# Patient Record
Sex: MALE | Race: WHITE | Employment: OTHER | URBAN - METROPOLITAN AREA
[De-identification: names, ages, dates, MRNs, and addresses within clinical notes are randomized per-mention and may not be internally consistent; named-entity substitution may affect disease eponyms.]

---

## 2017-02-10 PROBLEM — S32.000A CLOSED COMPRESSION FRACTURE OF LUMBAR VERTEBRA (HCC): Status: ACTIVE | Noted: 2017-02-10

## 2017-04-14 PROBLEM — K56.609 SMALL BOWEL OBSTRUCTION (HCC): Status: ACTIVE | Noted: 2017-04-14

## 2017-04-14 PROBLEM — K43.9 VENTRAL HERNIA: Status: ACTIVE | Noted: 2017-04-14

## 2017-04-14 PROBLEM — R10.9 ABDOMINAL PAIN: Status: ACTIVE | Noted: 2017-04-14

## 2017-04-15 PROBLEM — E66.9 OBESITY: Chronic | Status: ACTIVE | Noted: 2017-04-14

## 2017-04-15 PROBLEM — F17.200 NICOTINE DEPENDENCE: Chronic | Status: ACTIVE | Noted: 2017-04-14

## 2017-04-15 PROBLEM — G40.909 SEIZURE DISORDER (HCC): Chronic | Status: ACTIVE | Noted: 2017-04-14

## 2017-04-20 PROBLEM — F05 DELIRIUM DUE TO ANOTHER MEDICAL CONDITION: Status: ACTIVE | Noted: 2017-04-20

## 2017-04-20 PROBLEM — G45.9 TIA (TRANSIENT ISCHEMIC ATTACK): Status: ACTIVE | Noted: 2017-04-20

## 2017-12-11 PROBLEM — R56.9 SEIZURE (HCC): Status: ACTIVE | Noted: 2017-12-11

## 2017-12-12 PROBLEM — R10.9 ABDOMINAL PAIN: Status: RESOLVED | Noted: 2017-04-14 | Resolved: 2017-12-12

## 2017-12-12 PROBLEM — K56.609 SMALL BOWEL OBSTRUCTION (HCC): Status: RESOLVED | Noted: 2017-04-14 | Resolved: 2017-12-12

## 2017-12-12 PROBLEM — Z86.73 HISTORY OF TIA (TRANSIENT ISCHEMIC ATTACK): Status: ACTIVE | Noted: 2017-04-20

## 2018-03-19 LAB
LEFT VENTRICULAR EJECTION FRACTION HIGH VALUE: 71 %
LEFT VENTRICULAR EJECTION FRACTION MODE: NORMAL
LV EF: 61 %

## 2018-09-20 ENCOUNTER — OFFICE VISIT (OUTPATIENT)
Dept: CARDIOLOGY CLINIC | Age: 68
End: 2018-09-20
Payer: MEDICARE

## 2018-09-20 VITALS
DIASTOLIC BLOOD PRESSURE: 66 MMHG | HEIGHT: 70 IN | WEIGHT: 263 LBS | RESPIRATION RATE: 16 BRPM | SYSTOLIC BLOOD PRESSURE: 110 MMHG | BODY MASS INDEX: 37.65 KG/M2 | HEART RATE: 66 BPM

## 2018-09-20 DIAGNOSIS — I10 ESSENTIAL HYPERTENSION: ICD-10-CM

## 2018-09-20 DIAGNOSIS — K70.30 ALCOHOLIC CIRRHOSIS OF LIVER WITHOUT ASCITES (HCC): ICD-10-CM

## 2018-09-20 DIAGNOSIS — Z95.2 S/P TAVR (TRANSCATHETER AORTIC VALVE REPLACEMENT): ICD-10-CM

## 2018-09-20 DIAGNOSIS — I48.19 PERSISTENT ATRIAL FIBRILLATION (HCC): Primary | ICD-10-CM

## 2018-09-20 PROCEDURE — 1036F TOBACCO NON-USER: CPT | Performed by: INTERNAL MEDICINE

## 2018-09-20 PROCEDURE — 99213 OFFICE O/P EST LOW 20 MIN: CPT | Performed by: INTERNAL MEDICINE

## 2018-09-20 PROCEDURE — 4040F PNEUMOC VAC/ADMIN/RCVD: CPT | Performed by: INTERNAL MEDICINE

## 2018-09-20 PROCEDURE — 3017F COLORECTAL CA SCREEN DOC REV: CPT | Performed by: INTERNAL MEDICINE

## 2018-09-20 PROCEDURE — G8417 CALC BMI ABV UP PARAM F/U: HCPCS | Performed by: INTERNAL MEDICINE

## 2018-09-20 PROCEDURE — G8427 DOCREV CUR MEDS BY ELIG CLIN: HCPCS | Performed by: INTERNAL MEDICINE

## 2018-09-20 PROCEDURE — 93000 ELECTROCARDIOGRAM COMPLETE: CPT | Performed by: INTERNAL MEDICINE

## 2018-09-20 PROCEDURE — 1101F PT FALLS ASSESS-DOCD LE1/YR: CPT | Performed by: INTERNAL MEDICINE

## 2018-09-20 PROCEDURE — 1123F ACP DISCUSS/DSCN MKR DOCD: CPT | Performed by: INTERNAL MEDICINE

## 2018-09-20 RX ORDER — CEPHALEXIN 500 MG/1
500 CAPSULE ORAL 2 TIMES DAILY
COMMUNITY
End: 2019-06-17

## 2018-09-20 RX ORDER — LACOSAMIDE 100 MG/1
100 TABLET ORAL 2 TIMES DAILY
COMMUNITY

## 2018-09-20 RX ORDER — PREDNISONE 1 MG/1
1 TABLET ORAL DAILY
COMMUNITY
End: 2019-06-17

## 2018-09-20 RX ORDER — OMEPRAZOLE 40 MG/1
40 CAPSULE, DELAYED RELEASE ORAL DAILY
COMMUNITY

## 2018-09-20 NOTE — PROGRESS NOTES
[facility] Cardiology  Kissee Mills MD Josseline Fiore MD Herbie Gianotti, MD Murel Mesi. Ramiro Hastings MD            Tyrell Alvarenga   1950  Faith Menjivar MD    Mr. Tyrell Alvarenga was in the outpatient office on 2018 for follow up of his valvular heart disease. This 79-year-old gentleman with a history of chronic alcoholic cirrhosis and esophageal varices and underwent TAVR at [facility] in 2017. His last echocardiogram earlier this year at the Valve clinic at [facility] revealed a gradient of 12 and a normal EF. He presented to our office for a 6 month follow up visit. In the interim, he is noted to have right upper lobe nodule that has been growing. There is concern for cancer. He is scheduled to follow up with Pulmonology at [facility] in the next few weeks. He notes no new cardiac complaints. He ambulates with the help of a cane. He notes no shortness of breath, orthopnea or PND with it. No lower extremity edema. Past medical history:   1. Hypertension. 2. Long history of alcohol abuse. 3. Alcoholic cirrhosis with varices and upper GI bleed, 2011.   4. Auto anticoagulation. The patient's INR was 1.9 on 2012 despite the fact that he was not on anticoagulants. It has been documented as being high since at least 2011.   5. Elevated liver function studies due to alcoholic cirrhosis. 6. JAY. The patient had soft palate resection for this and does not use CPAP. 7. Banding of esophageal varices, 2011.   8. Family history negative for early CAD. His father  of \"natural causes\" and his mother  of leukemia. A younger brother who weighs over 450 pounds may have some heart problems but the patient is not sure. 9. Hospitalization, 2012 with newly documented AF. 10. Echo, 2012. Supravalvular aortic stenosis with peak and mean gradients of 33 and 22 mm/Hg respectively, effective orifice area was 1.6 cm ².  He did have normal LV internal dimensions is normal.      EKG, as per my interpretation, reveals atrial fibrillation, controlled ventricular response. No acute ST-T changes. Mr. Karen Hill was in the outpatient office for follow up of his valvular heart disease and AF. Echocardiogram done in [facility] a few months ago at the [facility]ve Clinic revealed an appropriately functioning ES3. He therefore requests no further work up. If he was rate controlled, as mentioned, he was deemed a poor anticoagulation candidate secondary to brain bleed. He has no evidence of volume overload. I therefore made no medication changes today, which include the following:   cephALEXin (KEFLEX) 500 MG capsule Take 500 mg by mouth 2 times daily   lacosamide (VIMPAT) 100 MG TABS tablet Take 100 mg by mouth 2 times daily. Janusz Dominguez omeprazole (PRILOSEC) 20 MG delayed release capsule Take 20 mg by mouth daily   predniSONE (DELTASONE) 1 MG tablet Take 1 mg by mouth daily   Calcium Citrate-Vitamin D (CALCIUM CITRATE + D PO) Take 600 mg by mouth daily   spironolactone (ALDACTONE) 25 MG tablet Take 25 mg by mouth daily   vitamin B-1 (THIAMINE) 100 MG tablet Take 100 mg by mouth daily   acetaminophen (TYLENOL) 325 MG tablet Take 325 mg by mouth 3 times daily    levETIRAcetam (KEPPRA) 500 MG tablet Take 2 tablets by mouth 2 times daily   aspirin 81 MG tablet Take 81 mg by mouth daily   hydroxychloroquine (PLAQUENIL) 200 MG tablet Take 400 mg by mouth nightly    folic acid (FOLVITE) 1 MG tablet Take 1 mg by mouth daily    lactulose 20 GM/30ML SOLN 20 g by Other route daily    Cholecalciferol (VITAMIN D) 2000 UNITS TABS tablet Take 4,000 Units by mouth daily    nadolol (CORGARD) 80 MG tablet Take 80 mg by mouth daily      Should he remain stable from a cardiac perspective, he will be seen back in our office in 6 months. Thank you for allowing us to participate in the care of this patient.      RPV/tlr

## 2018-09-20 NOTE — PATIENT INSTRUCTIONS
Patient Education        A Healthy Heart: Care Instructions  Your Care Instructions    Heart disease occurs when a substance called plaque builds up in the vessels that supply oxygen-rich blood to your heart. This can narrow the blood vessels and reduce blood flow. A heart attack happens when blood flow is completely blocked. A high-fat diet, smoking, and other factors increase the risk of heart disease. Your doctor has found that you have a chance of having heart disease. You can do lots of things to keep your heart healthy. It may not be easy, but you can change your diet, exercise more, and quit smoking. These steps really work to lower your chance of heart disease. Follow-up care is a key part of your treatment and safety. Be sure to make and go to all appointments, and call your doctor if you are having problems. It's also a good idea to know your test results and keep a list of the medicines you take. How can you care for yourself at home? Diet    · Use less salt when you cook and eat. This helps lower your blood pressure. Taste food before salting. Add only a little salt when you think you need it. With time, your taste buds will adjust to less salt.     · Eat fewer snack items, fast foods, canned soups, and other high-salt, high-fat, processed foods.     · Read food labels and try to avoid saturated and trans fats. They increase your risk of heart disease by raising cholesterol levels.     · Limit the amount of solid fat-butter, margarine, and shortening-you eat. Use olive, peanut, or canola oil when you cook. Bake, broil, and steam foods instead of frying them.     · Eating fish can lower your risk for heart disease. Eat at least 2 servings of fish a week. [place], mackerel, herring, sardines, and chunk light tuna are very good choices. These fish contain omega-3 fatty acids.     · Eat a variety of fruit and vegetables every day.  Dark green, deep orange, red, or yellow fruits and vegetables are

## 2018-10-01 ENCOUNTER — TELEPHONE (OUTPATIENT)
Dept: CARDIOLOGY CLINIC | Age: 68
End: 2018-10-01

## 2018-10-01 NOTE — TELEPHONE ENCOUNTER
Dr Marie Santana, [facility]  Requesting cardiac clearance prior to bronchoscopy for lung nodule that lit up on PET scan.    Please advise,

## 2018-10-05 ENCOUNTER — HOSPITAL ENCOUNTER (OUTPATIENT)
Age: 68
Discharge: HOME | End: 2018-10-07

## 2018-10-05 PROCEDURE — 88305 TISSUE EXAM BY PATHOLOGIST: CPT

## 2018-10-05 PROCEDURE — 87081 CULTURE SCREEN ONLY: CPT

## 2018-10-06 LAB — CLOTEST: NORMAL

## 2019-05-09 ENCOUNTER — OFFICE VISIT (OUTPATIENT)
Dept: CARDIOLOGY CLINIC | Age: 69
End: 2019-05-09
Payer: MEDICARE

## 2019-05-09 VITALS
SYSTOLIC BLOOD PRESSURE: 124 MMHG | HEIGHT: 70 IN | RESPIRATION RATE: 20 BRPM | WEIGHT: 280.4 LBS | HEART RATE: 74 BPM | BODY MASS INDEX: 40.14 KG/M2 | DIASTOLIC BLOOD PRESSURE: 72 MMHG

## 2019-05-09 DIAGNOSIS — R06.09 DOE (DYSPNEA ON EXERTION): ICD-10-CM

## 2019-05-09 DIAGNOSIS — I48.0 PAROXYSMAL ATRIAL FIBRILLATION (HCC): Primary | ICD-10-CM

## 2019-05-09 DIAGNOSIS — Z95.2 S/P TAVR (TRANSCATHETER AORTIC VALVE REPLACEMENT): ICD-10-CM

## 2019-05-09 DIAGNOSIS — I10 ESSENTIAL HYPERTENSION: ICD-10-CM

## 2019-05-09 PROCEDURE — 1123F ACP DISCUSS/DSCN MKR DOCD: CPT | Performed by: INTERNAL MEDICINE

## 2019-05-09 PROCEDURE — 3017F COLORECTAL CA SCREEN DOC REV: CPT | Performed by: INTERNAL MEDICINE

## 2019-05-09 PROCEDURE — G8427 DOCREV CUR MEDS BY ELIG CLIN: HCPCS | Performed by: INTERNAL MEDICINE

## 2019-05-09 PROCEDURE — 1036F TOBACCO NON-USER: CPT | Performed by: INTERNAL MEDICINE

## 2019-05-09 PROCEDURE — G8417 CALC BMI ABV UP PARAM F/U: HCPCS | Performed by: INTERNAL MEDICINE

## 2019-05-09 PROCEDURE — 4040F PNEUMOC VAC/ADMIN/RCVD: CPT | Performed by: INTERNAL MEDICINE

## 2019-05-09 PROCEDURE — 93000 ELECTROCARDIOGRAM COMPLETE: CPT | Performed by: INTERNAL MEDICINE

## 2019-05-09 PROCEDURE — 99213 OFFICE O/P EST LOW 20 MIN: CPT | Performed by: INTERNAL MEDICINE

## 2019-05-09 RX ORDER — POTASSIUM CITRATE 10 MEQ/1
TABLET, EXTENDED RELEASE ORAL
COMMUNITY

## 2019-05-09 NOTE — PATIENT INSTRUCTIONS
the  may tell you to chew 1 adult-strength or 2 to 4 low-dose aspirin. Wait for an ambulance. Do not try to drive yourself.   Watch closely for changes in your health, and be sure to contact your doctor if you have any problems. Where can you learn more? Go to [email or web address] and sign in to your [employer] account. Enter X860 in the [facility] box to learn more about \"A Healthy Heart: Care Instructions. \"     If you do not have an account, please click on the \"Sign Up Now\" link. Current as of: July 22, 2018  Content Version: 12.0  © 3013-2910 Healthwise, Incorporated. Care instructions adapted under license by [facility] ([facility]). If you have questions about a medical condition or this instruction, always ask your healthcare professional. [address] any warranty or liability for your use of this information.

## 2019-05-10 NOTE — PROGRESS NOTES
[facility]  Rayann Crick, MD Ian Matar, MD Glennis Percy, MD Reuben Nyhan. Vianey Bartlett MD            Garold Nyhan   1950  Bozena Andrews MD    Mr. Garold Nyhan was in the outpatient office on 2019 for follow up of his valvular heart disease. This 78-year-old obese gentleman has a history of chronic alcoholic cirrhosis, esophageal varices and is S/P TAVR in [facility] in 2017. His last echo in 2018 revealed normal EF with a gradient of 12. During the last office visit, he had noted a right upper pulmonary nodule. Biopsy was consistent with cancer. He therefore underwent 5 cycles of radiation therapy at [facility]. He follows with Pulmonology there for further evaluation. He ambulates with the help of a cane. He notes no chest pain. His baseline dyspnea is unchanged. He denies orthopnea or PND. No lower extremity edema. Past medical history:   1. Hypertension. 2. Long history of alcohol abuse. 3. Alcoholic cirrhosis with varices and upper GI bleed, 2011.   4. Auto anticoagulation. The patient's INR was 1.9 on 2012 despite the fact that he was not on anticoagulants. It has been documented as being high since at least 2011.   5. Elevated liver function studies due to alcoholic cirrhosis. 6. JAY. The patient had soft palate resection for this and does not use CPAP. 7. Banding of esophageal varices, 2011.   8. Family history negative for early CAD. His father  of \"natural causes\" and his mother  of leukemia. A younger brother who weighs over 450 pounds may have some heart problems but the patient is not sure. 9. Hospitalization, 2012 with newly documented AF. 10. Echo, 2012. Supravalvular aortic stenosis with peak and mean gradients of 33 and 22 mm/Hg respectively, effective orifice area was 1.6 cm ².  He did have normal LV internal dimensions with mild concentric LVH, normal LV regional wall motion and    20. Admission, [facility], 12/2017, with seizures.  Prolonged hospital course complicated by fall and right hip fracture.  S/P hip replacement surgery. 21. Echocardiogram, [facility], 3/19/2018, EF 66 +/-5%. Dilated RV with normal function. Severely dilated left atrium, dilated right atrial cavity, S/P TAVR (29 mm S3) with mean gradient of 12 and dimensional index of .52.    22. Atrial fibrillation noted to be persistent. Deemed not an anticoagulation candidate secondary to brain bleed in 2016. Watchman reported attempted at [facility], unsuccessful. 23. Right upper lobe lung cancer. S/P 5 doses of radiation therapy. Follows at [facility]. Last CT scan, 04/2019, fibrosis in the right upper lung with decrease in the size of pulmonary nodules. No new or enlarging lymphadenopathy.      Review of Systems:  HEENT: negative for acute visual and auditory problems  Constitutional: negative for fever and chills  Respiratory: negative for cough and hemoptysis  Cardiovascular: negative for chest pain and dyspnea  Gastrointestinal: negative for abdominal pain, diarrhea, nausea and vomiting  Genitourinary: negative for dysuria and hematuria  Derm: negative for rash and skin lesion(s)  Neurological: negative for seizures and tremors  Endocrine: negative for diabetic symptoms including polydipsia and polyuria  Musculoskeletal: negative for CTD  Psychiatric: negative for anxiety and major depression    On exam, he is a middle-aged, morbidly obese gentleman in no particular distress. BP: 124/72. P: 74.  R: 20. Wt. 218 lbs. BMI: 40. HEENT, conjunctiva pink. Sclerae anicteric. Mucous membranes are moist.  Neck, no JVD could be appreciated. Carotid upstrokes normal.  No bruits. Chest expands normally. No intercostal retractions. Cardiovascular exam reveals normal S1/S2. Regular rate and rhythm. No murmurs, rubs or gallops noted. Lungs have good air entry bilaterally without any creps, rhonchi or wheezes.   Abdomen is soft, nontender with intact bowel sounds. Extremities have no edema. Distal pulses are normal bilaterally. No cyanosis. Skin has no rashes. Neurologically, oriented x 3. Psych, patient is pleasant. Back has no tenderness. Muscle tone is normal.       EKG, as per my interpretation, reveals atrial fibrillation, controlled ventricular response. No acute ST-T changes. Mr. Colton Hill was in the outpatient office for follow up of his valvular heart disease and AF. His valve sounds normal on auscultation. His last echo about a year ago revealed normal gradients. Yearly echo will be repeated after this office visit. He does have long-standing persistent atrial fibrillation. He is rate controlled. He is deemed not a candidate for anticoagulation secondary to his liver disease and esophageal varices. He appears compensated and well perfused today. I therefore made no medication changes today, which include the following:   potassium citrate (UROCIT-K) 10 MEQ (1080 MG) extended release tablet Take by mouth 3 tabs tid   Teriparatide, Recombinant, (FORTEO SC) Inject into the skin   cephALEXin (KEFLEX) 500 MG capsule Take 500 mg by mouth 2 times daily   lacosamide (VIMPAT) 100 MG TABS tablet Take 100 mg by mouth 2 times daily. Suezrichelle Je    omeprazole (PRILOSEC) 20 MG delayed release capsule Take 40 mg by mouth daily    predniSONE (DELTASONE) 1 MG tablet Take 1 mg by mouth daily   Calcium Citrate-Vitamin D (CALCIUM CITRATE + D PO) Take 600 mg by mouth daily   spironolactone (ALDACTONE) 25 MG tablet Take 25 mg by mouth daily   vitamin B-1 (THIAMINE) 100 MG tablet Take 100 mg by mouth daily   acetaminophen (TYLENOL) 325 MG tablet Take 325 mg by mouth 3 times daily    levETIRAcetam (KEPPRA) 500 MG tablet Take 2 tablets by mouth 2 times daily   aspirin 81 MG tablet Take 81 mg by mouth daily   hydroxychloroquine (PLAQUENIL) 200 MG tablet Take 400 mg by mouth nightly    folic acid (FOLVITE) 1 MG tablet Take 1 mg by mouth daily    lactulose 20 GM/30ML SOLN 20 g by Other route daily    Cholecalciferol (VITAMIN D) 2000 UNITS TABS tablet Take 4,000 Units by mouth daily    nadolol (CORGARD) 80 MG tablet Take 80 mg by mouth daily      If he remains stable from a cardiac perspective, he will be seen back in our office in a year. Thank you for allowing us to participate in the care of this patient.       RPV/tlr

## 2019-05-22 ENCOUNTER — TELEPHONE (OUTPATIENT)
Dept: CARDIOLOGY CLINIC | Age: 69
End: 2019-05-22

## 2019-05-29 ENCOUNTER — HOSPITAL ENCOUNTER (OUTPATIENT)
Dept: CARDIOLOGY | Age: 69
Discharge: HOME | End: 2019-05-29
Payer: MEDICARE

## 2019-05-29 DIAGNOSIS — Z95.2 S/P TAVR (TRANSCATHETER AORTIC VALVE REPLACEMENT): ICD-10-CM

## 2019-05-29 DIAGNOSIS — R06.09 DOE (DYSPNEA ON EXERTION): ICD-10-CM

## 2019-05-29 DIAGNOSIS — I10 ESSENTIAL HYPERTENSION: ICD-10-CM

## 2019-05-29 DIAGNOSIS — I48.0 PAROXYSMAL ATRIAL FIBRILLATION (HCC): ICD-10-CM

## 2019-05-29 LAB
LV EF: 65 %
LVEF MODALITY: NORMAL

## 2019-05-29 PROCEDURE — 93306 TTE W/DOPPLER COMPLETE: CPT

## 2019-06-14 ENCOUNTER — HOSPITAL ENCOUNTER (OUTPATIENT)
Age: 69
Discharge: HOME | End: 2019-06-16
Payer: MEDICARE

## 2019-06-14 LAB
ALBUMIN SERPL-MCNC: 3.6 G/DL (ref 3.5–5.2)
ALP BLD-CCNC: 82 U/L (ref 40–129)
ALT SERPL-CCNC: 14 U/L (ref 0–40)
ANION GAP SERPL CALCULATED.3IONS-SCNC: 13 MMOL/L (ref 7–16)
AST SERPL-CCNC: 32 U/L (ref 0–39)
BASOPHILS ABSOLUTE: 0.03 E9/L (ref 0–0.2)
BASOPHILS RELATIVE PERCENT: 0.6 % (ref 0–2)
BILIRUB SERPL-MCNC: 0.8 MG/DL (ref 0–1.2)
BILIRUBIN DIRECT: 0.3 MG/DL (ref 0–0.3)
BILIRUBIN, INDIRECT: 0.5 MG/DL (ref 0–1)
BUN BLDV-MCNC: 21 MG/DL (ref 8–23)
C-REACTIVE PROTEIN: 0.4 MG/DL (ref 0–0.4)
CALCIUM SERPL-MCNC: 9.7 MG/DL (ref 8.6–10.2)
CHLORIDE BLD-SCNC: 103 MMOL/L (ref 98–107)
CO2: 25 MMOL/L (ref 22–29)
CREAT SERPL-MCNC: 1.3 MG/DL (ref 0.7–1.2)
EOSINOPHILS ABSOLUTE: 0.07 E9/L (ref 0.05–0.5)
EOSINOPHILS RELATIVE PERCENT: 1.4 % (ref 0–6)
GFR AFRICAN AMERICAN: >60
GFR NON-AFRICAN AMERICAN: 55 ML/MIN/1.73
GLUCOSE BLD-MCNC: 116 MG/DL (ref 74–99)
HCT VFR BLD CALC: 38.5 % (ref 37–54)
HEMOGLOBIN: 12 G/DL (ref 12.5–16.5)
IMMATURE GRANULOCYTES #: 0.02 E9/L
IMMATURE GRANULOCYTES %: 0.4 % (ref 0–5)
LYMPHOCYTES ABSOLUTE: 0.61 E9/L (ref 1.5–4)
LYMPHOCYTES RELATIVE PERCENT: 12.4 % (ref 20–42)
MCH RBC QN AUTO: 29.6 PG (ref 26–35)
MCHC RBC AUTO-ENTMCNC: 31.2 % (ref 32–34.5)
MCV RBC AUTO: 94.8 FL (ref 80–99.9)
MONOCYTES ABSOLUTE: 0.51 E9/L (ref 0.1–0.95)
MONOCYTES RELATIVE PERCENT: 10.3 % (ref 2–12)
NEUTROPHILS ABSOLUTE: 3.69 E9/L (ref 1.8–7.3)
NEUTROPHILS RELATIVE PERCENT: 74.9 % (ref 43–80)
PDW BLD-RTO: 15.4 FL (ref 11.5–15)
PLATELET # BLD: 66 E9/L (ref 130–450)
PLATELET CONFIRMATION: NORMAL
PMV BLD AUTO: 11.8 FL (ref 7–12)
POTASSIUM SERPL-SCNC: 4.4 MMOL/L (ref 3.5–5)
RBC # BLD: 4.06 E12/L (ref 3.8–5.8)
SEDIMENTATION RATE, ERYTHROCYTE: 3 MM/HR (ref 0–15)
SODIUM BLD-SCNC: 141 MMOL/L (ref 132–146)
TOTAL PROTEIN: 6.9 G/DL (ref 6.4–8.3)
WBC # BLD: 4.9 E9/L (ref 4.5–11.5)

## 2019-06-14 PROCEDURE — 80053 COMPREHEN METABOLIC PANEL: CPT

## 2019-06-14 PROCEDURE — 36415 COLL VENOUS BLD VENIPUNCTURE: CPT

## 2019-06-14 PROCEDURE — 85651 RBC SED RATE NONAUTOMATED: CPT

## 2019-06-14 PROCEDURE — 82248 BILIRUBIN DIRECT: CPT

## 2019-06-14 PROCEDURE — 86140 C-REACTIVE PROTEIN: CPT

## 2019-06-14 PROCEDURE — 85025 COMPLETE CBC W/AUTO DIFF WBC: CPT

## 2019-06-17 ENCOUNTER — OFFICE VISIT (OUTPATIENT)
Dept: RHEUMATOLOGY | Age: 69
End: 2019-06-17
Payer: MEDICARE

## 2019-06-17 VITALS
DIASTOLIC BLOOD PRESSURE: 82 MMHG | HEIGHT: 68 IN | TEMPERATURE: 97.2 F | SYSTOLIC BLOOD PRESSURE: 132 MMHG | HEART RATE: 86 BPM | BODY MASS INDEX: 43.29 KG/M2 | WEIGHT: 285.6 LBS | OXYGEN SATURATION: 98 %

## 2019-06-17 DIAGNOSIS — M81.0 AGE-RELATED OSTEOPOROSIS WITHOUT CURRENT PATHOLOGICAL FRACTURE: ICD-10-CM

## 2019-06-17 DIAGNOSIS — M05.79 RHEUMATOID ARTHRITIS INVOLVING MULTIPLE SITES WITH POSITIVE RHEUMATOID FACTOR (HCC): ICD-10-CM

## 2019-06-17 PROCEDURE — 99214 OFFICE O/P EST MOD 30 MIN: CPT | Performed by: INTERNAL MEDICINE

## 2019-06-17 RX ORDER — PREDNISONE 1 MG/1
TABLET ORAL
Refills: 0 | COMMUNITY
Start: 2019-04-22 | End: 2019-08-19 | Stop reason: SDUPTHER

## 2019-06-17 ASSESSMENT — ENCOUNTER SYMPTOMS
EYE ITCHING: 0
ABDOMINAL PAIN: 0
TROUBLE SWALLOWING: 0
CONSTIPATION: 0
SINUS PAIN: 0
SINUS PRESSURE: 0
DIARRHEA: 0
COUGH: 0
VOMITING: 0
SHORTNESS OF BREATH: 0
SORE THROAT: 0
COLOR CHANGE: 0
WHEEZING: 0
EYE REDNESS: 0
CHEST TIGHTNESS: 0
PHOTOPHOBIA: 0
BLOOD IN STOOL: 0
ABDOMINAL DISTENTION: 0
EYE PAIN: 0
EYE DISCHARGE: 0

## 2019-06-17 ASSESSMENT — ROUTINE ASSESSMENT OF PATIENT INDEX DATA (RAPID3)
ON A SCALE OF ONE TO TEN, CONSIDERING ALL THE WAYS IN WHICH ILLNESS AND HEALTH CONDITIONS MAY AFFECT YOU AT THIS TIME, PLEASE INDICATE BELOW HOW YOU ARE DOING:: 6
ON A SCALE OF ONE TO TEN, HOW MUCH PAIN HAVE YOU HAD BECAUSE OF YOUR CONDITION OVER THE PAST WEEK?: 7
TOTAL RAPID3 SCORE: 13

## 2019-06-17 NOTE — PROGRESS NOTES
adenopathy. Does not bruise/bleed easily. Psychiatric/Behavioral: Negative for confusion. The patient is not nervous/anxious. Current Outpatient Medications:     predniSONE (DELTASONE) 5 MG tablet, take 1 tablet by mouth once daily, Disp: , Rfl: 0    teriparatide, recombinant, (FORTEO) 600 MCG/2.4ML SOLN injection, Inject 0.08 mLs into the skin daily, Disp: 90 pen, Rfl: 5    potassium citrate (UROCIT-K) 10 MEQ (1080 MG) extended release tablet, Take by mouth 3 tabs tid, Disp: , Rfl:     lacosamide (VIMPAT) 100 MG TABS tablet, Take 100 mg by mouth 2 times daily. ., Disp: , Rfl:     omeprazole (PRILOSEC) 20 MG delayed release capsule, Take 40 mg by mouth daily , Disp: , Rfl:     Calcium Citrate-Vitamin D (CALCIUM CITRATE + D PO), Take 600 mg by mouth daily, Disp: , Rfl:     spironolactone (ALDACTONE) 25 MG tablet, Take 25 mg by mouth daily, Disp: , Rfl:     vitamin B-1 (THIAMINE) 100 MG tablet, Take 100 mg by mouth daily, Disp: , Rfl:     acetaminophen (TYLENOL) 325 MG tablet, Take 325 mg by mouth 3 times daily , Disp: , Rfl:     levETIRAcetam (KEPPRA) 500 MG tablet, Take 2 tablets by mouth 2 times daily, Disp: 60 tablet, Rfl: 0    aspirin 81 MG tablet, Take 81 mg by mouth daily, Disp: , Rfl:     hydroxychloroquine (PLAQUENIL) 200 MG tablet, Take 400 mg by mouth nightly , Disp: , Rfl:     folic acid (FOLVITE) 1 MG tablet, Take 1 mg by mouth daily , Disp: , Rfl:     lactulose 20 GM/30ML SOLN, 20 g by Other route daily , Disp: , Rfl:     Cholecalciferol (VITAMIN D) 2000 UNITS TABS tablet, Take 4,000 Units by mouth daily , Disp: , Rfl:     nadolol (CORGARD) 80 MG tablet, Take 80 mg by mouth daily , Disp: , Rfl:   No Known Allergies        Past Medical History:   Diagnosis Date    [facility] [facility]     [facility]     Has been sober / dry since 01/2016.     Arthritis     Chronic atrial fibrillation (HCC)     Esophageal varices (HCC)     UGI bleed ~2012 - has had variceal banding Sexual activity: Not Currently     Partners: Female   Lifestyle    Physical activity:     Days per week: Not on file     Minutes per session: Not on file    Stress: Not on file   Relationships    Social connections:     Talks on phone: Not on file     Gets together: Not on file     Attends Zoroastrianism service: Not on file     Active member of club or organization: Not on file     Attends meetings of clubs or organizations: Not on file     Relationship status: Not on file    Intimate partner violence:     Fear of current or ex partner: Not on file     Emotionally abused: Not on file     Physically abused: Not on file     Forced sexual activity: Not on file   Other Topics Concern    Not on file   Social History Narrative    Drinks occ Cola. Social History     Social History Narrative    Drinks occ Cola. Vitals:    06/17/19 1109   BP: 132/82   Site: Right Upper Arm   Position: Sitting   Pulse: 86   Temp: 97.2 °F (36.2 °C)   TempSrc: Temporal   SpO2: 98%   Weight: 285 lb 9.6 oz (129.5 kg)   Height: 5' 8\" (1.727 m)        Physical Exam   Constitutional: He is oriented to person, place, and time. He appears well-developed and well-nourished. HENT:   Head: Normocephalic. Right Ear: External ear normal.   Left Ear: External ear normal.   Mouth/Throat: Oropharynx is clear and moist.   Eyes: Pupils are equal, round, and reactive to light. Conjunctivae and EOM are normal.   Neck: Normal range of motion. No thyromegaly present. Cardiovascular: Normal rate, regular rhythm and intact distal pulses. Pulmonary/Chest: Effort normal and breath sounds normal. He has no wheezes. He has no rales. He exhibits no tenderness. Abdominal: Soft. Bowel sounds are normal. He exhibits no distension and no mass. There is no tenderness. There is no rebound and no guarding. Musculoskeletal: Normal range of motion. Lymphadenopathy:     He has no cervical adenopathy.    Neurological: He is alert and oriented to person, place, and time. Skin: Skin is warm and dry. Capillary refill takes less than 2 seconds. No rash noted. Psychiatric: He has a normal mood and affect. His behavior is normal.        De Villa was seen today for joint pain and joint swelling. Diagnoses and all orders for this visit:    Rheumatoid arthritis involving multiple sites with positive rheumatoid factor (HCC)    Age-related osteoporosis without current pathological fracture    Other orders  -     teriparatide, recombinant, (FORTEO) 600 MCG/2.4ML SOLN injection; Inject 0.08 mLs into the skin daily     Rheumatoid arthritis was diagnosed by Dr. Jhon Carreon manifested as arthralgia and low titers ofanti-CCP antibody. Not a candidate for DMARD's due to liver cirrhosis. He has been complaining of shoulder pain. X ray  of bilateral shelters were reviewed. hose were consistent with arthritis of AC joints. Glenohumeral joint itself is normal . Continue 5 mg of prednisone. Continue 400 mg, Plaquenil    Osteoporosis :   Continue Forteo. Continue 1200 mg of Jalen and 800 IU of Vit D. Return in about 3 months (around 9/17/2019). Eugenia Angel MD     *This document was created using voice recognition software. Note was reviewed, however may contain grammatical errors.

## 2019-06-19 ENCOUNTER — PROCEDURE VISIT (OUTPATIENT)
Dept: PODIATRY | Age: 69
End: 2019-06-19
Payer: MEDICARE

## 2019-06-19 ENCOUNTER — OFFICE VISIT (OUTPATIENT)
Dept: FAMILY MEDICINE CLINIC | Age: 69
End: 2019-06-19
Payer: MEDICARE

## 2019-06-19 VITALS
SYSTOLIC BLOOD PRESSURE: 122 MMHG | OXYGEN SATURATION: 98 % | TEMPERATURE: 97.9 F | BODY MASS INDEX: 42.74 KG/M2 | HEIGHT: 68 IN | HEART RATE: 66 BPM | DIASTOLIC BLOOD PRESSURE: 78 MMHG | WEIGHT: 282 LBS

## 2019-06-19 VITALS
DIASTOLIC BLOOD PRESSURE: 62 MMHG | WEIGHT: 289 LBS | SYSTOLIC BLOOD PRESSURE: 87 MMHG | TEMPERATURE: 97.8 F | BODY MASS INDEX: 43.94 KG/M2

## 2019-06-19 DIAGNOSIS — R26.2 DIFFICULTY WALKING: ICD-10-CM

## 2019-06-19 DIAGNOSIS — M79.675 PAIN IN LEFT TOE(S): ICD-10-CM

## 2019-06-19 DIAGNOSIS — I48.0 PAROXYSMAL ATRIAL FIBRILLATION (HCC): ICD-10-CM

## 2019-06-19 DIAGNOSIS — I73.9 PERIPHERAL VASCULAR DISEASE, UNSPECIFIED (HCC): ICD-10-CM

## 2019-06-19 DIAGNOSIS — M79.674 PAIN IN TOE OF RIGHT FOOT: ICD-10-CM

## 2019-06-19 DIAGNOSIS — B35.1 TINEA UNGUIUM: Primary | ICD-10-CM

## 2019-06-19 DIAGNOSIS — I10 ESSENTIAL HYPERTENSION: Primary | ICD-10-CM

## 2019-06-19 DIAGNOSIS — E66.01 MORBID OBESITY WITH BMI OF 40.0-44.9, ADULT (HCC): ICD-10-CM

## 2019-06-19 DIAGNOSIS — E66.01 CLASS 3 SEVERE OBESITY DUE TO EXCESS CALORIES WITH SERIOUS COMORBIDITY AND BODY MASS INDEX (BMI) OF 40.0 TO 44.9 IN ADULT (HCC): ICD-10-CM

## 2019-06-19 DIAGNOSIS — K70.30 ALCOHOLIC CIRRHOSIS OF LIVER WITHOUT ASCITES (HCC): ICD-10-CM

## 2019-06-19 PROCEDURE — 11721 DEBRIDE NAIL 6 OR MORE: CPT | Performed by: PODIATRIST

## 2019-06-19 PROCEDURE — 99214 OFFICE O/P EST MOD 30 MIN: CPT | Performed by: INTERNAL MEDICINE

## 2019-06-19 ASSESSMENT — ENCOUNTER SYMPTOMS
SHORTNESS OF BREATH: 0
DIARRHEA: 0
COUGH: 0
BLOOD IN STOOL: 0
RHINORRHEA: 0
WHEEZING: 0
BACK PAIN: 1
VOMITING: 0
SINUS PAIN: 0
ABDOMINAL PAIN: 0
CONSTIPATION: 0
NAUSEA: 0

## 2019-06-19 ASSESSMENT — PATIENT HEALTH QUESTIONNAIRE - PHQ9
2. FEELING DOWN, DEPRESSED OR HOPELESS: 0
SUM OF ALL RESPONSES TO PHQ QUESTIONS 1-9: 0
SUM OF ALL RESPONSES TO PHQ9 QUESTIONS 1 & 2: 0
1. LITTLE INTEREST OR PLEASURE IN DOING THINGS: 0
SUM OF ALL RESPONSES TO PHQ QUESTIONS 1-9: 0

## 2019-06-19 NOTE — PROGRESS NOTES
Logan Castaneda  Return Patient    Chief Complaint   Patient presents with    Toe Pain     nail care saw PCP Dr. Manuel Najera on 3/13/2019       Subjective: This Grant Omar comes to clinic for foot and nail care. Pt currently has complaint of thickened, painful, elongated nails that he/she cannot manage by themselves. Pt. Relates pain to nails with shoe gear. Pt's primary care physician is Ela Sheikh MD.  Past Medical History:   Diagnosis Date    Alcoholic cirrhosis ([address]     [facility]ism ([facility] [facility]     Has been sober / dry since 01/2016.  Arthritis     Chronic atrial fibrillation (HCC)     Esophageal varices (HCC)     UGI bleed ~2012 - has had variceal banding 2x around that time.  History of kidney stones     Hypertension     Lumbar compression fracture ([address] 02/2017    Lung tumor 2017    Osteopenia     S/P TAVR (transcatheter aortic valve replacement) 9/20/2018    Seizure disorder ([facility] [address] 56/4742    Complicated a stroke with occipital ICH.  Seizures ([facility] [address]     Sepsis with MRSE bacteremia 09/2016    resolved    Severe aortic stenosis     Confirmed by echo 9/2016, BROWN 10/2016. Undergoing w/u at [facility] for TAVR, as of 4/2017.  Sleep apnea     Couldn't tolerate CPAP therapy ~2007. Had had UPPP.  Stroke ([facility] [address] 09/2016    With receptive aphasia, poor memory that are improving as of 04/2017. Pt was found to have an occipital ICH at that time, with possible amyloid angiopathy as cause per MRI subsequently. Had seizure and sepsis (d/t MRSE bacteremia) complicating stroke.        No Known Allergies  Current Outpatient Medications on File Prior to Visit   Medication Sig Dispense Refill    predniSONE (DELTASONE) 5 MG tablet take 1 tablet by mouth once daily  0    teriparatide, recombinant, (FORTEO) 600 MCG/2.4ML SOLN injection Inject 0.08 mLs into the skin daily 90 pen 5    potassium citrate (UROCIT-K) 10 MEQ (1080 MG) extended release tablet Take by mouth 3 tabs tid      lacosamide (VIMPAT) [x] [x] [x] [x] [x] [x] [x] [x] [x] [x]  5 4 3  2 1 1 2 3 4 5                         Right                                        Left      Nails that are described above are all elongated thickened pitting mycotic yellowish incurvated causing pain with both shoe gear. Palpation nails greater then 3 mm thick painful       Dermatologic Exam:hair loss noted  lower extremity    Skin lesion/ulceration   Skin   Callus neg  Musculoskeletal:     1st MPJ ROM normal, Bilateral.  Muscle strength 5/5, Bilateral.  Pain present upon palpation of toenails 1-5  Bilateral., Bilateral.  Ankle ROM normal,Bilateral.    Dorsally contracted digits , Bilateral.     Vascular:  Pulses   bilateral DP absnet    PT absient    severe varicose veins noted to lower extremity there is loss of hair cool to touch discoloration to feet nails are mycotic dystrophic         Neurological:  Sensation present to light touch to level of digits, Bilateral.    Foot Exam     Ortho Exam  Q7   []Yes    []No                Q8   [x]Yes    []No                     Q9   []Yes    []No  Assessment:  70 y.o. male with:   1. Tinea unguium    2. Peripheral vascular disease, unspecified ([address]    3. Pain in toe of right foot    4. Pain in left toe(s)    5. Difficulty walking     No orders of the defined types were placed in this encounter. Plan:   Pt was evaluated and examined. Patient was given personalized discharge instructions. Nails 1-10 were debrided in length and thickness sharply with a nail nipper and  without incident. Pt will follow up in 9 weeks or sooner if any problems arise. Diagnosis was discussed with the pt and all of their questions were answered in detail. Proper foot hygiene and care was discussed with the pt. Patient to check feet daily and contact the office with any questions/problems/concerns. Other comorbidity noted and will be managed by PCP. Pain waiver discussed with patient and confirmed.    6/19/2019      Electronically signed by Jada Whatley DPM on 6/19/2019 at 10:26 AM  6/19/2019

## 2019-06-20 NOTE — PROGRESS NOTES
[id number] [facility]     19  Kia Davis : 1950 Sex: male  Age: 70 y.o. Chief Complaint   Patient presents with    Hypertension     3m   Multiple medical problem follow-up. HPI patient presents today for follow-up visit on his multiple medical problems. He is accompanied by his wife today. Doing reasonably well. States his blood pressure has been good. He has had no seizure activity. Since I have seen him last he has seen urology pulmonary GI and rheumatology. I reviewed all of their notes. In addition to the above he also sees podiatry, neurology for seizure and neurology for stroke, cardiology radiation oncology. He denies any falls    Review of Systems   Constitutional: Positive for fatigue. Negative for activity change, appetite change, chills, diaphoresis, fever and unexpected weight change. HENT: Positive for postnasal drip. Negative for congestion, rhinorrhea and sinus pain. Respiratory: Negative for cough, shortness of breath and wheezing. Cardiovascular: Negative for chest pain, palpitations and leg swelling. Chronic atrial fibrillation not on anticoagulation secondary to falls and bleeding   Gastrointestinal: Negative for abdominal pain, blood in stool, constipation, diarrhea, nausea and vomiting. Colic cirrhosis. Some loose stool lactulose that he is on. Endocrine: Negative. Genitourinary: Negative for difficulty urinating, dysuria, frequency, hematuria and urgency. Decreased urinary stream--saw urology  for hematuria. Musculoskeletal: Positive for arthralgias and back pain. Negative for gait problem and myalgias. Ports arthritis and lower back pain/low back pain severe at times has been taking Tylenol. Some improvement. Rheumatoid arthritis--neurosurgery did not recommend surgery at this time for his back-recent right hip fracture and repair-also complaining of worsening rheumatoid arthritic symptoms.    Skin: Negative. Allergic/Immunologic: Negative for environmental allergies and immunocompromised state. Neurological: Negative for dizziness, weakness, light-headedness, numbness and headaches. Relapses, numbness, seizures (although not recent) TIA-watchman procedure failed and we are unable to anticoagulate this gentleman   Hematological: Negative. Psychiatric/Behavioral: Negative for behavioral problems, confusion and sleep disturbance. The patient is not nervous/anxious. REST OF PERTINENT ROS GONE OVER AND WAS NEGATIVE. Current Outpatient Medications:     predniSONE (DELTASONE) 5 MG tablet, take 1 tablet by mouth once daily, Disp: , Rfl: 0    teriparatide, recombinant, (FORTEO) 600 MCG/2.4ML SOLN injection, Inject 0.08 mLs into the skin daily, Disp: 90 pen, Rfl: 5    potassium citrate (UROCIT-K) 10 MEQ (1080 MG) extended release tablet, Take by mouth 3 tabs tid, Disp: , Rfl:     lacosamide (VIMPAT) 100 MG TABS tablet, Take 100 mg by mouth 2 times daily. ., Disp: , Rfl:     omeprazole (PRILOSEC) 20 MG delayed release capsule, Take 40 mg by mouth daily , Disp: , Rfl:     Calcium Citrate-Vitamin D (CALCIUM CITRATE + D PO), Take 600 mg by mouth daily, Disp: , Rfl:     spironolactone (ALDACTONE) 25 MG tablet, Take 25 mg by mouth daily, Disp: , Rfl:     vitamin B-1 (THIAMINE) 100 MG tablet, Take 100 mg by mouth daily, Disp: , Rfl:     acetaminophen (TYLENOL) 325 MG tablet, Take 325 mg by mouth 3 times daily , Disp: , Rfl:     levETIRAcetam (KEPPRA) 500 MG tablet, Take 2 tablets by mouth 2 times daily (Patient taking differently: Take 2,000 mg by mouth daily ), Disp: 60 tablet, Rfl: 0    aspirin 81 MG tablet, Take 81 mg by mouth daily, Disp: , Rfl:     hydroxychloroquine (PLAQUENIL) 200 MG tablet, Take 400 mg by mouth daily , Disp: , Rfl:     folic acid (FOLVITE) 1 MG tablet, Take 1 mg by mouth daily , Disp: , Rfl:     lactulose 20 GM/30ML SOLN, 20 g by Other route daily , Disp: , Rfl:   Cholecalciferol (VITAMIN D) 2000 UNITS TABS tablet, Take 4,000 Units by mouth daily , Disp: , Rfl:     nadolol (CORGARD) 80 MG tablet, Take 80 mg by mouth daily , Disp: , Rfl:   No Known Allergies    Past Medical History:   Diagnosis Date    [facility] cirrhosis [facility]     Alcoholism ([facility]     Has been sober / dry since 01/2016.  Arthritis     Chronic atrial fibrillation (HCC)     Esophageal varices (HCC)     UGI bleed ~2012 - has had variceal banding 2x around that time.  History of kidney stones     Hypertension     Lumbar compression fracture ([facility] 02/2017    Lung tumor 2017    Osteopenia     S/P TAVR (transcatheter aortic valve replacement) 9/20/2018    Seizure disorder ([facility] 18/1697    Complicated a stroke with occipital ICH.  Seizures ([facility]     Sepsis with MRSE bacteremia 09/2016    resolved    Severe aortic stenosis     Confirmed by echo 9/2016, BROWN 10/2016. Undergoing w/u at [facility] for TAVR, as of 4/2017.  Sleep apnea     Couldn't tolerate CPAP therapy ~2007. Had had UPPP.  Stroke ([facility] 09/2016    With receptive aphasia, poor memory that are improving as of 04/2017. Pt was found to have an occipital ICH at that time, with possible amyloid angiopathy as cause per MRI subsequently. Had seizure and sepsis (d/t MRSE bacteremia) complicating stroke.      Past Surgical History:   Procedure Laterality Date    ECHO COMPL W DOP COLOR FLOW  2/4/2012         ECHOCARDIOGRAM COMPLETE 2D W DOPPLER W COLOR  8/27/2013         ESOPHAGEAL VARICE LIGATION      HIP SURGERY      PALATE SURGERY      UMBILICAL HERNIA REPAIR  04/14/2017    UPPER GASTROINTESTINAL ENDOSCOPY      UPPER GASTROINTESTINAL ENDOSCOPY  05/08/2017     Family History   Problem Relation Age of Onset    Cancer Mother         leukemia     Social History     Socioeconomic History    Marital status:      Spouse name: Not on file    Number of children: Not on file    Years of education: Not on file    Highest education level: Not on file   Occupational History    Not on file   Social Needs    Financial resource strain: Not on file    Food insecurity:     Worry: Not on file     Inability: Not on file    Transportation needs:     Medical: Not on file     Non-medical: Not on file   Tobacco Use    Smoking status: Former Smoker    Smokeless tobacco: Never Used   Substance and Sexual Activity    Alcohol use: No     Comment: no alcohol since 2011    Drug use: Never    Sexual activity: Not Currently     Partners: Female   Lifestyle    Physical activity:     Days per week: Not on file     Minutes per session: Not on file    Stress: Not on file   Relationships    Social connections:     Talks on phone: Not on file     Gets together: Not on file     Attends Gnosticist service: Not on file     Active member of club or organization: Not on file     Attends meetings of clubs or organizations: Not on file     Relationship status: Not on file    Intimate partner violence:     Fear of current or ex partner: Not on file     Emotionally abused: Not on file     Physically abused: Not on file     Forced sexual activity: Not on file   Other Topics Concern    Not on file   Social History Narrative    Drinks occ Cola. Vitals:    06/19/19 1036   BP: 122/78   Pulse: 66   Temp: 97.9 °F (36.6 °C)   TempSrc: Temporal   SpO2: 98%   Weight: 282 lb (127.9 kg)   Height: 5' 8\" (1.727 m)       Physical Exam   Constitutional: He is oriented to person, place, and time. He appears well-developed and well-nourished. No distress. Neck: Normal range of motion. Neck supple. No JVD present. No thyromegaly present. No thyroid nodules   Cardiovascular: Normal rate and intact distal pulses. Exam reveals no gallop and no friction rub. Murmur heard. Irregular. Have controlled ventricular response. Systolic murmur did improve postoperatively. Pulmonary/Chest: Effort normal and breath sounds normal. No respiratory distress. He has no wheezes.  He has no rales. Abdominal: Soft. Bowel sounds are normal. He exhibits no distension and no mass. There is no tenderness. Spleen tip and liver were palpable with deep inspiration   Musculoskeletal: Normal range of motion. He exhibits edema. Walks with a slow gait. Lymphadenopathy:     He has no cervical adenopathy. He has no axillary adenopathy. Right: No inguinal adenopathy present. Left: No inguinal adenopathy present. Neurological: He is alert and oriented to person, place, and time. He displays normal reflexes. A sensory deficit is present. He exhibits normal muscle tone. Coordination normal.   Diminished sensation light touch in the ankle area bilaterally. Skin: Skin is warm and dry. No rash noted. No erythema. Psychiatric: He has a normal mood and affect. His behavior is normal. Judgment and thought content normal.   Nursing note and vitals reviewed. Assessment and Plan:  Richard Payne was seen today for hypertension. Diagnoses and all orders for this visit:    Essential hypertension    Morbid obesity with BMI of 40.0-44.9, adult ([facility]    Class 3 severe obesity due to excess calories with serious comorbidity and body mass index (BMI) of 40.0 to 44.9 in adult [facility])    Alcoholic cirrhosis of liver without ascites (HCC)    Paroxysmal atrial fibrillation ([facility] [address] [facility]    Plan: I will see him back in 3 months and as needed. Reviewed recent blood work with them. Will not check anything today but do so again in about 3 months when I see him. Follow with above consultants. Continue to monitor blood pressure closely. Continue weight loss attempts. Fall precautions. Notify us with problems in the interim. Return in about 3 months (around 9/19/2019). Seen By:  Izabela Camp MD      *Document was created using voice recognition software. Note was reviewed however may contain grammatical errors.

## 2019-06-24 ENCOUNTER — TELEPHONE (OUTPATIENT)
Dept: RHEUMATOLOGY | Age: 69
End: 2019-06-24

## 2019-06-24 NOTE — TELEPHONE ENCOUNTER
----- Message from Jenny Lawrence MD sent at 6/20/2019 11:14 PM EDT -----  Markers of inflammation are normal.   Platelets level are low

## 2019-07-01 ENCOUNTER — HOSPITAL ENCOUNTER (OUTPATIENT)
Age: 69
Discharge: HOME | End: 2019-07-03
Payer: MEDICARE

## 2019-07-01 ENCOUNTER — OFFICE VISIT (OUTPATIENT)
Dept: FAMILY MEDICINE CLINIC | Age: 69
End: 2019-07-01
Payer: MEDICARE

## 2019-07-01 ENCOUNTER — TELEPHONE (OUTPATIENT)
Dept: FAMILY MEDICINE CLINIC | Age: 69
End: 2019-07-01

## 2019-07-01 VITALS
HEART RATE: 80 BPM | DIASTOLIC BLOOD PRESSURE: 86 MMHG | TEMPERATURE: 98.4 F | OXYGEN SATURATION: 98 % | SYSTOLIC BLOOD PRESSURE: 124 MMHG

## 2019-07-01 DIAGNOSIS — R53.1 WEAKNESS: Primary | ICD-10-CM

## 2019-07-01 DIAGNOSIS — L03.116 CELLULITIS OF LEFT FOOT: ICD-10-CM

## 2019-07-01 DIAGNOSIS — M79.672 LEFT FOOT PAIN: Primary | ICD-10-CM

## 2019-07-01 DIAGNOSIS — R53.1 WEAKNESS: ICD-10-CM

## 2019-07-01 LAB
ALBUMIN SERPL-MCNC: 3.9 G/DL (ref 3.5–5.2)
ALP BLD-CCNC: 64 U/L (ref 40–129)
ALT SERPL-CCNC: 17 U/L (ref 0–40)
ANION GAP SERPL CALCULATED.3IONS-SCNC: 14 MMOL/L (ref 7–16)
ANISOCYTOSIS: ABNORMAL
AST SERPL-CCNC: 45 U/L (ref 0–39)
BACTERIA: ABNORMAL /HPF
BASOPHILS ABSOLUTE: 0 E9/L (ref 0–0.2)
BASOPHILS RELATIVE PERCENT: 0.4 % (ref 0–2)
BILIRUB SERPL-MCNC: 1.3 MG/DL (ref 0–1.2)
BILIRUBIN URINE: ABNORMAL
BLOOD, URINE: ABNORMAL
BUN BLDV-MCNC: 23 MG/DL (ref 8–23)
BURR CELLS: ABNORMAL
CALCIUM SERPL-MCNC: 9.7 MG/DL (ref 8.6–10.2)
CHLORIDE BLD-SCNC: 102 MMOL/L (ref 98–107)
CLARITY: CLEAR
CO2: 24 MMOL/L (ref 22–29)
COLOR: ABNORMAL
CREAT SERPL-MCNC: 1.2 MG/DL (ref 0.7–1.2)
EOSINOPHILS ABSOLUTE: 0.27 E9/L (ref 0.05–0.5)
EOSINOPHILS RELATIVE PERCENT: 3.5 % (ref 0–6)
GFR AFRICAN AMERICAN: >60
GFR NON-AFRICAN AMERICAN: 60 ML/MIN/1.73
GLUCOSE BLD-MCNC: 103 MG/DL (ref 74–99)
GLUCOSE URINE: NEGATIVE MG/DL
HCT VFR BLD CALC: 37.1 % (ref 37–54)
HEMOGLOBIN: 11.7 G/DL (ref 12.5–16.5)
KETONES, URINE: NEGATIVE MG/DL
LEUKOCYTE ESTERASE, URINE: NEGATIVE
LYMPHOCYTES ABSOLUTE: 0.39 E9/L (ref 1.5–4)
LYMPHOCYTES RELATIVE PERCENT: 5.2 % (ref 20–42)
MCH RBC QN AUTO: 30.2 PG (ref 26–35)
MCHC RBC AUTO-ENTMCNC: 31.5 % (ref 32–34.5)
MCV RBC AUTO: 95.6 FL (ref 80–99.9)
MONOCYTES ABSOLUTE: 0.62 E9/L (ref 0.1–0.95)
MONOCYTES RELATIVE PERCENT: 7.8 % (ref 2–12)
NEUTROPHILS ABSOLUTE: 6.47 E9/L (ref 1.8–7.3)
NEUTROPHILS RELATIVE PERCENT: 83.5 % (ref 43–80)
NITRITE, URINE: NEGATIVE
PDW BLD-RTO: 15.4 FL (ref 11.5–15)
PH UA: 5.5 (ref 5–9)
PHOSPHORUS: 2.5 MG/DL (ref 2.5–4.5)
PLATELET # BLD: 75 E9/L (ref 130–450)
PLATELET CONFIRMATION: NORMAL
PMV BLD AUTO: 11.7 FL (ref 7–12)
POIKILOCYTES: ABNORMAL
POTASSIUM SERPL-SCNC: 5.5 MMOL/L (ref 3.5–5)
PROTEIN UA: ABNORMAL MG/DL
RBC # BLD: 3.88 E12/L (ref 3.8–5.8)
RBC UA: ABNORMAL /HPF (ref 0–2)
SODIUM BLD-SCNC: 140 MMOL/L (ref 132–146)
SPECIFIC GRAVITY UA: >=1.03 (ref 1–1.03)
TOTAL PROTEIN: 7.4 G/DL (ref 6.4–8.3)
UROBILINOGEN, URINE: 1 E.U./DL
WBC # BLD: 7.7 E9/L (ref 4.5–11.5)
WBC UA: ABNORMAL /HPF (ref 0–5)

## 2019-07-01 PROCEDURE — 36415 COLL VENOUS BLD VENIPUNCTURE: CPT

## 2019-07-01 PROCEDURE — 99213 OFFICE O/P EST LOW 20 MIN: CPT | Performed by: FAMILY MEDICINE

## 2019-07-01 PROCEDURE — 80053 COMPREHEN METABOLIC PANEL: CPT

## 2019-07-01 PROCEDURE — 85025 COMPLETE CBC W/AUTO DIFF WBC: CPT

## 2019-07-01 PROCEDURE — 81001 URINALYSIS AUTO W/SCOPE: CPT

## 2019-07-01 PROCEDURE — 84100 ASSAY OF PHOSPHORUS: CPT

## 2019-07-01 RX ORDER — CEPHALEXIN 500 MG/1
500 CAPSULE ORAL 3 TIMES DAILY
Qty: 30 CAPSULE | Refills: 0 | Status: SHIPPED | OUTPATIENT
Start: 2019-07-01 | End: 2019-07-10

## 2019-07-10 ENCOUNTER — OFFICE VISIT (OUTPATIENT)
Dept: FAMILY MEDICINE CLINIC | Age: 69
End: 2019-07-10
Payer: MEDICARE

## 2019-07-10 VITALS
HEIGHT: 69 IN | TEMPERATURE: 97.9 F | HEART RATE: 70 BPM | OXYGEN SATURATION: 97 % | SYSTOLIC BLOOD PRESSURE: 126 MMHG | WEIGHT: 285 LBS | BODY MASS INDEX: 42.21 KG/M2 | DIASTOLIC BLOOD PRESSURE: 70 MMHG

## 2019-07-10 DIAGNOSIS — R53.1 WEAKNESS: Primary | ICD-10-CM

## 2019-07-10 DIAGNOSIS — E66.01 MORBID OBESITY WITH BMI OF 40.0-44.9, ADULT (HCC): ICD-10-CM

## 2019-07-10 DIAGNOSIS — E87.5 HYPERKALEMIA: ICD-10-CM

## 2019-07-10 DIAGNOSIS — K70.30 ALCOHOLIC CIRRHOSIS OF LIVER WITHOUT ASCITES (HCC): ICD-10-CM

## 2019-07-10 DIAGNOSIS — I10 ESSENTIAL HYPERTENSION: ICD-10-CM

## 2019-07-10 DIAGNOSIS — S90.32XD CONTUSION OF LEFT FOOT, SUBSEQUENT ENCOUNTER: ICD-10-CM

## 2019-07-10 DIAGNOSIS — S80.01XD CONTUSION OF RIGHT KNEE, SUBSEQUENT ENCOUNTER: ICD-10-CM

## 2019-07-10 PROCEDURE — 99214 OFFICE O/P EST MOD 30 MIN: CPT | Performed by: INTERNAL MEDICINE

## 2019-07-10 NOTE — PROGRESS NOTES
MG tablet, Take 25 mg by mouth daily, Disp: , Rfl:     vitamin B-1 (THIAMINE) 100 MG tablet, Take 100 mg by mouth daily, Disp: , Rfl:     acetaminophen (TYLENOL) 325 MG tablet, Take 325 mg by mouth 3 times daily , Disp: , Rfl:     levETIRAcetam (KEPPRA) 500 MG tablet, Take 2 tablets by mouth 2 times daily (Patient taking differently: Take 2,000 mg by mouth daily ), Disp: 60 tablet, Rfl: 0    aspirin 81 MG tablet, Take 81 mg by mouth daily, Disp: , Rfl:     hydroxychloroquine (PLAQUENIL) 200 MG tablet, Take 400 mg by mouth daily , Disp: , Rfl:     folic acid (FOLVITE) 1 MG tablet, Take 1 mg by mouth daily , Disp: , Rfl:     lactulose (CHRONULAC) 10 GM/15ML solution, 10 g by Other route daily , Disp: , Rfl:     Cholecalciferol (VITAMIN D) 2000 UNITS TABS tablet, Take 4,000 Units by mouth daily , Disp: , Rfl:     nadolol (CORGARD) 80 MG tablet, Take 80 mg by mouth daily , Disp: , Rfl:   No Known Allergies    Past Medical History:   Diagnosis Date    Alcoholic cirrhosis ([address]     Alcoholism ([address]     Has been sober / dry since 01/2016.  Arthritis     Chronic atrial fibrillation (HCC)     Esophageal varices (HCC)     UGI bleed ~2012 - has had variceal banding 2x around that time.  History of kidney stones     Hypertension     Lumbar compression fracture ([address] 02/2017    Lung tumor 2017    Osteopenia     S/P TAVR (transcatheter aortic valve replacement) 9/20/2018    Seizure disorder ([address] 46/1529    Complicated a stroke with occipital ICH.  Seizures ([address]     Sepsis with MRSE bacteremia 09/2016    resolved    Severe aortic stenosis     Confirmed by echo 9/2016, BROWN 10/2016. Undergoing w/u at [facility] for TAVR, as of 4/2017.  Sleep apnea     Couldn't tolerate CPAP therapy ~2007. Had had UPPP.  Stroke ([facility] 09/2016    With receptive aphasia, poor memory that are improving as of 04/2017.  Pt was found to have an occipital ICH at that time, with possible amyloid angiopathy as cause per MRI

## 2019-07-15 ENCOUNTER — TELEPHONE (OUTPATIENT)
Dept: CARDIOLOGY CLINIC | Age: 69
End: 2019-07-15

## 2019-07-15 NOTE — TELEPHONE ENCOUNTER
Patient's wife calling the office.     He is scheduled July 24th, at [facility] for laser surgery of a kidney stone in his urethra at [facility]     The surgeon is asking if he can stop his ASA for 3 to 7 days and are asking for how long     He is s/p TAVR 6/2017     The Dr Leslie Marcum [phone]

## 2019-07-22 LAB — POTASSIUM (K+): 4.1

## 2019-08-06 ENCOUNTER — TELEPHONE (OUTPATIENT)
Dept: RHEUMATOLOGY | Age: 69
End: 2019-08-06

## 2019-08-13 ENCOUNTER — TELEPHONE (OUTPATIENT)
Dept: FAMILY MEDICINE CLINIC | Age: 69
End: 2019-08-13

## 2019-08-13 DIAGNOSIS — M05.9 RHEUMATOID ARTHRITIS WITH POSITIVE RHEUMATOID FACTOR, INVOLVING UNSPECIFIED SITE (HCC): Primary | ICD-10-CM

## 2019-08-19 RX ORDER — NADOLOL 40 MG/1
40 TABLET ORAL 2 TIMES DAILY
Qty: 180 TABLET | Refills: 1 | Status: SHIPPED | OUTPATIENT
Start: 2019-08-19 | End: 2020-01-08 | Stop reason: SDUPTHER

## 2019-08-19 RX ORDER — PREDNISONE 1 MG/1
5 TABLET ORAL DAILY
Qty: 90 TABLET | Refills: 1 | Status: SHIPPED | OUTPATIENT
Start: 2019-08-19

## 2019-08-21 NOTE — TELEPHONE ENCOUNTER
I left message to see if he has been contacted to set up for referral. If not or if he has questions to please call our receptionists for assistance.  Ok to close

## 2019-09-20 ENCOUNTER — HOSPITAL ENCOUNTER (OUTPATIENT)
Age: 69
Discharge: HOME | End: 2019-09-22
Payer: MEDICARE

## 2019-09-20 LAB
ALBUMIN SERPL-MCNC: 3.9 G/DL (ref 3.5–5.2)
ALP BLD-CCNC: 88 U/L (ref 40–129)
ALT SERPL-CCNC: 16 U/L (ref 0–40)
ANION GAP SERPL CALCULATED.3IONS-SCNC: 14 MMOL/L (ref 7–16)
AST SERPL-CCNC: 33 U/L (ref 0–39)
BASOPHILS ABSOLUTE: 0 E9/L (ref 0–0.2)
BASOPHILS RELATIVE PERCENT: 0.3 % (ref 0–2)
BILIRUB SERPL-MCNC: 1 MG/DL (ref 0–1.2)
BUN BLDV-MCNC: 21 MG/DL (ref 8–23)
CALCIUM SERPL-MCNC: 9.8 MG/DL (ref 8.6–10.2)
CHLORIDE BLD-SCNC: 104 MMOL/L (ref 98–107)
CO2: 24 MMOL/L (ref 22–29)
CREAT SERPL-MCNC: 1.4 MG/DL (ref 0.7–1.2)
EOSINOPHILS ABSOLUTE: 0.06 E9/L (ref 0.05–0.5)
EOSINOPHILS RELATIVE PERCENT: 0.9 % (ref 0–6)
GFR AFRICAN AMERICAN: >60
GFR NON-AFRICAN AMERICAN: 50 ML/MIN/1.73
GLUCOSE BLD-MCNC: 132 MG/DL (ref 74–99)
HCT VFR BLD CALC: 41.1 % (ref 37–54)
HEMOGLOBIN: 12.3 G/DL (ref 12.5–16.5)
INR BLD: 1.3
LYMPHOCYTES ABSOLUTE: 0.51 E9/L (ref 1.5–4)
LYMPHOCYTES RELATIVE PERCENT: 8 % (ref 20–42)
MCH RBC QN AUTO: 28.1 PG (ref 26–35)
MCHC RBC AUTO-ENTMCNC: 29.9 % (ref 32–34.5)
MCV RBC AUTO: 94.1 FL (ref 80–99.9)
MONOCYTES ABSOLUTE: 0.26 E9/L (ref 0.1–0.95)
MONOCYTES RELATIVE PERCENT: 3.5 % (ref 2–12)
NEUTROPHILS ABSOLUTE: 5.63 E9/L (ref 1.8–7.3)
NEUTROPHILS RELATIVE PERCENT: 87.6 % (ref 43–80)
OVALOCYTES: ABNORMAL
PDW BLD-RTO: 14.7 FL (ref 11.5–15)
PLATELET # BLD: 97 E9/L (ref 130–450)
PLATELET CONFIRMATION: NORMAL
PMV BLD AUTO: 11.5 FL (ref 7–12)
POIKILOCYTES: ABNORMAL
POTASSIUM SERPL-SCNC: 5 MMOL/L (ref 3.5–5)
PROTHROMBIN TIME: 14.7 SEC (ref 9.3–12.4)
RBC # BLD: 4.37 E12/L (ref 3.8–5.8)
SODIUM BLD-SCNC: 142 MMOL/L (ref 132–146)
TOTAL PROTEIN: 7.4 G/DL (ref 6.4–8.3)
WBC # BLD: 6.4 E9/L (ref 4.5–11.5)

## 2019-09-20 PROCEDURE — 80053 COMPREHEN METABOLIC PANEL: CPT

## 2019-09-20 PROCEDURE — 36415 COLL VENOUS BLD VENIPUNCTURE: CPT

## 2019-09-20 PROCEDURE — 85610 PROTHROMBIN TIME: CPT

## 2019-09-20 PROCEDURE — 85025 COMPLETE CBC W/AUTO DIFF WBC: CPT

## 2019-09-23 RX ORDER — GUARN/MA-HUANG/P.GIN/S.GINSENG
TABLET ORAL DAILY
COMMUNITY
End: 2019-09-25 | Stop reason: SDUPTHER

## 2019-09-25 ENCOUNTER — PROCEDURE VISIT (OUTPATIENT)
Dept: PODIATRY | Age: 69
End: 2019-09-25
Payer: MEDICARE

## 2019-09-25 ENCOUNTER — OFFICE VISIT (OUTPATIENT)
Dept: FAMILY MEDICINE CLINIC | Age: 69
End: 2019-09-25
Payer: MEDICARE

## 2019-09-25 VITALS
SYSTOLIC BLOOD PRESSURE: 113 MMHG | BODY MASS INDEX: 42.83 KG/M2 | WEIGHT: 290 LBS | TEMPERATURE: 97.2 F | DIASTOLIC BLOOD PRESSURE: 66 MMHG

## 2019-09-25 VITALS
HEART RATE: 77 BPM | WEIGHT: 290 LBS | BODY MASS INDEX: 42.83 KG/M2 | DIASTOLIC BLOOD PRESSURE: 78 MMHG | TEMPERATURE: 97.7 F | SYSTOLIC BLOOD PRESSURE: 128 MMHG

## 2019-09-25 DIAGNOSIS — I73.9 PERIPHERAL VASCULAR DISEASE, UNSPECIFIED (HCC): ICD-10-CM

## 2019-09-25 DIAGNOSIS — M05.9 RHEUMATOID ARTHRITIS WITH POSITIVE RHEUMATOID FACTOR, INVOLVING UNSPECIFIED SITE (HCC): Primary | ICD-10-CM

## 2019-09-25 DIAGNOSIS — D69.6 THROMBOCYTOPENIA (HCC): ICD-10-CM

## 2019-09-25 DIAGNOSIS — R26.2 DIFFICULTY WALKING: ICD-10-CM

## 2019-09-25 DIAGNOSIS — K70.30 ALCOHOLIC CIRRHOSIS OF LIVER WITHOUT ASCITES (HCC): ICD-10-CM

## 2019-09-25 DIAGNOSIS — B35.1 TINEA UNGUIUM: Primary | ICD-10-CM

## 2019-09-25 DIAGNOSIS — E66.01 MORBID OBESITY WITH BMI OF 40.0-44.9, ADULT (HCC): ICD-10-CM

## 2019-09-25 DIAGNOSIS — I10 ESSENTIAL HYPERTENSION: ICD-10-CM

## 2019-09-25 DIAGNOSIS — M79.675 PAIN IN LEFT TOE(S): ICD-10-CM

## 2019-09-25 DIAGNOSIS — M79.674 PAIN IN TOE OF RIGHT FOOT: ICD-10-CM

## 2019-09-25 DIAGNOSIS — G40.909 SEIZURE DISORDER (HCC): ICD-10-CM

## 2019-09-25 PROCEDURE — 99214 OFFICE O/P EST MOD 30 MIN: CPT | Performed by: INTERNAL MEDICINE

## 2019-09-25 PROCEDURE — 4040F PNEUMOC VAC/ADMIN/RCVD: CPT | Performed by: INTERNAL MEDICINE

## 2019-09-25 PROCEDURE — G8427 DOCREV CUR MEDS BY ELIG CLIN: HCPCS | Performed by: INTERNAL MEDICINE

## 2019-09-25 PROCEDURE — 90653 IIV ADJUVANT VACCINE IM: CPT | Performed by: INTERNAL MEDICINE

## 2019-09-25 PROCEDURE — G8417 CALC BMI ABV UP PARAM F/U: HCPCS | Performed by: INTERNAL MEDICINE

## 2019-09-25 PROCEDURE — G0008 ADMIN INFLUENZA VIRUS VAC: HCPCS | Performed by: INTERNAL MEDICINE

## 2019-09-25 PROCEDURE — 1036F TOBACCO NON-USER: CPT | Performed by: INTERNAL MEDICINE

## 2019-09-25 PROCEDURE — 11721 DEBRIDE NAIL 6 OR MORE: CPT | Performed by: PODIATRIST

## 2019-09-25 PROCEDURE — 1123F ACP DISCUSS/DSCN MKR DOCD: CPT | Performed by: INTERNAL MEDICINE

## 2019-09-25 PROCEDURE — 3017F COLORECTAL CA SCREEN DOC REV: CPT | Performed by: INTERNAL MEDICINE

## 2019-09-25 ASSESSMENT — ENCOUNTER SYMPTOMS
RESPIRATORY NEGATIVE: 1
EYES NEGATIVE: 1
GASTROINTESTINAL NEGATIVE: 1

## 2019-09-25 NOTE — PROGRESS NOTES
Diomedes Mario  Return Patient    Chief Complaint   Patient presents with    Toe Pain     nail care saw PCP Dr. Corinne Genet on 7/10/2019       Subjective: This Joo Espinal comes to clinic for foot and nail care. Pt currently has complaint of thickened, painful, elongated nails that he/she cannot manage by themselves. Pt. Relates pain to nails with shoe gear. Pt's primary care physician is Dione Rosario MD.  Past Medical History:   Diagnosis Date    Alcoholic cirrhosis ([address]     Alcoholism ([address]     Has been sober / dry since 01/2016.  Anxiety     Arthritis     Chronic atrial fibrillation (HCC)     Esophageal varices (HCC)     UGI bleed ~2012 - has had variceal banding 2x around that time.  History of kidney stones     Hypertension     Lumbar compression fracture ([address] 02/2017    Lung tumor 2017    Osteopenia     Osteopenia     S/P TAVR (transcatheter aortic valve replacement) 9/20/2018    Seizure disorder ([address] 06/4443    Complicated a stroke with occipital ICH.  Seizures ([address]     Sepsis with MRSE bacteremia 09/2016    resolved    Severe aortic stenosis     Confirmed by echo 9/2016, BROWN 10/2016. Undergoing w/u at [facility] for TAVR, as of 4/2017.  Sleep apnea     Couldn't tolerate CPAP therapy ~2007. Had had UPPP.  Stroke ([facility] 09/2016    With receptive aphasia, poor memory that are improving as of 04/2017. Pt was found to have an occipital ICH at that time, with possible amyloid angiopathy as cause per MRI subsequently. Had seizure and sepsis (d/t MRSE bacteremia) complicating stroke.        No Known Allergies  Current Outpatient Medications on File Prior to Visit   Medication Sig Dispense Refill    Calcium 600-200 MG-UNIT TABS Take by mouth daily      nadolol (CORGARD) 40 MG tablet Take 1 tablet by mouth 2 times daily 180 tablet 1    predniSONE (DELTASONE) 5 MG tablet Take 1 tablet by mouth daily 90 tablet 1    teriparatide, recombinant, (FORTEO) 600 MCG/2.4ML SOLN injection [x] [x] [x] [x] [x] [x] [x] [x] [x] [x]  5 4 3 2 1 1 2 3 4 5                         Right                                        Left    Color  [x] [x] [x] [x] [x] [x] [x] [x] [x] [x]  5 4 3 2 1 1 2 3 4 5                          Right                                        Left    Incurvation/Ingrowin   [x] [x] [x] [x] [x] [x] [x] [x] [x] [x]  5 4 3 2 1 1 2 3 4 5                         Right                                        Left    Inflammation/Pain   [x] [x] [x] [x] [x] [x] [x] [x] [x] [x]  5 4 3  2 1 1 2 3 4 5                         Right                                        Left      Nails that are described above are all elongated thickened pitting mycotic yellowish incurvated causing pain with both shoe gear. Palpation nails greater then 3 mm thick painful       Dermatologic Exam:hair loss noted  lower extremity    Skin lesion/ulceration   Skin   Callus neg  Musculoskeletal:     1st MPJ ROM normal, Bilateral.  Muscle strength 5/5, Bilateral.  Pain present upon palpation of toenails 1-5  Bilateral., Bilateral.  Ankle ROM normal,Bilateral.    Dorsally contracted digits , Bilateral.     Vascular:  Pulses   bilateral DP absnet    PT absient    severe varicose veins noted to lower extremity there is loss of hair cool to touch discoloration to feet nails are mycotic dystrophic         Neurological:  Sensation present to light touch to level of digits, Bilateral.    Foot Exam    General  General Appearance: appears stated age and healthy   Orientation: alert and oriented to person, place, and time       Right Foot/Ankle     Inspection and Palpation  Skin Exam: skin changes and abnormal color; Neurovascular  Dorsalis pedis: 1+  Posterior tibial: absent      Left Foot/Ankle      Neurovascular  Dorsalis pedis: absent  Posterior tibial: absent             Ortho Exam  Q7   []Yes    []No                Q8   [x]Yes    []No                     Q9   []Yes    []No  Assessment:  78 y.o. male with:   1.  Tinea

## 2019-09-25 NOTE — PROGRESS NOTES
[id number] [facility]     19  Eveline Hughes : 1950 Sex: male  Age: 78 y.o. Chief Complaint   Patient presents with    Hypertension       HPI  Patient presents today for 3-month follow-up visit accompanied by his wife. Looked over last couple notes. This gentleman with alcoholic cirrhosis. He has been reasonably stable. He follows with numerous consultants including urology, pulmonary, GI, rheumatology, podiatry, neurosurgery and neurology for seizure/stroke, cardiology, radiation oncology. He has seen GI recently looked over recent labs that included a creatinine that was up to 1.4 which is new. Nothing else is changed. His fluid intake is adequate. No new medications. Nothing new over-the-counter. He has had kidney stones and lithotripsy in the recent past.      Review of Systems   Constitutional: Positive for fatigue. Negative for activity change, appetite change, chills, diaphoresis, fever and unexpected weight change. HENT: Positive for postnasal drip. Negative for congestion, rhinorrhea and sinus pain.    Respiratory: Negative for cough, shortness of breath and wheezing.    Cardiovascular: Negative for chest pain, palpitations and leg swelling.        Chronic atrial fibrillation not on anticoagulation secondary to falls and bleeding   Gastrointestinal: Negative for abdominal pain, blood in stool, constipation, diarrhea, nausea and vomiting.        Colic cirrhosis.  Some loose stool lactulose that he is on.   Endocrine: Negative.    Genitourinary: Negative for difficulty urinating, dysuria, frequency, hematuria and urgency.         Decreased urinary stream--saw urology  for hematuria.   Musculoskeletal: Positive for arthralgias and back pain.  Negative for gait problem and myalgias.   re Ports arthritis and lower back pain/low back pain severe at times has been taking Tylenol.  Some improvement.  Rheumatoid arthritis--neurosurgery did not recommend surgery at this time for hernia with SBO)  right hip fx and repair, Failed watchman procedure  Reviewed, no changes. SH: Patient is . Personal Habits: The patient is a former pipesmoker for years. He still does vaping. Does not  currently consume alcohol, has consumed alcohol in the pastheavily Worked at [employer] now retired  Reviewed, no changes. Current Outpatient Medications:     nadolol (CORGARD) 40 MG tablet, Take 1 tablet by mouth 2 times daily, Disp: 180 tablet, Rfl: 1    predniSONE (DELTASONE) 5 MG tablet, Take 1 tablet by mouth daily, Disp: 90 tablet, Rfl: 1    teriparatide, recombinant, (FORTEO) 600 MCG/2.4ML SOLN injection, Inject 0.08 mLs into the skin daily, Disp: 90 pen, Rfl: 5    potassium citrate (UROCIT-K) 10 MEQ (1080 MG) extended release tablet, Take by mouth 3 tabs tid, Disp: , Rfl:     lacosamide (VIMPAT) 100 MG TABS tablet, Take 100 mg by mouth 2 times daily. ., Disp: , Rfl:     omeprazole (PRILOSEC) 40 MG delayed release capsule, Take 40 mg by mouth daily , Disp: , Rfl:     Calcium Citrate-Vitamin D (CALCIUM CITRATE + D PO), Take 600 mg by mouth daily, Disp: , Rfl:     spironolactone (ALDACTONE) 25 MG tablet, Take 25 mg by mouth daily, Disp: , Rfl:     vitamin B-1 (THIAMINE) 100 MG tablet, Take 100 mg by mouth daily, Disp: , Rfl:     acetaminophen (TYLENOL) 325 MG tablet, Take 325 mg by mouth 3 times daily , Disp: , Rfl:     levETIRAcetam (KEPPRA) 500 MG tablet, Take 2 tablets by mouth 2 times daily (Patient taking differently: Take 2,000 mg by mouth daily ), Disp: 60 tablet, Rfl: 0    aspirin 81 MG tablet, Take 81 mg by mouth daily, Disp: , Rfl:     hydroxychloroquine (PLAQUENIL) 200 MG tablet, Take 400 mg by mouth daily , Disp: , Rfl:     folic acid (FOLVITE) 1 MG tablet, Take 1 mg by mouth daily , Disp: , Rfl:     lactulose (CHRONULAC) 10 GM/15ML solution, 10 g by Other route daily , Disp: , Rfl:     Cholecalciferol (VITAMIN D) 2000 UNITS TABS tablet, Take 4,000 Units by mouth daily , Disp: , strain: Not on file    Food insecurity:     Worry: Not on file     Inability: Not on file    Transportation needs:     Medical: Not on file     Non-medical: Not on file   Tobacco Use    Smoking status: Former Smoker     Packs/day: 1.00     Years: 55.00     Pack years: 55.00     Types: Cigarettes, Pipe     Start date: 7/10/1966     Last attempt to quit: 7/10/2015     Years since quittin.2    Smokeless tobacco: Never Used   Substance and Sexual Activity    Alcohol use: No     Comment: no alcohol since     Drug use: Never    Sexual activity: Not Currently     Partners: Female   Lifestyle    Physical activity:     Days per week: Not on file     Minutes per session: Not on file    Stress: Not on file   Relationships    Social connections:     Talks on phone: Not on file     Gets together: Not on file     Attends Buddhism service: Not on file     Active member of club or organization: Not on file     Attends meetings of clubs or organizations: Not on file     Relationship status: Not on file    Intimate partner violence:     Fear of current or ex partner: Not on file     Emotionally abused: Not on file     Physically abused: Not on file     Forced sexual activity: Not on file   Other Topics Concern    Not on file   Social History Narrative    Drinks occ Cola. Vitals:    19 1214   BP: 128/78   Site: Left Upper Arm   Position: Sitting   Pulse: 77   Temp: 97.7 °F (36.5 °C)   TempSrc: Temporal   Weight: 290 lb (131.5 kg)       Physical Exam      Constitutional: He is oriented to person, place, and time. He appears well-developed and well-nourished. No distress. Neck: Normal range of motion. Neck supple. No JVD present. No thyromegaly present. No thyroid nodules   Cardiovascular: Normal rate and intact distal pulses. Exam reveals no gallop and no friction rub. Murmur heard. Heart: Irregularly irregular Have controlled ventricular response. Systolic murmur did improve postoperatively. 12/25/2019). Seen By:  Georgette Guardado MD      *Document was created using voice recognition software. Note was reviewed however may contain grammatical errors.

## 2019-10-07 RX ORDER — SPIRONOLACTONE 25 MG/1
25 TABLET ORAL DAILY
Qty: 30 TABLET | Refills: 3 | Status: SHIPPED | OUTPATIENT
Start: 2019-10-07 | End: 2020-01-08 | Stop reason: SDUPTHER

## 2019-10-16 ENCOUNTER — TELEPHONE (OUTPATIENT)
Dept: FAMILY MEDICINE CLINIC | Age: 69
End: 2019-10-16

## 2019-11-08 DIAGNOSIS — E87.5 HYPERKALEMIA: ICD-10-CM

## 2019-11-21 ENCOUNTER — OFFICE VISIT (OUTPATIENT)
Dept: CARDIOLOGY CLINIC | Age: 69
End: 2019-11-21
Payer: MEDICARE

## 2019-11-21 VITALS
HEART RATE: 68 BPM | BODY MASS INDEX: 43.84 KG/M2 | RESPIRATION RATE: 16 BRPM | SYSTOLIC BLOOD PRESSURE: 118 MMHG | HEIGHT: 69 IN | WEIGHT: 296 LBS | DIASTOLIC BLOOD PRESSURE: 68 MMHG

## 2019-11-21 DIAGNOSIS — I48.0 PAROXYSMAL ATRIAL FIBRILLATION (HCC): Primary | ICD-10-CM

## 2019-11-21 PROCEDURE — 93000 ELECTROCARDIOGRAM COMPLETE: CPT | Performed by: INTERNAL MEDICINE

## 2019-11-21 PROCEDURE — G8417 CALC BMI ABV UP PARAM F/U: HCPCS | Performed by: INTERNAL MEDICINE

## 2019-11-21 PROCEDURE — 99213 OFFICE O/P EST LOW 20 MIN: CPT | Performed by: INTERNAL MEDICINE

## 2019-11-21 PROCEDURE — 1123F ACP DISCUSS/DSCN MKR DOCD: CPT | Performed by: INTERNAL MEDICINE

## 2019-11-21 PROCEDURE — G8482 FLU IMMUNIZE ORDER/ADMIN: HCPCS | Performed by: INTERNAL MEDICINE

## 2019-11-21 PROCEDURE — 4040F PNEUMOC VAC/ADMIN/RCVD: CPT | Performed by: INTERNAL MEDICINE

## 2019-11-21 PROCEDURE — G8427 DOCREV CUR MEDS BY ELIG CLIN: HCPCS | Performed by: INTERNAL MEDICINE

## 2019-11-21 PROCEDURE — 3017F COLORECTAL CA SCREEN DOC REV: CPT | Performed by: INTERNAL MEDICINE

## 2019-11-21 PROCEDURE — 1036F TOBACCO NON-USER: CPT | Performed by: INTERNAL MEDICINE

## 2020-01-08 ENCOUNTER — OFFICE VISIT (OUTPATIENT)
Dept: FAMILY MEDICINE CLINIC | Age: 70
End: 2020-01-08
Payer: MEDICARE

## 2020-01-08 ENCOUNTER — PROCEDURE VISIT (OUTPATIENT)
Dept: PODIATRY | Age: 70
End: 2020-01-08
Payer: MEDICARE

## 2020-01-08 VITALS
HEART RATE: 100 BPM | WEIGHT: 297 LBS | OXYGEN SATURATION: 97 % | TEMPERATURE: 97.8 F | BODY MASS INDEX: 45.01 KG/M2 | DIASTOLIC BLOOD PRESSURE: 71 MMHG | HEIGHT: 68 IN | RESPIRATION RATE: 18 BRPM | SYSTOLIC BLOOD PRESSURE: 103 MMHG

## 2020-01-08 VITALS
SYSTOLIC BLOOD PRESSURE: 103 MMHG | WEIGHT: 295 LBS | BODY MASS INDEX: 44.71 KG/M2 | DIASTOLIC BLOOD PRESSURE: 71 MMHG | TEMPERATURE: 97.2 F | HEIGHT: 68 IN

## 2020-01-08 PROBLEM — M79.675 PAIN IN LEFT TOE(S): Status: RESOLVED | Noted: 2019-06-19 | Resolved: 2020-01-08

## 2020-01-08 PROBLEM — R26.2 DIFFICULTY WALKING: Status: RESOLVED | Noted: 2019-06-19 | Resolved: 2020-01-08

## 2020-01-08 PROBLEM — I73.9 PERIPHERAL VASCULAR DISEASE, UNSPECIFIED (HCC): Status: RESOLVED | Noted: 2019-06-19 | Resolved: 2020-01-08

## 2020-01-08 PROBLEM — M79.674 PAIN IN TOE OF RIGHT FOOT: Status: RESOLVED | Noted: 2019-06-19 | Resolved: 2020-01-08

## 2020-01-08 PROBLEM — B35.1 TINEA UNGUIUM: Status: RESOLVED | Noted: 2019-06-19 | Resolved: 2020-01-08

## 2020-01-08 PROCEDURE — 99214 OFFICE O/P EST MOD 30 MIN: CPT | Performed by: INTERNAL MEDICINE

## 2020-01-08 PROCEDURE — 3017F COLORECTAL CA SCREEN DOC REV: CPT | Performed by: INTERNAL MEDICINE

## 2020-01-08 PROCEDURE — 4040F PNEUMOC VAC/ADMIN/RCVD: CPT | Performed by: INTERNAL MEDICINE

## 2020-01-08 PROCEDURE — 11721 DEBRIDE NAIL 6 OR MORE: CPT | Performed by: PODIATRIST

## 2020-01-08 PROCEDURE — G8482 FLU IMMUNIZE ORDER/ADMIN: HCPCS | Performed by: INTERNAL MEDICINE

## 2020-01-08 PROCEDURE — G8427 DOCREV CUR MEDS BY ELIG CLIN: HCPCS | Performed by: INTERNAL MEDICINE

## 2020-01-08 PROCEDURE — 1036F TOBACCO NON-USER: CPT | Performed by: INTERNAL MEDICINE

## 2020-01-08 PROCEDURE — G8417 CALC BMI ABV UP PARAM F/U: HCPCS | Performed by: INTERNAL MEDICINE

## 2020-01-08 PROCEDURE — 1123F ACP DISCUSS/DSCN MKR DOCD: CPT | Performed by: INTERNAL MEDICINE

## 2020-01-08 RX ORDER — NADOLOL 40 MG/1
40 TABLET ORAL 2 TIMES DAILY
Qty: 180 TABLET | Refills: 1 | Status: SHIPPED
Start: 2020-01-08 | End: 2020-07-02 | Stop reason: SDUPTHER

## 2020-01-08 RX ORDER — SPIRONOLACTONE 25 MG/1
25 TABLET ORAL DAILY
Qty: 90 TABLET | Refills: 1 | Status: SHIPPED
Start: 2020-01-08 | End: 2020-07-02 | Stop reason: SDUPTHER

## 2020-01-08 ASSESSMENT — PATIENT HEALTH QUESTIONNAIRE - PHQ9
SUM OF ALL RESPONSES TO PHQ QUESTIONS 1-9: 0
SUM OF ALL RESPONSES TO PHQ QUESTIONS 1-9: 0
SUM OF ALL RESPONSES TO PHQ9 QUESTIONS 1 & 2: 0
1. LITTLE INTEREST OR PLEASURE IN DOING THINGS: 0
2. FEELING DOWN, DEPRESSED OR HOPELESS: 0

## 2020-01-08 ASSESSMENT — ENCOUNTER SYMPTOMS
EYES NEGATIVE: 1
RESPIRATORY NEGATIVE: 1
GASTROINTESTINAL NEGATIVE: 1

## 2020-01-08 NOTE — PROGRESS NOTES
[x] [x] [x] [x] [x] [x] [x] [x] [x] [x]  5 4 3 2 1 1 2 3 4 5                         Right                                        Left    Color  [x] [x] [x] [x] [x] [x] [x] [x] [x] [x]  5 4 3 2 1 1 2 3 4 5                          Right                                        Left    Incurvation/Ingrowin   [x] [x] [x] [x] [x] [x] [x] [x] [x] [x]  5 4 3 2 1 1 2 3 4 5                         Right                                        Left    Inflammation/Pain   [x] [x] [x] [x] [x] [x] [x] [x] [x] [x]  5 4 3  2 1 1 2 3 4 5                         Right                                        Left      Nails that are described above are all elongated thickened pitting mycotic yellowish incurvated causing pain with both shoe gear. Palpation nails greater then 3 mm thick painful       Dermatologic Exam:hair loss noted  lower extremity    Skin lesion/ulceration   Skin   Callus neg  Musculoskeletal:     1st MPJ ROM normal, Bilateral.  Muscle strength 5/5, Bilateral.  Pain present upon palpation of toenails 1-5  Bilateral., Bilateral.  Ankle ROM normal,Bilateral.    Dorsally contracted digits , Bilateral.     Vascular:  Pulses   bilateral DP absnet    PT absient    severe varicose veins noted to lower extremity there is loss of hair cool to touch discoloration to feet nails are mycotic dystrophic         Neurological:  Sensation present to light touch to level of digits, Bilateral.    Foot Exam    General  General Appearance: appears stated age and healthy   Orientation: alert and oriented to person, place, and time       Right Foot/Ankle     Inspection and Palpation  Skin Exam: skin changes and abnormal color; Neurovascular  Dorsalis pedis: 1+  Posterior tibial: absent      Left Foot/Ankle      Neurovascular  Dorsalis pedis: absent  Posterior tibial: absent             Ortho Exam  Q7   []Yes    []No                Q8   [x]Yes    []No                     Q9   []Yes    []No  Assessment:  78 y.o. male with:   1.  Tinea

## 2020-01-09 NOTE — PROGRESS NOTES
with vancomycin  Osteoporosis - Follows with rheumatology  Liver biopsy - ×3  Evaluation for liver transplant, [facility] - 5/13  Lung mass - Following with pulmonary  Follows with - Pulmonary, rheumatology, GI, cardiology, urology, podiatry, neurology-no longer seeing  hematology/oncology  transient ischemic attack - 4/17  Tavr - [facility]-6/17--needs Atb prophylaxis prior to dental  squamous cell CA lung - radiation  Lung Cancer - Squamous cell. Radiation treatment completed  Surgical Hx:  Umbilical hernia repair - 4/17 (due to incarcerated ventral hernia with SBO)  right hip fx and repair, Failed watchman procedure  Reviewed, no changes. SH: Patient is . Personal Habits: The patient is a former pipesmoker for years. He still does vaping. Does not  currently consume alcohol, has consumed alcohol in the pastheavily Worked at [employer] now retired  Reviewed, no changes. Current Outpatient Medications:     spironolactone (ALDACTONE) 25 MG tablet, Take 1 tablet by mouth daily, Disp: 90 tablet, Rfl: 1    nadolol (CORGARD) 40 MG tablet, Take 1 tablet by mouth 2 times daily, Disp: 180 tablet, Rfl: 1    predniSONE (DELTASONE) 5 MG tablet, Take 1 tablet by mouth daily, Disp: 90 tablet, Rfl: 1    teriparatide, recombinant, (FORTEO) 600 MCG/2.4ML SOLN injection, Inject 0.08 mLs into the skin daily, Disp: 90 pen, Rfl: 5    potassium citrate (UROCIT-K) 10 MEQ (1080 MG) extended release tablet, Take by mouth 3 tabs tid, Disp: , Rfl:     lacosamide (VIMPAT) 100 MG TABS tablet, Take 100 mg by mouth 2 times daily. ., Disp: , Rfl:     omeprazole (PRILOSEC) 40 MG delayed release capsule, Take 40 mg by mouth daily , Disp: , Rfl:     Calcium Citrate-Vitamin D (CALCIUM CITRATE + D PO), Take 600 mg by mouth daily, Disp: , Rfl:     vitamin B-1 (THIAMINE) 100 MG tablet, Take 100 mg by mouth daily, Disp: , Rfl:     acetaminophen (TYLENOL) 325 MG tablet, Take 325 mg by mouth 3 times daily , Disp: , Rfl:     levETIRAcetam (KEPPRA) 500 HERNIA REPAIR  2017    UPPER GASTROINTESTINAL ENDOSCOPY      UPPER GASTROINTESTINAL ENDOSCOPY  2017    UPPER GASTROINTESTINAL ENDOSCOPY  2017     Family History   Problem Relation Age of Onset    Cancer Mother         leukemia     Social History     Socioeconomic History    Marital status:      Spouse name: Not on file    Number of children: Not on file    Years of education: Not on file    Highest education level: Not on file   Occupational History    Not on file   Social Needs    Financial resource strain: Not on file    Food insecurity:     Worry: Not on file     Inability: Not on file    Transportation needs:     Medical: Not on file     Non-medical: Not on file   Tobacco Use    Smoking status: Former Smoker     Packs/day: 1.00     Years: 55.00     Pack years: 55.00     Types: Cigarettes, Pipe     Start date: 7/10/1966     Last attempt to quit: 7/10/2015     Years since quittin.5    Smokeless tobacco: Never Used   Substance and Sexual Activity    Alcohol use: No     Comment: no alcohol since     Drug use: Never    Sexual activity: Not Currently     Partners: Female   Lifestyle    Physical activity:     Days per week: Not on file     Minutes per session: Not on file    Stress: Not on file   Relationships    Social connections:     Talks on phone: Not on file     Gets together: Not on file     Attends Sabianist service: Not on file     Active member of club or organization: Not on file     Attends meetings of clubs or organizations: Not on file     Relationship status: Not on file    Intimate partner violence:     Fear of current or ex partner: Not on file     Emotionally abused: Not on file     Physically abused: Not on file     Forced sexual activity: Not on file   Other Topics Concern    Not on file   Social History Narrative    Drinks occ Cola.        Vitals:    20 1103   BP: 103/71   Pulse: 100   Resp: 18   Temp: 97.8 °F (36.6 °C)   TempSrc: Temporal

## 2020-01-31 ENCOUNTER — TELEPHONE (OUTPATIENT)
Dept: FAMILY MEDICINE CLINIC | Age: 70
End: 2020-01-31

## 2020-04-06 ENCOUNTER — OFFICE VISIT (OUTPATIENT)
Dept: PRIMARY CARE CLINIC | Age: 70
End: 2020-04-06
Payer: MEDICARE

## 2020-04-06 VITALS
TEMPERATURE: 97.6 F | SYSTOLIC BLOOD PRESSURE: 102 MMHG | DIASTOLIC BLOOD PRESSURE: 66 MMHG | HEART RATE: 76 BPM | OXYGEN SATURATION: 98 %

## 2020-04-06 PROCEDURE — G8926 SPIRO NO PERF OR DOC: HCPCS | Performed by: INTERNAL MEDICINE

## 2020-04-06 PROCEDURE — 1036F TOBACCO NON-USER: CPT | Performed by: INTERNAL MEDICINE

## 2020-04-06 PROCEDURE — 3017F COLORECTAL CA SCREEN DOC REV: CPT | Performed by: INTERNAL MEDICINE

## 2020-04-06 PROCEDURE — G8417 CALC BMI ABV UP PARAM F/U: HCPCS | Performed by: INTERNAL MEDICINE

## 2020-04-06 PROCEDURE — 1123F ACP DISCUSS/DSCN MKR DOCD: CPT | Performed by: INTERNAL MEDICINE

## 2020-04-06 PROCEDURE — G8427 DOCREV CUR MEDS BY ELIG CLIN: HCPCS | Performed by: INTERNAL MEDICINE

## 2020-04-06 PROCEDURE — 99214 OFFICE O/P EST MOD 30 MIN: CPT | Performed by: INTERNAL MEDICINE

## 2020-04-06 PROCEDURE — 4040F PNEUMOC VAC/ADMIN/RCVD: CPT | Performed by: INTERNAL MEDICINE

## 2020-04-06 PROCEDURE — 3023F SPIROM DOC REV: CPT | Performed by: INTERNAL MEDICINE

## 2020-04-06 NOTE — PROGRESS NOTES
13-NDC#5414-0785-43 12/01/15  90662-Influenza Vaccine High Dose 65+ Preservative Free Im Use 18  90658-Influenza Vaccine Fluvirin Iiv3 (ages 3 and older) 11/17/14 09/26/12 09/26/12 10/26/11 10/20/10  90632-Hepatitis A Vaccine - Adult 2 Dose age 23 & over 17. Chronic Diagnosis: Atrial fibrillation, Aortic valve disorder, Other seizures, Thrombocytopenic disorder,  Portal hypertension, Atherosclerotic heart disease of native coronary artery with, Alcoholic cirrhosis,  Taking medication, Rheumatoid arthritis, Essential hypertension, Bleeding esophageal varices, Tobacco  user, Sleep apnea, Benign essential hypertension.   Health Maintenance:  Influenza Vaccination - (2018)  Colonoscopy - (2010)  Couseled on Home Safety - (2017)  Mini Mental Status - (3/27/2017)  Bone Density Test Screening - (2008)  Tetanus Immunization - (2017)  Psa Test - ,3/09,, -dr jean baptiste  Prostate Exam - 10/07,, dr Jan Arechiga  Rectal Exam - 10/07,,dr jean baptiste  Bone Density Test -   Neg AAA, Mammogram  Hemmocult Cards - 7/10  EGD - 10/11,,,,,10/13,4/15,12/15  2D ECHO - 6/15  Carotid Artery Study - ,   EKG -   Thyroid ultrasound - ,   Heart catheter[facility] -   Medical Problems:  Hypertension  Valvular Heart Disease - SEVERE AS-TAVR  Thyroid Irradiation as A Child, Chemical Exposure At Work  Sleep Apnea - status post uvuloplasty  Kidney Stones - uric acid--followed by dr rivera  Colon Polyps  Thyroid Cyst--Asp & US - MULTINODULAR GOITER  Esophageal Varices, GI bleed  thyroid nodule - adenomatous  liver BX - steatosis and fibrosis  Emani Sit  1950 Page #3  renal cysts, adrenal adenoma, cholelithiasis, Benign Prostatic Hypertrophy  microhematuria eval - neg  hematology eval - elevated MCV/MCH/thrombocytopenia  Ugi Bleed - x's 2 with esophageal varicies/Banding  blood transfusions, Rheumatoid Arthritis  Atrial Fibrillation - paroxysmal-unable to anticoagulate  Rheumatoid Arthritis  hepatitis studies - jesica. 7/13-neg  Cirrhosis - alcoholic  Cerebral amyloid angiopathy - Intracerebral bleed  Pancreatitis, Pneumonia, Seizure Disorder  Methicillin-resistant staph epidermidis bacteremia - Presumed infective endocarditis and treated for 6  weeks with vancomycin  Osteoporosis - Follows with rheumatology  Liver biopsy - ×3  Evaluation for liver transplant, [facility] - 5/13  Lung mass - Following with pulmonary  Follows with - Pulmonary, rheumatology, GI, cardiology, urology, podiatry, neurology-no longer seeing  hematology/oncology  transient ischemic attack - 4/17  Tavr - [facility]-6/17--needs Atb prophylaxis prior to dental  squamous cell CA lung - radiation  Lung Cancer - Squamous cell. Radiation treatment completed  Surgical Hx:  Umbilical hernia repair - 4/17 (due to incarcerated ventral hernia with SBO)  right hip fx and repair, Failed watchman procedure  Reviewed, no changes. SH: Patient is . Personal Habits: The patient is a former pipesmoker for years. He still does vaping. Does not  currently consume alcohol, has consumed alcohol in the pastheavily Worked at [employer] now retired  Reviewed, no changes. Current Outpatient Medications:     spironolactone (ALDACTONE) 25 MG tablet, Take 1 tablet by mouth daily, Disp: 90 tablet, Rfl: 1    nadolol (CORGARD) 40 MG tablet, Take 1 tablet by mouth 2 times daily, Disp: 180 tablet, Rfl: 1    predniSONE (DELTASONE) 5 MG tablet, Take 1 tablet by mouth daily, Disp: 90 tablet, Rfl: 1    teriparatide, recombinant, (FORTEO) 600 MCG/2.4ML SOLN injection, Inject 0.08 mLs into the skin daily, Disp: 90 pen, Rfl: 5    potassium citrate (UROCIT-K) 10 MEQ (1080 MG) extended release tablet, Take by mouth 3 tabs tid, Disp: , Rfl:     lacosamide (VIMPAT) 100 MG TABS tablet, Take 100 mg by mouth 2 times daily. ., Disp: , Rfl:     omeprazole (PRILOSEC) 40 MG delayed release capsule, Take 40 mg by mouth daily , Disp: , Rfl:     Calcium Citrate-Vitamin D (CALCIUM CITRATE + D PO), Take 600 mg by mouth daily, Disp: , Rfl:     vitamin B-1 (THIAMINE) 100 MG tablet, Take 100 mg by mouth daily, Disp: , Rfl:     acetaminophen (TYLENOL) 325 MG tablet, Take 325 mg by mouth 3 times daily , Disp: , Rfl:     levETIRAcetam (KEPPRA) 500 MG tablet, Take 2 tablets by mouth 2 times daily (Patient taking differently: Take 2,000 mg by mouth daily ), Disp: 60 tablet, Rfl: 0    aspirin 81 MG tablet, Take 81 mg by mouth daily, Disp: , Rfl:     hydroxychloroquine (PLAQUENIL) 200 MG tablet, Take 400 mg by mouth daily Indications: taking 400 mg , Disp: , Rfl:     folic acid (FOLVITE) 1 MG tablet, Take 1 mg by mouth daily , Disp: , Rfl:     lactulose (CHRONULAC) 10 GM/15ML solution, 10 g by Other route daily , Disp: , Rfl:     VITAMIN D PO, Take 4,000 Units by mouth daily , Disp: , Rfl:   No Known Allergies    Past Medical History:   Diagnosis Date    Alcoholic cirrhosis ([address]     Alcoholism ([address]     Has been sober / dry since 01/2016.  Anxiety     Arthritis     Chronic atrial fibrillation     Esophageal varices (HCC)     UGI bleed ~2012 - has had variceal banding 2x around that time.  History of kidney stones     Hypertension     Lumbar compression fracture ([address] 02/2017    Lung tumor 2017    Osteopenia     Osteopenia     S/P TAVR (transcatheter aortic valve replacement) 9/20/2018    Seizure disorder ([address] 25/7114    Complicated a stroke with occipital ICH.  Seizures ([address]     Sepsis with MRSE bacteremia 09/2016    resolved    Severe aortic stenosis     Confirmed by echo 9/2016, BROWN 10/2016. Undergoing w/u at [facility] for TAVR, as of 4/2017.  Sleep apnea     Couldn't tolerate CPAP therapy ~2007. Had had UPPP.  Stroke [address] 09/2016    With receptive aphasia, poor memory that are improving as of 04/2017.  Pt was found to have an occipital ICH at that time, with possible amyloid angiopathy as cause per MRI subsequently. Had seizure and sepsis (d/t MRSE bacteremia) complicating stroke.      Past Surgical History:   Procedure Laterality Date    ECHO COMPL W DOP COLOR FLOW  2012         ECHOCARDIOGRAM COMPLETE 2D W DOPPLER W COLOR  2013         ESOPHAGEAL VARICE LIGATION      HIP SURGERY      PALATE SURGERY      UMBILICAL HERNIA REPAIR  2017    UPPER GASTROINTESTINAL ENDOSCOPY      UPPER GASTROINTESTINAL ENDOSCOPY  2017    UPPER GASTROINTESTINAL ENDOSCOPY  2017     Family History   Problem Relation Age of Onset    Cancer Mother         leukemia     Social History     Socioeconomic History    Marital status:      Spouse name: Not on file    Number of children: Not on file    Years of education: Not on file    Highest education level: Not on file   Occupational History    Not on file   Social Needs    Financial resource strain: Not on file    Food insecurity     Worry: Not on file     Inability: Not on file    Transportation needs     Medical: Not on file     Non-medical: Not on file   Tobacco Use    Smoking status: Former Smoker     Packs/day: 1.00     Years: 55.00     Pack years: 55.00     Types: Cigarettes, Pipe     Start date: 7/10/1966     Last attempt to quit: 7/10/2015     Years since quittin.7    Smokeless tobacco: Never Used   Substance and Sexual Activity    Alcohol use: No     Comment: no alcohol since     Drug use: Never    Sexual activity: Not Currently     Partners: Female   Lifestyle    Physical activity     Days per week: Not on file     Minutes per session: Not on file    Stress: Not on file   Relationships    Social connections     Talks on phone: Not on file     Gets together: Not on file     Attends Yazidi service: Not on file     Active member of club or organization: Not on file     Attends meetings of clubs or organizations: Not on file     Relationship status: Not on file    Intimate

## 2020-05-29 ENCOUNTER — HOSPITAL ENCOUNTER (OUTPATIENT)
Age: 70
Discharge: HOME | End: 2020-05-31

## 2020-05-29 PROCEDURE — 87081 CULTURE SCREEN ONLY: CPT

## 2020-05-29 PROCEDURE — 88305 TISSUE EXAM BY PATHOLOGIST: CPT

## 2020-05-30 LAB — CLOTEST: NORMAL

## 2020-07-02 ENCOUNTER — OFFICE VISIT (OUTPATIENT)
Dept: FAMILY MEDICINE CLINIC | Age: 70
End: 2020-07-02
Payer: MEDICARE

## 2020-07-02 VITALS
HEIGHT: 70 IN | DIASTOLIC BLOOD PRESSURE: 68 MMHG | TEMPERATURE: 97.8 F | HEART RATE: 82 BPM | SYSTOLIC BLOOD PRESSURE: 110 MMHG | BODY MASS INDEX: 43.15 KG/M2 | OXYGEN SATURATION: 97 % | WEIGHT: 301.4 LBS

## 2020-07-02 PROCEDURE — 1036F TOBACCO NON-USER: CPT | Performed by: INTERNAL MEDICINE

## 2020-07-02 PROCEDURE — 3017F COLORECTAL CA SCREEN DOC REV: CPT | Performed by: INTERNAL MEDICINE

## 2020-07-02 PROCEDURE — G8417 CALC BMI ABV UP PARAM F/U: HCPCS | Performed by: INTERNAL MEDICINE

## 2020-07-02 PROCEDURE — 4040F PNEUMOC VAC/ADMIN/RCVD: CPT | Performed by: INTERNAL MEDICINE

## 2020-07-02 PROCEDURE — G8427 DOCREV CUR MEDS BY ELIG CLIN: HCPCS | Performed by: INTERNAL MEDICINE

## 2020-07-02 PROCEDURE — 1123F ACP DISCUSS/DSCN MKR DOCD: CPT | Performed by: INTERNAL MEDICINE

## 2020-07-02 PROCEDURE — 99214 OFFICE O/P EST MOD 30 MIN: CPT | Performed by: INTERNAL MEDICINE

## 2020-07-02 RX ORDER — NADOLOL 40 MG/1
40 TABLET ORAL 2 TIMES DAILY
Qty: 180 TABLET | Refills: 1 | Status: SHIPPED
Start: 2020-07-02 | End: 2020-10-12 | Stop reason: SDUPTHER

## 2020-07-02 RX ORDER — LACTOBACIL 2/BIFIDO 11/S.THERM 112.5B
CAPSULE ORAL
COMMUNITY
End: 2021-10-18 | Stop reason: ALTCHOICE

## 2020-07-02 RX ORDER — SPIRONOLACTONE 25 MG/1
25 TABLET ORAL DAILY
Qty: 90 TABLET | Refills: 1 | Status: SHIPPED
Start: 2020-07-02 | End: 2020-10-12 | Stop reason: SDUPTHER

## 2020-07-02 RX ORDER — LEVETIRACETAM 1000 MG/1
1000 TABLET ORAL 2 TIMES DAILY
COMMUNITY

## 2020-07-02 ASSESSMENT — PATIENT HEALTH QUESTIONNAIRE - PHQ9
1. LITTLE INTEREST OR PLEASURE IN DOING THINGS: 0
SUM OF ALL RESPONSES TO PHQ QUESTIONS 1-9: 0
SUM OF ALL RESPONSES TO PHQ9 QUESTIONS 1 & 2: 0
SUM OF ALL RESPONSES TO PHQ QUESTIONS 1-9: 0
2. FEELING DOWN, DEPRESSED OR HOPELESS: 0

## 2020-07-05 NOTE — PROGRESS NOTES
for postnasal drip. Negative for congestion, rhinorrhea and sinus pain.    Respiratory: Negative for cough, shortness of breath and wheezing.-History of lung cancer with radiation  Cardiovascular: Negative for chest pain, palpitations and leg swelling.        Chronic atrial fibrillation not on anticoagulation secondary to falls and bleeding   Gastrointestinal: Negative for abdominal pain, blood in stool, constipation, diarrhea, nausea and vomiting.        Alcoholic cirrhosis.  Some loose stool lactulose that he is on.   Endocrine: Negative.    Genitourinary: Negative for difficulty urinating, dysuria, frequency, hematuria and urgency.         Decreased urinary stream--saw urology 1/17 for hematuria.   Musculoskeletal: Positive for arthralgias and back pain. Negative for gait problem and myalgias.   rePorts arthritis and lower back pain/low back pain severe at times has been taking Tylenol.  Some improvement.  Rheumatoid arthritis--neurosurgery did not recommend surgery at this time for his back-recent right hip fracture and repair  Allergic/Immunologic: Negative for environmental allergies and immunocompromised state. Neurological: Negative for dizziness, weakness, light-headedness, numbness and headaches.        Reports, numbness, seizures (although not recent) TIA-watchman procedure failed and we are unable to anticoagulate this gentleman   Hematological: Negative. Amita Coyne have chronic thrombocytopenia associated with his cirrhosis. Psychiatric/Behavioral: Negative for behavioral problems, confusion and sleep disturbance. The patient is not nervous/anxious.         REST OF PERTINENT ROS GONE OVER AND WAS NEGATIVE.      PMH:  Shot Record:  -Fluzone Influenza Virus- Medicare 00/00/00 09/07/17 11/05/15  90750-Shingrix (Shinglesvaccine (HZV), Recombinant, Subunit, Adjuvanted 03/13/19  Toradol-Ketorolac 30MG Injection 03/06/06  90746-Hepatitis B Vaccine Adult 12/18/17 11/17/14 06/24/14 05/22/14 14  90736-Zoster Vaccine 01/12/10  90732-Pneumococcal Vaccine (Pneumovax) 17  90715-TdaP For Individuals greater than 11 17  13595-Zewfraz 13-NDC#4167-2264-56 12/01/15  90662-Influenza Vaccine High Dose 65+ Preservative Free Im Use 18  90658-Influenza Vaccine Fluvirin Iiv3 (ages 3 and older) 11/17/14 09/26/12 09/26/12 10/26/11 10/20/10  90632-Hepatitis A Vaccine - Adult 2 Dose age 23 & over 17. Chronic Diagnosis: Atrial fibrillation, Aortic valve disorder, Other seizures, Thrombocytopenic disorder,  Portal hypertension, Atherosclerotic heart disease of native coronary artery with, Alcoholic cirrhosis,  Taking medication, Rheumatoid arthritis, Essential hypertension, Bleeding esophageal varices, Tobacco  user, Sleep apnea, Benign essential hypertension.   Health Maintenance:  Influenza Vaccination - (2018)  Colonoscopy - (2010)  Couseled on Home Safety - (2017)  Mini Mental Status - (3/27/2017)  Bone Density Test Screening - (2008)  Tetanus Immunization - (2017)  Psa Test - ,3/09,, -dr jean baptiste  Prostate Exam - 10/07,, dr Shreya Crouch  Rectal Exam - 10/07,,dr jean baptiste  Bone Density Test -   Neg AAA, Mammogram  Hemmocult Cards - 7/10  EGD - 10/11,,,,,10/13,4/15,12/15,   2D ECHO - 6/15  Carotid Artery Study - ,   EKG -   Thyroid ultrasound - ,   Heart catheterization [facility] -   Medical Problems:  Hypertension  Valvular Heart Disease - SEVERE AS-TAVR  Thyroid Irradiation as A Child, Chemical Exposure At Work  Sleep Apnea - status post uvuloplasty  Kidney Stones - uric acid--followed by dr rivera  Colon Polyps  Thyroid Cyst--Asp & US - MULTINODULAR GOITER  Esophageal Varices, GI bleed  thyroid nodule - adenomatous  liver BX - steatosis and fibrosis  Lazarus Valdes  1950 Page #3  renal cysts, adrenal adenoma, cholelithiasis, Benign Prostatic Hypertrophy  microhematuria eval - neg  hematology eval - elevated MCV/MCH/thrombocytopenia  Ugi Bleed - x's 2 with esophageal varicies/Banding  blood transfusions, Rheumatoid Arthritis  Atrial Fibrillation - paroxysmal-unable to anticoagulate  Rheumatoid Arthritis  hepatitis studies - jesica. 7/13-neg  Cirrhosis - alcoholic  Cerebral amyloid angiopathy - Intracerebral bleed  Pancreatitis, Pneumonia, Seizure Disorder  Methicillin-resistant staph epidermidis bacteremia - Presumed infective endocarditis and treated for 6  weeks with vancomycin  Osteoporosis - Follows with rheumatology  Liver biopsy - ×3  Evaluation for liver transplant, [facility] - 5/13  Lung mass - Following with pulmonary  Follows with - Pulmonary, rheumatology, GI, cardiology, urology, podiatry, neurology-no longer seeing  hematology/oncology  transient ischemic attack - 4/17  Tavr - [facility]-6/17--needs Atb prophylaxis prior to dental  squamous cell CA lung - radiation  Lung Cancer - Squamous cell. Radiation treatment completed  Surgical Hx:  Umbilical hernia repair - 4/17 (due to incarcerated ventral hernia with SBO)  right hip fx and repair, Failed watchman procedure  Reviewed, no changes. SH: Patient is . Personal Habits: The patient is a former pipesmoker for years. He still does vaping.  Does not  currently consume alcohol, has consumed alcohol in the pastheavily Worked at [employer] now retired  Reviewed, no changes.                Current Outpatient Medications:     levETIRAcetam (KEPPRA) 1000 MG tablet, Take 1,000 mg by mouth 2 times daily, Disp: , Rfl:     Probiotic Product (HIGH POTENCY PROBIOTIC) CAPS, Take by mouth 10 billion CFU: 2 tablets once daily, Disp: , Rfl:     nadolol (CORGARD) 40 MG tablet, Take 1 tablet by mouth 2 times daily, Disp: 180 tablet, Rfl: 1    spironolactone (ALDACTONE) 25 MG tablet, Take 1 tablet by mouth daily, Disp: 90 tablet, Rfl: 1    predniSONE (DELTASONE) 5 MG tablet, Take 1 tablet by mouth daily, Disp: 90 tablet, Rfl: 1    teriparatide, recombinant, (FORTEO) 600 MCG/2.4ML SOLN injection, Inject 0.08 mLs into the skin daily, Disp: 90 pen, Rfl: 5    potassium citrate (UROCIT-K) 10 MEQ (1080 MG) extended release tablet, Take by mouth 3 tabs tid, Disp: , Rfl:     lacosamide (VIMPAT) 100 MG TABS tablet, Take 100 mg by mouth 2 times daily. ., Disp: , Rfl:     omeprazole (PRILOSEC) 40 MG delayed release capsule, Take 40 mg by mouth daily , Disp: , Rfl:     Calcium Citrate-Vitamin D (CALCIUM CITRATE + D PO), Take 600 mg by mouth daily, Disp: , Rfl:     vitamin B-1 (THIAMINE) 100 MG tablet, Take 100 mg by mouth daily, Disp: , Rfl:     acetaminophen (TYLENOL) 500 MG tablet, Take 500 mg by mouth every 6 hours , Disp: , Rfl:     aspirin 81 MG tablet, Take 81 mg by mouth daily, Disp: , Rfl:     hydroxychloroquine (PLAQUENIL) 200 MG tablet, Take 400 mg by mouth daily Indications: taking 400 mg , Disp: , Rfl:     folic acid (FOLVITE) 1 MG tablet, Take 1 mg by mouth daily , Disp: , Rfl:     lactulose (CHRONULAC) 10 GM/15ML solution, 10 g by Other route daily , Disp: , Rfl:     VITAMIN D PO, Take 4,000 Units by mouth daily , Disp: , Rfl:   No Known Allergies    Past Medical History:   Diagnosis Date    [facility]     Alcoholism ([facility]     Has been sober / dry since 01/2016.  Anxiety     Arthritis     Chronic atrial fibrillation     Esophageal varices (HCC)     UGI bleed ~2012 - has had variceal banding 2x around that time.  History of kidney stones     Hypertension     Lumbar compression fracture ([facility] 02/2017    Lung tumor 2017    Osteopenia     Osteopenia     S/P TAVR (transcatheter aortic valve replacement) 9/20/2018    Seizure disorder ([facility] 34/2246    Complicated a stroke with occipital ICH.  Seizures ([address]     Sepsis with MRSE bacteremia 09/2016    resolved    Severe aortic stenosis     Confirmed by echo 9/2016, BROWN 10/2016. Undergoing w/u at [facility] for TAVR, as of 4/2017. together: Not on file     Attends Amish service: Not on file     Active member of club or organization: Not on file     Attends meetings of clubs or organizations: Not on file     Relationship status: Not on file    Intimate partner violence     Fear of current or ex partner: Not on file     Emotionally abused: Not on file     Physically abused: Not on file     Forced sexual activity: Not on file   Other Topics Concern    Not on file   Social History Narrative    Drinks occ Cola. Vitals:    07/02/20 1005   BP: 110/68   Pulse: 82   Temp: 97.8 °F (36.6 °C)   TempSrc: Temporal   SpO2: 97%   Weight: (!) 301 lb 6.4 oz (136.7 kg)   Height: 5' 10\" (1.778 m)       Physical Exam    Constitutional: He is oriented to person, place, and time. He appears well-developed and well-nourished. No distress. Neck: Normal range of motion. Neck supple. No JVD present. No thyromegaly present. No thyroid nodules   Cardiovascular: Normal rate and intact distal pulses. Exam reveals no gallop and no friction rub.   Murmur heard.    Heart: Irregularly irregular with controlled ventricular response.  Systolic murmur did improve postoperatively.   Pulmonary/Chest: Effort normal and breath sounds normal. No respiratory distress. He has no wheezes. He has no rales. Abdominal: Soft. Bowel sounds are normal. He exhibits no distension and no mass. There is no tenderness.   Spleen tip and liver were palpable with deep inspiration   Musculoskeletal: Normal range of motion. He exhibits edema.   Walks with a slow gait.   Lymph nodes     He has no cervical adenopathy.     He has no axillary adenopathy.        Right: No inguinal adenopathy present.        Left: No inguinal adenopathy present. Neurological: He is alert and oriented to person, place, and time. He displays normal reflexes. A sensory deficit is present.  He exhibits normal muscle tone. Coordination normal.   Diminished sensation light touch in the ankle area

## 2020-10-12 ENCOUNTER — HOSPITAL ENCOUNTER (OUTPATIENT)
Age: 70
Discharge: HOME | End: 2020-10-14
Payer: MEDICARE

## 2020-10-12 ENCOUNTER — OFFICE VISIT (OUTPATIENT)
Dept: FAMILY MEDICINE CLINIC | Age: 70
End: 2020-10-12
Payer: MEDICARE

## 2020-10-12 VITALS
OXYGEN SATURATION: 98 % | WEIGHT: 303.6 LBS | SYSTOLIC BLOOD PRESSURE: 98 MMHG | HEART RATE: 69 BPM | BODY MASS INDEX: 43.46 KG/M2 | TEMPERATURE: 97.2 F | DIASTOLIC BLOOD PRESSURE: 60 MMHG | HEIGHT: 70 IN

## 2020-10-12 LAB
ALBUMIN SERPL-MCNC: 3.7 G/DL (ref 3.5–5.2)
ALP BLD-CCNC: 75 U/L (ref 40–129)
ALT SERPL-CCNC: 19 U/L (ref 0–40)
AMMONIA: 48 UMOL/L (ref 16–60)
ANION GAP SERPL CALCULATED.3IONS-SCNC: 13 MMOL/L (ref 7–16)
AST SERPL-CCNC: 45 U/L (ref 0–39)
BASOPHILS ABSOLUTE: 0.04 E9/L (ref 0–0.2)
BASOPHILS RELATIVE PERCENT: 0.4 % (ref 0–2)
BILIRUB SERPL-MCNC: 1.4 MG/DL (ref 0–1.2)
BUN BLDV-MCNC: 20 MG/DL (ref 8–23)
CALCIUM SERPL-MCNC: 9.9 MG/DL (ref 8.6–10.2)
CHLORIDE BLD-SCNC: 99 MMOL/L (ref 98–107)
CHOLESTEROL, TOTAL: 119 MG/DL (ref 0–199)
CO2: 26 MMOL/L (ref 22–29)
CREAT SERPL-MCNC: 1.3 MG/DL (ref 0.7–1.2)
EOSINOPHILS ABSOLUTE: 0.22 E9/L (ref 0.05–0.5)
EOSINOPHILS RELATIVE PERCENT: 2.4 % (ref 0–6)
GFR AFRICAN AMERICAN: >60
GFR NON-AFRICAN AMERICAN: 54 ML/MIN/1.73
GLUCOSE BLD-MCNC: 88 MG/DL (ref 74–99)
HCT VFR BLD CALC: 41.6 % (ref 37–54)
HDLC SERPL-MCNC: 63 MG/DL
HEMOGLOBIN: 13.6 G/DL (ref 12.5–16.5)
IMMATURE GRANULOCYTES #: 0.03 E9/L
IMMATURE GRANULOCYTES %: 0.3 % (ref 0–5)
LDL CHOLESTEROL CALCULATED: 40 MG/DL (ref 0–99)
LYMPHOCYTES ABSOLUTE: 0.83 E9/L (ref 1.5–4)
LYMPHOCYTES RELATIVE PERCENT: 9.1 % (ref 20–42)
MAGNESIUM: 1.6 MG/DL (ref 1.6–2.6)
MCH RBC QN AUTO: 32.5 PG (ref 26–35)
MCHC RBC AUTO-ENTMCNC: 32.7 % (ref 32–34.5)
MCV RBC AUTO: 99.3 FL (ref 80–99.9)
MONOCYTES ABSOLUTE: 1.1 E9/L (ref 0.1–0.95)
MONOCYTES RELATIVE PERCENT: 12.1 % (ref 2–12)
NEUTROPHILS ABSOLUTE: 6.89 E9/L (ref 1.8–7.3)
NEUTROPHILS RELATIVE PERCENT: 75.7 % (ref 43–80)
PDW BLD-RTO: 14.6 FL (ref 11.5–15)
PLATELET # BLD: 81 E9/L (ref 130–450)
PLATELET CONFIRMATION: NORMAL
PMV BLD AUTO: 10.7 FL (ref 7–12)
POTASSIUM SERPL-SCNC: 4.6 MMOL/L (ref 3.5–5)
RBC # BLD: 4.19 E12/L (ref 3.8–5.8)
SODIUM BLD-SCNC: 138 MMOL/L (ref 132–146)
TOTAL PROTEIN: 6.9 G/DL (ref 6.4–8.3)
TRIGL SERPL-MCNC: 80 MG/DL (ref 0–149)
VITAMIN D 25-HYDROXY: 51 NG/ML (ref 30–100)
VLDLC SERPL CALC-MCNC: 16 MG/DL
WBC # BLD: 9.1 E9/L (ref 4.5–11.5)

## 2020-10-12 PROCEDURE — 90694 VACC AIIV4 NO PRSRV 0.5ML IM: CPT | Performed by: INTERNAL MEDICINE

## 2020-10-12 PROCEDURE — 85025 COMPLETE CBC W/AUTO DIFF WBC: CPT

## 2020-10-12 PROCEDURE — 82306 VITAMIN D 25 HYDROXY: CPT

## 2020-10-12 PROCEDURE — 36415 COLL VENOUS BLD VENIPUNCTURE: CPT

## 2020-10-12 PROCEDURE — 99214 OFFICE O/P EST MOD 30 MIN: CPT | Performed by: INTERNAL MEDICINE

## 2020-10-12 PROCEDURE — 4040F PNEUMOC VAC/ADMIN/RCVD: CPT | Performed by: INTERNAL MEDICINE

## 2020-10-12 PROCEDURE — 1036F TOBACCO NON-USER: CPT | Performed by: INTERNAL MEDICINE

## 2020-10-12 PROCEDURE — 3017F COLORECTAL CA SCREEN DOC REV: CPT | Performed by: INTERNAL MEDICINE

## 2020-10-12 PROCEDURE — G8427 DOCREV CUR MEDS BY ELIG CLIN: HCPCS | Performed by: INTERNAL MEDICINE

## 2020-10-12 PROCEDURE — 82140 ASSAY OF AMMONIA: CPT

## 2020-10-12 PROCEDURE — 83735 ASSAY OF MAGNESIUM: CPT

## 2020-10-12 PROCEDURE — 80061 LIPID PANEL: CPT

## 2020-10-12 PROCEDURE — 1123F ACP DISCUSS/DSCN MKR DOCD: CPT | Performed by: INTERNAL MEDICINE

## 2020-10-12 PROCEDURE — 80053 COMPREHEN METABOLIC PANEL: CPT

## 2020-10-12 PROCEDURE — G8484 FLU IMMUNIZE NO ADMIN: HCPCS | Performed by: INTERNAL MEDICINE

## 2020-10-12 PROCEDURE — G8417 CALC BMI ABV UP PARAM F/U: HCPCS | Performed by: INTERNAL MEDICINE

## 2020-10-12 PROCEDURE — 80177 DRUG SCRN QUAN LEVETIRACETAM: CPT

## 2020-10-12 PROCEDURE — G0008 ADMIN INFLUENZA VIRUS VAC: HCPCS | Performed by: INTERNAL MEDICINE

## 2020-10-12 RX ORDER — SPIRONOLACTONE 25 MG/1
25 TABLET ORAL DAILY
Qty: 90 TABLET | Refills: 1 | Status: SHIPPED
Start: 2020-10-12 | End: 2021-04-12 | Stop reason: SDUPTHER

## 2020-10-12 RX ORDER — NADOLOL 40 MG/1
40 TABLET ORAL 2 TIMES DAILY
Qty: 180 TABLET | Refills: 1 | Status: SHIPPED
Start: 2020-10-12 | End: 2021-04-12 | Stop reason: SDUPTHER

## 2020-10-13 ENCOUNTER — TELEPHONE (OUTPATIENT)
Dept: FAMILY MEDICINE CLINIC | Age: 70
End: 2020-10-13

## 2020-10-13 NOTE — PROGRESS NOTES
hypertension, Atherosclerotic heart disease of native coronary artery with, Alcoholic cirrhosis,  Taking medication, Rheumatoid arthritis, Essential hypertension, Bleeding esophageal varices, Tobacco  user, Sleep apnea, Benign essential hypertension. Health Maintenance:  Influenza Vaccination - (2018)  Colonoscopy - (2010)  Couseled on Home Safety - (2017)  Mini Mental Status - (3/27/2017)  Bone Density Test Screening - (2008)  Tetanus Immunization - (2017)  Psa Test - ,3/09,, -dr jean baptiste  Prostate Exam - 10/07,,, dr César Crum  Rectal Exam - 10/07,,dr jean baptiste  Bone Density Test -   Neg AAA, Mammogram  Hemmocult Cards - 7/10  EGD - 10/11,,,,,10/13,4/15,12/15,   2D ECHO - 6/15  Carotid Artery Study - ,   EKG -   Thyroid ultrasound - ,   Heart catheterization [facility] -   Medical Problems:  Hypertension  Valvular Heart Disease - SEVERE AS-TAVR  Thyroid Irradiation as A Child, Chemical Exposure At Work  Sleep Apnea - status post uvuloplasty  Kidney Stones - uric acid--followed by dr rivera  Colon Polyps  Thyroid Cyst--Asp & US - MULTINODULAR GOITER  Esophageal Varices, GI bleed  thyroid nodule - adenomatous  liver BX - steatosis and fibrosis  Vilma Luu  1950 Page #3  renal cysts, adrenal adenoma, cholelithiasis, Benign Prostatic Hypertrophy  microhematuria eval - neg  hematology eval - elevated MCV/MCH/thrombocytopenia  Ugi Bleed - x's 2 with esophageal varicies/Banding  blood transfusions, Rheumatoid Arthritis  Atrial Fibrillation - paroxysmal-unable to anticoagulate  Rheumatoid Arthritis  hepatitis studies - jesica.  -neg  Cirrhosis - alcoholic  Cerebral amyloid angiopathy - Intracerebral bleed  Pancreatitis, Pneumonia, Seizure Disorder  Methicillin-resistant staph epidermidis bacteremia - Presumed infective endocarditis and treated for 6  weeks with vancomycin  Osteoporosis - Follows with MG tablet, Take 100 mg by mouth daily, Disp: , Rfl:     acetaminophen (TYLENOL) 500 MG tablet, Take 500 mg by mouth every 6 hours , Disp: , Rfl:     aspirin 81 MG tablet, Take 81 mg by mouth daily, Disp: , Rfl:     hydroxychloroquine (PLAQUENIL) 200 MG tablet, Take 400 mg by mouth daily Indications: taking 400 mg , Disp: , Rfl:     folic acid (FOLVITE) 1 MG tablet, Take 1 mg by mouth daily , Disp: , Rfl:     lactulose (CHRONULAC) 10 GM/15ML solution, 10 g by Other route daily , Disp: , Rfl:     VITAMIN D PO, Take 4,000 Units by mouth daily , Disp: , Rfl:   No Known Allergies    Past Medical History:   Diagnosis Date    [facility]     Alcoholism ([facility]     Has been sober / dry since 01/2016.  Anxiety     Arthritis     Chronic atrial fibrillation (HCC)     Esophageal varices (HCC)     UGI bleed ~2012 - has had variceal banding 2x around that time.  History of kidney stones     Hypertension     Lumbar compression fracture ([facility] 02/2017    Lung tumor 2017    Osteopenia     Osteopenia     S/P TAVR (transcatheter aortic valve replacement) 9/20/2018    Seizure disorder ([address] 26/3507    Complicated a stroke with occipital ICH.  Seizures ([address]     Sepsis with MRSE bacteremia 09/2016    resolved    Severe aortic stenosis     Confirmed by echo 9/2016, BROWN 10/2016. Undergoing w/u at [facility] for TAVR, as of 4/2017.  Sleep apnea     Couldn't tolerate CPAP therapy ~2007. Had had UPPP.  Stroke ([address] 09/2016    With receptive aphasia, poor memory that are improving as of 04/2017. Pt was found to have an occipital ICH at that time, with possible amyloid angiopathy as cause per MRI subsequently. Had seizure and sepsis (d/t MRSE bacteremia) complicating stroke.      Past Surgical History:   Procedure Laterality Date    ECHO COMPL W DOP COLOR FLOW  2/4/2012         ECHOCARDIOGRAM COMPLETE 2D W DOPPLER W COLOR  8/27/2013         ESOPHAGEAL VARICE LIGATION      HIP SURGERY      PALATE SURGERY      UMBILICAL HERNIA REPAIR  2017    UPPER GASTROINTESTINAL ENDOSCOPY      UPPER GASTROINTESTINAL ENDOSCOPY  2017    UPPER GASTROINTESTINAL ENDOSCOPY  2017     Family History   Problem Relation Age of Onset    Cancer Mother         leukemia     Social History     Socioeconomic History    Marital status:      Spouse name: Not on file    Number of children: Not on file    Years of education: Not on file    Highest education level: Not on file   Occupational History    Not on file   Social Needs    Financial resource strain: Not on file    Food insecurity     Worry: Not on file     Inability: Not on file    Transportation needs     Medical: Not on file     Non-medical: Not on file   Tobacco Use    Smoking status: Former Smoker     Packs/day: 1.00     Years: 55.00     Pack years: 55.00     Types: Cigarettes, Pipe     Start date: 7/10/1966     Last attempt to quit: 7/10/2015     Years since quittin.2    Smokeless tobacco: Never Used   Substance and Sexual Activity    Alcohol use: No     Comment: no alcohol since     Drug use: Never    Sexual activity: Not Currently     Partners: Female   Lifestyle    Physical activity     Days per week: Not on file     Minutes per session: Not on file    Stress: Not on file   Relationships    Social connections     Talks on phone: Not on file     Gets together: Not on file     Attends Spiritism service: Not on file     Active member of club or organization: Not on file     Attends meetings of clubs or organizations: Not on file     Relationship status: Not on file    Intimate partner violence     Fear of current or ex partner: Not on file     Emotionally abused: Not on file     Physically abused: Not on file     Forced sexual activity: Not on file   Other Topics Concern    Not on file   Social History Narrative    Drinks occ Cola.        Vitals:    10/12/20 1244   BP: 98/60   Pulse: 69   Temp: 97.2 °F (36.2 °C)   TempSrc: Temporal   SpO2: 98%   Weight: (!) 303 lb 9.6 oz (137.7 kg)   Height: 5' 10\" (1.778 m)       Physical Exam    Constitutional: He is oriented to person, place, and time. He appears well-developed and well-nourished. No distress. Neck: Normal range of motion. Neck supple. No JVD present. No thyromegaly present. No thyroid nodules   Cardiovascular: Normal rate and intact distal pulses. Exam reveals no gallop and no friction rub.   Murmur heard.    Heart: Irregularly irregular with controlled ventricular response.  Systolic murmur did improve postoperatively.   Pulmonary/Chest: Effort normal and breath sounds normal. No respiratory distress. He has no wheezes. He has no rales. Abdominal: Soft. Bowel sounds are normal. He exhibits no distension and no mass. There is no tenderness.   Spleen tip and liver were palpable with deep inspiration   Musculoskeletal: Normal range of motion. He exhibits edema.   Walks with a slow gait.   Lymph nodes     He has no cervical adenopathy.     He has no axillary adenopathy.        Right: No inguinal adenopathy present.        Left: No inguinal adenopathy present. Neurological: He is alert and oriented to person, place, and time. He displays normal reflexes. A sensory deficit is present. He exhibits normal muscle tone. Coordination normal.   Diminished sensation light touch in the ankle area bilaterally.   Skin: Skin is warm and dry. No rash noted. No erythema.  Few bruises noted  Psychiatric: He has a normal mood and affect. His behavior is normal. Judgment and thought content normal.            Assessment and Plan:  Alisa Seip was seen today for hypertension. Diagnoses and all orders for this visit:    Alcoholic cirrhosis of liver without ascites (HCC)  -     spironolactone (ALDACTONE) 25 MG tablet; Take 1 tablet by mouth daily  -     nadolol (CORGARD) 40 MG tablet;  Take 1 tablet by mouth 2 times daily  -     AMMONIA; Future  [facility]    Seizure disorder ([facility]  -     LEVETIRACETAM LEVEL; Future  Stable and quiescent    Neuropathy, alcoholic (HCC)  Stable    Paroxysmal atrial fibrillation (HCC)  Stable and unable to anticoagulate    Morbid obesity with BMI of 40.0-44.9, adult (HCC)    Rheumatoid arthritis with positive rheumatoid factor, involving unspecified site ([facility]  Stable and following with rheumatology    Age-related osteoporosis without current pathological fracture    S/P TAVR (transcatheter aortic valve replacement)    History of regular medication use  -     CBC Auto Differential; Future  -     Comprehensive Metabolic Panel; Future  -     Lipid Panel; Future  -     MAGNESIUM; Future    Vitamin D deficiency  -     Vitamin D 25 Hydroxy; Future    Anticoagulated    Other orders  -     INFLUENZA, QUADV, ADJUVANTED, 65 YRS =, IM, PF, PREFILL SYR, 0.5ML (FLUAD)    Plan: I will see him back in 3 months and as needed. Will offer him colonoscopy at that time if interested although given his multitude of significant medical problems not sure that he is going to want to do this normally sure how much she is going to benefit. Monitor blood pressure. Weight loss attempts. Blood work to monitor disease progression and medication use. Send copy of this to Dr. Johnson Rank his rheumatologist.  Prescription management performed. Notify me with problems in the interim. Fall precautions. Return in about 3 months (around 1/12/2021). Seen By:  Ana Maria Recinos MD      *Document was created using voice recognition software. Note was reviewed however may contain grammatical errors.

## 2020-10-13 NOTE — TELEPHONE ENCOUNTER
Labs stable including ammonia level. Keppra level is still pending. Please send copy of blood work to his rheumatologist Dr. Bethel Lozano.

## 2020-10-14 LAB — KEPPRA: 90 UG/ML (ref 12–46)

## 2020-10-15 ENCOUNTER — VIRTUAL VISIT (OUTPATIENT)
Dept: FAMILY MEDICINE CLINIC | Age: 70
End: 2020-10-15
Payer: MEDICARE

## 2020-10-15 ENCOUNTER — TELEPHONE (OUTPATIENT)
Dept: FAMILY MEDICINE CLINIC | Age: 70
End: 2020-10-15

## 2020-10-15 PROCEDURE — 3017F COLORECTAL CA SCREEN DOC REV: CPT | Performed by: PHYSICIAN ASSISTANT

## 2020-10-15 PROCEDURE — G0438 PPPS, INITIAL VISIT: HCPCS | Performed by: PHYSICIAN ASSISTANT

## 2020-10-15 PROCEDURE — 1123F ACP DISCUSS/DSCN MKR DOCD: CPT | Performed by: PHYSICIAN ASSISTANT

## 2020-10-15 PROCEDURE — G8484 FLU IMMUNIZE NO ADMIN: HCPCS | Performed by: PHYSICIAN ASSISTANT

## 2020-10-15 PROCEDURE — 4040F PNEUMOC VAC/ADMIN/RCVD: CPT | Performed by: PHYSICIAN ASSISTANT

## 2020-10-15 ASSESSMENT — PATIENT HEALTH QUESTIONNAIRE - PHQ9
SUM OF ALL RESPONSES TO PHQ9 QUESTIONS 1 & 2: 0
SUM OF ALL RESPONSES TO PHQ QUESTIONS 1-9: 0
SUM OF ALL RESPONSES TO PHQ QUESTIONS 1-9: 0
2. FEELING DOWN, DEPRESSED OR HOPELESS: 0
SUM OF ALL RESPONSES TO PHQ QUESTIONS 1-9: 0
1. LITTLE INTEREST OR PLEASURE IN DOING THINGS: 0

## 2020-10-15 ASSESSMENT — LIFESTYLE VARIABLES
HOW OFTEN DO YOU HAVE A DRINK CONTAINING ALCOHOL: NEVER
AUDIT-C TOTAL SCORE: INCOMPLETE
AUDIT TOTAL SCORE: INCOMPLETE
HOW OFTEN DO YOU HAVE A DRINK CONTAINING ALCOHOL: 0

## 2020-10-15 NOTE — PROGRESS NOTES
Medicare Annual Wellness Visit  Are Name: Shai Ortega Date: 10/15/2020   MRN: [id number] Sex: Male   Age: 69 y.o. Ethnicity: Non-/Non    : 1950 Race: Cosimo Buerger is here for Medicare AWV    Screenings for behavioral, psychosocial and functional/safety risks, and cognitive dysfunction are all negative except as indicated below. These results, as well as other patient data from the [address] [facility] [address] form, are documented in Flowsheets linked to this Encounter. Patient does not want colonoscopy performed at this time. No Known Allergies      Prior to Visit Medications    Medication Sig Taking? Authorizing Provider   spironolactone (ALDACTONE) 25 MG tablet Take 1 tablet by mouth daily Yes Denise Reynaga MD   nadolol (CORGARD) 40 MG tablet Take 1 tablet by mouth 2 times daily Yes Denise Reynaga MD   levETIRAcetam (KEPPRA) 1000 MG tablet Take 1,000 mg by mouth 2 times daily Yes Historical Provider, MD   Probiotic Product (HIGH POTENCY PROBIOTIC) CAPS Take by mouth 10 billion CFU: 2 tablets once daily Yes Historical Provider, MD   predniSONE (DELTASONE) 5 MG tablet Take 1 tablet by mouth daily Yes Segundo Lafleur MD   teriparatide, recombinant, (FORTEO) 600 MCG/2.4ML SOLN injection Inject 0.08 mLs into the skin daily Yes Segundo Lafleur MD   potassium citrate (UROCIT-K) 10 MEQ (1080 MG) extended release tablet Take by mouth 3 tabs bid Yes Historical Provider, MD   lacosamide (VIMPAT) 100 MG TABS tablet Take 100 mg by mouth 2 times daily. . Yes Historical Provider, MD   omeprazole (PRILOSEC) 40 MG delayed release capsule Take 40 mg by mouth daily  Yes Historical Provider, MD   Calcium Citrate-Vitamin D (CALCIUM CITRATE + D PO) Take 600 mg by mouth daily Yes Historical Provider, MD   vitamin B-1 (THIAMINE) 100 MG tablet Take 100 mg by mouth daily Yes Historical Provider, MD   acetaminophen (TYLENOL) 500 MG tablet Take 500 mg by mouth every 6 hours  Yes Historical Provider, MD   aspirin 81 MG tablet Take 81 mg by mouth daily Yes Historical Provider, MD   hydroxychloroquine (PLAQUENIL) 200 MG tablet Take 400 mg by mouth daily Indications: taking 400 mg  Yes Historical Provider, MD   folic acid (FOLVITE) 1 MG tablet Take 1 mg by mouth daily  Yes Historical Provider, MD   lactulose (CHRONULAC) 10 GM/15ML solution 10 g by Other route daily  Yes Historical Provider, MD   VITAMIN D PO Take 4,000 Units by mouth daily  Yes Historical Provider, MD         Past Medical History:   Diagnosis Date    Al[facility]     Alcoholism ([facility]     Has been sober / dry since 01/2016.  Anxiety     Arthritis     Chronic atrial fibrillation (HCC)     Esophageal varices (HCC)     UGI bleed ~2012 - has had variceal banding 2x around that time.  History of kidney stones     Hypertension     Lumbar compression fracture ([facility] 02/2017    Lung tumor 2017    Osteopenia     Osteopenia     S/P TAVR (transcatheter aortic valve replacement) 9/20/2018    Seizure disorder ([facility] 41/5928    Complicated a stroke with occipital ICH.  Seizures ([facility]     Sepsis with MRSE bacteremia 09/2016    resolved    Severe aortic stenosis     Confirmed by echo 9/2016, BROWN 10/2016. Undergoing w/u at [facility] for TAVR, as of 4/2017.  Sleep apnea     Couldn't tolerate CPAP therapy ~2007. Had had UPPP.  Stroke ([facility] 09/2016    With receptive aphasia, poor memory that are improving as of 04/2017. Pt was found to have an occipital ICH at that time, with possible amyloid angiopathy as cause per MRI subsequently. Had seizure and sepsis (d/t MRSE bacteremia) complicating stroke.        Past Surgical History:   Procedure Laterality Date    ECHO COMPL W DOP COLOR FLOW  2/4/2012         ECHOCARDIOGRAM COMPLETE 2D W DOPPLER W COLOR  8/27/2013         ESOPHAGEAL VARICE LIGATION      HIP SURGERY      PALATE SURGERY      UMBILICAL HERNIA REPAIR  04/14/2017    UPPER GASTROINTESTINAL ENDOSCOPY      UPPER GASTROINTESTINAL ENDOSCOPY  05/08/2017    UPPER GASTROINTESTINAL ENDOSCOPY  04/2017         Family History   Problem Relation Age of Onset    Cancer Mother         leukemia       CareTeam (Including outside providers/suppliers regularly involved in providing care):   Patient Care Team:  Rafaela Barrera MD as PCP - [facility], MD as PCP - [facility] Empaneled Provider  Jorge Wright MD as Consulting Physician (Hematology and Oncology)  Annelise Reis MD as Surgeon (Gastroenterology)  Cami George MD as Consulting Physician (Cardiology)    Wt Readings from Last 3 Encounters:   10/12/20 (!) 303 lb 9.6 oz (137.7 kg)   07/02/20 (!) 301 lb 6.4 oz (136.7 kg)   01/08/20 297 lb (134.7 kg)      Patient-Reported Vitals 10/15/2020   Patient-Reported Weight 300lb   Patient-Reported Height 5 10   Patient-Reported Temperature 98.4      There is no height or weight on file to calculate BMI. Based upon direct observation of the patient, evaluation of cognition reveals recent and remote memory intact. Patient's complete Health Risk Assessment and screening values have been reviewed and are found in Flowsheets. The following problems were reviewed today and where indicated follow up appointments were made and/or referrals ordered.     Positive Risk Factor Screenings with Interventions:     Fall Risk:  2 or more falls in past year?: no  Fall with injury in past year?: (!) yes  Fall Risk Interventions:    · Home safety tips provided    Health Habits/Nutrition:  Health Habits/Nutrition  Do you exercise for at least 20 minutes 2-3 times per week?: (!) No  Have you lost any weight without trying in the past 3 months?: No  Do you eat fewer than 2 meals per day?: No  Have you seen a dentist within the past year?: Yes     Health Habits/Nutrition Interventions:  · Inadequate physical activity:  patient is not ready to increase his/her physical activity level at this time    Personalized Preventive Plan   Current Health Maintenance Status  Immunization History   Administered Date(s) Administered    Hepatitis A Adult (Havrix, Vaqta) 05/22/2014, 06/24/2014, 11/17/2014, 12/18/2017    Hepatitis A/Hepatitis B (Twinrix) 12/18/2017    Hepatitis B Adult (Recombivax HB) 05/22/2014, 06/24/2014, 11/16/2014, 12/18/2017    Influenza Vaccine, unspecified formulation 12/30/2013    Influenza Virus Vaccine 10/20/2010, 10/26/2011, 09/26/2012, 11/16/2014, 11/04/2015, 10/15/2016, 09/07/2017    Influenza, High Dose (Fluzone 65 yrs and older) 12/26/2017, 09/17/2018    Influenza, Quadv, adjuvanted, 65 yrs +, IM, PF (Fluad) 10/12/2020    Influenza, Triv, inactivated, subunit, adjuvanted, IM (Fluad 65 yrs and older) 09/25/2019    Pneumococcal Conjugate 13-valent ([id number]) 12/01/2015    Pneumococcal Polysaccharide ([id number]) 12/17/2009, 11/13/2013, 12/26/2017    Tdap (Boostrix, Adacel) 09/22/2017    Zoster Live (Zostavax) 01/12/2010    Zoster Recombinant (Shingrix) 03/13/2019, 07/08/2019        Health Maintenance   Topic Date Due    Colon Cancer Screen FIT/FOBT  04/27/2013    Annual Wellness Visit (AWV)  06/19/2019    Low dose CT lung screening  12/11/2020    Potassium monitoring  10/12/2021    Creatinine monitoring  10/12/2021    Lipid screen  10/12/2025    DTaP/Tdap/Td vaccine (2 - Td) 09/22/2027    Hepatitis A vaccine  Completed    Hepatitis B vaccine  Completed    Flu vaccine  Completed    Shingles Vaccine  Completed    Pneumococcal 65+ years Vaccine  Completed    AAA screen  Completed    Hepatitis C screen  Completed    Hib vaccine  Aged Out    Meningococcal (ACWY) vaccine  Aged Out     Recommendations for [employer] Due: see orders and patient instructions/AVS.  . Recommended screening schedule for the next 5-10 years is provided to the patient in written form: see Patient Jaky English was seen today for medicare awv.     Diagnoses and all orders for this visit:    Routine general medical examination at a health care facility              Evie Myles is a 69 y.o. male being evaluated by a Virtual Visit (phone) encounter to address concerns as mentioned above. A caregiver was present when appropriate. Due to this being a TeleHealth encounter (During USBBK-50 public health emergency), evaluation of the following organ systems was limited: Vitals/Constitutional/EENT/Resp/CV/GI//MS/Neuro/Skin/Heme-Lymph-Imm. Pursuant to the emergency declaration under the [facility], [address] [place] [address] and the [employer] Act, this Virtual Visit was conducted with patient's (and/or legal guardian's) consent, to reduce the patient's risk of exposure to COVID-19 and provide necessary medical care. The patient (and/or legal guardian) has also been advised to contact this office for worsening conditions or problems, and seek emergency medical treatment and/or call 911 if deemed necessary. Patient identification was verified at the start of the visit: Yes    Services were provided through phone to substitute for in-person clinic visit. Patient and provider were located at their individual homes. --OLIVIA Chen III on 10/15/2020 at 2:05 PM    An electronic signature was used to authenticate this note. Advance Care Planning   Advanced Care Planning: Discussed the patients choices for care and treatment in case of a health event that adversely affects decision-making abilities. Also discussed the patients long-term treatment options. Reviewed with the patient the [address] [place] [address] Declaration forms  Reviewed the process of designating a competent adult as an Agent (or -in-fact) that could take make health care decisions for the patient if incompetent.  Patient was asked to complete the declaration forms, either acknowledge the forms by a public notary or an eligible witness and provide a signed copy to the practice office. Time spent (minutes): Less than 5 minutes     Obesity Counseling: Assessed behavioral health risks and factors affecting choice of behavior. Suggested weight control approaches, including dietary changes behavioral modification and follow up plan. Provided educational and support documentation. Time spent (minutes): Less than 5 minutes    Cardiovascular Disease Risk Counseling: Assessed the patient's risk to develop cardiovascular disease and reviewed main risk factors. Reviewed steps to reduce disease risk including:   · Quitting tobacco use, reducing amount smoked, or not starting the habit  · Making healthy food choices  · Being physically active and gradualy increasing activity levels   · Reduce weight and determine a healthy BMI goal  · Monitor blood pressure and treat if higher than 140/90 mmHg  · Maintain blood total cholesterol levels under 5 mmol/l or 190 mg/dl  · Maintain LDL cholesterol levels under 3.0 mmol/l or 115 mg/dl   · Control blood glucose levels  · Consider taking aspirin (75 mg daily), once blood pressure is controlled   Provided a follow up plan.   Time spent (minutes): Less than 5 minutes

## 2020-10-15 NOTE — TELEPHONE ENCOUNTER
Patient's Keppra level was too high. Decrease dose to 500 mg a.m. 1000 mg p.m. and repeat level in about a month.

## 2020-10-19 ENCOUNTER — OFFICE VISIT (OUTPATIENT)
Dept: PODIATRY | Age: 70
End: 2020-10-19
Payer: MEDICARE

## 2020-10-19 VITALS
DIASTOLIC BLOOD PRESSURE: 62 MMHG | TEMPERATURE: 96.8 F | WEIGHT: 300 LBS | HEIGHT: 70 IN | BODY MASS INDEX: 42.95 KG/M2 | SYSTOLIC BLOOD PRESSURE: 109 MMHG

## 2020-10-19 PROCEDURE — 11721 DEBRIDE NAIL 6 OR MORE: CPT | Performed by: PODIATRIST

## 2020-10-19 ASSESSMENT — ENCOUNTER SYMPTOMS
RESPIRATORY NEGATIVE: 1
EYES NEGATIVE: 1
GASTROINTESTINAL NEGATIVE: 1

## 2020-10-19 NOTE — PROGRESS NOTES
Pinky Fair  Return Patient    Chief Complaint   Patient presents with    Toe Pain     saw pcp Dr. Charisse Hunter on 10/12/2020       Subjective: This Kathy Morales comes to clinic for foot and nail care. Pt currently has complaint of thickened, painful, elongated nails that he/she cannot manage by themselves. Pt. Relates pain to nails with shoe gear. Pt's primary care physician is Geraldine Worley MD.1-8-20  Past Medical History:   Diagnosis Date    Alcoholic cirrhosis ([address]     Alcoholism ([address]     Has been sober / dry since 01/2016.  Anxiety     Arthritis     Chronic atrial fibrillation (HCC)     Esophageal varices (HCC)     UGI bleed ~2012 - has had variceal banding 2x around that time.  History of kidney stones     Hypertension     Lumbar compression fracture ([address] 02/2017    Lung tumor 2017    Osteopenia     Osteopenia     S/P TAVR (transcatheter aortic valve replacement) 9/20/2018    Seizure disorder ([address] 76/4059    Complicated a stroke with occipital ICH.  Seizures ([address]     Sepsis with MRSE bacteremia 09/2016    resolved    Severe aortic stenosis     Confirmed by echo 9/2016, BROWN 10/2016. Undergoing w/u at [facility] for TAVR, as of 4/2017.  Sleep apnea     Couldn't tolerate CPAP therapy ~2007. Had had UPPP.  Stroke ([facility] 09/2016    With receptive aphasia, poor memory that are improving as of 04/2017. Pt was found to have an occipital ICH at that time, with possible amyloid angiopathy as cause per MRI subsequently. Had seizure and sepsis (d/t MRSE bacteremia) complicating stroke.        No Known Allergies  Current Outpatient Medications on File Prior to Visit   Medication Sig Dispense Refill    spironolactone (ALDACTONE) 25 MG tablet Take 1 tablet by mouth daily 90 tablet 1    nadolol (CORGARD) 40 MG tablet Take 1 tablet by mouth 2 times daily 180 tablet 1    levETIRAcetam (KEPPRA) 1000 MG tablet Take 1,000 mg by mouth 2 times daily      Probiotic Product (HIGH POTENCY PROBIOTIC) CAPS Take by mouth 10 billion CFU: 2 tablets once daily      predniSONE (DELTASONE) 5 MG tablet Take 1 tablet by mouth daily 90 tablet 1    teriparatide, recombinant, (FORTEO) 600 MCG/2.4ML SOLN injection Inject 0.08 mLs into the skin daily 90 pen 5    potassium citrate (UROCIT-K) 10 MEQ (1080 MG) extended release tablet Take by mouth 3 tabs bid      lacosamide (VIMPAT) 100 MG TABS tablet Take 100 mg by mouth 2 times daily. Mannie Ambrosia omeprazole (PRILOSEC) 40 MG delayed release capsule Take 40 mg by mouth daily       Calcium Citrate-Vitamin D (CALCIUM CITRATE + D PO) Take 600 mg by mouth daily      vitamin B-1 (THIAMINE) 100 MG tablet Take 100 mg by mouth daily      acetaminophen (TYLENOL) 500 MG tablet Take 500 mg by mouth every 6 hours       aspirin 81 MG tablet Take 81 mg by mouth daily      hydroxychloroquine (PLAQUENIL) 200 MG tablet Take 400 mg by mouth daily Indications: taking 001 mg       folic acid (FOLVITE) 1 MG tablet Take 1 mg by mouth daily       lactulose (CHRONULAC) 10 GM/15ML solution 10 g by Other route daily       VITAMIN D PO Take 4,000 Units by mouth daily        No current facility-administered medications on file prior to visit. Review of Systems   Constitutional: Negative. HENT: Negative. Eyes: Negative. Respiratory: Negative. Cardiovascular: Negative. Gastrointestinal: Negative. Endocrine: Negative. Genitourinary: Negative. Musculoskeletal: Negative. Objective:  General: AAO x 3 in NAD.     Derm  Toenail Description  Sites of Onychomycosis Involvement (Check affected area)  [x] [x] [x] [x] [x] [x] [x] [x] [x] [x]  5 4 3 2 1 1 2 3 4 5                          Right                                        Left    Thickness  [x] [x] [x] [x] [x] [x] [x] [x] [x] [x]  5 4 3 2 1 1 2 3 4 5                         Right                                        Left    Dystrophic Changes   [x] [x] [x] [x] [x] [x] [x] [x] [x] [x]  5 4 3 2 1 1 2 3 4 5 Right                                        Left    Color  [x] [x] [x] [x] [x] [x] [x] [x] [x] [x]  5 4 3 2 1 1 2 3 4 5                          Right                                        Left    Incurvation/Ingrowin   [x] [x] [x] [x] [x] [x] [x] [x] [x] [x]  5 4 3 2 1 1 2 3 4 5                         Right                                        Left    Inflammation/Pain   [x] [x] [x] [x] [x] [x] [x] [x] [x] [x]  5 4 3  2 1 1 2 3 4 5                         Right                                        Left      Nails that are described above are all elongated thickened pitting mycotic yellowish incurvated causing pain with both shoe gear. Palpation nails greater then 3 mm thick painful       Dermatologic Exam:hair loss noted  lower extremity    Skin lesion/ulceration   Skin   Callus neg  Musculoskeletal:     1st MPJ ROM normal, Bilateral.  Muscle strength 5/5, Bilateral.  Pain present upon palpation of toenails 1-5  Bilateral., Bilateral.  Ankle ROM normal,Bilateral.    Dorsally contracted digits , Bilateral.     Vascular:  Pulses   bilateral DP absnet    PT absient    severe varicose veins noted to lower extremity there is loss of hair cool to touch discoloration to feet nails are mycotic dystrophic         Neurological:  Sensation present to light touch to level of digits, Bilateral.    Foot Exam    General  General Appearance: appears stated age and healthy   Orientation: alert and oriented to person, place, and time       Right Foot/Ankle     Inspection and Palpation  Skin Exam: skin changes and abnormal color; Neurovascular  Dorsalis pedis: 1+  Posterior tibial: absent      Left Foot/Ankle      Neurovascular  Dorsalis pedis: absent  Posterior tibial: absent             Ortho Exam  Q7   []Yes    []No                Q8   [x]Yes    []No                     Q9   []Yes    []No  Assessment:  69 y.o. male with:   1. Tinea unguium    2. Peripheral vascular disease, unspecified ([facility]    3. Pain in toe of right foot    4. Pain in left toe(s)    5. Difficulty walking     No orders of the defined types were placed in this encounter. Plan:   Pt was evaluated and examined. Patient was given personalized discharge instructions. Nails 1-10 were debrided in length and thickness sharply with a nail nipper and  without incident. Pt will follow up in 9 weeks or sooner if any problems arise. Diagnosis was discussed with the pt and all of their questions were answered in detail. Proper foot hygiene and care was discussed with the pt. Patient to check feet daily and contact the office with any questions/problems/concerns. Other comorbidity noted and will be managed by PCP. Pain waiver discussed with patient and confirmed.    10/19/2020      Electronically signed by Delio Flanagan DPM on 10/19/2020 at 11:04 AM  10/19/2020

## 2020-11-02 DIAGNOSIS — G40.909 SEIZURE DISORDER (HCC): ICD-10-CM

## 2020-11-05 ENCOUNTER — TELEPHONE (OUTPATIENT)
Dept: FAMILY MEDICINE CLINIC | Age: 70
End: 2020-11-05

## 2020-11-05 LAB — KEPPRA: 44 UG/ML (ref 12–46)

## 2021-01-13 ENCOUNTER — OFFICE VISIT (OUTPATIENT)
Dept: FAMILY MEDICINE CLINIC | Age: 71
End: 2021-01-13
Payer: MEDICARE

## 2021-01-13 ENCOUNTER — PROCEDURE VISIT (OUTPATIENT)
Dept: PODIATRY | Age: 71
End: 2021-01-13
Payer: MEDICARE

## 2021-01-13 VITALS — SYSTOLIC BLOOD PRESSURE: 112 MMHG | TEMPERATURE: 98.2 F | DIASTOLIC BLOOD PRESSURE: 64 MMHG

## 2021-01-13 VITALS
OXYGEN SATURATION: 97 % | BODY MASS INDEX: 43.18 KG/M2 | WEIGHT: 301.6 LBS | SYSTOLIC BLOOD PRESSURE: 108 MMHG | HEART RATE: 77 BPM | DIASTOLIC BLOOD PRESSURE: 64 MMHG | TEMPERATURE: 97.1 F | HEIGHT: 70 IN

## 2021-01-13 DIAGNOSIS — I48.0 PAROXYSMAL ATRIAL FIBRILLATION (HCC): Primary | ICD-10-CM

## 2021-01-13 DIAGNOSIS — G40.909 SEIZURE DISORDER (HCC): ICD-10-CM

## 2021-01-13 DIAGNOSIS — Z92.29 HISTORY OF REGULAR MEDICATION USE: ICD-10-CM

## 2021-01-13 DIAGNOSIS — B35.1 TINEA UNGUIUM: Primary | ICD-10-CM

## 2021-01-13 DIAGNOSIS — M79.674 PAIN IN TOE OF RIGHT FOOT: ICD-10-CM

## 2021-01-13 DIAGNOSIS — E66.01 CLASS 3 SEVERE OBESITY DUE TO EXCESS CALORIES WITH SERIOUS COMORBIDITY AND BODY MASS INDEX (BMI) OF 40.0 TO 44.9 IN ADULT (HCC): ICD-10-CM

## 2021-01-13 DIAGNOSIS — I10 ESSENTIAL HYPERTENSION: ICD-10-CM

## 2021-01-13 DIAGNOSIS — K70.30 ALCOHOLIC CIRRHOSIS OF LIVER WITHOUT ASCITES (HCC): ICD-10-CM

## 2021-01-13 DIAGNOSIS — I73.9 PERIPHERAL VASCULAR DISEASE, UNSPECIFIED (HCC): ICD-10-CM

## 2021-01-13 DIAGNOSIS — D69.6 THROMBOCYTOPENIA (HCC): ICD-10-CM

## 2021-01-13 DIAGNOSIS — M79.675 PAIN IN LEFT TOE(S): ICD-10-CM

## 2021-01-13 DIAGNOSIS — R26.2 DIFFICULTY WALKING: ICD-10-CM

## 2021-01-13 DIAGNOSIS — M05.9 RHEUMATOID ARTHRITIS WITH POSITIVE RHEUMATOID FACTOR, INVOLVING UNSPECIFIED SITE (HCC): ICD-10-CM

## 2021-01-13 PROCEDURE — 1123F ACP DISCUSS/DSCN MKR DOCD: CPT | Performed by: INTERNAL MEDICINE

## 2021-01-13 PROCEDURE — 11721 DEBRIDE NAIL 6 OR MORE: CPT | Performed by: PODIATRIST

## 2021-01-13 PROCEDURE — G8417 CALC BMI ABV UP PARAM F/U: HCPCS | Performed by: INTERNAL MEDICINE

## 2021-01-13 PROCEDURE — 3017F COLORECTAL CA SCREEN DOC REV: CPT | Performed by: INTERNAL MEDICINE

## 2021-01-13 PROCEDURE — G8427 DOCREV CUR MEDS BY ELIG CLIN: HCPCS | Performed by: INTERNAL MEDICINE

## 2021-01-13 PROCEDURE — 4040F PNEUMOC VAC/ADMIN/RCVD: CPT | Performed by: INTERNAL MEDICINE

## 2021-01-13 PROCEDURE — 99214 OFFICE O/P EST MOD 30 MIN: CPT | Performed by: INTERNAL MEDICINE

## 2021-01-13 PROCEDURE — G8484 FLU IMMUNIZE NO ADMIN: HCPCS | Performed by: INTERNAL MEDICINE

## 2021-01-13 PROCEDURE — 1036F TOBACCO NON-USER: CPT | Performed by: INTERNAL MEDICINE

## 2021-01-13 ASSESSMENT — ENCOUNTER SYMPTOMS
EYES NEGATIVE: 1
RESPIRATORY NEGATIVE: 1
GASTROINTESTINAL NEGATIVE: 1

## 2021-01-13 ASSESSMENT — PATIENT HEALTH QUESTIONNAIRE - PHQ9
SUM OF ALL RESPONSES TO PHQ QUESTIONS 1-9: 0
2. FEELING DOWN, DEPRESSED OR HOPELESS: 0
SUM OF ALL RESPONSES TO PHQ QUESTIONS 1-9: 0
1. LITTLE INTEREST OR PLEASURE IN DOING THINGS: 0

## 2021-01-13 NOTE — PROGRESS NOTES
[id number]     21  Juliet Odom : 1950 Sex: male  Age: 69 y.o. Chief Complaint   Patient presents with    Hypertension       HPI    Patient presents today for 3-month follow-up visit accompanied by his wife. This is follow-up on his medical problems. Patient blood pressure has been stable although with the lower end. He is asymptomatic with this. Weight is at 301 and has been sitting there for the last 6 months. No change. No further seizure activity on his current antiseizure medications. He continues in atrial fibrillation with controlled ventricular response on no anticoagulation secondary to his bleeding including intracerebral history and esophageal varices with cirrhosis. There is auto anticoagulation secondary to his cirrhosis. He follows with numerous consultants including urology, pulmonary, GI, rheumatology, podiatry, neurosurgery and neurology for seizure/stroke, cardiology  No longer sees radiation oncology    Review of Systems     Constitutional:  Negative for activity change, fatigue, appetite change, chills, diaphoresis, fever and unexpected weight change. HENT: Positive for postnasal drip. Negative for congestion, rhinorrhea and sinus pain.    Respiratory: Negative for cough, shortness of breath and wheezing.-History of lung cancer with radiation  Cardiovascular: Negative for chest pain, palpitations and leg swelling.        Chronic atrial fibrillation not on anticoagulation secondary to falls and bleeding-he is currently on no anticoagulated with cirrhosis. Gastrointestinal: Negative for abdominal pain, blood in stool, constipation, diarrhea, nausea and vomiting.        Alcoholic cirrhosis.  No longer taking lactulose. Last lactulose level was 71.  On probiotic that is supposed to help with his ammonia level endocrine: Negative.    Genitourinary: Negative for difficulty urinating, dysuria, frequency, hematuria and urgency.       Decreased urinary stream--saw urology 1/17 for hematuria.   Musculoskeletal: Positive for arthralgias and back pain. Negative for gait problem and myalgias.   rePorts arthritis and lower back pain/low back pain severe at times has been taking Tylenol.  Some improvement.  Rheumatoid arthritis--neurosurgery did not recommend surgery at this time for his back  Allergic/Immunologic: Negative for environmental allergies and immunocompromised state. Neurological: Negative for dizziness, weakness, light-headedness, numbness and headaches.        Reports, numbness, seizures (although not recent) TIA-watchman procedure failed and we are unable to anticoagulate this gentleman   Hematological: Negative. Vlad Levyw have chronic thrombocytopenia associated with his cirrhosis. Psychiatric/Behavioral: Negative for behavioral problems, confusion and sleep disturbance. The patient is not nervous/anxious.           REST OF PERTINENT ROS GONE OVER AND WAS NEGATIVE. PMH:  Shot Record:  -Fluzone Influenza Virus- Medicare 00/00/00 09/07/17 11/05/15  90750-Shingrix (Shinglesvaccine (HZV), Recombinant, Subunit, Adjuvanted 03/13/19  Toradol-Ketorolac 30MG Injection 03/06/06  90746-Hepatitis B Vaccine Adult 12/18/17 11/17/14 06/24/14 05/22/14 05/22/14  90736-Zoster Vaccine 01/12/10  90732-Pneumococcal Vaccine (Pneumovax) 12/26/17 11/13/13 12/17/09  90715-TdaP For Individuals greater than 11 09/22/17  25985-Bnbebmd 13-NDC#[id number] 12/01/15  90662-Influenza Vaccine High Dose 65+ Preservative Free Im Use 09/17/18  90658-Influenza Vaccine Fluvirin Iiv3 (ages 3 and older) 11/17/14 09/26/12 09/26/12 10/26/11 10/20/10  90632-Hepatitis A Vaccine - Adult 2 Dose age 23 & over 12/18/17 11/17/14 06/24/14 05/22/14 05/22/14.   Chronic Diagnosis: Atrial fibrillation, Aortic valve disorder, Other seizures, Thrombocytopenic disorder,  Portal hypertension, Atherosclerotic heart disease of native coronary artery with, Alcoholic cirrhosis, Taking medication, Rheumatoid arthritis, Essential hypertension, Bleeding esophageal varices, Tobacco  user, Sleep apnea, Benign essential hypertension. Health Maintenance:  Influenza Vaccination - (2018)  Colonoscopy - (2010)  Couseled on Home Safety - (2017)  Mini Mental Status - (3/27/2017)  Bone Density Test Screening - (2008)  Tetanus Immunization - (2017)  Psa Test - ,3/09,, -dr jean baptiste  Prostate Exam - 10/07,,, dr Aidan Yi  Rectal Exam - 10/07,,dr jean baptiste  Bone Density Test -   Neg AAA, Mammogram  Hemmocult Cards - 7/10  EGD - 10/11,,,,,10/13,4/15,12/15,   2D ECHO - 6/15  Carotid Artery Study - ,   EKG -   Thyroid ultrasound - ,   Heart catheterization [facility] -   Medical Problems:  Hypertension  Valvular Heart Disease - SEVERE AS-TAVR  Thyroid Irradiation as A Child, Chemical Exposure At Work  Sleep Apnea - status post uvuloplasty  Kidney Stones - uric acid--followed by dr rivera  Colon Polyps  Thyroid Cyst--Asp & US - MULTINODULAR GOITER  Esophageal Varices, GI bleed  thyroid nodule - adenomatous  liver BX - steatosis and fibrosis  Vikram Bernal  1950 Page #3  renal cysts, adrenal adenoma, cholelithiasis, Benign Prostatic Hypertrophy  microhematuria eval - neg  hematology eval - elevated MCV/MCH/thrombocytopenia  Ugi Bleed - x's 2 with esophageal varicies/Banding  blood transfusions, Rheumatoid Arthritis  Atrial Fibrillation - paroxysmal-unable to anticoagulate  Rheumatoid Arthritis  hepatitis studies - jesica.  -neg  Cirrhosis - alcoholic  Cerebral amyloid angiopathy - Intracerebral bleed  Pancreatitis, Pneumonia, Seizure Disorder  Methicillin-resistant staph epidermidis bacteremia - Presumed infective endocarditis and treated for 6  weeks with vancomycin  Osteoporosis - Follows with rheumatology  Liver biopsy - ×3  Evaluation for liver transplant, [facility] -  Lung mass - Following with pulmonary  Follows with - Pulmonary, rheumatology, GI, cardiology, urology, podiatry, neurology-no longer seeing  hematology/oncology  transient ischemic attack - 4/17  Tavr - [facility]-6/17--needs Atb prophylaxis prior to dental  squamous cell CA lung - radiation  Lung Cancer - Squamous cell. Radiation treatment completed  Surgical Hx:  Umbilical hernia repair - 4/17 (due to incarcerated ventral hernia with SBO)  right hip fx and repair, Failed watchman procedure  Reviewed, no changes. SH: Patient is . Personal Habits: The patient is a former pipesmoker for years. He still does vaping. Does not  currently consume alcohol, has consumed alcohol in the pastheavily Worked at [employer] now retired  Reviewed, no changes.                Current Outpatient Medications:     spironolactone (ALDACTONE) 25 MG tablet, Take 1 tablet by mouth daily, Disp: 90 tablet, Rfl: 1    nadolol (CORGARD) 40 MG tablet, Take 1 tablet by mouth 2 times daily, Disp: 180 tablet, Rfl: 1    levETIRAcetam (KEPPRA) 1000 MG tablet, Take 1,000 mg by mouth 2 times daily, Disp: , Rfl:     Probiotic Product (HIGH POTENCY PROBIOTIC) CAPS, Take by mouth 10 billion CFU: 2 tablets once daily, Disp: , Rfl:     predniSONE (DELTASONE) 5 MG tablet, Take 1 tablet by mouth daily, Disp: 90 tablet, Rfl: 1    teriparatide, recombinant, (FORTEO) 600 MCG/2.4ML SOLN injection, Inject 0.08 mLs into the skin daily, Disp: 90 pen, Rfl: 5    potassium citrate (UROCIT-K) 10 MEQ (1080 MG) extended release tablet, Take by mouth 3 tabs bid, Disp: , Rfl:     lacosamide (VIMPAT) 100 MG TABS tablet, Take 100 mg by mouth 2 times daily. ., Disp: , Rfl:     omeprazole (PRILOSEC) 40 MG delayed release capsule, Take 40 mg by mouth daily , Disp: , Rfl:     Calcium Citrate-Vitamin D (CALCIUM CITRATE + D PO), Take 600 mg by mouth daily, Disp: , Rfl:     vitamin B-1 (THIAMINE) 100 MG tablet, Take 100 mg by mouth daily, Disp: , Rfl:   acetaminophen (TYLENOL) 500 MG tablet, Take 500 mg by mouth every 6 hours , Disp: , Rfl:     aspirin 81 MG tablet, Take 81 mg by mouth daily, Disp: , Rfl:     hydroxychloroquine (PLAQUENIL) 200 MG tablet, Take 400 mg by mouth daily Indications: taking 400 mg , Disp: , Rfl:     folic acid (FOLVITE) 1 MG tablet, Take 1 mg by mouth daily , Disp: , Rfl:     lactulose (CHRONULAC) 10 GM/15ML solution, 10 g by Other route daily , Disp: , Rfl:     VITAMIN D PO, Take 4,000 Units by mouth daily , Disp: , Rfl:   No Known Allergies    Past Medical History:   Diagnosis Date    Alcoholic cirrhosis ([facility]     Alcoholism ([facility]     Has been sober / dry since 01/2016.  Anxiety     Arthritis     Chronic atrial fibrillation (HCC)     Esophageal varices (HCC)     UGI bleed ~2012 - has had variceal banding 2x around that time.  History of kidney stones     Hypertension     Lumbar compression fracture ([facility] 02/2017    Lung tumor 2017    Osteopenia     Osteopenia     S/P TAVR (transcatheter aortic valve replacement) 9/20/2018    Seizure disorder ([address] 82/2940    Complicated a stroke with occipital ICH.  Seizures ([address]     Sepsis with MRSE bacteremia 09/2016    resolved    Severe aortic stenosis     Confirmed by echo 9/2016, BROWN 10/2016. Undergoing w/u at [facility] for TAVR, as of 4/2017.  Sleep apnea     Couldn't tolerate CPAP therapy ~2007. Had had UPPP.  Stroke ([facility] 09/2016    With receptive aphasia, poor memory that are improving as of 04/2017. Pt was found to have an occipital ICH at that time, with possible amyloid angiopathy as cause per MRI subsequently. Had seizure and sepsis (d/t MRSE bacteremia) complicating stroke.      Past Surgical History:   Procedure Laterality Date    ECHO COMPL W DOP COLOR FLOW  2/4/2012         ECHOCARDIOGRAM COMPLETE 2D W DOPPLER W COLOR  8/27/2013         ESOPHAGEAL VARICE LIGATION      HIP SURGERY      PALATE SURGERY      UMBILICAL HERNIA REPAIR  04/14/2017  UPPER GASTROINTESTINAL ENDOSCOPY      UPPER GASTROINTESTINAL ENDOSCOPY  2017    UPPER GASTROINTESTINAL ENDOSCOPY  2017     Family History   Problem Relation Age of Onset    Cancer Mother         leukemia     Social History     Socioeconomic History    Marital status:      Spouse name: Not on file    Number of children: Not on file    Years of education: Not on file    Highest education level: Not on file   Occupational History    Not on file   Social Needs    Financial resource strain: Not on file    Food insecurity     Worry: Not on file     Inability: Not on file    Transportation needs     Medical: Not on file     Non-medical: Not on file   Tobacco Use    Smoking status: Former Smoker     Packs/day: 1.00     Years: 55.00     Pack years: 55.00     Types: Cigarettes, Pipe     Start date: 7/10/1966     Quit date: 7/10/2015     Years since quittin.5    Smokeless tobacco: Never Used   Substance and Sexual Activity    Alcohol use: No     Comment: no alcohol since     Drug use: Never    Sexual activity: Not Currently     Partners: Female   Lifestyle    Physical activity     Days per week: Not on file     Minutes per session: Not on file    Stress: Not on file   Relationships    Social connections     Talks on phone: Not on file     Gets together: Not on file     Attends [employer] service: Not on file     Active member of club or organization: Not on file     Attends meetings of clubs or organizations: Not on file     Relationship status: Not on file    Intimate partner violence     Fear of current or ex partner: Not on file     Emotionally abused: Not on file     Physically abused: Not on file     Forced sexual activity: Not on file   Other Topics Concern    Not on file   Social History Narrative    Drinks occ Cola.        Vitals:    21 1115   BP: 108/64   Pulse: 77   Temp: 97.1 °F (36.2 °C)   TempSrc: Temporal   SpO2: 97%   Weight: (!) 301 lb 9.6 oz (136.8 kg) Height: 5' 10\" (1.778 m)       Physical Exam    Constitutional: He is oriented to person, place, and time. He appears well-developed and well-nourished. No distress. Neck: Normal range of motion. Neck supple. No JVD present. No thyromegaly present. No thyroid nodules   Cardiovascular: Normal rate and intact distal pulses. Exam reveals no gallop and no friction rub.   Murmur heard.    Heart: Irregularly irregular with controlled ventricular response.  Systolic murmur did improve postoperatively.   Pulmonary/Chest: Effort normal and breath sounds normal. No respiratory distress. He has no wheezes. He has no rales. Psychiatric: He has a normal mood and affect. His behavior is normal. Judgment and thought content normal.             Assessment and Plan:  Dinorah Albarran was seen today for hypertension. Diagnoses and all orders for this visit:    Paroxysmal atrial fibrillation ([address]  Stable-rate control and auto anticoagulated. Seizure disorder ([address]  Stable without seizure activity. Alcoholic cirrhosis of liver without ascites (HCC)  Stable and following with GI[facility]    Rheumatoid arthritis with positive rheumatoid factor, involving unspecified site ([address]  Stable on medication and following with rheumatology    Essential hypertension  Stable    History of regular medication use    Class 3 severe obesity due to excess calories with serious comorbidity and body mass index (BMI) of 40.0 to 44.9 in adult (HCC)    Thrombocytopenia (HCC)  Stable. Plan: I will see patient back in 3 months and as needed. He is to continue follow-up with the above consultants. Apparently did have CT of chest with pulmonary recently not demonstrating any further activity of his lung cancer. No blood work today. Monitor blood pressure. Look over 10/13/2020 note when I see patient next. time spent with encounter pre,post and during visit reviewing records and answering questions. Time spent 35 minutes. Return in about 3 months (around 4/13/2021). Seen By:  Anne Velazco MD      *Document was created using voice recognition software. Note was reviewed however may contain grammatical errors.

## 2021-01-13 NOTE — PROGRESS NOTES
Edith Marshall  Return Patient    Chief Complaint   Patient presents with    Toe Pain     saw pcp Dr. Rony Mello on 1/13/21       Subjective: This Oris Small comes to clinic for foot and nail care. Pt currently has complaint of thickened, painful, elongated nails that he/she cannot manage by themselves. Pt. Relates pain to nails with shoe gear. Pt's primary care physician is Zayra Anderson MD.  Past Medical History:   Diagnosis Date    Al[facility]olic cirrhosis [facility] [address]     Alcoholism ([facility] [address]     Has been sober / dry since 01/2016.  Anxiety     Arthritis     Chronic atrial fibrillation (HCC)     Esophageal varices (HCC)     UGI bleed ~2012 - has had variceal banding 2x around that time.  History of kidney stones     Hypertension     Lumbar compression fracture ([address] 02/2017    Lung tumor 2017    Osteopenia     Osteopenia     S/P TAVR (transcatheter aortic valve replacement) 9/20/2018    Seizure disorder ([facility] [address] 82/2356    Complicated a stroke with occipital ICH.  Seizures ([facility] [address]     Sepsis with MRSE bacteremia 09/2016    resolved    Severe aortic stenosis     Confirmed by echo 9/2016, BROWN 10/2016. Undergoing w/u at [facility] for TAVR, as of 4/2017.  Sleep apnea     Couldn't tolerate CPAP therapy ~2007. Had had UPPP.  Stroke ([facility] [address] 09/2016    With receptive aphasia, poor memory that are improving as of 04/2017. Pt was found to have an occipital ICH at that time, with possible amyloid angiopathy as cause per MRI subsequently. Had seizure and sepsis (d/t MRSE bacteremia) complicating stroke.        No Known Allergies  Current Outpatient Medications on File Prior to Visit   Medication Sig Dispense Refill    nadolol (CORGARD) 40 MG tablet Take 1 tablet by mouth 2 times daily 180 tablet 1    levETIRAcetam (KEPPRA) 1000 MG tablet Take 1,000 mg by mouth 2 times daily      Probiotic Product (HIGH POTENCY PROBIOTIC) CAPS Take by mouth 10 billion CFU: 2 tablets once daily      predniSONE (DELTASONE) 5 MG tablet Take 1 tablet by mouth daily 90 tablet 1    teriparatide, recombinant, (FORTEO) 600 MCG/2.4ML SOLN injection Inject 0.08 mLs into the skin daily 90 pen 5    potassium citrate (UROCIT-K) 10 MEQ (1080 MG) extended release tablet Take by mouth 3 tabs bid      lacosamide (VIMPAT) 100 MG TABS tablet Take 100 mg by mouth 2 times daily. Naina Blocker omeprazole (PRILOSEC) 40 MG delayed release capsule Take 40 mg by mouth daily       Calcium Citrate-Vitamin D (CALCIUM CITRATE + D PO) Take 600 mg by mouth daily      vitamin B-1 (THIAMINE) 100 MG tablet Take 100 mg by mouth daily      acetaminophen (TYLENOL) 500 MG tablet Take 500 mg by mouth every 6 hours       aspirin 81 MG tablet Take 81 mg by mouth daily      hydroxychloroquine (PLAQUENIL) 200 MG tablet Take 400 mg by mouth daily Indications: taking 780 mg       folic acid (FOLVITE) 1 MG tablet Take 1 mg by mouth daily       lactulose (CHRONULAC) 10 GM/15ML solution 10 g by Other route daily       VITAMIN D PO Take 4,000 Units by mouth daily       spironolactone (ALDACTONE) 25 MG tablet Take 1 tablet by mouth daily 90 tablet 1     No current facility-administered medications on file prior to visit. Review of Systems   Constitutional: Negative. HENT: Negative. Eyes: Negative. Respiratory: Negative. Cardiovascular: Negative. Gastrointestinal: Negative. Endocrine: Negative. Genitourinary: Negative. Musculoskeletal: Negative. Objective:  General: AAO x 3 in NAD.     Derm  Toenail Description  Sites of Onychomycosis Involvement (Check affected area)  [x] [x] [x] [x] [x] [x] [x] [x] [x] [x]  5 4 3 2 1 1 2 3 4 5                          Right                                        Left    Thickness  [x] [x] [x] [x] [x] [x] [x] [x] [x] [x]  5 4 3 2 1 1 2 3 4 5                         Right                                        Left    Dystrophic Changes   [x] [x] [x] [x] [x] [x] [x] [x] [x] [x]  5 4 3 2 1 1 2 3 4 5 Right                                        Left    Color  [x] [x] [x] [x] [x] [x] [x] [x] [x] [x]  5 4 3 2 1 1 2 3 4 5                          Right                                        Left    Incurvation/Ingrowin   [x] [x] [x] [x] [x] [x] [x] [x] [x] [x]  5 4 3 2 1 1 2 3 4 5                         Right                                        Left    Inflammation/Pain   [x] [x] [x] [x] [x] [x] [x] [x] [x] [x]  5 4 3  2 1 1 2 3 4 5                         Right                                        Left      Nails that are described above are all elongated thickened pitting mycotic yellowish incurvated causing pain with both shoe gear. Palpation nails greater then 3 mm thick painful       Dermatologic Exam:hair loss noted  lower extremity    Skin lesion/ulceration   Skin   Callus neg  Musculoskeletal:     1st MPJ ROM normal, Bilateral.  Muscle strength 5/5, Bilateral.  Pain present upon palpation of toenails 1-5  Bilateral., Bilateral.  Ankle ROM normal,Bilateral.    Dorsally contracted digits , Bilateral.     Vascular:  Pulses   bilateral DP absnet    PT absient    severe varicose veins noted to lower extremity there is loss of hair cool to touch discoloration to feet nails are mycotic dystrophic         Neurological:  Sensation present to light touch to level of digits, Bilateral.    Foot Exam    General  General Appearance: appears stated age and healthy   Orientation: alert and oriented to person, place, and time       Right Foot/Ankle     Inspection and Palpation  Skin Exam: skin changes and abnormal color; Neurovascular  Dorsalis pedis: 1+  Posterior tibial: absent      Left Foot/Ankle      Neurovascular  Dorsalis pedis: absent  Posterior tibial: absent             Ortho Exam  Q7   []Yes    []No                Q8   [x]Yes    []No                     Q9   []Yes    []No  Assessment:  69 y.o. male with:   1. Tinea unguium    2.  Peripheral vascular disease, unspecified (HCC)    3. Pain in toe of right foot    4. Pain in left toe(s)    5. Difficulty walking     No orders of the defined types were placed in this encounter. Plan:   Pt was evaluated and examined. Patient was given personalized discharge instructions. Nails 1-10 were debrided in length and thickness sharply with a nail nipper and  without incident. Pt will follow up in 9 weeks or sooner if any problems arise. Diagnosis was discussed with the pt and all of their questions were answered in detail. Proper foot hygiene and care was discussed with the pt. Patient to check feet daily and contact the office with any questions/problems/concerns. Other comorbidity noted and will be managed by PCP. Pain waiver discussed with patient and confirmed.    1/13/2021      Electronically signed by Marty Valencia DPM on 1/13/2021 at 11:03 AM  1/13/2021

## 2021-04-12 ENCOUNTER — PROCEDURE VISIT (OUTPATIENT)
Dept: PODIATRY | Age: 71
End: 2021-04-12
Payer: MEDICARE

## 2021-04-12 ENCOUNTER — OFFICE VISIT (OUTPATIENT)
Dept: FAMILY MEDICINE CLINIC | Age: 71
End: 2021-04-12
Payer: MEDICARE

## 2021-04-12 VITALS
OXYGEN SATURATION: 97 % | SYSTOLIC BLOOD PRESSURE: 110 MMHG | BODY MASS INDEX: 43.44 KG/M2 | HEART RATE: 89 BPM | TEMPERATURE: 97.4 F | HEIGHT: 70 IN | WEIGHT: 303.4 LBS | DIASTOLIC BLOOD PRESSURE: 66 MMHG

## 2021-04-12 VITALS
SYSTOLIC BLOOD PRESSURE: 110 MMHG | HEIGHT: 70 IN | BODY MASS INDEX: 43.38 KG/M2 | WEIGHT: 303 LBS | DIASTOLIC BLOOD PRESSURE: 66 MMHG | TEMPERATURE: 97.4 F

## 2021-04-12 DIAGNOSIS — J44.9 CHRONIC OBSTRUCTIVE PULMONARY DISEASE, UNSPECIFIED COPD TYPE (HCC): ICD-10-CM

## 2021-04-12 DIAGNOSIS — I48.11 LONGSTANDING PERSISTENT ATRIAL FIBRILLATION (HCC): ICD-10-CM

## 2021-04-12 DIAGNOSIS — R26.2 DIFFICULTY WALKING: ICD-10-CM

## 2021-04-12 DIAGNOSIS — D69.6 THROMBOCYTOPENIA (HCC): ICD-10-CM

## 2021-04-12 DIAGNOSIS — E66.01 CLASS 3 SEVERE OBESITY DUE TO EXCESS CALORIES WITH SERIOUS COMORBIDITY AND BODY MASS INDEX (BMI) OF 40.0 TO 44.9 IN ADULT (HCC): ICD-10-CM

## 2021-04-12 DIAGNOSIS — I10 ESSENTIAL HYPERTENSION: ICD-10-CM

## 2021-04-12 DIAGNOSIS — K70.30 ALCOHOLIC CIRRHOSIS OF LIVER WITHOUT ASCITES (HCC): ICD-10-CM

## 2021-04-12 DIAGNOSIS — I73.9 PERIPHERAL VASCULAR DISEASE, UNSPECIFIED (HCC): ICD-10-CM

## 2021-04-12 DIAGNOSIS — G40.909 SEIZURE DISORDER (HCC): ICD-10-CM

## 2021-04-12 DIAGNOSIS — Z92.29 HISTORY OF REGULAR MEDICATION USE: ICD-10-CM

## 2021-04-12 DIAGNOSIS — M79.674 PAIN IN TOE OF RIGHT FOOT: ICD-10-CM

## 2021-04-12 DIAGNOSIS — M05.9 RHEUMATOID ARTHRITIS WITH POSITIVE RHEUMATOID FACTOR, INVOLVING UNSPECIFIED SITE (HCC): ICD-10-CM

## 2021-04-12 DIAGNOSIS — B35.1 TINEA UNGUIUM: Primary | ICD-10-CM

## 2021-04-12 DIAGNOSIS — M79.675 PAIN IN LEFT TOE(S): ICD-10-CM

## 2021-04-12 DIAGNOSIS — G62.1 NEUROPATHY, ALCOHOLIC (HCC): ICD-10-CM

## 2021-04-12 LAB
ALBUMIN SERPL-MCNC: 3.8 G/DL (ref 3.5–5.2)
ALP BLD-CCNC: 68 U/L (ref 40–129)
ALT SERPL-CCNC: 18 U/L (ref 0–40)
ANION GAP SERPL CALCULATED.3IONS-SCNC: 11 MMOL/L (ref 7–16)
AST SERPL-CCNC: 44 U/L (ref 0–39)
BASOPHILS ABSOLUTE: 0.05 E9/L (ref 0–0.2)
BASOPHILS RELATIVE PERCENT: 0.7 % (ref 0–2)
BILIRUB SERPL-MCNC: 1.3 MG/DL (ref 0–1.2)
BUN BLDV-MCNC: 20 MG/DL (ref 8–23)
CALCIUM SERPL-MCNC: 10 MG/DL (ref 8.6–10.2)
CHLORIDE BLD-SCNC: 104 MMOL/L (ref 98–107)
CO2: 27 MMOL/L (ref 22–29)
CREAT SERPL-MCNC: 1.3 MG/DL (ref 0.7–1.2)
EOSINOPHILS ABSOLUTE: 0.01 E9/L (ref 0.05–0.5)
EOSINOPHILS RELATIVE PERCENT: 0.1 % (ref 0–6)
GFR AFRICAN AMERICAN: >60
GFR NON-AFRICAN AMERICAN: 54 ML/MIN/1.73
GLUCOSE BLD-MCNC: 90 MG/DL (ref 74–99)
HCT VFR BLD CALC: 41.8 % (ref 37–54)
HEMOGLOBIN: 13 G/DL (ref 12.5–16.5)
IMMATURE GRANULOCYTES #: 0.02 E9/L
IMMATURE GRANULOCYTES %: 0.3 % (ref 0–5)
LYMPHOCYTES ABSOLUTE: 0.7 E9/L (ref 1.5–4)
LYMPHOCYTES RELATIVE PERCENT: 9.4 % (ref 20–42)
MCH RBC QN AUTO: 31.3 PG (ref 26–35)
MCHC RBC AUTO-ENTMCNC: 31.1 % (ref 32–34.5)
MCV RBC AUTO: 100.7 FL (ref 80–99.9)
MONOCYTES ABSOLUTE: 0.73 E9/L (ref 0.1–0.95)
MONOCYTES RELATIVE PERCENT: 9.8 % (ref 2–12)
NEUTROPHILS ABSOLUTE: 5.95 E9/L (ref 1.8–7.3)
NEUTROPHILS RELATIVE PERCENT: 79.7 % (ref 43–80)
PDW BLD-RTO: 14.1 FL (ref 11.5–15)
PLATELET # BLD: 76 E9/L (ref 130–450)
PLATELET CONFIRMATION: NORMAL
PMV BLD AUTO: 10.9 FL (ref 7–12)
POTASSIUM SERPL-SCNC: 4.7 MMOL/L (ref 3.5–5)
RBC # BLD: 4.15 E12/L (ref 3.8–5.8)
SODIUM BLD-SCNC: 142 MMOL/L (ref 132–146)
TOTAL PROTEIN: 6.9 G/DL (ref 6.4–8.3)
WBC # BLD: 7.5 E9/L (ref 4.5–11.5)

## 2021-04-12 PROCEDURE — 1123F ACP DISCUSS/DSCN MKR DOCD: CPT | Performed by: INTERNAL MEDICINE

## 2021-04-12 PROCEDURE — 4040F PNEUMOC VAC/ADMIN/RCVD: CPT | Performed by: INTERNAL MEDICINE

## 2021-04-12 PROCEDURE — 11721 DEBRIDE NAIL 6 OR MORE: CPT | Performed by: PODIATRIST

## 2021-04-12 PROCEDURE — 1036F TOBACCO NON-USER: CPT | Performed by: INTERNAL MEDICINE

## 2021-04-12 PROCEDURE — 3023F SPIROM DOC REV: CPT | Performed by: INTERNAL MEDICINE

## 2021-04-12 PROCEDURE — 99214 OFFICE O/P EST MOD 30 MIN: CPT | Performed by: INTERNAL MEDICINE

## 2021-04-12 PROCEDURE — G8417 CALC BMI ABV UP PARAM F/U: HCPCS | Performed by: INTERNAL MEDICINE

## 2021-04-12 PROCEDURE — G8427 DOCREV CUR MEDS BY ELIG CLIN: HCPCS | Performed by: INTERNAL MEDICINE

## 2021-04-12 PROCEDURE — 3017F COLORECTAL CA SCREEN DOC REV: CPT | Performed by: INTERNAL MEDICINE

## 2021-04-12 PROCEDURE — G8926 SPIRO NO PERF OR DOC: HCPCS | Performed by: INTERNAL MEDICINE

## 2021-04-12 RX ORDER — SPIRONOLACTONE 25 MG/1
25 TABLET ORAL DAILY
Qty: 90 TABLET | Refills: 1 | Status: SHIPPED
Start: 2021-04-12 | End: 2021-07-12 | Stop reason: SDUPTHER

## 2021-04-12 RX ORDER — NADOLOL 40 MG/1
40 TABLET ORAL 2 TIMES DAILY
Qty: 180 TABLET | Refills: 1 | Status: SHIPPED
Start: 2021-04-12 | End: 2021-07-12 | Stop reason: SDUPTHER

## 2021-04-12 ASSESSMENT — ENCOUNTER SYMPTOMS
RESPIRATORY NEGATIVE: 1
GASTROINTESTINAL NEGATIVE: 1
EYES NEGATIVE: 1

## 2021-04-12 NOTE — PROGRESS NOTES
Pat Dailey  Return Patient    Chief Complaint   Patient presents with    Toe Pain     saw pcp Dr. Tammy Venegas 4/13/2021       Subjective: This Padimni Sung comes to clinic for foot and nail care. Pt currently has complaint of thickened, painful, elongated nails that he/she cannot manage by themselves. Pt. Relates pain to nails with shoe gear. Pt's primary care physician is Kayleigh Og MD.  Past Medical History:   Diagnosis Date    Alcoholic cirrhosis ([facility] [address]     Alcoholism ([address]     Has been sober / dry since 01/2016.  Anxiety     Arthritis     Chronic atrial fibrillation (HCC)     Esophageal varices (HCC)     UGI bleed ~2012 - has had variceal banding 2x around that time.  History of kidney stones     Hypertension     Lumbar compression fracture ([address] 02/2017    Lung tumor 2017    Osteopenia     Osteopenia     S/P TAVR (transcatheter aortic valve replacement) 9/20/2018    Seizure disorder ([address] 45/4071    Complicated a stroke with occipital ICH.  Seizures ([address]     Sepsis with MRSE bacteremia 09/2016    resolved    Severe aortic stenosis     Confirmed by echo 9/2016, BROWN 10/2016. Undergoing w/u at [facility] for TAVR, as of 4/2017.  Sleep apnea     Couldn't tolerate CPAP therapy ~2007. Had had UPPP.  Stroke ([facility] [address] 09/2016    With receptive aphasia, poor memory that are improving as of 04/2017. Pt was found to have an occipital ICH at that time, with possible amyloid angiopathy as cause per MRI subsequently. Had seizure and sepsis (d/t MRSE bacteremia) complicating stroke.        No Known Allergies  Current Outpatient Medications on File Prior to Visit   Medication Sig Dispense Refill    levETIRAcetam (KEPPRA) 1000 MG tablet Take 1,000 mg by mouth 2 times daily      Probiotic Product (HIGH POTENCY PROBIOTIC) CAPS Take by mouth 10 billion CFU: 2 tablets once daily      predniSONE (DELTASONE) 5 MG tablet Take 1 tablet by mouth daily 90 tablet 1    teriparatide, recombinant, (FORTEO) 600 MCG/2.4ML SOLN injection Inject 0.08 mLs into the skin daily 90 pen 5    potassium citrate (UROCIT-K) 10 MEQ (1080 MG) extended release tablet Take by mouth 3 tabs bid      lacosamide (VIMPAT) 100 MG TABS tablet Take 100 mg by mouth 2 times daily. Lakeland Dunn omeprazole (PRILOSEC) 40 MG delayed release capsule Take 40 mg by mouth daily       Calcium Citrate-Vitamin D (CALCIUM CITRATE + D PO) Take 600 mg by mouth daily      vitamin B-1 (THIAMINE) 100 MG tablet Take 100 mg by mouth daily      acetaminophen (TYLENOL) 500 MG tablet Take 500 mg by mouth every 6 hours       aspirin 81 MG tablet Take 81 mg by mouth daily      hydroxychloroquine (PLAQUENIL) 200 MG tablet Take 400 mg by mouth daily Indications: taking 171 mg       folic acid (FOLVITE) 1 MG tablet Take 1 mg by mouth daily       lactulose (CHRONULAC) 10 GM/15ML solution 10 g by Other route daily       VITAMIN D PO Take 4,000 Units by mouth daily        No current facility-administered medications on file prior to visit. Review of Systems   Constitutional: Negative. HENT: Negative. Eyes: Negative. Respiratory: Negative. Cardiovascular: Negative. Gastrointestinal: Negative. Endocrine: Negative. Genitourinary: Negative. Musculoskeletal: Negative. Objective:  General: AAO x 3 in NAD.     Derm  Toenail Description  Sites of Onychomycosis Involvement (Check affected area)  [x] [x] [x] [x] [x] [x] [x] [x] [x] [x]  5 4 3 2 1 1 2 3 4 5                          Right                                        Left    Thickness  [x] [x] [x] [x] [x] [x] [x] [x] [x] [x]  5 4 3 2 1 1 2 3 4 5                         Right                                        Left    Dystrophic Changes   [x] [x] [x] [x] [x] [x] [x] [x] [x] [x]  5 4 3 2 1 1 2 3 4 5                         Right                                        Left    Color  [x] [x] [x] [x] [x] [x] [x] [x] [x] [x]  5 4 3 2 1 1 2 3 4 5                          Right Left    Incurvation/Ingrowin   [x] [x] [x] [x] [x] [x] [x] [x] [x] [x]  5 4 3 2 1 1 2 3 4 5                         Right                                        Left    Inflammation/Pain   [x] [x] [x] [x] [x] [x] [x] [x] [x] [x]  5 4 3  2 1 1 2 3 4 5                         Right                                        Left      Nails that are described above are all elongated thickened pitting mycotic yellowish incurvated causing pain with both shoe gear. Palpation nails greater then 3 mm thick painful       Dermatologic Exam:hair loss noted  lower extremity    Skin lesion/ulceration   Skin   Callus neg  Musculoskeletal:     1st MPJ ROM normal, Bilateral.  Muscle strength 5/5, Bilateral.  Pain present upon palpation of toenails 1-5  Bilateral., Bilateral.  Ankle ROM normal,Bilateral.    Dorsally contracted digits , Bilateral.     Vascular:  Pulses   bilateral DP absnet    PT absient    severe varicose veins noted to lower extremity there is loss of hair cool to touch discoloration to feet nails are mycotic dystrophic         Neurological:  Sensation present to light touch to level of digits, Bilateral.    Foot Exam    General  General Appearance: appears stated age and healthy   Orientation: alert and oriented to person, place, and time       Right Foot/Ankle     Inspection and Palpation  Skin Exam: skin changes and abnormal color; Neurovascular  Dorsalis pedis: 1+  Posterior tibial: absent      Left Foot/Ankle      Neurovascular  Dorsalis pedis: absent  Posterior tibial: absent             Ortho Exam  Q7   []Yes    []No                Q8   [x]Yes    []No                     Q9   []Yes    []No  Assessment:  69 y.o. male with:   1. Tinea unguium    2. Peripheral vascular disease, unspecified ([facility] [address]    3. Pain in toe of right foot    4. Pain in left toe(s)    5. Difficulty walking     No orders of the defined types were placed in this encounter.         Plan:   Pt was evaluated and examined. Patient was given personalized discharge instructions. Nails 1-10 were debrided in length and thickness sharply with a nail nipper and  without incident. Pt will follow up in 9 weeks or sooner if any problems arise. Diagnosis was discussed with the pt and all of their questions were answered in detail. Proper foot hygiene and care was discussed with the pt. Patient to check feet daily and contact the office with any questions/problems/concerns. Other comorbidity noted and will be managed by PCP. Pain waiver discussed with patient and confirmed.    4/12/2021      Electronically signed by Cristóbal Gaming DPM on 4/12/2021 at 11:39 AM  4/12/2021

## 2021-04-13 LAB — AMMONIA: 60 UMOL/L (ref 16–60)

## 2021-04-13 NOTE — PROGRESS NOTES
[id number] [employer]     21  Desean Rosado : 1950 Sex: male  Age: 69 y.o. Chief Complaint   Patient presents with    Hypertension       HPI    Patient presents today accompanied by his wife for 3-month follow-up visit on his multiple medical problems patient's blood pressure stable. Wife states it does run at the lower end many times but patient is asymptomatic with this. Weight is stable at 303 and has been sitting there for some time. He is unable to lose weight. No further seizure activity on his current antiseizure medication. He continues in his chronic atrial fibrillation with controlled ventricular response on no anticoagulation secondary to his bleeding including intracerebral bleed thought to be caused by cerebral amyloid angiopathy and esophageal varices with cirrhosis. Since I have seen him he is seen GI for cirrhosis esophageal varices. Is going to have colonoscopy in July along with a EGD upcoming next month. He saw urology related to his stones. He did have post residual check of his urine which was negligible. He does tell me today that he voids very small amounts and does not go very often. He insists he is drinking enough fluids. I did asked him to keep some ice the nose over several days and call in some numbers. I would likewise check some labs today to include renal function. Is also seeing rheumatology recently for his rheumatoid arthritis. He also has osteoporosis and is currently on Forteo which will be finishing up in July timeframe. All of the above notes were reviewed. Wife manages all his appointments, states is up-to-date with everybody. He follows with numerous consultants including urology, pulmonary, GI, rheumatology, podiatry, and neurology for seizure/stroke, cardiology.   He no longer sees radiation oncology for his lung cancer nor neurosurgery    Review of Systems     Constitutional:  Negative for activity change, fatigue, appetite change, chills, diaphoresis, fever and unexpected weight change. HENT: Positive for postnasal drip. Negative for congestion, rhinorrhea and sinus pain.    Respiratory: Negative for cough, shortness of breath and wheezing.-History of lung cancer with radiation  Cardiovascular: Negative for chest pain, palpitations and leg swelling.        Chronic atrial fibrillation not on anticoagulation secondary to falls and bleeding-he is currently on no anticoagulated with cirrhosis. Gastrointestinal: Negative for abdominal pain, blood in stool, constipation, diarrhea, nausea and vomiting.        Alcoholic cirrhosis.  No longer taking lactulose. Last lactulose level was 71. On probiotic that is supposed to help with his ammonia level endocrine: Negative.    Genitourinary: Negative for difficulty urinating, dysuria, frequency, hematuria and urgency.         Decreased urinary stream--saw urology 1/17 for hematuria. --Now complaining of decreasing urine output-see above  Musculoskeletal: Positive for arthralgias and back pain. Negative for gait problem and myalgias.   rePorts arthritis and lower back pain/low back pain severe at times has been taking Tylenol.  Some improvement.  Rheumatoid arthritis--neurosurgery did not recommend surgery at this time for his back  Allergic/Immunologic: Negative for environmental allergies and immunocompromised state. Neurological: Negative for dizziness, weakness, light-headedness, numbness and headaches.        Reports, numbness, seizures (although not recent) TIA-watchman procedure failed and we are unable to anticoagulate this gentleman   Hematological: Negative. Shameka Whitney have chronic thrombocytopenia associated with his cirrhosis. Psychiatric/Behavioral: Negative for behavioral problems, confusion and sleep disturbance. The patient is not nervous/anxious.             REST OF PERTINENT ROS GONE OVER AND WAS NEGATIVE.      PMH:  Shot Record:  -Fluzone Influenza Virus- Medicare 00/00/00 09/07/17 11/05/15  90750-Shingrix (Shinglesvaccine (HZV), Recombinant, Subunit, Adjuvanted 03/13/19  Toradol-Ketorolac 30MG Injection 03/06/06  90746-Hepatitis B Vaccine Adult 12/18/17 11/17/14 06/24/14 05/22/14 05/22/14  90736-Zoster Vaccine 01/12/10  90732-Pneumococcal Vaccine (Pneumovax) 12/26/17 11/13/13 12/17/09  90715-TdaP For Individuals greater than 11 09/22/17  64684-Wllhumn 13-NDC#9947-6729-42 12/01/15  90662-Influenza Vaccine High Dose 65+ Preservative Free Im Use 09/17/18  90658-Influenza Vaccine Fluvirin Iiv3 (ages 3 and older) 11/17/14 09/26/12 09/26/12 10/26/11 10/20/10  90632-Hepatitis A Vaccine - Adult 2 Dose age 23 & over 12/18/17 11/17/14 06/24/14 05/22/14 05/22/14. Chronic Diagnosis: Atrial fibrillation, Aortic valve disorder, Other seizures, Thrombocytopenic disorder,  Portal hypertension, Atherosclerotic heart disease of native coronary artery with, Alcoholic cirrhosis,  Taking medication, Rheumatoid arthritis, Essential hypertension, Bleeding esophageal varices, Tobacco  user, Sleep apnea, Benign essential hypertension.   Health Maintenance:  Influenza Vaccination - (9/17/2018)  Colonoscopy - (1/6/2010)  Couseled on Home Safety - (7/19/2017)  Mini Mental Status - (3/27/2017)  Bone Density Test Screening - (1/4/2008)  Tetanus Immunization - (9/7/2017)  Psa Test - 1/08,3/09,12/09, 7/17-dr jean baptiste  Prostate Exam - 10/07,11/08,2015, dr Paris Acosta  Rectal Exam - 10/07,11/08,dr jean baptiste  Bone Density Test - 1/08  Neg AAA, Mammogram  Hemmocult Cards - 7/10  EGD - 10/11,11/11,6/12,8/12,1/13,10/13,4/15,12/15, 5/20  2D ECHO - 6/15  Carotid Artery Study - 2/12, 2/14  EKG - 2/12  Thyroid ultrasound - 7/12, 2/16  Heart catheterization [facility] - 4/17  Medical Problems:  Hypertension  Valvular Heart Disease - SEVERE AS-TAVR  Thyroid Irradiation as A Child, Chemical Exposure At Work  Sleep Apnea - status post uvuloplasty  Kidney Stones - uric acid--followed by dr Paris Acosta  Colon Polyps  Thyroid Cyst--Asp & US - MULTINODULAR GOITER  Esophageal Varices, GI bleed  thyroid nodule - adenomatous  liver BX - steatosis and fibrosis  Thelma Reese  1950 Page #3  renal cysts, adrenal adenoma, cholelithiasis, Benign Prostatic Hypertrophy  microhematuria eval - neg  hematology eval - elevated MCV/MCH/thrombocytopenia  Ugi Bleed - x's 2 with esophageal varicies/Banding  blood transfusions, Rheumatoid Arthritis  Atrial Fibrillation - paroxysmal-unable to anticoagulate  Rheumatoid Arthritis  hepatitis studies - jesica. 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 Radiation treatment completed  Surgical Hx:  Umbilical hernia repair -  (due to incarcerated ventral hernia with SBO)  right hip fx and repair, Failed watchman procedure  Reviewed, no changes. SH: Patient is . Personal Habits: The patient is a former pipesmoker for years. He still does vaping.  Does not  currently consume alcohol, has consumed alcohol in the pastheavily Worked at [employer] now retired  Reviewed, no changes.                  Current Outpatient Medications:     nadolol (CORGARD) 40 MG tablet, Take 1 tablet by mouth 2 times daily, Disp: 180 tablet, Rfl: 1    spironolactone (ALDACTONE) 25 MG tablet, Take 1 tablet by mouth daily, Disp: 90 tablet, Rfl: 1    levETIRAcetam (KEPPRA) 1000 MG tablet, Take 1,000 mg by mouth 2 times daily, Disp: , Rfl:     Probiotic Product (HIGH POTENCY PROBIOTIC) CAPS, Take by mouth 10 billion CFU: 2 tablets once daily, Disp: , Rfl:     predniSONE (DELTASONE) 5 MG tablet, Take 1 tablet by mouth daily, Disp: 90 tablet, Rfl: 1    teriparatide, recombinant, (FORTEO) 600 MCG/2.4ML SOLN injection, Inject 0.08 mLs into the skin daily, Disp: 90 pen, Rfl: 5    potassium citrate (UROCIT-K) 10 MEQ (1080 MG) extended release tablet, Take by mouth 3 tabs bid, Disp: , Rfl:     lacosamide (VIMPAT) 100 MG TABS tablet, Take 100 mg by mouth 2 times daily. ., Disp: , Rfl:     omeprazole (PRILOSEC) 40 MG delayed release capsule, Take 40 mg by mouth daily , Disp: , Rfl:     Calcium Citrate-Vitamin D (CALCIUM CITRATE + D PO), Take 600 mg by mouth daily, Disp: , Rfl:     vitamin B-1 (THIAMINE) 100 MG tablet, Take 100 mg by mouth daily, Disp: , Rfl:     acetaminophen (TYLENOL) 500 MG tablet, Take 500 mg by mouth every 6 hours , Disp: , Rfl:     aspirin 81 MG tablet, Take 81 mg by mouth daily, Disp: , Rfl:     hydroxychloroquine (PLAQUENIL) 200 MG tablet, Take 400 mg by mouth daily Indications: taking 400 mg , Disp: , Rfl:     folic acid (FOLVITE) 1 MG tablet, Take 1 mg by mouth daily , Disp: , Rfl:     lactulose (CHRONULAC) 10 GM/15ML solution, 10 g by Other route daily , Disp: , Rfl:     VITAMIN D PO, Take 4,000 Units by mouth daily , Disp: , Rfl:   No Known Allergies    Past Medical History:   Diagnosis Date    Alcoholic cirrhosis ([facility]     Alcoholism ([facility]     Has been sober / dry since 01/2016.  Anxiety     Arthritis     Chronic atrial fibrillation (HCC)     Esophageal varices (HCC)     UGI bleed ~2012 - has had variceal banding 2x around that time.     History of kidney stones     Hypertension     Lumbar compression fracture ([facility] 02/2017    Lung tumor 2017    Osteopenia     Osteopenia     S/P TAVR (transcatheter aortic valve replacement) 9/20/2018    Seizure disorder ([facility] 09/2016 Complicated a stroke with occipital ICH.  Seizures ([address]     Sepsis with MRSE bacteremia 2016    resolved    Severe aortic stenosis     Confirmed by echo 2016, BROWN 10/2016. Undergoing w/u at [facility] for TAVR, as of 2017.  Sleep apnea     Couldn't tolerate CPAP therapy ~. Had had UPPP.  Stroke ([address] 2016    With receptive aphasia, poor memory that are improving as of 2017. Pt was found to have an occipital ICH at that time, with possible amyloid angiopathy as cause per MRI subsequently. Had seizure and sepsis (d/t MRSE bacteremia) complicating stroke.      Past Surgical History:   Procedure Laterality Date    ECHO COMPL W DOP COLOR FLOW  2012         ECHOCARDIOGRAM COMPLETE 2D W DOPPLER W COLOR  2013         ESOPHAGEAL VARICE LIGATION      HIP SURGERY      PALATE SURGERY      UMBILICAL HERNIA REPAIR  2017    UPPER GASTROINTESTINAL ENDOSCOPY      UPPER GASTROINTESTINAL ENDOSCOPY  2017    UPPER GASTROINTESTINAL ENDOSCOPY  2017     Family History   Problem Relation Age of Onset    Cancer Mother         leukemia     Social History     Socioeconomic History    Marital status:      Spouse name: Not on file    Number of children: Not on file    Years of education: Not on file    Highest education level: Not on file   Occupational History    Not on file   Social Needs    Financial resource strain: Not on file    Food insecurity     Worry: Not on file     Inability: Not on file    Transportation needs     Medical: Not on file     Non-medical: Not on file   Tobacco Use    Smoking status: Former Smoker     Packs/day: 1.00     Years: 55.00     Pack years: 55.00     Types: Cigarettes, Pipe     Start date: 7/10/1966     Quit date: 7/10/2015     Years since quittin.7    Smokeless tobacco: Never Used   Substance and Sexual Activity    Alcohol use: No     Comment: no alcohol since     Drug use: Never    Sexual activity: Not Currently     Partners: Female   Lifestyle    Physical activity     Days per week: Not on file     Minutes per session: Not on file    Stress: Not on file   Relationships    Social connections     Talks on phone: Not on file     Gets together: Not on file     Attends Cheondoism service: Not on file     Active member of club or organization: Not on file     Attends meetings of clubs or organizations: Not on file     Relationship status: Not on file    Intimate partner violence     Fear of current or ex partner: Not on file     Emotionally abused: Not on file     Physically abused: Not on file     Forced sexual activity: Not on file   Other Topics Concern    Not on file   Social History Narrative    Drinks occ Cola. Vitals:    04/12/21 1034   BP: 110/66   Pulse: 89   Temp: 97.4 °F (36.3 °C)   TempSrc: Temporal   SpO2: 97%   Weight: (!) 303 lb 6.4 oz (137.6 kg)   Height: 5' 10\" (1.778 m)       Physical Exam    Constitutional: He is oriented to person, place, and time. He appears well-developed and well-nourished. No distress. Obese  Neck: Normal range of motion. Neck supple. No JVD present. No thyromegaly present. No thyroid nodules   Cardiovascular: Normal rate and intact distal pulses. Exam reveals no gallop and no friction rub.   .    Heart: Irregularly irregular with controlled ventricular response.  Systolic murmur did improve postoperatively.   Pulmonary/Chest: Effort normal and breath sounds normal. No respiratory distress. He has no wheezes. He has no rales.      Abdominal: Soft. Bowel sounds are normal. He exhibits no distension and no mass. There is no tenderness.   Spleen tip and liver were palpable with deep inspiration   Musculoskeletal: Normal range of motion. He exhibits edema.   Walks with a slow gait.   Lymph nodes     He has no cervical adenopathy.     He has no axillary adenopathy.        Right: No inguinal adenopathy present.        Left: No inguinal adenopathy present.    Neurological: He is alert and oriented to person, place, and time. He displays normal reflexes. A sensory deficit is present. He exhibits normal muscle tone. Coordination normal.   Diminished sensation light touch in the ankle area bilaterally.   Skin: Skin is warm and dry. No rash noted. No erythema. Psychiatric: He has a normal mood and affect. His behavior is normal. Judgment and thought content normal.         Assessment and Plan:  Lennox Baldy was seen today for hypertension. Diagnoses and all orders for this visit:    Alcoholic cirrhosis of liver without ascites (HCC)  -     nadolol (CORGARD) 40 MG tablet; Take 1 tablet by mouth 2 times daily  -     spironolactone (ALDACTONE) 25 MG tablet; Take 1 tablet by mouth daily  -     AMMONIA; Future    Neuropathy, alcoholic (HCC)    Longstanding persistent atrial fibrillation (HCC)    Chronic obstructive pulmonary disease, unspecified COPD type ([facility]    Seizure disorder (HCC)  -     LEVETIRACETAM LEVEL; Future    Rheumatoid arthritis with positive rheumatoid factor, involving unspecified site [facility])    Essential hypertension  -     Comprehensive Metabolic Panel; Future  -     CBC Auto Differential; Future    Thrombocytopenia (HCC)    Class 3 severe obesity due to excess calories with serious comorbidity and body mass index (BMI) of 40.0 to 44.9 in adult [facility])    History of regular medication use  -     Comprehensive Metabolic Panel; Future  -     CBC Auto Differential; Future    Plan: Blood work today to monitor disease progression and medication use. Prescription management performed. Continue to follow with above consultants. Monitor blood pressure. I did ask him to check STU's over several days and call in some numbers. Fall precautions. Notify us of problems in the interim. Return in about 3 months (around 7/12/2021). Seen By:  Gibran Esquivel MD      *Document was created using voice recognition software. Note was reviewed however may contain grammatical errors.

## 2021-04-15 ENCOUNTER — TELEPHONE (OUTPATIENT)
Dept: FAMILY MEDICINE CLINIC | Age: 71
End: 2021-04-15

## 2021-04-15 LAB — KEPPRA: 44 UG/ML (ref 12–46)

## 2021-04-15 NOTE — LETTER
[address] [place] [address]  Phone: [phone]  Fax: [phone]    Luis Felipe Elkins MD        April 15, 2021     [place] [address]      Dear Augusta Cervantes:    Below are the results from your recent visit:    Resulted Orders   LEVETIRACETAM LEVEL   Result Value Ref Range    Levetiracetam Lvl 44 12 - 46 ug/mL      Comment:      INTERPRETIVE INFORMATION: Keppra (Levetiracetam)  Therapeutic Range:  12-46 ug/mL              Toxic:  Not well Established  Pharmacokinetics of levetiracetam are affected by renal function. Adverse  effects may include somnolence, weakness, headache and vomiting. This levetiracetam (Keppra) immunoassay uses the [employer]  reagents,  which has known cross-reactivity with the drug brivaracetam (Briviact)  and  may report inaccurate results. Patients transitioning from  levetiracetam to  brivaracetam or those who are using both medications should not monitor  drug  concentrations with the [employer] assay. These patients should be  monitored using a validated chromatographic methodology that  distinguishes  between drugs to determine drug concentrations. Performed By: Beny [address]  [place], [address] [place] [address]  : Jose Antonio De La Fuente.  Barney Saint, MD     AMMONIA   Result Value Ref Range    Ammonia 60.0 16.0 - 60.0 umol/L   CBC Auto Differential   Result Value Ref Range    WBC 7.5 4.5 - 11.5 E9/L    RBC 4.15 3.80 - 5.80 E12/L    Hemoglobin 13.0 12.5 - 16.5 g/dL    Hematocrit 41.8 37.0 - 54.0 %    .7 (H) 80.0 - 99.9 fL    MCH 31.3 26.0 - 35.0 pg    MCHC 31.1 (L) 32.0 - 34.5 %    RDW 14.1 11.5 - 15.0 fL    Platelets 76 (L) 854 - 450 E9/L    MPV 10.9 7.0 - 12.0 fL    Neutrophils % 79.7 43.0 - 80.0 %    Immature Granulocytes % 0.3 0.0 - 5.0 %    Lymphocytes % 9.4 (L) 20.0 - 42.0 %    Monocytes % 9.8 2.0 - 12.0 %    Eosinophils % 0.1 0.0 - 6.0 %    Basophils % 0.7 0.0 - 2.0 %    Neutrophils Absolute 5.95 1.80 - 7.30 E9/L    Immature Granulocytes # 0.02 E9/L    Lymphocytes Absolute 0.70 (L) 1.50 - 4.00 E9/L    Monocytes Absolute 0.73 0.10 - 0.95 E9/L    Eosinophils Absolute 0.01 (L) 0.05 - 0.50 E9/L    Basophils Absolute 0.05 0.00 - 0.20 E9/L   Comprehensive Metabolic Panel   Result Value Ref Range    Sodium 142 132 - 146 mmol/L    Potassium 4.7 3.5 - 5.0 mmol/L    Chloride 104 98 - 107 mmol/L    CO2 27 22 - 29 mmol/L    Anion Gap 11 7 - 16 mmol/L    Glucose 90 74 - 99 mg/dL    BUN 20 8 - 23 mg/dL    CREATININE 1.3 (H) 0.7 - 1.2 mg/dL    GFR Non-African American 54 >=60 mL/min/1.73      Comment:      Chronic Kidney Disease: less than 60 ml/min/1.73 sq.m. Kidney Failure: less than 15 ml/min/1.73 sq.m. Results valid for patients 18 years and older. GFR  >60     Calcium 10.0 8.6 - 10.2 mg/dL    Total Protein 6.9 6.4 - 8.3 g/dL    Albumin 3.8 3.5 - 5.2 g/dL    Total Bilirubin 1.3 (H) 0.0 - 1.2 mg/dL    Alkaline Phosphatase 68 40 - 129 U/L    ALT 18 0 - 40 U/L    AST 44 (H) 0 - 39 U/L   Platelet Confirmation   Result Value Ref Range    Platelet Confirmation CONFIRMED      Labs stable    If you have any questions or concerns, please don't hesitate to call.     Sincerely,        Lalita Singh MD

## 2021-05-17 ENCOUNTER — HOSPITAL ENCOUNTER (OUTPATIENT)
Age: 71
Discharge: HOME | End: 2021-05-19

## 2021-05-17 PROCEDURE — 87081 CULTURE SCREEN ONLY: CPT

## 2021-05-18 LAB — CLOTEST: NORMAL

## 2021-06-02 ENCOUNTER — OFFICE VISIT (OUTPATIENT)
Dept: CARDIOLOGY CLINIC | Age: 71
End: 2021-06-02
Payer: MEDICARE

## 2021-06-02 VITALS
WEIGHT: 300.2 LBS | HEART RATE: 77 BPM | BODY MASS INDEX: 42.98 KG/M2 | DIASTOLIC BLOOD PRESSURE: 70 MMHG | SYSTOLIC BLOOD PRESSURE: 132 MMHG | HEIGHT: 70 IN | RESPIRATION RATE: 16 BRPM

## 2021-06-02 DIAGNOSIS — I10 ESSENTIAL HYPERTENSION: ICD-10-CM

## 2021-06-02 DIAGNOSIS — Z95.2 S/P TAVR (TRANSCATHETER AORTIC VALVE REPLACEMENT): ICD-10-CM

## 2021-06-02 DIAGNOSIS — I48.0 PAROXYSMAL ATRIAL FIBRILLATION (HCC): Primary | ICD-10-CM

## 2021-06-02 PROCEDURE — G8417 CALC BMI ABV UP PARAM F/U: HCPCS | Performed by: INTERNAL MEDICINE

## 2021-06-02 PROCEDURE — G8427 DOCREV CUR MEDS BY ELIG CLIN: HCPCS | Performed by: INTERNAL MEDICINE

## 2021-06-02 PROCEDURE — 99212 OFFICE O/P EST SF 10 MIN: CPT | Performed by: INTERNAL MEDICINE

## 2021-06-02 PROCEDURE — 4040F PNEUMOC VAC/ADMIN/RCVD: CPT | Performed by: INTERNAL MEDICINE

## 2021-06-02 PROCEDURE — 3017F COLORECTAL CA SCREEN DOC REV: CPT | Performed by: INTERNAL MEDICINE

## 2021-06-02 PROCEDURE — 1123F ACP DISCUSS/DSCN MKR DOCD: CPT | Performed by: INTERNAL MEDICINE

## 2021-06-02 PROCEDURE — 93000 ELECTROCARDIOGRAM COMPLETE: CPT | Performed by: INTERNAL MEDICINE

## 2021-06-02 PROCEDURE — 1036F TOBACCO NON-USER: CPT | Performed by: INTERNAL MEDICINE

## 2021-06-02 NOTE — PROGRESS NOTES
[facility]  Julee Marlow. TERI Bustamante M.D. [place]. [address] [facility], Alfred Rhodes M.D. Sanya Hammonds M.D. MD Desean Figueroa   1950  Shon Butler MD    This 78-year-old man is seen for outpatient cardiac follow-up. He remains sedentary. He becomes dyspneic on walking from the parking lot today. This is chronic. He has been unable to lose weight. He had recent EGD and reports that his esophageal varices were stable. He does not take anticoagulants. He has a complex history including obesity, hypertension, JAY, atrial fibrillation and aortic valve stenosis. He had normal coronary arteries at catheterization in 2017. Successful TAVR was performed at the [facility] in 2017. He was hospitalized with seizures in 2017. A watchman implant was attempted unsuccessfully in . It is felt anticoagulation is contraindicated due to a history of intracranial bleeding. He is recently completed 5 doses of radiation therapy for lung cancer. He uses a cane and is limited functionally because of weakness and dyspnea. He denies chest pain palpitations or unprovoked sweats. Medical History:  1. Hypertension. 2. Long history of alcohol abuse. 3. Alcoholic cirrhosis with varices and upper GI bleed, 2011.   4. Auto anticoagulation. The patient's INR was 1.9 on 2012 despite the fact that he was not on anticoagulants. It has been documented as being high since at least 2011.   5. Elevated liver function studies due to alcoholic cirrhosis. 6. JAY. The patient had soft palate resection for this and does not use CPAP. 7. Obesity. BMI  - 2019.  8. Banding of esophageal varices, 2011.   9. Family history negative for early CAD. His father  of \"natural causes\" and his mother  of leukemia.  A younger brother who weighs over 450 pounds may have some heart problems but the patient is not sure. 10. Hospitalization, 02/03/2012 with newly documented AF. 11. Echo, 02/04/2012. Supravalvular aortic stenosis with peak and mean gradients of 33 and 22 mm/Hg respectively, effective orifice area was 1.6 cm ². He did have normal LV internal dimensions with mild concentric LVH, normal LV regional wall motion and systolic function with normal diastolic function for age. Moderate LAE with mild MR, AV is sclerotic, but the leaflets appear to open well. He has mild TR with RVSP between 35 and 40 mmHg. 12. Rheumatoid arthritis on prednisone therapy. 15. [facility] admission, 08/27/2013 with AF/RVR. Triggered by a flu-like illness and beta blocker withdrawal. Patient unable to take his medications for two to three days prior to admission because of nausea and vomiting. Patient converted to sinus rhythm in the hospital. Further GI work-up pending OP follow-up with his gastroenterologist, Dr. Yessenia Stephens. 14. Echo, 08/27/2013. Normal LV size, mild concentric LVH. Mild global hypokinesis, EF 45%. Stage II diastolic dysfunction. Severe LAE. AV difficult to visualize, but appeared to be sclerotic with mildly decreased leaflet excursion. Echo bright structure just above sinuses of Valsalva consistent with supravalvular aortic stenosis. Peak/mean gradients 46/24. Estimated effective orifice area 1.1 cm². Mild TR, normal RVSP.  15. Echocardiogram, 06/25/2015, EF >55%.  E/E' 16.  Severe left atrial enlargement.  Moderately sclerotic aortic valve with moderate AS (mean gradient 33 mmHg, valve area 1.4 cm², V-max 3.7 m/s).  RVSP 39 mmHg.    16. Echocardiogram, 3/21/2017, [facility]. EF is normal.  Normal RV size and function. Severe left atrial enlargement.  Moderate mitral annular calcification with mild MR, severe aortic stenosis (valve area . 67 cm²).  VTI ratio . 21.  Mean gradient 42 mmHg. 17. Left heart catheterization, 4/10/2017, [facility].  Normal epicardial coronaries.    18. TAVR, 6/12/2017, [facility] with a 29 mm Romero Marija S3.    19. Echocardiogram, 6/13/2017, [facility], EF normal.  Severely dilated left atrium. S/P TAVR with mean gradient of 13 mmHg.     20. Echocardiogram, 7/2017, [facility], EF 54 +/-5%, severely dilated left atrium.  S/P TAVR with a mean gradient of 12 mmHg.    21. Admission, F, 12/2017, with seizures.  Prolonged hospital course complicated by fall and right hip fracture.  S/P hip replacement surgery. 22. Echocardiogram, [facility], 3/19/2018, EF 66 +/-5%. Dilated RV with normal function. Severely dilated left atrium, dilated right atrial cavity, S/P TAVR (29 mm S3) with mean gradient of 12 and dimensional index of .52.    23. Atrial fibrillation noted to be persistent. Deemed not an anticoagulation candidate secondary to brain bleed in 2016. Watchman reported attempted at [facility], unsuccessful. 24. Right upper lobe lung cancer. S/P 5 doses of radiation therapy. Follows at [facility]. Last CT scan, 04/2019, fibrosis in the right upper lung with decrease in the size of pulmonary nodules. No new or enlarging lymphadenopathy. Review of Systems:  Constitutional: negative for fever and chills  Respiratory: negative for cough and hemoptysis  Cardiovascular:   Gastrointestinal: negative for abdominal pain, diarrhea, nausea and vomiting  Genitourinary:negative for dysuria and hematuria  Derm: negative for rash and skin lesion(s)  Neurological: negative for seizures and tremors  Endocrine: negative for diabetic symptoms including polydipsia and polyuria  Musculoskeletal: negative for CTD  Psychiatric: negative for psychosis and major depression    On examination he is an alert pleasant elderly man in no distress he is severely obese. Skin is warm and dry. Respirations are unlabored. /70   Pulse 77   Resp 16   Ht 5' 10\" (1.778 m)   Wt (!) 300 lb 3.2 oz (136.2 kg)   BMI 43.07 kg/m² .     HEENT negative for scleral icterus. Extraocular muscles intact. No facial asymmetry or central cyanosis. Neck without masses or goiter. No bruit or JVD. Cardiac apex not displaced. Rhythm regular. Abdomen normal.  Extremities without edema. Lung fields are clear    EKG today per Dr. Ethel Davis review: Atrial fibrillation. Ventricular response 77/min. Left axis. Intraventricular conduction delay. The patient is stable from a cardiovascular standpoint. Medications are reviewed today and no changes made. He will have cardiac follow-up in 1 year. At completion of today's visit, medications include the following:    Current Outpatient Medications:     nadolol (CORGARD) 40 MG tablet, Take 1 tablet by mouth 2 times daily, Disp: 180 tablet, Rfl: 1    spironolactone (ALDACTONE) 25 MG tablet, Take 1 tablet by mouth daily, Disp: 90 tablet, Rfl: 1    levETIRAcetam (KEPPRA) 1000 MG tablet, Take 1,000 mg by mouth 2 times daily, Disp: , Rfl:     Probiotic Product (HIGH POTENCY PROBIOTIC) CAPS, Take by mouth 10 billion CFU: 2 tablets once daily, Disp: , Rfl:     predniSONE (DELTASONE) 5 MG tablet, Take 1 tablet by mouth daily, Disp: 90 tablet, Rfl: 1    teriparatide, recombinant, (FORTEO) 600 MCG/2.4ML SOLN injection, Inject 0.08 mLs into the skin daily, Disp: 90 pen, Rfl: 5    potassium citrate (UROCIT-K) 10 MEQ (1080 MG) extended release tablet, Take by mouth 3 tabs bid, Disp: , Rfl:     lacosamide (VIMPAT) 100 MG TABS tablet, Take 100 mg by mouth 2 times daily. ., Disp: , Rfl:     omeprazole (PRILOSEC) 40 MG delayed release capsule, Take 40 mg by mouth daily , Disp: , Rfl:     Calcium Citrate-Vitamin D (CALCIUM CITRATE + D PO), Take 600 mg by mouth daily, Disp: , Rfl:     vitamin B-1 (THIAMINE) 100 MG tablet, Take 100 mg by mouth daily, Disp: , Rfl:     acetaminophen (TYLENOL) 500 MG tablet, Take 500 mg by mouth every 6 hours , Disp: , Rfl:     aspirin 81 MG tablet, Take 81 mg by mouth daily, Disp: , Rfl:    hydroxychloroquine (PLAQUENIL) 200 MG tablet, Take 400 mg by mouth daily Indications: taking 400 mg , Disp: , Rfl:     folic acid (FOLVITE) 1 MG tablet, Take 1 mg by mouth daily , Disp: , Rfl:     lactulose (CHRONULAC) 10 GM/15ML solution, 10 g by Other route daily , Disp: , Rfl:     VITAMIN D PO, Take 4,000 Units by mouth daily , Disp: , Rfl:       Note: This report was completed utilizing computer voice recognition software. Every effort has been made to ensure accuracy, however; inadvertent computerized transcription errors may be present. Rosario Brown.  Yamilex Sutherland MD

## 2021-07-12 ENCOUNTER — OFFICE VISIT (OUTPATIENT)
Dept: FAMILY MEDICINE CLINIC | Age: 71
End: 2021-07-12
Payer: MEDICARE

## 2021-07-12 ENCOUNTER — PROCEDURE VISIT (OUTPATIENT)
Dept: PODIATRY | Age: 71
End: 2021-07-12
Payer: MEDICARE

## 2021-07-12 VITALS
OXYGEN SATURATION: 99 % | SYSTOLIC BLOOD PRESSURE: 96 MMHG | HEIGHT: 70 IN | WEIGHT: 297.6 LBS | HEART RATE: 64 BPM | TEMPERATURE: 96.9 F | DIASTOLIC BLOOD PRESSURE: 64 MMHG | BODY MASS INDEX: 42.61 KG/M2

## 2021-07-12 VITALS
BODY MASS INDEX: 43.05 KG/M2 | WEIGHT: 300 LBS | TEMPERATURE: 97.4 F | DIASTOLIC BLOOD PRESSURE: 72 MMHG | SYSTOLIC BLOOD PRESSURE: 112 MMHG

## 2021-07-12 DIAGNOSIS — M79.674 PAIN IN TOE OF RIGHT FOOT: ICD-10-CM

## 2021-07-12 DIAGNOSIS — M81.0 AGE-RELATED OSTEOPOROSIS WITHOUT CURRENT PATHOLOGICAL FRACTURE: ICD-10-CM

## 2021-07-12 DIAGNOSIS — B35.1 TINEA UNGUIUM: Primary | ICD-10-CM

## 2021-07-12 DIAGNOSIS — E55.9 VITAMIN D DEFICIENCY: ICD-10-CM

## 2021-07-12 DIAGNOSIS — D69.6 THROMBOCYTOPENIA (HCC): ICD-10-CM

## 2021-07-12 DIAGNOSIS — E66.01 MORBID OBESITY WITH BMI OF 40.0-44.9, ADULT (HCC): ICD-10-CM

## 2021-07-12 DIAGNOSIS — K70.30 ALCOHOLIC CIRRHOSIS OF LIVER WITHOUT ASCITES (HCC): ICD-10-CM

## 2021-07-12 DIAGNOSIS — M79.675 PAIN IN LEFT TOE(S): ICD-10-CM

## 2021-07-12 DIAGNOSIS — M05.9 RHEUMATOID ARTHRITIS WITH POSITIVE RHEUMATOID FACTOR, INVOLVING UNSPECIFIED SITE (HCC): ICD-10-CM

## 2021-07-12 DIAGNOSIS — I73.9 PERIPHERAL VASCULAR DISEASE, UNSPECIFIED (HCC): ICD-10-CM

## 2021-07-12 DIAGNOSIS — Z95.2 S/P TAVR (TRANSCATHETER AORTIC VALVE REPLACEMENT): ICD-10-CM

## 2021-07-12 DIAGNOSIS — R26.2 DIFFICULTY WALKING: ICD-10-CM

## 2021-07-12 DIAGNOSIS — G40.909 SEIZURE DISORDER (HCC): ICD-10-CM

## 2021-07-12 DIAGNOSIS — Z92.29 HISTORY OF REGULAR MEDICATION USE: ICD-10-CM

## 2021-07-12 DIAGNOSIS — I48.11 LONGSTANDING PERSISTENT ATRIAL FIBRILLATION (HCC): ICD-10-CM

## 2021-07-12 LAB
ALBUMIN SERPL-MCNC: 4 G/DL (ref 3.5–5.2)
ALP BLD-CCNC: 62 U/L (ref 40–129)
ALT SERPL-CCNC: 20 U/L (ref 0–40)
AMMONIA: 48 UMOL/L (ref 16–60)
ANION GAP SERPL CALCULATED.3IONS-SCNC: 16 MMOL/L (ref 7–16)
AST SERPL-CCNC: 52 U/L (ref 0–39)
BILIRUB SERPL-MCNC: 1.6 MG/DL (ref 0–1.2)
BUN BLDV-MCNC: 19 MG/DL (ref 6–23)
CALCIUM SERPL-MCNC: 10.4 MG/DL (ref 8.6–10.2)
CHLORIDE BLD-SCNC: 100 MMOL/L (ref 98–107)
CO2: 25 MMOL/L (ref 22–29)
CREAT SERPL-MCNC: 1.3 MG/DL (ref 0.7–1.2)
GFR AFRICAN AMERICAN: >60
GFR NON-AFRICAN AMERICAN: 54 ML/MIN/1.73
GLUCOSE BLD-MCNC: 86 MG/DL (ref 74–99)
HCT VFR BLD CALC: 43.2 % (ref 37–54)
HEMOGLOBIN: 14.1 G/DL (ref 12.5–16.5)
MCH RBC QN AUTO: 31.7 PG (ref 26–35)
MCHC RBC AUTO-ENTMCNC: 32.6 % (ref 32–34.5)
MCV RBC AUTO: 97.1 FL (ref 80–99.9)
PDW BLD-RTO: 14.6 FL (ref 11.5–15)
PLATELET # BLD: 85 E9/L (ref 130–450)
PLATELET CONFIRMATION: NORMAL
PMV BLD AUTO: 10.9 FL (ref 7–12)
POTASSIUM SERPL-SCNC: 4.6 MMOL/L (ref 3.5–5)
RBC # BLD: 4.45 E12/L (ref 3.8–5.8)
SODIUM BLD-SCNC: 141 MMOL/L (ref 132–146)
TOTAL PROTEIN: 7.6 G/DL (ref 6.4–8.3)
WBC # BLD: 7.8 E9/L (ref 4.5–11.5)

## 2021-07-12 PROCEDURE — 4040F PNEUMOC VAC/ADMIN/RCVD: CPT | Performed by: INTERNAL MEDICINE

## 2021-07-12 PROCEDURE — 3017F COLORECTAL CA SCREEN DOC REV: CPT | Performed by: INTERNAL MEDICINE

## 2021-07-12 PROCEDURE — 1123F ACP DISCUSS/DSCN MKR DOCD: CPT | Performed by: INTERNAL MEDICINE

## 2021-07-12 PROCEDURE — G8417 CALC BMI ABV UP PARAM F/U: HCPCS | Performed by: INTERNAL MEDICINE

## 2021-07-12 PROCEDURE — 99214 OFFICE O/P EST MOD 30 MIN: CPT | Performed by: INTERNAL MEDICINE

## 2021-07-12 PROCEDURE — 1036F TOBACCO NON-USER: CPT | Performed by: INTERNAL MEDICINE

## 2021-07-12 PROCEDURE — G8427 DOCREV CUR MEDS BY ELIG CLIN: HCPCS | Performed by: INTERNAL MEDICINE

## 2021-07-12 PROCEDURE — 11721 DEBRIDE NAIL 6 OR MORE: CPT | Performed by: PODIATRIST

## 2021-07-12 RX ORDER — NADOLOL 40 MG/1
40 TABLET ORAL 2 TIMES DAILY
Qty: 180 TABLET | Refills: 1 | Status: SHIPPED
Start: 2021-07-12 | End: 2022-02-21 | Stop reason: SDUPTHER

## 2021-07-12 RX ORDER — SPIRONOLACTONE 25 MG/1
25 TABLET ORAL DAILY
Qty: 90 TABLET | Refills: 1 | Status: SHIPPED
Start: 2021-07-12 | End: 2022-02-21 | Stop reason: SDUPTHER

## 2021-07-12 SDOH — ECONOMIC STABILITY: FOOD INSECURITY: WITHIN THE PAST 12 MONTHS, THE FOOD YOU BOUGHT JUST DIDN'T LAST AND YOU DIDN'T HAVE MONEY TO GET MORE.: NEVER TRUE

## 2021-07-12 SDOH — ECONOMIC STABILITY: FOOD INSECURITY: WITHIN THE PAST 12 MONTHS, YOU WORRIED THAT YOUR FOOD WOULD RUN OUT BEFORE YOU GOT MONEY TO BUY MORE.: NEVER TRUE

## 2021-07-12 ASSESSMENT — ENCOUNTER SYMPTOMS
RESPIRATORY NEGATIVE: 1
EYES NEGATIVE: 1
GASTROINTESTINAL NEGATIVE: 1

## 2021-07-12 ASSESSMENT — SOCIAL DETERMINANTS OF HEALTH (SDOH): HOW HARD IS IT FOR YOU TO PAY FOR THE VERY BASICS LIKE FOOD, HOUSING, MEDICAL CARE, AND HEATING?: NOT HARD AT ALL

## 2021-07-12 NOTE — PROGRESS NOTES
[address] [facility] [employer]     21  Eva Luna : 1950 Sex: male  Age: 69 y.o. Chief Complaint   Patient presents with    Hypertension     3 month follow-up       HPI  Patient presents today for 3-month follow-up visit on his multiple medical problems. He is accompanied by his wife. Since I have seen him he has seen Dr. Jules Foster of cardiology and I reviewed his note. He is also seen Dr. Veena Mccoy who is done EGD and I reviewed his note as well. EGD showing severe gastritis. He does have colonoscopy upcoming with him this month. He will be seeing pulmonary upcoming but will first get CAT scan of the chest. This is in follow-up for his lung cancer history. He is finishing up his Forteo from rheumatology this month also will follow up with him shortly as well. Patient's weight is down 6 pounds intentionally. Wife shows me an order for blood work ordered by GI. I reviewed this and added a couple things today to this as well. This will include vitamin D and vitamin B1 levels. He has had no further seizures on his antiseizure medication. We did check level last time it was okay. He continues in his chronic atrial fibrillation with controlled ventricular response on no anticoagulation secondary to his bleeding including intracerebral bleed thought to be caused by cerebral amyloid angiopathy and esophageal varices with cirrhosis. Patient has had no falls. Wife manages all his appointments, states is up-to-date with everybody. He follows with numerous consultants including urology, pulmonary, GI, rheumatology, podiatry, and neurology for seizure/stroke, cardiology. He no longer sees radiation oncology for his lung cancer nor neurosurgery    Review of Systems     Constitutional:  Negative for activity change, fatigue, appetite change, chills, diaphoresis, fever and unexpected weight change.    HENT:  Negative for congestion, rhinorrhea and sinus pain.    Respiratory: Negative for cough, shortness of breath and wheezing.-History of lung cancer with radiation  Cardiovascular: Negative for chest pain, palpitations and leg swelling.        Chronic atrial fibrillation not on anticoagulation secondary to falls and bleeding-he is currently on no anticoagulated with cirrhosis. Gastrointestinal: Negative for abdominal pain, blood in stool, constipation, diarrhea, nausea and vomiting.        Alcoholic cirrhosis. States taking lactulose about twice a week. Last ammonia level was 60. On probiotic that is supposed to help with his ammonia level endocrine: Negative.    Genitourinary: Negative for difficulty urinating, dysuria, frequency, hematuria and urgency.         Decreased urinary stream--saw urology 1/17 for hematuria. -  Musculoskeletal: Positive for arthralgias and back pain. Negative for gait problem and myalgias.   rePorts arthritis and lower back pain/low back pain severe at times has been taking Tylenol.  Some improvement.  Rheumatoid arthritis--neurosurgery did not recommend surgery at this time for his back  Allergic/Immunologic: Negative for environmental allergies and immunocompromised state. Neurological: Negative for dizziness, weakness, light-headedness, numbness and headaches.        Reports, numbness, seizures (although not recent) TIA-watchman procedure failed and we are unable to anticoagulate this gentleman   Hematological: Negative. Blanca Rodas have chronic thrombocytopenia associated with his cirrhosis. Psychiatric/Behavioral: Negative for behavioral problems, confusion and sleep disturbance. The patient is not nervous/anxious.             REST OF PERTINENT ROS GONE OVER AND WAS NEGATIVE.        PMH:  Shot Record:  -Fluzone Influenza Virus- Medicare 00/00/00 09/07/17 11/05/15  90750-Shingrix (Shinglesvaccine (HZV), Recombinant, Subunit, Adjuvanted 03/13/19  Toradol-Ketorolac 30MG Injection 03/06/06  90746-Hepatitis B Vaccine Adult 12/18/17 11/17/14 06/24/14 05/22/14 05/22/14  90736-Zoster Vaccine 01/12/10  90732-Pneumococcal Vaccine (Pneumovax) 12/26/17 11/13/13 12/17/09  90715-TdaP For Individuals greater than 11 09/22/17  62839-Myvdwrh 13-NDC#7165-1022-82 12/01/15  90662-Influenza Vaccine High Dose 65+ Preservative Free Im Use 09/17/18  90658-Influenza Vaccine Fluvirin Iiv3 (ages 3 and older) 11/17/14 09/26/12 09/26/12 10/26/11 10/20/10  90632-Hepatitis A Vaccine - Adult 2 Dose age 23 & over 12/18/17 11/17/14 06/24/14 05/22/14 05/22/14. Chronic Diagnosis: Atrial fibrillation, Aortic valve disorder, Other seizures, Thrombocytopenic disorder,  Portal hypertension, Atherosclerotic heart disease of native coronary artery with, Alcoholic cirrhosis,  Taking medication, Rheumatoid arthritis, Essential hypertension, Bleeding esophageal varices, Tobacco  user, Sleep apnea, Benign essential hypertension.   Health Maintenance:  Influenza Vaccination - (9/17/2018)  Colonoscopy - (1/6/2010), 7/21  Couseled on Home Safety - (7/19/2017)  Mini Mental Status - (3/27/2017)  Bone Density Test Screening - (1/4/2008)  Tetanus Immunization - (9/7/2017)  Psa Test - 1/08,3/09,12/09, 7/17-dr jean baptiste  Prostate Exam - 10/07,11/08,2015, dr Ana Becerra  Rectal Exam - 10/07,11/08,dr jean baptiste  Bone Density Test - 1/08  Neg AAA, Mammogram  Hemmocult Cards - 7/10  EGD - 10/11,11/11,6/12,8/12,1/13,10/13,4/15,12/15, 5/20, 5/21  2D ECHO - 6/15  Carotid Artery Study - 2/12, 2/14  EKG - 2/12  Thyroid ultrasound - 7/12, 2/16  Heart catheterization [facility] clinic - 4/17  Medical Problems:  Hypertension  Valvular Heart Disease - SEVERE AS-TAVR  Thyroid Irradiation as A Child, Chemical Exposure At Work  Sleep Apnea - status post uvuloplasty  Kidney Stones - uric acid--followed by dr Ana Becerra  Colon Polyps  Thyroid Cyst--Asp & US - MULTINODULAR GOITER  Esophageal Varices, GI bleed  thyroid nodule - adenomatous  liver BX - steatosis and fibrosis    renal cysts, adrenal adenoma, cholelithiasis, Benign Prostatic Hypertrophy  microhematuria eval - neg  hematology eval - elevated MCV/MCH/thrombocytopenia  Ugi Bleed - x's 2 with esophageal varicies/Banding  blood transfusions, Rheumatoid Arthritis  Atrial Fibrillation - paroxysmal-unable to anticoagulate  Rheumatoid Arthritis  hepatitis studies - jesica. 7/13-neg  Cirrhosis - alcoholic  Cerebral amyloid angiopathy - Intracerebral bleed  Pancreatitis, Pneumonia, Seizure Disorder  Methicillin-resistant staph epidermidis bacteremia - Presumed infective endocarditis and treated for 6  weeks with vancomycin  Osteoporosis - Follows with rheumatology  Liver biopsy - ×3  Evaluation for liver transplant, [facility] - 5/13  Lung mass - Following with pulmonary  Follows with - Pulmonary, rheumatology, GI, cardiology, urology, podiatry, neurology-no longer seeing  hematology/oncology  transient ischemic attack - 4/17  Tavr - [facility]-6/17--needs Atb prophylaxis prior to dental  squamous cell CA lung - radiation  Lung Cancer - Squamous cell. Radiation treatment completed  Surgical Hx:  Umbilical hernia repair - 4/17 (due to incarcerated ventral hernia with SBO)  right hip fx and repair, Failed watchman procedure  Reviewed, no changes. SH: Patient is . Personal Habits: The patient is a former pipesmoker for years. He still does vaping.  Does not  currently consume alcohol, has consumed alcohol in the pastheavily Worked at [employer] now retired  Reviewed, no changes.                 Current Outpatient Medications:     spironolactone (ALDACTONE) 25 MG tablet, Take 1 tablet by mouth daily, Disp: 90 tablet, Rfl: 1    nadolol (CORGARD) 40 MG tablet, Take 1 tablet by mouth 2 times daily, Disp: 180 tablet, Rfl: 1    levETIRAcetam (KEPPRA) 1000 MG tablet, Take 1,000 mg by mouth 2 times daily, Disp: , Rfl:     Probiotic Product (HIGH POTENCY PROBIOTIC) CAPS, Take by mouth 10 billion CFU: 2 tablets once daily, Disp: , Rfl:     predniSONE (DELTASONE) 5 MG tablet, Take 1 tablet by mouth daily, Disp: 90 tablet, Rfl: 1    teriparatide, recombinant, (FORTEO) 600 MCG/2.4ML SOLN injection, Inject 0.08 mLs into the skin daily, Disp: 90 pen, Rfl: 5    potassium citrate (UROCIT-K) 10 MEQ (1080 MG) extended release tablet, Take by mouth 3 tabs bid, Disp: , Rfl:     lacosamide (VIMPAT) 100 MG TABS tablet, Take 100 mg by mouth 2 times daily. ., Disp: , Rfl:     omeprazole (PRILOSEC) 40 MG delayed release capsule, Take 40 mg by mouth daily , Disp: , Rfl:     Calcium Citrate-Vitamin D (CALCIUM CITRATE + D PO), Take 600 mg by mouth daily, Disp: , Rfl:     vitamin B-1 (THIAMINE) 100 MG tablet, Take 100 mg by mouth daily, Disp: , Rfl:     acetaminophen (TYLENOL) 500 MG tablet, Take 500 mg by mouth every 6 hours , Disp: , Rfl:     aspirin 81 MG tablet, Take 81 mg by mouth daily, Disp: , Rfl:     hydroxychloroquine (PLAQUENIL) 200 MG tablet, Take 400 mg by mouth daily Indications: taking 400 mg , Disp: , Rfl:     folic acid (FOLVITE) 1 MG tablet, Take 1 mg by mouth daily , Disp: , Rfl:     lactulose (CHRONULAC) 10 GM/15ML solution, 10 g by Other route daily , Disp: , Rfl:     VITAMIN D PO, Take 4,000 Units by mouth daily , Disp: , Rfl:   No Known Allergies    Past Medical History:   Diagnosis Date    Alcoholic cirrhosis ([facility] [address]     Alcoholism ([facility]     Has been sober / dry since 01/2016.  Anxiety     Arthritis     Chronic atrial fibrillation (HCC)     Esophageal varices (HCC)     UGI bleed ~2012 - has had variceal banding 2x around that time.  History of kidney stones     Hypertension     Lumbar compression fracture ([address] 02/2017    Lung tumor 2017    Osteopenia     Osteopenia     S/P TAVR (transcatheter aortic valve replacement) 9/20/2018    Seizure disorder ([address] 04/9179    Complicated a stroke with occipital ICH.  Seizures ([address]     Sepsis with MRSE bacteremia 09/2016    resolved    Severe aortic stenosis     Confirmed by echo 9/2016, BROWN 10/2016. Undergoing w/u at [facility] for TAVR, as of 4/2017.     Sleep apnea     Couldn't tolerate CPAP therapy ~. Had had UPPP.  Stroke ([facility] [address] 2016    With receptive aphasia, poor memory that are improving as of 2017. Pt was found to have an occipital ICH at that time, with possible amyloid angiopathy as cause per MRI subsequently. Had seizure and sepsis (d/t MRSE bacteremia) complicating stroke. Past Surgical History:   Procedure Laterality Date    ECHO COMPL W DOP COLOR FLOW  2012         ECHOCARDIOGRAM COMPLETE 2D W DOPPLER W COLOR  2013         ESOPHAGEAL VARICE LIGATION      HIP SURGERY      PALATE SURGERY      UMBILICAL HERNIA REPAIR  2017    UPPER GASTROINTESTINAL ENDOSCOPY      UPPER GASTROINTESTINAL ENDOSCOPY  2017    UPPER GASTROINTESTINAL ENDOSCOPY  2017     Family History   Problem Relation Age of Onset    Cancer Mother         leukemia     Social History     Socioeconomic History    Marital status:      Spouse name: Not on file    Number of children: Not on file    Years of education: Not on file    Highest education level: Not on file   Occupational History    Not on file   Tobacco Use    Smoking status: Former Smoker     Packs/day: 1.00     Years: 55.00     Pack years: 55.00     Types: Cigarettes, Pipe     Start date: 7/10/1966     Quit date: 7/10/2015     Years since quittin.0    Smokeless tobacco: Never Used   Vaping Use    Vaping Use: Never used   Substance and Sexual Activity    Alcohol use: No     Comment: no alcohol since     Drug use: Never    Sexual activity: Not Currently     Partners: Female   Other Topics Concern    Not on file   Social History Narrative    Drinks occ Cola.      Social Determinants of Health     Financial Resource Strain: Low Risk     Difficulty of Paying Living Expenses: Not hard at all   Food Insecurity: No Food Insecurity    Worried About Running Out of Food in the Last Year: Never true    Avis of Food in the Last Year: Never true   Transportation Needs:     Lack of Transportation (Medical):  Lack of Transportation (Non-Medical):    Physical Activity:     Days of Exercise per Week:     Minutes of Exercise per Session:    Stress:     Feeling of Stress :    Social Connections:     Frequency of Communication with Friends and Family:     Frequency of Social Gatherings with Friends and Family:     Attends Alevism Services:     Active Member of Clubs or Organizations:     Attends Club or Organization Meetings:     Marital Status:    Intimate Partner Violence:     Fear of Current or Ex-Partner:     Emotionally Abused:     Physically Abused:     Sexually Abused:        Vitals:    07/12/21 1349   BP: 96/64   Pulse: 64   Temp: 96.9 °F (36.1 °C)   TempSrc: Temporal   SpO2: 99%   Weight: 297 lb 9.6 oz (135 kg)   Height: 5' 10\" (1.778 m)       Physical Exam    Constitutional: He is oriented to person, place, and time. He appears well-developed and well-nourished. No distress. Obese  Neck: Normal range of motion. Neck supple. No JVD present. No thyromegaly present. No thyroid nodules   Cardiovascular: Normal rate and intact distal pulses. Exam reveals no gallop and no friction rub.   .    Heart: Irregularly irregular with controlled ventricular response.  Systolic murmur did improve postoperatively.   Pulmonary/Chest: Effort normal and breath sounds normal. No respiratory distress. He has no wheezes. He has no rales.       Abdominal: Soft. Bowel sounds are normal. He exhibits no distension and no mass. There is no tenderness.   Spleen tip and liver were palpable with deep inspiration   Musculoskeletal: Normal range of motion. He exhibits edema.   Walks with a slow gait.   Lymph nodes     He has no cervical adenopathy.     He has no axillary adenopathy.        Right: No inguinal adenopathy present.        Left: No inguinal adenopathy present. Neurological: He is alert and oriented to person, place, and time. He displays normal reflexes. A sensory deficit is present.  He exhibits normal muscle tone. Coordination normal.   Diminished sensation light touch in the ankle area bilaterally.   Skin: Skin is warm and dry. No rash noted. No erythema. Psychiatric: He has a normal mood and affect. His behavior is normal. Judgment and thought content normal.       Assessment and Plan:  Jie Alves was seen today for hypertension. Diagnoses and all orders for this visit:    Alcoholic cirrhosis of liver without ascites (HCC)  -     spironolactone (ALDACTONE) 25 MG tablet; Take 1 tablet by mouth daily  -     nadolol (CORGARD) 40 MG tablet; Take 1 tablet by mouth 2 times daily  -     VITAMIN B1; Future    Longstanding persistent atrial fibrillation (HCC)    Seizure disorder (HCC)    Rheumatoid arthritis with positive rheumatoid factor, involving unspecified site ([facility]    Thrombocytopenia ([facility] [address]    History of regular medication use    Morbid obesity with BMI of 40.0-44.9, adult (HCC)    Age-related osteoporosis without current pathological fracture    Vitamin D deficiency  -     Vitamin D 25 Hydroxy; Future    S/P TAVR (transcatheter aortic valve replacement)    Plan: Continue to follow with all above consultants. Blood work today to monitor disease progression and medication use. Prescription management performed. Continue weight loss attempts. Upcoming colonoscopy. I will see back in 3 months and as needed. Notify us of problems in the interim. Encounter including face-to-face time and post visit chart review and E HR documentation was 35 minutes  Return in about 3 months (around 10/12/2021). Seen By:  Shannan Zhong MD      *Document was created using voice recognition software. Note was reviewed however may contain grammatical errors.

## 2021-07-12 NOTE — PROGRESS NOTES
Abhishek Moncada  Return Patient    Chief Complaint   Patient presents with    Toe Pain     saw pcp Dr. Diana Guajardo 7/12/2021       Subjective: This Rajendra Balderrama comes to clinic for foot and nail care. Pt currently has complaint of thickened, painful, elongated nails that he/she cannot manage by themselves. Pt. Relates pain to nails with shoe gear. Pt's primary care physician is Mare Dorantes MD.  Past Medical History:   Diagnosis Date    Alcoholic cirrhosis ([address]     Alcoholism ([address]     Has been sober / dry since 01/2016.  Anxiety     Arthritis     Chronic atrial fibrillation (HCC)     Esophageal varices (HCC)     UGI bleed ~2012 - has had variceal banding 2x around that time.  History of kidney stones     Hypertension     Lumbar compression fracture ([address] 02/2017    Lung tumor 2017    Osteopenia     Osteopenia     S/P TAVR (transcatheter aortic valve replacement) 9/20/2018    Seizure disorder ([address] 81/9207    Complicated a stroke with occipital ICH.  Seizures ([address]     Sepsis with MRSE bacteremia 09/2016    resolved    Severe aortic stenosis     Confirmed by echo 9/2016, BROWN 10/2016. Undergoing w/u at [facility] for [facility]AVR, as of 4/2017.  Sleep apnea     Couldn't tolerate CPAP therapy ~2007. Had had UPPP.  Stroke ([facility] 09/2016    With receptive aphasia, poor memory that are improving as of 04/2017. Pt was found to have an occipital ICH at that time, with possible amyloid angiopathy as cause per MRI subsequently. Had seizure and sepsis (d/t MRSE bacteremia) complicating stroke.        No Known Allergies  Current Outpatient Medications on File Prior to Visit   Medication Sig Dispense Refill    nadolol (CORGARD) 40 MG tablet Take 1 tablet by mouth 2 times daily 180 tablet 1    levETIRAcetam (KEPPRA) 1000 MG tablet Take 1,000 mg by mouth 2 times daily      Probiotic Product (HIGH POTENCY PROBIOTIC) CAPS Take by mouth 10 billion CFU: 2 tablets once daily      predniSONE (DELTASONE) 5 MG tablet Take 1 tablet by mouth daily 90 tablet 1    teriparatide, recombinant, (FORTEO) 600 MCG/2.4ML SOLN injection Inject 0.08 mLs into the skin daily 90 pen 5    potassium citrate (UROCIT-K) 10 MEQ (1080 MG) extended release tablet Take by mouth 3 tabs bid      lacosamide (VIMPAT) 100 MG TABS tablet Take 100 mg by mouth 2 times daily. Yoselynjosh Lopez omeprazole (PRILOSEC) 40 MG delayed release capsule Take 40 mg by mouth daily       Calcium Citrate-Vitamin D (CALCIUM CITRATE + D PO) Take 600 mg by mouth daily      vitamin B-1 (THIAMINE) 100 MG tablet Take 100 mg by mouth daily      acetaminophen (TYLENOL) 500 MG tablet Take 500 mg by mouth every 6 hours       aspirin 81 MG tablet Take 81 mg by mouth daily      hydroxychloroquine (PLAQUENIL) 200 MG tablet Take 400 mg by mouth daily Indications: taking 566 mg       folic acid (FOLVITE) 1 MG tablet Take 1 mg by mouth daily       lactulose (CHRONULAC) 10 GM/15ML solution 10 g by Other route daily       VITAMIN D PO Take 4,000 Units by mouth daily       spironolactone (ALDACTONE) 25 MG tablet Take 1 tablet by mouth daily 90 tablet 1     No current facility-administered medications on file prior to visit. Review of Systems   Constitutional: Negative. HENT: Negative. Eyes: Negative. Respiratory: Negative. Cardiovascular: Negative. Gastrointestinal: Negative. Endocrine: Negative. Genitourinary: Negative. Musculoskeletal: Negative. Objective:  General: AAO x 3 in NAD.     Derm  Toenail Description  Sites of Onychomycosis Involvement (Check affected area)  [x] [x] [x] [x] [x] [x] [x] [x] [x] [x]  5 4 3 2 1 1 2 3 4 5                          Right                                        Left    Thickness  [x] [x] [x] [x] [x] [x] [x] [x] [x] [x]  5 4 3 2 1 1 2 3 4 5                         Right                                        Left    Dystrophic Changes   [x] [x] [x] [x] [x] [x] [x] [x] [x] [x]  5 4 3 2 1 1 2 3 4 5 right foot    4. Pain in left toe(s)    5. Difficulty walking     No orders of the defined types were placed in this encounter. Plan:   Pt was evaluated and examined. Patient was given personalized discharge instructions. Nails 1-10 were debrided in length and thickness sharply with a nail nipper and  without incident. Pt will follow up in 9 weeks or sooner if any problems arise. Diagnosis was discussed with the pt and all of their questions were answered in detail. Proper foot hygiene and care was discussed with the pt. Patient to check feet daily and contact the office with any questions/problems/concerns. Other comorbidity noted and will be managed by PCP. Pain waiver discussed with patient and confirmed.    7/12/2021      Electronically signed by Marquis Elio DPM on 7/12/2021 at 2:31 PM  7/12/2021

## 2021-07-13 LAB
ANTI-NUCLEAR ANTIBODY (ANA): NEGATIVE
VITAMIN D 25-HYDROXY: 60 NG/ML (ref 30–100)

## 2021-07-14 ENCOUNTER — TELEPHONE (OUTPATIENT)
Dept: FAMILY MEDICINE CLINIC | Age: 71
End: 2021-07-14

## 2021-07-14 LAB — AFP-TUMOR MARKER: 3 NG/ML (ref 0–9)

## 2021-07-14 NOTE — TELEPHONE ENCOUNTER
Labs stable.   Send copies to his gastroenterologist Dr. Bella Antonio and his rheumatologist Dr. Mehnaz Flores

## 2021-07-15 NOTE — TELEPHONE ENCOUNTER
Letter sent to patient letting him know that his lab results are stable. Attached a copy of the results for his records. Results also sent via fax to Dr Ellen Max and Dr Farideh Valdes.

## 2021-07-18 LAB — VITAMIN B1 WHOLE BLOOD: 172 NMOL/L (ref 70–180)

## 2021-07-27 ENCOUNTER — HOSPITAL ENCOUNTER (OUTPATIENT)
Age: 71
Discharge: HOME | End: 2021-07-29

## 2021-07-27 PROCEDURE — 88305 TISSUE EXAM BY PATHOLOGIST: CPT

## 2021-10-18 ENCOUNTER — OFFICE VISIT (OUTPATIENT)
Dept: FAMILY MEDICINE CLINIC | Age: 71
End: 2021-10-18
Payer: MEDICARE

## 2021-10-18 ENCOUNTER — PROCEDURE VISIT (OUTPATIENT)
Dept: PODIATRY | Age: 71
End: 2021-10-18
Payer: MEDICARE

## 2021-10-18 VITALS — HEIGHT: 70 IN | WEIGHT: 297 LBS | BODY MASS INDEX: 42.52 KG/M2

## 2021-10-18 VITALS
BODY MASS INDEX: 41.66 KG/M2 | HEART RATE: 67 BPM | WEIGHT: 291 LBS | HEIGHT: 70 IN | OXYGEN SATURATION: 99 % | SYSTOLIC BLOOD PRESSURE: 106 MMHG | TEMPERATURE: 97 F | DIASTOLIC BLOOD PRESSURE: 68 MMHG

## 2021-10-18 DIAGNOSIS — Z92.29 HISTORY OF REGULAR MEDICATION USE: ICD-10-CM

## 2021-10-18 DIAGNOSIS — M79.675 PAIN IN LEFT TOE(S): ICD-10-CM

## 2021-10-18 DIAGNOSIS — M79.674 PAIN IN TOE OF RIGHT FOOT: ICD-10-CM

## 2021-10-18 DIAGNOSIS — R26.2 DIFFICULTY WALKING: ICD-10-CM

## 2021-10-18 DIAGNOSIS — D69.6 THROMBOCYTOPENIA (HCC): ICD-10-CM

## 2021-10-18 DIAGNOSIS — K70.30 ALCOHOLIC CIRRHOSIS OF LIVER WITHOUT ASCITES (HCC): ICD-10-CM

## 2021-10-18 DIAGNOSIS — I48.11 LONGSTANDING PERSISTENT ATRIAL FIBRILLATION (HCC): ICD-10-CM

## 2021-10-18 DIAGNOSIS — G40.909 SEIZURE DISORDER (HCC): ICD-10-CM

## 2021-10-18 DIAGNOSIS — I73.9 PERIPHERAL VASCULAR DISEASE, UNSPECIFIED (HCC): ICD-10-CM

## 2021-10-18 DIAGNOSIS — E66.01 MORBID OBESITY WITH BMI OF 40.0-44.9, ADULT (HCC): ICD-10-CM

## 2021-10-18 DIAGNOSIS — M05.9 RHEUMATOID ARTHRITIS WITH POSITIVE RHEUMATOID FACTOR, INVOLVING UNSPECIFIED SITE (HCC): ICD-10-CM

## 2021-10-18 DIAGNOSIS — B35.1 TINEA UNGUIUM: Primary | ICD-10-CM

## 2021-10-18 LAB
ALBUMIN SERPL-MCNC: 4.1 G/DL (ref 3.5–5.2)
ALP BLD-CCNC: 65 U/L (ref 40–129)
ALT SERPL-CCNC: 18 U/L (ref 0–40)
ANION GAP SERPL CALCULATED.3IONS-SCNC: 15 MMOL/L (ref 7–16)
AST SERPL-CCNC: 46 U/L (ref 0–39)
BASOPHILS ABSOLUTE: 0.02 E9/L (ref 0–0.2)
BASOPHILS RELATIVE PERCENT: 0.3 % (ref 0–2)
BILIRUB SERPL-MCNC: 1.8 MG/DL (ref 0–1.2)
BUN BLDV-MCNC: 17 MG/DL (ref 6–23)
CALCIUM SERPL-MCNC: 10.2 MG/DL (ref 8.6–10.2)
CHLORIDE BLD-SCNC: 101 MMOL/L (ref 98–107)
CO2: 23 MMOL/L (ref 22–29)
CREAT SERPL-MCNC: 1.3 MG/DL (ref 0.7–1.2)
EOSINOPHILS ABSOLUTE: 0.1 E9/L (ref 0.05–0.5)
EOSINOPHILS RELATIVE PERCENT: 1.3 % (ref 0–6)
GFR AFRICAN AMERICAN: >60
GFR NON-AFRICAN AMERICAN: 54 ML/MIN/1.73
GLUCOSE BLD-MCNC: 89 MG/DL (ref 74–99)
HCT VFR BLD CALC: 44.3 % (ref 37–54)
HEMOGLOBIN: 14.5 G/DL (ref 12.5–16.5)
IMMATURE GRANULOCYTES #: 0.02 E9/L
IMMATURE GRANULOCYTES %: 0.3 % (ref 0–5)
LYMPHOCYTES ABSOLUTE: 0.76 E9/L (ref 1.5–4)
LYMPHOCYTES RELATIVE PERCENT: 9.6 % (ref 20–42)
MCH RBC QN AUTO: 32.3 PG (ref 26–35)
MCHC RBC AUTO-ENTMCNC: 32.7 % (ref 32–34.5)
MCV RBC AUTO: 98.7 FL (ref 80–99.9)
MONOCYTES ABSOLUTE: 0.63 E9/L (ref 0.1–0.95)
MONOCYTES RELATIVE PERCENT: 7.9 % (ref 2–12)
NEUTROPHILS ABSOLUTE: 6.4 E9/L (ref 1.8–7.3)
NEUTROPHILS RELATIVE PERCENT: 80.6 % (ref 43–80)
PDW BLD-RTO: 14.6 FL (ref 11.5–15)
PLATELET # BLD: 77 E9/L (ref 130–450)
PLATELET CONFIRMATION: NORMAL
PMV BLD AUTO: 11.2 FL (ref 7–12)
POTASSIUM SERPL-SCNC: 4.5 MMOL/L (ref 3.5–5)
RBC # BLD: 4.49 E12/L (ref 3.8–5.8)
SODIUM BLD-SCNC: 139 MMOL/L (ref 132–146)
TOTAL PROTEIN: 7.4 G/DL (ref 6.4–8.3)
WBC # BLD: 7.9 E9/L (ref 4.5–11.5)

## 2021-10-18 PROCEDURE — G8427 DOCREV CUR MEDS BY ELIG CLIN: HCPCS | Performed by: INTERNAL MEDICINE

## 2021-10-18 PROCEDURE — G8417 CALC BMI ABV UP PARAM F/U: HCPCS | Performed by: INTERNAL MEDICINE

## 2021-10-18 PROCEDURE — G8484 FLU IMMUNIZE NO ADMIN: HCPCS | Performed by: INTERNAL MEDICINE

## 2021-10-18 PROCEDURE — G0008 ADMIN INFLUENZA VIRUS VAC: HCPCS | Performed by: INTERNAL MEDICINE

## 2021-10-18 PROCEDURE — 11721 DEBRIDE NAIL 6 OR MORE: CPT | Performed by: PODIATRIST

## 2021-10-18 PROCEDURE — 1036F TOBACCO NON-USER: CPT | Performed by: INTERNAL MEDICINE

## 2021-10-18 PROCEDURE — 4040F PNEUMOC VAC/ADMIN/RCVD: CPT | Performed by: INTERNAL MEDICINE

## 2021-10-18 PROCEDURE — 90694 VACC AIIV4 NO PRSRV 0.5ML IM: CPT | Performed by: INTERNAL MEDICINE

## 2021-10-18 PROCEDURE — 99213 OFFICE O/P EST LOW 20 MIN: CPT | Performed by: INTERNAL MEDICINE

## 2021-10-18 PROCEDURE — 1123F ACP DISCUSS/DSCN MKR DOCD: CPT | Performed by: INTERNAL MEDICINE

## 2021-10-18 PROCEDURE — 3017F COLORECTAL CA SCREEN DOC REV: CPT | Performed by: INTERNAL MEDICINE

## 2021-10-18 RX ORDER — NADOLOL 40 MG/1
40 TABLET ORAL 2 TIMES DAILY
Qty: 180 TABLET | Refills: 1 | Status: CANCELLED | OUTPATIENT
Start: 2021-10-18

## 2021-10-18 RX ORDER — SPIRONOLACTONE 25 MG/1
25 TABLET ORAL DAILY
Qty: 90 TABLET | Refills: 1 | Status: CANCELLED | OUTPATIENT
Start: 2021-10-18 | End: 2021-11-17

## 2021-10-18 ASSESSMENT — ENCOUNTER SYMPTOMS
GASTROINTESTINAL NEGATIVE: 1
RESPIRATORY NEGATIVE: 1
EYES NEGATIVE: 1

## 2021-10-18 NOTE — PROGRESS NOTES
Lilly Fuelling  Return Patient    Chief Complaint   Patient presents with    Nail Problem       Subjective: This Karlan Gess comes to clinic for foot and nail care. Pt currently has complaint of thickened, painful, elongated nails that he/she cannot manage by themselves. Pt. Relates pain to nails with shoe gear. Pt's primary care physician is Iris Olivares MD.  Past Medical History:   Diagnosis Date    Alcoholic cirrhosis ([address]     Alcoholism ([address]     Has been sober / dry since 01/2016.  Anxiety     Arthritis     Chronic atrial fibrillation (HCC)     Esophageal varices (HCC)     UGI bleed ~2012 - has had variceal banding 2x around that time.  History of kidney stones     Hypertension     Lumbar compression fracture ([address] 02/2017    Lung tumor 2017    Osteopenia     Osteopenia     S/P TAVR (transcatheter aortic valve replacement) 9/20/2018    Seizure disorder ([address] 89/9767    Complicated a stroke with occipital ICH.  Seizures ([address]     Sepsis with MRSE bacteremia 09/2016    resolved    Severe aortic stenosis     Confirmed by echo 9/2016, BROWN 10/2016. Undergoing w/u at [facility] for [facility]AVR, as of 4/2017.  Sleep apnea     Couldn't tolerate CPAP therapy ~2007. Had had UPPP.  Stroke [facility] 09/2016    With receptive aphasia, poor memory that are improving as of 04/2017. Pt was found to have an occipital ICH at that time, with possible amyloid angiopathy as cause per MRI subsequently. Had seizure and sepsis (d/t MRSE bacteremia) complicating stroke.        No Known Allergies  Current Outpatient Medications on File Prior to Visit   Medication Sig Dispense Refill    nadolol (CORGARD) 40 MG tablet Take 1 tablet by mouth 2 times daily 180 tablet 1    levETIRAcetam (KEPPRA) 1000 MG tablet Take 1,000 mg by mouth 2 times daily      Probiotic Product (HIGH POTENCY PROBIOTIC) CAPS Take by mouth 10 billion CFU: 2 tablets once daily      predniSONE (DELTASONE) 5 MG tablet Take 1 tablet by mouth daily 90 tablet 1    teriparatide, recombinant, (FORTEO) 600 MCG/2.4ML SOLN injection Inject 0.08 mLs into the skin daily 90 pen 5    potassium citrate (UROCIT-K) 10 MEQ (1080 MG) extended release tablet Take by mouth 3 tabs bid      lacosamide (VIMPAT) 100 MG TABS tablet Take 100 mg by mouth 2 times daily. Ashia Roseronel omeprazole (PRILOSEC) 40 MG delayed release capsule Take 40 mg by mouth daily       Calcium Citrate-Vitamin D (CALCIUM CITRATE + D PO) Take 600 mg by mouth daily      vitamin B-1 (THIAMINE) 100 MG tablet Take 100 mg by mouth daily      acetaminophen (TYLENOL) 500 MG tablet Take 500 mg by mouth every 6 hours       aspirin 81 MG tablet Take 81 mg by mouth daily      hydroxychloroquine (PLAQUENIL) 200 MG tablet Take 400 mg by mouth daily Indications: taking 058 mg       folic acid (FOLVITE) 1 MG tablet Take 1 mg by mouth daily       lactulose (CHRONULAC) 10 GM/15ML solution 10 g by Other route daily       VITAMIN D PO Take 4,000 Units by mouth daily       spironolactone (ALDACTONE) 25 MG tablet Take 1 tablet by mouth daily 90 tablet 1     No current facility-administered medications on file prior to visit. Review of Systems   Constitutional: Negative. HENT: Negative. Eyes: Negative. Respiratory: Negative. Cardiovascular: Negative. Gastrointestinal: Negative. Endocrine: Negative. Genitourinary: Negative. Musculoskeletal: Negative. Objective:  General: AAO x 3 in NAD.     Derm  Toenail Description  Sites of Onychomycosis Involvement (Check affected area)  [x] [x] [x] [x] [x] [x] [x] [x] [x] [x]  5 4 3 2 1 1 2 3 4 5                          Right                                        Left    Thickness  [x] [x] [x] [x] [x] [x] [x] [x] [x] [x]  5 4 3 2 1 1 2 3 4 5                         Right                                        Left    Dystrophic Changes   [x] [x] [x] [x] [x] [x] [x] [x] [x] [x]  5 4 3 2 1 1 2 3 4 5                         Right Left    Color  [x] [x] [x] [x] [x] [x] [x] [x] [x] [x]  5 4 3 2 1 1 2 3 4 5                          Right                                        Left    Incurvation/Ingrowin   [x] [x] [x] [x] [x] [x] [x] [x] [x] [x]  5 4 3 2 1 1 2 3 4 5                         Right                                        Left    Inflammation/Pain   [x] [x] [x] [x] [x] [x] [x] [x] [x] [x]  5 4 3  2 1 1 2 3 4 5                         Right                                        Left      Nails that are described above are all elongated thickened pitting mycotic yellowish incurvated causing pain with both shoe gear. Palpation nails greater then 3 mm thick painful       Dermatologic Exam:hair loss noted  lower extremity    Skin lesion/ulceration   Skin   Callus neg  Musculoskeletal:     1st MPJ ROM normal, Bilateral.  Muscle strength 5/5, Bilateral.  Pain present upon palpation of toenails 1-5  Bilateral., Bilateral.  Ankle ROM normal,Bilateral.    Dorsally contracted digits , Bilateral.     Vascular:  Pulses   bilateral DP absnet    PT absient    severe varicose veins noted to lower extremity there is loss of hair cool to touch discoloration to feet nails are mycotic dystrophic         Neurological:  Sensation present to light touch to level of digits, Bilateral.    Foot Exam    General  General Appearance: appears stated age and healthy   Orientation: alert and oriented to person, place, and time       Right Foot/Ankle     Inspection and Palpation  Skin Exam: skin changes and abnormal color; Neurovascular  Dorsalis pedis: 1+  Posterior tibial: absent      Left Foot/Ankle      Neurovascular  Dorsalis pedis: absent  Posterior tibial: absent             Ortho Exam  Q7   []Yes    []No                Q8   [x]Yes    []No                     Q9   []Yes    []No  Assessment:  66 y.o. male with:   1. Tinea unguium    2. Peripheral vascular disease, unspecified ([address]    3. Pain in toe of right foot    4.  Pain in left toe(s)    5. Difficulty walking     No orders of the defined types were placed in this encounter. Plan:   Pt was evaluated and examined. Patient was given personalized discharge instructions. Nails 1-10 were debrided in length and thickness sharply with a nail nipper and  without incident. Pt will follow up in 9 weeks or sooner if any problems arise. Diagnosis was discussed with the pt and all of their questions were answered in detail. Proper foot hygiene and care was discussed with the pt. Patient to check feet daily and contact the office with any questions/problems/concerns. Other comorbidity noted and will be managed by PCP. Pain waiver discussed with patient and confirmed.    10/18/2021      Electronically signed by Mendel Calix, DPM on 10/18/2021 at 1:19 PM  10/18/2021

## 2021-10-19 ENCOUNTER — TELEPHONE (OUTPATIENT)
Dept: FAMILY MEDICINE CLINIC | Age: 71
End: 2021-10-19

## 2021-10-19 NOTE — PROGRESS NOTES
[id number] [employer]     10/19/21  Lynn Yuan : 1950 Sex: male  Age: 66 y.o. Chief Complaint   Patient presents with    Hypertension       HPI    Patient presents today for 3-month follow-up visit on his medical problems. Since I have seen Pasha Arreola 45 he has seen gastroenterology and has had colonoscopy done demonstrating tubulovillous adenoma. Another scope done upcoming February to follow-up on this. He is also seeing rheumatology for his rheumatoid arthritis and I reviewed their note as well. Had virtual visit with [place] radiation oncology. Reviewed their note and CAT scan results which demonstrated no metastatic disease of his lung cancer. Appears to be stable from their standpoint. He has had no further seizures on his antiseizure medication. He continues in his chronic atrial fibrillation with controlled ventricular response on no anticoagulation secondary to his bleeding including intracerebral bleed thought to be caused by cerebral amyloid angiopathy and esophageal varices with cirrhosis. Patient has had no falls.   Wife manages all his appointments, states is up-to-date with everybody. Weight is down 6 pounds. He follows with numerous consultants including urology, pulmonary, GI, rheumatology, podiatry, and neurology for seizure/stroke, cardiology radiation oncology. [facility] no longer sees  neurosurgery     Review of Systems     Constitutional:  Negative for activity change, fatigue, appetite change, chills, diaphoresis, fever and unexpected weight change. HENT:  Negative for congestion, rhinorrhea and sinus pain.    Respiratory: Negative for cough, shortness of breath and wheezing.-History of lung cancer with radiation  Cardiovascular: Negative for chest pain, palpitations and leg swelling.        Chronic atrial fibrillation not on anticoagulation secondary to falls and bleeding-he is currently on no anticoagulated with cirrhosis.   Gastrointestinal: Negative for abdominal pain, blood in stool, constipation, diarrhea, nausea and vomiting.        Alcoholic cirrhosis. States taking lactulose about twice a week. Last ammonia level was 60. On probiotic that is supposed to help with his ammonia level endocrine: Negative.    Genitourinary: Negative for difficulty urinating, dysuria, frequency, hematuria and urgency.         Decreased urinary stream--saw urology 1/17 for hematuria. -  Musculoskeletal: Positive for arthralgias and back pain. Negative for gait problem and myalgias.   rePorts arthritis and lower back pain/low back pain severe at times has been taking Tylenol.  Some improvement.  Rheumatoid arthritis--neurosurgery did not recommend surgery  for his back  Allergic/Immunologic: Negative for environmental allergies and immunocompromised state. Neurological: Negative for dizziness, weakness, light-headedness, numbness and headaches.        Reports, numbness, seizures (although not recent) TIA-watchman procedure failed and we are unable to anticoagulate this gentleman   Hematological: Negative. Winsome Ortega have chronic thrombocytopenia associated with his cirrhosis. Psychiatric/Behavioral: Negative for behavioral problems, confusion and sleep disturbance. The patient is not nervous/anxious.            REST OF PERTINENT ROS GONE OVER AND WAS NEGATIVE. Current Outpatient Medications:     spironolactone (ALDACTONE) 25 MG tablet, Take 1 tablet by mouth daily, Disp: 90 tablet, Rfl: 1    nadolol (CORGARD) 40 MG tablet, Take 1 tablet by mouth 2 times daily, Disp: 180 tablet, Rfl: 1    levETIRAcetam (KEPPRA) 1000 MG tablet, Take 1,000 mg by mouth 2 times daily, Disp: , Rfl:     predniSONE (DELTASONE) 5 MG tablet, Take 1 tablet by mouth daily, Disp: 90 tablet, Rfl: 1    potassium citrate (UROCIT-K) 10 MEQ (1080 MG) extended release tablet, Take by mouth 3 tabs bid, Disp: , Rfl:     lacosamide (VIMPAT) 100 MG TABS tablet, Take 100 mg by mouth 2 times daily. ., Disp: , Rfl:    omeprazole (PRILOSEC) 40 MG delayed release capsule, Take 40 mg by mouth daily , Disp: , Rfl:     Calcium Citrate-Vitamin D (CALCIUM CITRATE + D PO), Take 600 mg by mouth daily, Disp: , Rfl:     vitamin B-1 (THIAMINE) 100 MG tablet, Take 100 mg by mouth daily, Disp: , Rfl:     acetaminophen (TYLENOL) 500 MG tablet, Take 500 mg by mouth every 6 hours , Disp: , Rfl:     aspirin 81 MG tablet, Take 81 mg by mouth daily, Disp: , Rfl:     hydroxychloroquine (PLAQUENIL) 200 MG tablet, Take 400 mg by mouth daily Indications: taking 400 mg , Disp: , Rfl:     folic acid (FOLVITE) 1 MG tablet, Take 1 mg by mouth daily , Disp: , Rfl:     lactulose (CHRONULAC) 10 GM/15ML solution, 10 g by Other route daily , Disp: , Rfl:     VITAMIN D PO, Take 4,000 Units by mouth daily , Disp: , Rfl:   No Known Allergies    Past Medical History:   Diagnosis Date    Al[facility]     Alcoholism ([facility]     Has been sober / dry since 01/2016.  Anxiety     Arthritis     Chronic atrial fibrillation (HCC)     Esophageal varices (HCC)     UGI bleed ~2012 - has had variceal banding 2x around that time.  History of kidney stones     Hypertension     Lumbar compression fracture ([facility] 02/2017    Lung tumor 2017    Osteopenia     Osteopenia     S/P TAVR (transcatheter aortic valve replacement) 9/20/2018    Seizure disorder ([facility] 63/6091    Complicated a stroke with occipital ICH.  Seizures ([facility]     Sepsis with MRSE bacteremia 09/2016    resolved    Severe aortic stenosis     Confirmed by echo 9/2016, BROWN 10/2016. Undergoing [facility] for TAVR, as of 4/2017.  Sleep apnea     Couldn't tolerate CPAP therapy ~2007. Had had UPPP.  Stroke ([facility] 09/2016    With receptive aphasia, poor memory that are improving as of 04/2017. Pt was found to have an occipital ICH at that time, with possible amyloid angiopathy as cause per MRI subsequently. Had seizure and sepsis (d/t MRSE bacteremia) complicating stroke.      Past Attends Holiness Services:     Active Member of Clubs or Organizations:     Attends Club or Organization Meetings:     Marital Status:    Intimate Partner Violence:     Fear of Current or Ex-Partner:     Emotionally Abused:     Physically Abused:     Sexually Abused:        Vitals:    10/18/21 1338   BP: 106/68   Pulse: 67   Temp: 97 °F (36.1 °C)   TempSrc: Temporal   SpO2: 99%   Weight: 291 lb (132 kg)   Height: 5' 10\" (1.778 m)       Physical Exam  Constitutional: He is oriented to person, place, and time. He appears well-developed and well-nourished. No distress.  Obese  Neck: Normal range of motion. Neck supple. No JVD present. No thyromegaly present. No thyroid nodules   Cardiovascular: Normal rate and intact distal pulses. Exam reveals no gallop and no friction rub.   .    Heart: Irregularly irregular with controlled ventricular response.  Systolic murmur did improve postoperatively.   Pulmonary/Chest: Effort normal and breath sounds normal. No respiratory distress. He has no wheezes. He has no rales.       Abdominal: Soft. Bowel sounds are normal. He exhibits no distension and no mass. There is no tenderness.   Spleen tip and liver were palpable with deep inspiration   Musculoskeletal: Normal range of motion. He exhibits edema.   Walks with a slow gait.   Lymph nodes     He has no cervical adenopathy.     He has no axillary adenopathy.        Right: No inguinal adenopathy present.        Left: No inguinal adenopathy present. Neurological: He is alert and oriented to person, place, and time. He displays normal reflexes. A sensory deficit is present. He exhibits normal muscle tone. Coordination normal.   Diminished sensation light touch in the ankle area bilaterally.   Skin: Skin is warm and dry. No rash noted. No erythema. Psychiatric: He has a normal mood and affect.  His behavior is normal. Judgment and thought content normal.          Assessment and Plan:  Santino Meyer was seen today for hypertension. Diagnoses and all orders for this visit:    Alcoholic cirrhosis of liver without ascites ([address]    Longstanding persistent atrial fibrillation (HCC)  -     CBC Auto Differential; Future  -     Comprehensive Metabolic Panel; Future  -     LEVETIRACETAM LEVEL; Future    Seizure disorder (HCC)  -     CBC Auto Differential; Future  -     Comprehensive Metabolic Panel; Future  -     LEVETIRACETAM LEVEL; Future    Rheumatoid arthritis with positive rheumatoid factor, involving unspecified site (HCC)    Thrombocytopenia ([address]    History of regular medication use  -     CBC Auto Differential; Future  -     Comprehensive Metabolic Panel; Future    Morbid obesity with BMI of 40.0-44.9, adult ([address]    Other orders  -     INFLUENZA, QUADV, ADJUVANTED, 65 YRS =, IM, PF, PREFILL SYR, 0.5ML ([facility]UAD)    Plan: See back in 3 months and as needed. Blood work today to monitor disease progression and medication use. This will include a Keppra level. Upcoming repeat colonoscopy in February. No prescriptions today. Fall precautions. Follow-up with above consultants. Look over 7/12/2021 note when I see patient next. Return in about 4 months (around 2/18/2022). Seen By:  Filomena Melo MD      *Document was created using voice recognition software. Note was reviewed however may contain grammatical errors.

## 2021-10-20 NOTE — TELEPHONE ENCOUNTER
Letter sent to patient letting him know that his labs came back and appear to be stable. I have attached a copy of the results for his records.

## 2021-10-21 ENCOUNTER — TELEPHONE (OUTPATIENT)
Dept: FAMILY MEDICINE CLINIC | Age: 71
End: 2021-10-21

## 2021-10-21 LAB — KEPPRA: 39 UG/ML (ref 12–46)

## 2021-10-21 NOTE — TELEPHONE ENCOUNTER
Letter sent to patient letting him know his keppra level was good. Attached a copy of the results for his records.

## 2022-02-09 ENCOUNTER — HOSPITAL ENCOUNTER (OUTPATIENT)
Age: 72
Discharge: HOME | End: 2022-02-11

## 2022-02-09 PROCEDURE — 88305 TISSUE EXAM BY PATHOLOGIST: CPT

## 2022-02-21 ENCOUNTER — OFFICE VISIT (OUTPATIENT)
Dept: FAMILY MEDICINE CLINIC | Age: 72
End: 2022-02-21
Payer: MEDICARE

## 2022-02-21 ENCOUNTER — PROCEDURE VISIT (OUTPATIENT)
Dept: PODIATRY | Age: 72
End: 2022-02-21
Payer: MEDICARE

## 2022-02-21 VITALS
SYSTOLIC BLOOD PRESSURE: 116 MMHG | TEMPERATURE: 97.1 F | BODY MASS INDEX: 40.43 KG/M2 | HEIGHT: 70 IN | HEART RATE: 68 BPM | DIASTOLIC BLOOD PRESSURE: 74 MMHG | OXYGEN SATURATION: 98 % | WEIGHT: 282.4 LBS

## 2022-02-21 VITALS
WEIGHT: 282 LBS | DIASTOLIC BLOOD PRESSURE: 74 MMHG | SYSTOLIC BLOOD PRESSURE: 116 MMHG | TEMPERATURE: 97.4 F | BODY MASS INDEX: 40.46 KG/M2

## 2022-02-21 DIAGNOSIS — R26.2 DIFFICULTY WALKING: ICD-10-CM

## 2022-02-21 DIAGNOSIS — K70.30 ALCOHOLIC CIRRHOSIS OF LIVER WITHOUT ASCITES (HCC): ICD-10-CM

## 2022-02-21 DIAGNOSIS — D69.6 THROMBOCYTOPENIA (HCC): ICD-10-CM

## 2022-02-21 DIAGNOSIS — G40.909 SEIZURE DISORDER (HCC): ICD-10-CM

## 2022-02-21 DIAGNOSIS — Z95.2 S/P TAVR (TRANSCATHETER AORTIC VALVE REPLACEMENT): ICD-10-CM

## 2022-02-21 DIAGNOSIS — B35.1 TINEA UNGUIUM: Primary | ICD-10-CM

## 2022-02-21 DIAGNOSIS — M05.9 RHEUMATOID ARTHRITIS WITH POSITIVE RHEUMATOID FACTOR, INVOLVING UNSPECIFIED SITE (HCC): ICD-10-CM

## 2022-02-21 DIAGNOSIS — M79.674 PAIN IN TOE OF RIGHT FOOT: ICD-10-CM

## 2022-02-21 DIAGNOSIS — I48.11 LONGSTANDING PERSISTENT ATRIAL FIBRILLATION (HCC): Primary | ICD-10-CM

## 2022-02-21 DIAGNOSIS — G62.1 NEUROPATHY, ALCOHOLIC (HCC): ICD-10-CM

## 2022-02-21 DIAGNOSIS — M79.675 PAIN IN LEFT TOE(S): ICD-10-CM

## 2022-02-21 DIAGNOSIS — J44.9 CHRONIC OBSTRUCTIVE PULMONARY DISEASE, UNSPECIFIED COPD TYPE (HCC): ICD-10-CM

## 2022-02-21 DIAGNOSIS — I73.9 PERIPHERAL VASCULAR DISEASE, UNSPECIFIED (HCC): ICD-10-CM

## 2022-02-21 DIAGNOSIS — E66.01 MORBID OBESITY WITH BMI OF 40.0-44.9, ADULT (HCC): ICD-10-CM

## 2022-02-21 PROCEDURE — 3023F SPIROM DOC REV: CPT | Performed by: INTERNAL MEDICINE

## 2022-02-21 PROCEDURE — G8417 CALC BMI ABV UP PARAM F/U: HCPCS | Performed by: INTERNAL MEDICINE

## 2022-02-21 PROCEDURE — 3017F COLORECTAL CA SCREEN DOC REV: CPT | Performed by: INTERNAL MEDICINE

## 2022-02-21 PROCEDURE — 11721 DEBRIDE NAIL 6 OR MORE: CPT | Performed by: PODIATRIST

## 2022-02-21 PROCEDURE — G8427 DOCREV CUR MEDS BY ELIG CLIN: HCPCS | Performed by: INTERNAL MEDICINE

## 2022-02-21 PROCEDURE — 1123F ACP DISCUSS/DSCN MKR DOCD: CPT | Performed by: INTERNAL MEDICINE

## 2022-02-21 PROCEDURE — 1036F TOBACCO NON-USER: CPT | Performed by: INTERNAL MEDICINE

## 2022-02-21 PROCEDURE — 99213 OFFICE O/P EST LOW 20 MIN: CPT | Performed by: INTERNAL MEDICINE

## 2022-02-21 PROCEDURE — G8484 FLU IMMUNIZE NO ADMIN: HCPCS | Performed by: INTERNAL MEDICINE

## 2022-02-21 PROCEDURE — 4040F PNEUMOC VAC/ADMIN/RCVD: CPT | Performed by: INTERNAL MEDICINE

## 2022-02-21 RX ORDER — SPIRONOLACTONE 25 MG/1
25 TABLET ORAL DAILY
Qty: 90 TABLET | Refills: 1 | Status: SHIPPED
Start: 2022-02-21 | End: 2022-06-20 | Stop reason: SDUPTHER

## 2022-02-21 RX ORDER — NADOLOL 40 MG/1
40 TABLET ORAL 2 TIMES DAILY
Qty: 180 TABLET | Refills: 1 | Status: SHIPPED
Start: 2022-02-21 | End: 2022-06-20 | Stop reason: SDUPTHER

## 2022-02-21 RX ORDER — HYDROCORTISONE 25 MG/G
CREAM TOPICAL
Qty: 30 G | Refills: 0 | Status: SHIPPED
Start: 2022-02-21 | End: 2022-06-20

## 2022-02-21 ASSESSMENT — PATIENT HEALTH QUESTIONNAIRE - PHQ9
SUM OF ALL RESPONSES TO PHQ9 QUESTIONS 1 & 2: 0
SUM OF ALL RESPONSES TO PHQ QUESTIONS 1-9: 0
2. FEELING DOWN, DEPRESSED OR HOPELESS: 0
1. LITTLE INTEREST OR PLEASURE IN DOING THINGS: 0
SUM OF ALL RESPONSES TO PHQ QUESTIONS 1-9: 0

## 2022-02-21 ASSESSMENT — ENCOUNTER SYMPTOMS
RESPIRATORY NEGATIVE: 1
EYES NEGATIVE: 1
GASTROINTESTINAL NEGATIVE: 1

## 2022-02-21 NOTE — PROGRESS NOTES
Paresh Inman  Return Patient    Chief Complaint   Patient presents with    Toe Pain     saw pcp dr. Bess Garcia 2/21/2022       Subjective: This Adinreida Nail comes to clinic for foot and nail care. Pt currently has complaint of thickened, painful, elongated nails that he/she cannot manage by themselves. Pt. Relates pain to nails with shoe gear. Pt's primary care physician is Romayne Sale, MD.  Past Medical History:   Diagnosis Date    Alcoholic cirrhosis ([address]     Alcoholism ([address]     Has been sober / dry since 01/2016.  Anxiety     Arthritis     Chronic atrial fibrillation (HCC)     Esophageal varices (HCC)     UGI bleed ~2012 - has had variceal banding 2x around that time.  History of kidney stones     Hypertension     Lumbar compression fracture ([address] 02/2017    Lung tumor 2017    Osteopenia     Osteopenia     S/P TAVR (transcatheter aortic valve replacement) 9/20/2018    Seizure disorder ([address] 15/6963    Complicated a stroke with occipital ICH.  Seizures ([address]     Sepsis with MRSE bacteremia 09/2016    resolved    Severe aortic stenosis     Confirmed by echo 9/2016, BROWN 10/2016. Undergoing w/u at [facility] for [facility]AVR, as of 4/2017.  Sleep apnea     Couldn't tolerate CPAP therapy ~2007. Had had UPPP.  Stroke ([facility] 09/2016    With receptive aphasia, poor memory that are improving as of 04/2017. Pt was found to have an occipital ICH at that time, with possible amyloid angiopathy as cause per MRI subsequently. Had seizure and sepsis (d/t MRSE bacteremia) complicating stroke.        No Known Allergies  Current Outpatient Medications on File Prior to Visit   Medication Sig Dispense Refill    nadolol (CORGARD) 40 MG tablet Take 1 tablet by mouth 2 times daily 180 tablet 1    spironolactone (ALDACTONE) 25 MG tablet Take 1 tablet by mouth daily 90 tablet 1    Handicap Placard MISC by Does not apply route Duration: 5 years 1 each 0    hydrocortisone (ANUSOL-HC) 2.5 % CREA rectal cream Apply topically bid for 10 day 30 g 0    levETIRAcetam (KEPPRA) 1000 MG tablet Take 1,000 mg by mouth 2 times daily      predniSONE (DELTASONE) 5 MG tablet Take 1 tablet by mouth daily 90 tablet 1    potassium citrate (UROCIT-K) 10 MEQ (1080 MG) extended release tablet Take by mouth 3 tabs bid      lacosamide (VIMPAT) 100 MG TABS tablet Take 100 mg by mouth 2 times daily. [place] omeprazole (PRILOSEC) 40 MG delayed release capsule Take 40 mg by mouth daily       Calcium Citrate-Vitamin D (CALCIUM CITRATE + D PO) Take 600 mg by mouth daily      vitamin B-1 (THIAMINE) 100 MG tablet Take 100 mg by mouth daily      acetaminophen (TYLENOL) 500 MG tablet Take 500 mg by mouth every 6 hours       aspirin 81 MG tablet Take 81 mg by mouth daily      hydroxychloroquine (PLAQUENIL) 200 MG tablet Take 400 mg by mouth daily Indications: taking 511 mg       folic acid (FOLVITE) 1 MG tablet Take 1 mg by mouth daily       lactulose (CHRONULAC) 10 GM/15ML solution 10 g by Other route daily       VITAMIN D PO Take 4,000 Units by mouth daily        No current facility-administered medications on file prior to visit. Review of Systems   Constitutional: Negative. HENT: Negative. Eyes: Negative. Respiratory: Negative. Cardiovascular: Negative. Gastrointestinal: Negative. Endocrine: Negative. Genitourinary: Negative. Musculoskeletal: Negative. Objective:  General: AAO x 3 in NAD.     Derm  Toenail Description  Sites of Onychomycosis Involvement (Check affected area)  [x] [x] [x] [x] [x] [x] [x] [x] [x] [x]  5 4 3 2 1 1 2 3 4 5                          Right                                        Left    Thickness  [x] [x] [x] [x] [x] [x] [x] [x] [x] [x]  5 4 3 2 1 1 2 3 4 5                         Right                                        Left    Dystrophic Changes   [x] [x] [x] [x] [x] [x] [x] [x] [x] [x]  5 4 3 2 1 1 2 3 4 5                         Right Left    Color  [x] [x] [x] [x] [x] [x] [x] [x] [x] [x]  5 4 3 2 1 1 2 3 4 5                          Right                                        Left    Incurvation/Ingrowin   [x] [x] [x] [x] [x] [x] [x] [x] [x] [x]  5 4 3 2 1 1 2 3 4 5                         Right                                        Left    Inflammation/Pain   [x] [x] [x] [x] [x] [x] [x] [x] [x] [x]  5 4 3  2 1 1 2 3 4 5                         Right                                        Left      Nails that are described above are all elongated thickened pitting mycotic yellowish incurvated causing pain with both shoe gear. Palpation nails greater then 3 mm thick painful       Dermatologic Exam:hair loss noted  lower extremity    Skin lesion/ulceration   Skin   Callus neg  Musculoskeletal:     1st MPJ ROM normal, Bilateral.  Muscle strength 5/5, Bilateral.  Pain present upon palpation of toenails 1-5  Bilateral., Bilateral.  Ankle ROM normal,Bilateral.    Dorsally contracted digits , Bilateral.     Vascular:  Pulses   bilateral DP absnet    PT absient    severe varicose veins noted to lower extremity there is loss of hair cool to touch discoloration to feet nails are mycotic dystrophic         Neurological:  Sensation present to light touch to level of digits, Bilateral.    Foot Exam    General  General Appearance: appears stated age and healthy   Orientation: alert and oriented to person, place, and time       Right Foot/Ankle     Inspection and Palpation  Skin Exam: skin changes and abnormal color; Neurovascular  Dorsalis pedis: 1+  Posterior tibial: absent      Left Foot/Ankle      Neurovascular  Dorsalis pedis: absent  Posterior tibial: absent             Ortho Exam  Q7   []Yes    []No                Q8   [x]Yes    []No                     Q9   []Yes    []No  Assessment:  66 y.o. male with:   1. Tinea unguium    2. Peripheral vascular disease, unspecified ([address]    3. Pain in toe of right foot    4. Pain in left toe(s)    5. Difficulty walking     No orders of the defined types were placed in this encounter. Plan:   Pt was evaluated and examined. Patient was given personalized discharge instructions. Nails 1-10 were debrided in length and thickness sharply with a nail nipper and  without incident. Pt will follow up in 9 weeks or sooner if any problems arise. Diagnosis was discussed with the pt and all of their questions were answered in detail. Proper foot hygiene and care was discussed with the pt. Patient to check feet daily and contact the office with any questions/problems/concerns. Other comorbidity noted and will be managed by PCP. Pain waiver discussed with patient and confirmed.    2/21/2022      Electronically signed by Von Marie DPM on 2/21/2022 at 1:55 PM  2/21/2022

## 2022-02-22 NOTE — PROGRESS NOTES
[id number] [email or web address]     22  Kristofer Ahumada : 1950 Sex: male  Age: 66 y.o. Chief Complaint   Patient presents with    Hypertension     4 months       HPI    Patient presents today for 4-month follow-up visit on his medical problems. He is accompanied by his wife. States he has been doing well since seen last.  He did have colonoscopy by Dr. Jace Calles just recently. I do not have that report but will attempt to get that. He also states he has upcoming blood work to be done by Dr. Noemí Garcia. I looked at the order  and I do not have additional labs to add to this. He continues to follow with rheumatology for his rheumatoid arthritis and is stable. Follows with [facility] radiation oncology for his lung cancer and appears to be stable without metastatic disease. No further seizures on his antiseizure medication. He continues in chronic atrial fibrillation with controlled ventricular response on no anticoagulation secondary to his bleeding including an intracerebral bleed thought to be caused by cerebral amyloid angiopathy and his esophageal varices with cirrhosis. Denies any falls. Wife manages all of his appointments and states to be up-to-date with everybody. Weight is down 9 pounds. He follows with numerous consultants including urology, pulmonary, GI, rheumatology, podiatry, and neurology for seizure/stroke, cardiology radiation oncology. [facility] no longer sees  neurosurgery       Review of Systems     Constitutional:  Negative for activity change, fatigue, appetite change, chills, diaphoresis, fever and unexpected weight change.    HENT:  Negative for congestion, rhinorrhea and sinus pain.    Respiratory: Negative for cough, shortness of breath and wheezing.-History of lung cancer with radiation  Cardiovascular: Negative for chest pain, palpitations and leg swelling.        Chronic atrial fibrillation not on anticoagulation secondary to falls and bleeding-he is currently on no anticoagulated with cirrhosis. Gastrointestinal: Negative for abdominal pain, blood in stool, constipation, diarrhea, nausea and vomiting.        Alcoholic cirrhosis. States taking lactulose about twice a week. Last ammonia level was 60. On probiotic that is supposed to help with his ammonia level endocrine: Negative.    Genitourinary: Negative for difficulty urinating, dysuria, frequency, hematuria and urgency.         Decreased urinary stream--saw urology 1/17 for hematuria. -  Musculoskeletal: Positive for arthralgias and back pain. Negative for gait problem and myalgias.   rePorts arthritis and lower back pain/low back pain severe at times has been taking Tylenol on occasion.  Some improvement.  Rheumatoid arthritis--neurosurgery did not recommend surgery  for his back  Allergic/Immunologic: Negative for environmental allergies and immunocompromised state. Neurological: Negative for dizziness, weakness, light-headedness, numbness and headaches.        Reports, numbness, seizures (although not recent) TIA-watchman procedure failed and we are unable to anticoagulate this gentleman   Hematological: Negative. Ricky Pipes have chronic thrombocytopenia associated with his cirrhosis. Psychiatric/Behavioral: Negative for behavioral problems, confusion and sleep disturbance. The patient is not nervous/anxious.               REST OF PERTINENT ROS GONE OVER AND WAS NEGATIVE.                  Current Outpatient Medications:     nadolol (CORGARD) 40 MG tablet, Take 1 tablet by mouth 2 times daily, Disp: 180 tablet, Rfl: 1    spironolactone (ALDACTONE) 25 MG tablet, Take 1 tablet by mouth daily, Disp: 90 tablet, Rfl: 1    Handicap Placard MISC, by Does not apply route Duration: 5 years, Disp: 1 each, Rfl: 0    hydrocortisone (ANUSOL-HC) 2.5 % CREA rectal cream, Apply topically bid for 10 day, Disp: 30 g, Rfl: 0    levETIRAcetam (KEPPRA) 1000 MG tablet, Take 1,000 mg by mouth 2 times daily, Disp: , Rfl:     predniSONE (DELTASONE) 5 MG tablet, Take 1 tablet by mouth daily, Disp: 90 tablet, Rfl: 1    potassium citrate (UROCIT-K) 10 MEQ (1080 MG) extended release tablet, Take by mouth 3 tabs bid, Disp: , Rfl:     lacosamide (VIMPAT) 100 MG TABS tablet, Take 100 mg by mouth 2 times daily. ., Disp: , Rfl:     omeprazole (PRILOSEC) 40 MG delayed release capsule, Take 40 mg by mouth daily , Disp: , Rfl:     Calcium Citrate-Vitamin D (CALCIUM CITRATE + D PO), Take 600 mg by mouth daily, Disp: , Rfl:     vitamin B-1 (THIAMINE) 100 MG tablet, Take 100 mg by mouth daily, Disp: , Rfl:     acetaminophen (TYLENOL) 500 MG tablet, Take 500 mg by mouth every 6 hours , Disp: , Rfl:     aspirin 81 MG tablet, Take 81 mg by mouth daily, Disp: , Rfl:     hydroxychloroquine (PLAQUENIL) 200 MG tablet, Take 400 mg by mouth daily Indications: taking 400 mg , Disp: , Rfl:     folic acid (FOLVITE) 1 MG tablet, Take 1 mg by mouth daily , Disp: , Rfl:     lactulose (CHRONULAC) 10 GM/15ML solution, 10 g by Other route daily , Disp: , Rfl:     VITAMIN D PO, Take 4,000 Units by mouth daily , Disp: , Rfl:   No Known Allergies    Past Medical History:   Diagnosis Date    [facility]     Alcoholism ([facility]     Has been sober / dry since 01/2016.  Anxiety     Arthritis     Chronic atrial fibrillation (HCC)     Esophageal varices (HCC)     UGI bleed ~2012 - has had variceal banding 2x around that time.  History of kidney stones     Hypertension     Lumbar compression fracture ([address] 02/2017    Lung tumor 2017    Osteopenia     Osteopenia     S/P TAVR (transcatheter aortic valve replacement) 9/20/2018    Seizure disorder ([address] 26/9040    Complicated a stroke with occipital ICH.  Seizures ([address]     Sepsis with MRSE bacteremia 09/2016    resolved    Severe aortic stenosis     Confirmed by echo 9/2016, BROWN 10/2016. Undergoing w/u at [facility] for TAVR, as of 4/2017.  Sleep apnea     Couldn't tolerate CPAP therapy ~2007.  Had had UPPP.    Stroke ([facility] 2016    With receptive aphasia, poor memory that are improving as of 2017. Pt was found to have an occipital ICH at that time, with possible amyloid angiopathy as cause per MRI subsequently. Had seizure and sepsis (d/t MRSE bacteremia) complicating stroke. Past Surgical History:   Procedure Laterality Date    ECHO COMPL W DOP COLOR FLOW  2012         ECHOCARDIOGRAM COMPLETE 2D W DOPPLER W COLOR  2013         ESOPHAGEAL VARICE LIGATION      HIP SURGERY      PALATE SURGERY      UMBILICAL HERNIA REPAIR  2017    UPPER GASTROINTESTINAL ENDOSCOPY      UPPER GASTROINTESTINAL ENDOSCOPY  2017    UPPER GASTROINTESTINAL ENDOSCOPY  2017     Family History   Problem Relation Age of Onset    Cancer Mother         leukemia     Social History     Socioeconomic History    Marital status:      Spouse name: Not on file    Number of children: Not on file    Years of education: Not on file    Highest education level: Not on file   Occupational History    Not on file   Tobacco Use    Smoking status: Former Smoker     Packs/day: 1.00     Years: 55.00     Pack years: 55.00     Types: Cigarettes, Pipe     Start date: 7/10/1966     Quit date: 7/10/2015     Years since quittin.6    Smokeless tobacco: Never Used   Vaping Use    Vaping Use: Never used   Substance and Sexual Activity    Alcohol use: No     Comment: no alcohol since     Drug use: Never    Sexual activity: Not Currently     Partners: Female   Other Topics Concern    Not on file   Social History Narrative    Drinks occ Cola. Social Determinants of Health     Financial Resource Strain: Low Risk     Difficulty of Paying Living Expenses: Not hard at all   Food Insecurity: No Food Insecurity    Worried About Running Out of Food in the Last Year: Never true    Avis of Food in the Last Year: Never true   Transportation Needs:     Lack of Transportation (Medical):  Not on file    Lack gait.   Lymph nodes     He has no cervical adenopathy.     He has no axillary adenopathy.        Right: No inguinal adenopathy present.        Left: No inguinal adenopathy present. Neurological: He is alert and oriented to person, place, and time. He displays normal reflexes. A sensory deficit is present. He exhibits normal muscle tone. Coordination normal.   Diminished sensation light touch in the ankle area bilaterally.   Skin: Skin is warm and dry. No rash noted. No erythema. Psychiatric: He has a normal mood and affect. His behavior is normal. Judgment and thought content normal.            Assessment and Plan:  Anitha Perales was seen today for hypertension. Diagnoses and all orders for this visit:    Longstanding persistent atrial fibrillation (HCC)    Alcoholic cirrhosis of liver without ascites (HCC)  -     nadolol (CORGARD) 40 MG tablet; Take 1 tablet by mouth 2 times daily  -     spironolactone (ALDACTONE) 25 MG tablet; Take 1 tablet by mouth daily    Neuropathy, alcoholic (HCC)    Rheumatoid arthritis with positive rheumatoid factor, involving unspecified site [facility])    Chronic obstructive pulmonary disease, unspecified COPD type ([facility]    Seizure disorder (HCC)    Thrombocytopenia ([facility]    Morbid obesity with BMI of 40.0-44.9, adult ([facility]    S/P TAVR (transcatheter aortic valve replacement)    Other orders  -     Handicap Placard MISC; by Does not apply route Duration: 5 years  -     hydrocortisone (ANUSOL-HC) 2.5 % CREA rectal cream; Apply topically bid for 10 day    : We will see him back in 4 months and as needed. Follow-up with above consultants. We will attempt to get recent colonoscopy report. I did ask them to get a copy of GI blood work when available. I will not check anything today. He is complaining of hemorrhoids which are nonpainful and nonbleeding patient is causing a hygiene issue.   I did prescribe hydrocortisone cream to be applied over the next 10 days to see if this will shrink them up.  If he continues to have issues to let me know I can have surgery look at them. Reminder to look over 7/12/2021 note when I see patient nex fall precautions. Notify us with problems in the interim. T.    Return in about 4 months (around 6/21/2022). Seen By:  Howie Vidal MD      *Document was created using voice recognition software. Note was reviewed however may contain grammatical errors.

## 2022-05-06 ENCOUNTER — HOSPITAL ENCOUNTER (OUTPATIENT)
Age: 72
Discharge: HOME | End: 2022-05-08

## 2022-05-06 PROCEDURE — 88305 TISSUE EXAM BY PATHOLOGIST: CPT

## 2022-05-06 PROCEDURE — 87081 CULTURE SCREEN ONLY: CPT

## 2022-05-07 LAB — CLOTEST: NORMAL

## 2022-06-20 ENCOUNTER — PROCEDURE VISIT (OUTPATIENT)
Dept: PODIATRY | Age: 72
End: 2022-06-20
Payer: MEDICARE

## 2022-06-20 ENCOUNTER — OFFICE VISIT (OUTPATIENT)
Dept: FAMILY MEDICINE CLINIC | Age: 72
End: 2022-06-20
Payer: MEDICARE

## 2022-06-20 VITALS
DIASTOLIC BLOOD PRESSURE: 68 MMHG | TEMPERATURE: 98 F | HEIGHT: 70 IN | HEART RATE: 85 BPM | BODY MASS INDEX: 39.51 KG/M2 | SYSTOLIC BLOOD PRESSURE: 104 MMHG | OXYGEN SATURATION: 96 % | WEIGHT: 276 LBS

## 2022-06-20 VITALS
TEMPERATURE: 98 F | DIASTOLIC BLOOD PRESSURE: 68 MMHG | WEIGHT: 276 LBS | SYSTOLIC BLOOD PRESSURE: 104 MMHG | BODY MASS INDEX: 39.6 KG/M2

## 2022-06-20 DIAGNOSIS — I48.11 LONGSTANDING PERSISTENT ATRIAL FIBRILLATION (HCC): ICD-10-CM

## 2022-06-20 DIAGNOSIS — M79.674 PAIN IN TOE OF RIGHT FOOT: ICD-10-CM

## 2022-06-20 DIAGNOSIS — M81.0 AGE-RELATED OSTEOPOROSIS WITHOUT CURRENT PATHOLOGICAL FRACTURE: ICD-10-CM

## 2022-06-20 DIAGNOSIS — I73.9 PERIPHERAL VASCULAR DISEASE, UNSPECIFIED (HCC): ICD-10-CM

## 2022-06-20 DIAGNOSIS — K64.4 HEMORRHOIDS, EXTERNAL: ICD-10-CM

## 2022-06-20 DIAGNOSIS — R26.2 DIFFICULTY WALKING: ICD-10-CM

## 2022-06-20 DIAGNOSIS — K70.30 ALCOHOLIC CIRRHOSIS OF LIVER WITHOUT ASCITES (HCC): ICD-10-CM

## 2022-06-20 DIAGNOSIS — G40.909 SEIZURE DISORDER (HCC): ICD-10-CM

## 2022-06-20 DIAGNOSIS — Z92.29 HISTORY OF REGULAR MEDICATION USE: ICD-10-CM

## 2022-06-20 DIAGNOSIS — D69.6 THROMBOCYTOPENIA (HCC): ICD-10-CM

## 2022-06-20 DIAGNOSIS — E66.01 MORBID OBESITY WITH BMI OF 40.0-44.9, ADULT (HCC): ICD-10-CM

## 2022-06-20 DIAGNOSIS — M79.675 PAIN IN LEFT TOE(S): ICD-10-CM

## 2022-06-20 DIAGNOSIS — B35.1 TINEA UNGUIUM: Primary | ICD-10-CM

## 2022-06-20 DIAGNOSIS — M05.9 RHEUMATOID ARTHRITIS WITH POSITIVE RHEUMATOID FACTOR, INVOLVING UNSPECIFIED SITE (HCC): ICD-10-CM

## 2022-06-20 PROCEDURE — 3017F COLORECTAL CA SCREEN DOC REV: CPT | Performed by: INTERNAL MEDICINE

## 2022-06-20 PROCEDURE — 1123F ACP DISCUSS/DSCN MKR DOCD: CPT | Performed by: INTERNAL MEDICINE

## 2022-06-20 PROCEDURE — 1036F TOBACCO NON-USER: CPT | Performed by: INTERNAL MEDICINE

## 2022-06-20 PROCEDURE — 99214 OFFICE O/P EST MOD 30 MIN: CPT | Performed by: INTERNAL MEDICINE

## 2022-06-20 PROCEDURE — G8417 CALC BMI ABV UP PARAM F/U: HCPCS | Performed by: INTERNAL MEDICINE

## 2022-06-20 PROCEDURE — G8427 DOCREV CUR MEDS BY ELIG CLIN: HCPCS | Performed by: INTERNAL MEDICINE

## 2022-06-20 PROCEDURE — 11721 DEBRIDE NAIL 6 OR MORE: CPT | Performed by: PODIATRIST

## 2022-06-20 RX ORDER — HYDROCORTISONE 25 MG/G
CREAM TOPICAL
Qty: 30 G | Refills: 0 | Status: SHIPPED
Start: 2022-06-20 | End: 2022-06-20 | Stop reason: CLARIF

## 2022-06-20 RX ORDER — NADOLOL 40 MG/1
40 TABLET ORAL 2 TIMES DAILY
Qty: 180 TABLET | Refills: 1 | Status: SHIPPED | OUTPATIENT
Start: 2022-06-20

## 2022-06-20 RX ORDER — SPIRONOLACTONE 25 MG/1
25 TABLET ORAL DAILY
Qty: 90 TABLET | Refills: 1 | Status: SHIPPED | OUTPATIENT
Start: 2022-06-20 | End: 2023-10-17

## 2022-06-20 ASSESSMENT — ENCOUNTER SYMPTOMS
EYES NEGATIVE: 1
RESPIRATORY NEGATIVE: 1
GASTROINTESTINAL NEGATIVE: 1

## 2022-06-20 NOTE — PROGRESS NOTES
Jenna Patella  Return Patient    Chief Complaint   Patient presents with    Toe Pain     saw pcp Dr. Dominic Villaseñor 6/20/2022       Subjective: This Mapmargoth Moore comes to clinic for foot and nail care. Pt currently has complaint of thickened, painful, elongated nails that he/she cannot manage by themselves. Pt. Relates pain to nails with shoe gear. Pt's primary care physician is Sean Marquez MD.  Past Medical History:   Diagnosis Date    Alcoholic cirrhosis ([address]     Alcoholism ([address]     Has been sober / dry since 01/2016.  Anxiety     Arthritis     Chronic atrial fibrillation (HCC)     Esophageal varices (HCC)     UGI bleed ~2012 - has had variceal banding 2x around that time.  History of kidney stones     Hypertension     Lumbar compression fracture ([address] 02/2017    Lung tumor 2017    Osteopenia     Osteopenia     S/P TAVR (transcatheter aortic valve replacement) 9/20/2018    Seizure disorder ([address] 21/3326    Complicated a stroke with occipital ICH.  Seizures ([address]     Sepsis with MRSE bacteremia 09/2016    resolved    Severe aortic stenosis     Confirmed by echo 9/2016, BROWN 10/2016. Undergoing [facility] for [facility]AVR, as of 4/2017.  Sleep apnea     Couldn't tolerate CPAP therapy ~2007. Had had UPPP.  Stroke ([facility] 09/2016    With receptive aphasia, poor memory that are improving as of 04/2017. Pt was found to have an occipital ICH at that time, with possible amyloid angiopathy as cause per MRI subsequently. Had seizure and sepsis (d/t MRSE bacteremia) complicating stroke.        No Known Allergies  Current Outpatient Medications on File Prior to Visit   Medication Sig Dispense Refill    nadolol (CORGARD) 40 MG tablet Take 1 tablet by mouth 2 times daily 180 tablet 1    spironolactone (ALDACTONE) 25 MG tablet Take 1 tablet by mouth daily 90 tablet 1    hydrocortisone (ANUSOL-HC) 2.5 % CREA rectal cream Apply topically daily x 10-14 days to hemorrhoids 30 g 0    Handicap Placard MISC by Does not apply route Duration: 5 years 1 each 0    levETIRAcetam (KEPPRA) 1000 MG tablet Take 1,000 mg by mouth 2 times daily      predniSONE (DELTASONE) 5 MG tablet Take 1 tablet by mouth daily 90 tablet 1    potassium citrate (UROCIT-K) 10 MEQ (1080 MG) extended release tablet Take by mouth 3 tabs bid      lacosamide (VIMPAT) 100 MG TABS tablet Take 100 mg by mouth 2 times daily. Yoselyn Lopez omeprazole (PRILOSEC) 40 MG delayed release capsule Take 40 mg by mouth daily       Calcium Citrate-Vitamin D (CALCIUM CITRATE + D PO) Take 600 mg by mouth daily      vitamin B-1 (THIAMINE) 100 MG tablet Take 100 mg by mouth daily      acetaminophen (TYLENOL) 500 MG tablet Take 500 mg by mouth every 6 hours       aspirin 81 MG tablet Take 81 mg by mouth daily      hydroxychloroquine (PLAQUENIL) 200 MG tablet Take 400 mg by mouth daily Indications: taking 527 mg       folic acid (FOLVITE) 1 MG tablet Take 1 mg by mouth daily       VITAMIN D PO Take 4,000 Units by mouth daily        No current facility-administered medications on file prior to visit. Review of Systems   Constitutional: Negative. HENT: Negative. Eyes: Negative. Respiratory: Negative. Cardiovascular: Negative. Gastrointestinal: Negative. Endocrine: Negative. Genitourinary: Negative. Musculoskeletal: Negative. Objective:  General: AAO x 3 in NAD.     Derm  Toenail Description  Sites of Onychomycosis Involvement (Check affected area)  [x] [x] [x] [x] [x] [x] [x] [x] [x] [x]  5 4 3 2 1 1 2 3 4 5                          Right                                        Left    Thickness  [x] [x] [x] [x] [x] [x] [x] [x] [x] [x]  5 4 3 2 1 1 2 3 4 5                         Right                                        Left    Dystrophic Changes   [x] [x] [x] [x] [x] [x] [x] [x] [x] [x]  5 4 3 2 1 1 2 3 4 5                         Right Left    Color  [x] [x] [x] [x] [x] [x] [x] [x] [x] [x]  5 4 3 2 1 1 2 3 4 5                          Right                                        Left    Incurvation/Ingrowin   [x] [x] [x] [x] [x] [x] [x] [x] [x] [x]  5 4 3 2 1 1 2 3 4 5                         Right                                        Left    Inflammation/Pain   [x] [x] [x] [x] [x] [x] [x] [x] [x] [x]  5 4 3  2 1 1 2 3 4 5                         Right                                        Left      Nails that are described above are all elongated thickened pitting mycotic yellowish incurvated causing pain with both shoe gear. Palpation nails greater then 3 mm thick painful       Dermatologic Exam:hair loss noted  lower extremity    Skin lesion/ulceration   Skin   Callus neg  Musculoskeletal:     1st MPJ ROM normal, Bilateral.  Muscle strength 5/5, Bilateral.  Pain present upon palpation of toenails 1-5  Bilateral., Bilateral.  Ankle ROM normal,Bilateral.    Dorsally contracted digits , Bilateral.     Vascular:  Pulses   bilateral DP absnet    PT absient    severe varicose veins noted to lower extremity there is loss of hair cool to touch discoloration to feet nails are mycotic dystrophic         Neurological:  Sensation present to light touch to level of digits, Bilateral.    Foot Exam    General  General Appearance: appears stated age and healthy   Orientation: alert and oriented to person, place, and time       Right Foot/Ankle     Inspection and Palpation  Skin Exam: skin changes and abnormal color; Neurovascular  Dorsalis pedis: 1+  Posterior tibial: absent      Left Foot/Ankle      Neurovascular  Dorsalis pedis: absent  Posterior tibial: absent             Ortho Exam  Q7   []Yes    []No                Q8   [x]Yes    []No                     Q9   []Yes    []No  Assessment:  66 y.o. male with:   1. Tinea unguium    2. Peripheral vascular disease, unspecified ([address]    3. Pain in toe of right foot    4. Pain in left toe(s)    5. Difficulty walking     No orders of the defined types were placed in this encounter. Plan:   Pt was evaluated and examined. Patient was given personalized discharge instructions. Nails 1-10 were debrided in length and thickness sharply with a nail nipper and  without incident. Pt will follow up in 9 weeks or sooner if any problems arise. Diagnosis was discussed with the pt and all of their questions were answered in detail. Proper foot hygiene and care was discussed with the pt. Patient to check feet daily and contact the office with any questions/problems/concerns. Other comorbidity noted and will be managed by PCP. Pain waiver discussed with patient and confirmed.    6/20/2022      Electronically signed by Mor Lara DPM on 6/20/2022 at 2:33 PM  6/20/2022

## 2022-06-21 NOTE — PROGRESS NOTES
[address] [place]     22  [facility] : 1950 Sex: male  Age: 66 y.o. Chief Complaint   Patient presents with    Hypertension     4 months    Follow-up       HPI    Patient presents today accompanied by wife for 4-month follow-up visit on his medical problems. Colleen Coronado has been doing okay. Since I have seen him he has seen urology for his stones and PSA and rheumatology related to his rheumatoid arthritis. Also had EGD performed by gastroenterology demonstrating no varices at that time. He did have reflux and gastritis noted surgical pathology unremarkable. He also had liver ultrasound done in April through GI related to his cirrhosis. .  Reviewed gastroenterology's labs done  Few months ago. No medication changes since have seen him last.  He has been able to lose about 21 pounds in the past year intentionally watching his diet. Encouraged him to continue same. Follows with [facility] [place] radiation oncology for his lung cancer and appears to be stable without metastatic disease. No further seizures on his antiseizure medication. He continues in chronic atrial fibrillation with controlled ventricular response on no anticoagulation secondary to his bleeding including an intracerebral bleed thought to be caused by cerebral amyloid angiopathy and his esophageal varices with cirrhosis. Denies any falls. Wife manages all of his appointments and states to be up-to-date with everybody. He follows with numerous consultants including urology, pulmonary, GI, rheumatology, podiatry, and neurology for seizure/stroke, cardiology radiation oncology. [facility] no longer sees  neurosurgery     Review of Systems     Constitutional:  Negative for activity change, fatigue, appetite change, chills, diaphoresis, fever and unexpected weight change.    HENT:  Negative for congestion, rhinorrhea and sinus pain.    Respiratory: Negative for cough, shortness of breath and wheezing.-History of lung cancer with radiation  Cardiovascular: Negative for chest pain, palpitations and leg swelling.        Chronic atrial fibrillation not on anticoagulation secondary to falls and bleeding-he is currently on no anticoagulated with cirrhosis. Gastrointestinal: Negative for abdominal pain, blood in stool, constipation, diarrhea, nausea and vomiting.        Alcoholic cirrhosis. No longer taking lactulose on probiotic that is supposed to help with his ammonia level endocrine: Negative.    Genitourinary: Negative for difficulty urinating, dysuria, frequency, hematuria and urgency.         Decreased urinary stream--saw urology 1/17 for hematuria------ continues to see them  Musculoskeletal: Positive for arthralgias and back pain. Negative for gait problem and myalgias.   rePorts arthritis and lower back pain/low back pain severe at times has been taking Tylenol on occasion.  Some improvement.  Rheumatoid arthritis--neurosurgery did not recommend surgery  for his back  Allergic/Immunologic: Negative for environmental allergies and immunocompromised state. Neurological: Negative for dizziness, weakness, light-headedness, numbness and headaches.        Reports, numbness, seizures (although not recent) TIA-watchman procedure failed and we are unable to anticoagulate this gentleman   Hematological: Negative. Laxmi Clark have chronic thrombocytopenia associated with his cirrhosis. Psychiatric/Behavioral: Negative for behavioral problems, confusion and sleep disturbance. The patient is not nervous/anxious.              REST OF PERTINENT ROS GONE OVER AND WAS NEGATIVE.        PMH:  Shot Record:  -Fluzone Influenza Virus- Medicare 00/00/00 09/07/17 11/05/15  90750-Shingrix (Shinglesvaccine (HZV), Recombinant, Subunit, Adjuvanted 03/13/19  Toradol-Ketorolac 30MG Injection 03/06/06  90746-Hepatitis B Vaccine Adult 12/18/17 11/17/14 06/24/14 05/22/14 05/22/14  90736-Zoster Vaccine 01/12/10  90732-Pneumococcal Vaccine (Pneumovax) 12/26/17 11/13/13 12/17/09  90715-TdaP For Individuals greater than 11 09/22/17  39976-Rwoscsx 13-NDC#[id number] 12/01/15  90662-Influenza Vaccine High Dose 65+ Preservative Free Im Use 09/17/18  90658-Influenza Vaccine Fluvirin Iiv3 (ages 3 and older) 11/17/14 09/26/12 09/26/12 10/26/11 10/20/10  90632-Hepatitis A Vaccine - Adult 2 Dose age 23 & over 12/18/17 11/17/14 06/24/14 05/22/14 05/22/14. Chronic Diagnosis: Atrial fibrillation, Aortic valve disorder, Other seizures, Thrombocytopenic disorder,  Portal hypertension, Atherosclerotic heart disease of native coronary artery with, Alcoholic cirrhosis,  Taking medication, Rheumatoid arthritis, Essential hypertension, Bleeding esophageal varices, Tobacco  user, Sleep apnea, Benign essential hypertension.   Health Maintenance:  Influenza Vaccination - (9/17/2018)  Colonoscopy - (1/6/2010), 7/21  Couseled on Home Safety - (7/19/2017)  Mini Mental Status - (3/27/2017)  Bone Density Test Screening - (1/4/2008)  Tetanus Immunization - (9/7/2017)  Psa Test - 1/08,3/09,12/09, 7/17-dr jean baptiste  Prostate Exam - 10/07,11/08,2015, dr Freddie Forbes  Rectal Exam - 10/07,11/08,dr jean baptiste  Bone Density Test - 1/08  Neg AAA, Mammogram  Hemmocult Cards - 7/10  EGD - 10/11,11/11,6/12,8/12,1/13,10/13,4/15,12/15, 5/20, 5/21  2D ECHO - 6/15  Carotid Artery Study - 2/12, 2/14  EKG - 2/12  Thyroid ultrasound - 7/12, 2/16  Heart catheterization [facility] - 4/17  Medical Problems:  Hypertension  Valvular Heart Disease - SEVERE AS-TAVR  Thyroid Irradiation as A Child, Chemical Exposure At Work  Sleep Apnea - status post uvuloplasty  Kidney Stones - uric acid--followed by dr jean baptiste  Colon Polyps  Thyroid Cyst--Asp & US - MULTINODULAR GOITER  Esophageal Varices, GI bleed  thyroid nodule - adenomatous  liver BX - steatosis and fibrosis     renal cysts, adrenal adenoma, cholelithiasis, Benign Prostatic Hypertrophy  microhematuria eval - neg  hematology eval - elevated MCV/MCH/thrombocytopenia  Ugi Bleed - x's 2 with esophageal varicies/Banding  blood transfusions, Rheumatoid Arthritis  Atrial Fibrillation - paroxysmal-unable to anticoagulate  Rheumatoid Arthritis  hepatitis studies - jesica. 7/13-neg  Cirrhosis - alcoholic  Cerebral amyloid angiopathy - Intracerebral bleed  Pancreatitis, Pneumonia, Seizure Disorder  Methicillin-resistant staph epidermidis bacteremia - Presumed infective endocarditis and treated for 6  weeks with vancomycin  Osteoporosis - Follows with rheumatology  Liver biopsy - ×3  Evaluation for liver transplant, [facility] - 5/13  Lung mass - Following with pulmonary  Follows with - Pulmonary, rheumatology, GI, cardiology, urology, podiatry, neurology-no longer seeing  hematology/oncology  transient ischemic attack - 4/17  Tavr - [facility]-6/17--needs Atb prophylaxis prior to dental  squamous cell CA lung - radiation  Lung Cancer - Squamous cell. Radiation treatment completed  Surgical Hx:  Umbilical hernia repair - 4/17 (due to incarcerated ventral hernia with SBO)  right hip fx and repair, Failed watchman procedure  Reviewed, no changes. SH: Patient is . Personal Habits: The patient is a former pipesmoker for years. He still does vaping.  Does not  currently consume alcohol, has consumed alcohol in the pastheavily Worked at [employer] now retired  Reviewed, no changes.                Current Outpatient Medications:     nadolol (CORGARD) 40 MG tablet, Take 1 tablet by mouth 2 times daily, Disp: 180 tablet, Rfl: 1    spironolactone (ALDACTONE) 25 MG tablet, Take 1 tablet by mouth daily, Disp: 90 tablet, Rfl: 1    Handicap Placard MISC, by Does not apply route Duration: 5 years, Disp: 1 each, Rfl: 0    levETIRAcetam (KEPPRA) 1000 MG tablet, Take 1,000 mg by mouth 2 times daily, Disp: , Rfl:     predniSONE (DELTASONE) 5 MG tablet, Take 1 tablet by mouth daily, Disp: 90 tablet, Rfl: 1    potassium citrate (UROCIT-K) 10 MEQ (1080 MG) extended release tablet, Take by mouth 3 tabs bid, Disp: , Rfl:    lacosamide (VIMPAT) 100 MG TABS tablet, Take 100 mg by mouth 2 times daily. ., Disp: , Rfl:     omeprazole (PRILOSEC) 40 MG delayed release capsule, Take 40 mg by mouth daily , Disp: , Rfl:     Calcium Citrate-Vitamin D (CALCIUM CITRATE + D PO), Take 600 mg by mouth daily, Disp: , Rfl:     vitamin B-1 (THIAMINE) 100 MG tablet, Take 100 mg by mouth daily, Disp: , Rfl:     acetaminophen (TYLENOL) 500 MG tablet, Take 500 mg by mouth every 6 hours , Disp: , Rfl:     aspirin 81 MG tablet, Take 81 mg by mouth daily, Disp: , Rfl:     hydroxychloroquine (PLAQUENIL) 200 MG tablet, Take 400 mg by mouth daily Indications: taking 400 mg , Disp: , Rfl:     folic acid (FOLVITE) 1 MG tablet, Take 1 mg by mouth daily , Disp: , Rfl:     VITAMIN D PO, Take 4,000 Units by mouth daily , Disp: , Rfl:   No Known Allergies    Past Medical History:   Diagnosis Date    [facility] cirrhosis [facility]     Alcoholism ([facility]     Has been sober / dry since 01/2016.  Anxiety     Arthritis     Chronic atrial fibrillation (HCC)     Esophageal varices (HCC)     UGI bleed ~2012 - has had variceal banding 2x around that time.  History of kidney stones     Hypertension     Lumbar compression fracture ([facility] 02/2017    Lung tumor 2017    Osteopenia     Osteopenia     S/P TAVR (transcatheter aortic valve replacement) 9/20/2018    Seizure disorder ([facility] 04/2143    Complicated a stroke with occipital ICH.  Seizures ([facility]     Sepsis with MRSE bacteremia 09/2016    resolved    Severe aortic stenosis     Confirmed by echo 9/2016, BROWN 10/2016. Undergoing w/u at [facility] for TAVR, as of 4/2017.  Sleep apnea     Couldn't tolerate CPAP therapy ~2007. Had had UPPP.  Stroke ([facility] 09/2016    With receptive aphasia, poor memory that are improving as of 04/2017. Pt was found to have an occipital ICH at that time, with possible amyloid angiopathy as cause per MRI subsequently. Had seizure and sepsis (d/t MRSE bacteremia) complicating stroke. Past Surgical History:   Procedure Laterality Date    ECHO COMPL W DOP COLOR FLOW  2012         ECHOCARDIOGRAM COMPLETE 2D W DOPPLER W COLOR  2013         ESOPHAGEAL VARICE LIGATION      HIP SURGERY      PALATE SURGERY      UMBILICAL HERNIA REPAIR  2017    UPPER GASTROINTESTINAL ENDOSCOPY      UPPER GASTROINTESTINAL ENDOSCOPY  2017    UPPER GASTROINTESTINAL ENDOSCOPY  2017     Family History   Problem Relation Age of Onset    Cancer Mother         leukemia     Social History     Socioeconomic History    Marital status:      Spouse name: Not on file    Number of children: Not on file    Years of education: Not on file    Highest education level: Not on file   Occupational History    Not on file   Tobacco Use    Smoking status: Former Smoker     Packs/day: 1.00     Years: 55.00     Pack years: 55.00     Types: Cigarettes, Pipe     Start date: 7/10/1966     Quit date: 7/10/2015     Years since quittin.9    Smokeless tobacco: Never Used   Vaping Use    Vaping Use: Never used   Substance and Sexual Activity    Alcohol use: No     Comment: no alcohol since     Drug use: Never    Sexual activity: Not Currently     Partners: Female   Other Topics Concern    Not on file   Social History Narrative    Drinks occ Cola. Social Determinants of Health     Financial Resource Strain: Low Risk     Difficulty of Paying Living Expenses: Not hard at all   Food Insecurity: No Food Insecurity    Worried About Running Out of Food in the Last Year: Never true    Avis of Food in the Last Year: Never true   Transportation Needs:     Lack of Transportation (Medical): Not on file    Lack of Transportation (Non-Medical):  Not on file   Physical Activity:     Days of Exercise per Week: Not on file    Minutes of Exercise per Session: Not on file   Stress:     Feeling of Stress : Not on file   Social Connections:     Frequency of Communication with Friends and Family: Not on file    Frequency of Social Gatherings with Friends and Family: Not on file    Attends Scientology Services: Not on file    Active Member of Clubs or Organizations: Not on file    Attends Club or Organization Meetings: Not on file    Marital Status: Not on file   Intimate Partner Violence:     Fear of Current or Ex-Partner: Not on file    Emotionally Abused: Not on file    Physically Abused: Not on file    Sexually Abused: Not on file   Housing Stability:     Unable to Pay for Housing in the Last Year: Not on file    Number of [place] in the Last Year: Not on file    Unstable Housing in the Last Year: Not on file       Vitals:    06/20/22 1339   BP: 104/68   Pulse: 85   Temp: 98 °F (36.7 °C)   TempSrc: Temporal   SpO2: 96%   Weight: 276 lb (125.2 kg)   Height: 5' 10\" (1.778 m)       Physical Exam    Constitutional: He is oriented to person, place, and time. He appears well-developed and well-nourished. No distress.  Obese  Neck: Normal range of motion. Neck supple. No JVD present. No thyromegaly present. No thyroid nodules   Cardiovascular: Normal rate and intact distal pulses. Exam reveals no gallop and no friction rub.   .    Heart: Irregularly irregular with controlled ventricular response.  Systolic murmur did improve postoperatively.   Pulmonary/Chest: Effort normal and breath sounds normal. No respiratory distress. He has no wheezes. He has no rales.       Abdominal: Soft. Bowel sounds are normal. He exhibits no distension and no mass. There is no tenderness.   Spleen tip and liver were palpable with deep inspiration   Musculoskeletal: Normal range of motion. He exhibits edema.   Walks with a slow gait.   Lymph nodes     He has no cervical adenopathy.     He has no axillary adenopathy.        Right: No inguinal adenopathy present.        Left: No inguinal adenopathy present. Neurological: He is alert and oriented to person, place, and time. He displays normal reflexes.  A sensory deficit is present. He exhibits normal muscle tone. Coordination normal.   Diminished sensation light touch in the ankle area bilaterally.   Skin: Skin is warm and dry. No rash noted. No erythema. Psychiatric: He has a normal mood and affect. His behavior is normal. Judgment and thought content normal.   Rectal-I did examine the anal area and he does have external hemorrhoids noted. There is no bleeding and nonpainful. Assessment and Plan:  Maritza Lee was seen today for hypertension and follow-up. Diagnoses and all orders for this visit:    Alcoholic cirrhosis of liver without ascites (HCC)  -     nadolol (CORGARD) 40 MG tablet; Take 1 tablet by mouth 2 times daily  -     spironolactone (ALDACTONE) 25 MG tablet; Take 1 tablet by mouth daily  -     CBC with Auto Differential; Future  -     Comprehensive Metabolic Panel; Future  -     Ammonia; Future  -     AFP Tumor Marker; Future    Rheumatoid arthritis with positive rheumatoid factor, involving unspecified site (HCC)  -     Sedimentation Rate; Future    Seizure disorder (HCC)  -     Levetiracetam Level; Future    Thrombocytopenia (HCC)    Morbid obesity with BMI of 40.0-44.9, adult ([facility]    History of regular medication use    Paroxysmal atrial fibrillation (HCC)    Other orders  -     Discontinue: hydrocortisone (ANUSOL-HC) 2.5 % CREA rectal cream; Apply topically daily x 10-14 days to hemorrhoids    Plan: I will see patient back in the next 4 months and as needed. Follow with above consultants. With above consultants. Attempt to get Dr. Marion Poe note to review. Continue weight loss attempts. I will check blood work today to monitor disease progression and medication use. We will send copy to rheumatology and gastroenterology. Prescription management performed. Precautions.   Encounter with face-to-face, reviewing previous notes and consultant notes, test results discussing diagnosis and coordination of care well as documenting in EHR was 35 minutes. Return in about 4 months (around 10/20/2022). Seen By:  Roberta Ponce MD      *Document was created using voice recognition software. Note was reviewed however may contain grammatical errors.

## 2022-07-08 ENCOUNTER — OFFICE VISIT (OUTPATIENT)
Dept: FAMILY MEDICINE CLINIC | Age: 72
End: 2022-07-08
Payer: MEDICARE

## 2022-07-08 VITALS
HEART RATE: 60 BPM | BODY MASS INDEX: 39.94 KG/M2 | HEIGHT: 70 IN | DIASTOLIC BLOOD PRESSURE: 72 MMHG | SYSTOLIC BLOOD PRESSURE: 114 MMHG | WEIGHT: 279 LBS | OXYGEN SATURATION: 98 % | TEMPERATURE: 97.1 F

## 2022-07-08 DIAGNOSIS — L98.9 SKIN LESION: Primary | ICD-10-CM

## 2022-07-08 DIAGNOSIS — L82.1 SEBORRHEIC KERATOSIS: ICD-10-CM

## 2022-07-08 DIAGNOSIS — M05.9 RHEUMATOID ARTHRITIS WITH POSITIVE RHEUMATOID FACTOR, INVOLVING UNSPECIFIED SITE (HCC): ICD-10-CM

## 2022-07-08 DIAGNOSIS — K70.30 ALCOHOLIC CIRRHOSIS OF LIVER WITHOUT ASCITES (HCC): ICD-10-CM

## 2022-07-08 DIAGNOSIS — G40.909 SEIZURE DISORDER (HCC): ICD-10-CM

## 2022-07-08 LAB
AMMONIA: 36 UMOL/L (ref 16–60)
BASOPHILS ABSOLUTE: 0.03 E9/L (ref 0–0.2)
BASOPHILS RELATIVE PERCENT: 0.6 % (ref 0–2)
EOSINOPHILS ABSOLUTE: 0.02 E9/L (ref 0.05–0.5)
EOSINOPHILS RELATIVE PERCENT: 0.4 % (ref 0–6)
HCT VFR BLD CALC: 44 % (ref 37–54)
HEMOGLOBIN: 14.2 G/DL (ref 12.5–16.5)
IMMATURE GRANULOCYTES #: 0.01 E9/L
IMMATURE GRANULOCYTES %: 0.2 % (ref 0–5)
LYMPHOCYTES ABSOLUTE: 0.68 E9/L (ref 1.5–4)
LYMPHOCYTES RELATIVE PERCENT: 14.2 % (ref 20–42)
MCH RBC QN AUTO: 32.2 PG (ref 26–35)
MCHC RBC AUTO-ENTMCNC: 32.3 % (ref 32–34.5)
MCV RBC AUTO: 99.8 FL (ref 80–99.9)
MONOCYTES ABSOLUTE: 0.47 E9/L (ref 0.1–0.95)
MONOCYTES RELATIVE PERCENT: 9.8 % (ref 2–12)
NEUTROPHILS ABSOLUTE: 3.59 E9/L (ref 1.8–7.3)
NEUTROPHILS RELATIVE PERCENT: 74.8 % (ref 43–80)
PDW BLD-RTO: 14.6 FL (ref 11.5–15)
PLATELET # BLD: 74 E9/L (ref 130–450)
PLATELET CONFIRMATION: NORMAL
PMV BLD AUTO: 10.9 FL (ref 7–12)
RBC # BLD: 4.41 E12/L (ref 3.8–5.8)
SEDIMENTATION RATE, ERYTHROCYTE: 2 MM/HR (ref 0–15)
WBC # BLD: 4.8 E9/L (ref 4.5–11.5)

## 2022-07-08 PROCEDURE — G8417 CALC BMI ABV UP PARAM F/U: HCPCS | Performed by: INTERNAL MEDICINE

## 2022-07-08 PROCEDURE — 1123F ACP DISCUSS/DSCN MKR DOCD: CPT | Performed by: INTERNAL MEDICINE

## 2022-07-08 PROCEDURE — 1036F TOBACCO NON-USER: CPT | Performed by: INTERNAL MEDICINE

## 2022-07-08 PROCEDURE — 3017F COLORECTAL CA SCREEN DOC REV: CPT | Performed by: INTERNAL MEDICINE

## 2022-07-08 PROCEDURE — G8427 DOCREV CUR MEDS BY ELIG CLIN: HCPCS | Performed by: INTERNAL MEDICINE

## 2022-07-08 PROCEDURE — 99212 OFFICE O/P EST SF 10 MIN: CPT | Performed by: INTERNAL MEDICINE

## 2022-07-08 ASSESSMENT — ENCOUNTER SYMPTOMS: ROS SKIN COMMENTS: SEE ABOVE

## 2022-07-08 NOTE — PROGRESS NOTES
[address] [place]     22  [facility] [place] : 1950 Sex: male  Age: 66 y.o. Chief Complaint   Patient presents with    Mass     small growth on right arm, by elbow; bruising around it; hard; scaley       HPI  Presents today accompanied by wife to express care to have me look at a lesion to his right forearm that he states he just noticed about a month ago. Wife states that since he brought it to her attention couple weeks ago it has not changed. Is not painful. Does not itch. Is not describing lesions elsewhere. Review of Systems   Skin:        See above                  REST OF PERTINENT ROS GONE OVER AND WAS NEGATIVE. Current Outpatient Medications:     nadolol (CORGARD) 40 MG tablet, Take 1 tablet by mouth 2 times daily, Disp: 180 tablet, Rfl: 1    spironolactone (ALDACTONE) 25 MG tablet, Take 1 tablet by mouth daily, Disp: 90 tablet, Rfl: 1    Handicap Placard MISC, by Does not apply route Duration: 5 years, Disp: 1 each, Rfl: 0    levETIRAcetam (KEPPRA) 1000 MG tablet, Take 1,000 mg by mouth 2 times daily, Disp: , Rfl:     predniSONE (DELTASONE) 5 MG tablet, Take 1 tablet by mouth daily, Disp: 90 tablet, Rfl: 1    potassium citrate (UROCIT-K) 10 MEQ (1080 MG) extended release tablet, Take by mouth 3 tabs bid, Disp: , Rfl:     lacosamide (VIMPAT) 100 MG TABS tablet, Take 100 mg by mouth 2 times daily. ., Disp: , Rfl:     omeprazole (PRILOSEC) 40 MG delayed release capsule, Take 40 mg by mouth daily , Disp: , Rfl:     Calcium Citrate-Vitamin D (CALCIUM CITRATE + D PO), Take 600 mg by mouth daily, Disp: , Rfl:     vitamin B-1 (THIAMINE) 100 MG tablet, Take 100 mg by mouth daily, Disp: , Rfl:     acetaminophen (TYLENOL) 500 MG tablet, Take 500 mg by mouth every 6 hours , Disp: , Rfl:     aspirin 81 MG tablet, Take 81 mg by mouth daily, Disp: , Rfl:     hydroxychloroquine (PLAQUENIL) 200 MG tablet, Take 400 mg by mouth daily Indications: taking 400 mg , Disp: , Rfl:     folic acid (FOLVITE) 1 MG tablet, Take 1 mg by mouth daily , Disp: , Rfl:     VITAMIN D PO, Take 4,000 Units by mouth daily , Disp: , Rfl:   No Known Allergies    Past Medical History:   Diagnosis Date    Alcoholic cirrhosis ([facility]     Alcoholism ([facility]     Has been sober / dry since 01/2016.  Anxiety     Arthritis     Chronic atrial fibrillation (HCC)     Esophageal varices (HCC)     UGI bleed ~2012 - has had variceal banding 2x around that time.  History of kidney stones     Hypertension     Lumbar compression fracture ([address] 02/2017    Lung tumor 2017    Osteopenia     Osteopenia     S/P TAVR (transcatheter aortic valve replacement) 9/20/2018    Seizure disorder ([facility] 13/9324    Complicated a stroke with occipital ICH.  Seizures ([facility] [facility]     Sepsis with MRSE bacteremia 09/2016    resolved    Severe aortic stenosis     Confirmed by echo 9/2016, BROWN 10/2016. Undergoing w/u at [facility] for TAVR, as of 4/2017.  Sleep apnea     Couldn't tolerate CPAP therapy ~2007. Had had UPPP.  Stroke ([facility] [facility] 09/2016    With receptive aphasia, poor memory that are improving as of 04/2017. Pt was found to have an occipital ICH at that time, with possible amyloid angiopathy as cause per MRI subsequently. Had seizure and sepsis (d/t MRSE bacteremia) complicating stroke.      Past Surgical History:   Procedure Laterality Date    ECHO COMPL W DOP COLOR FLOW  2/4/2012         ECHOCARDIOGRAM COMPLETE 2D W DOPPLER W COLOR  8/27/2013         ESOPHAGEAL VARICE LIGATION      HIP SURGERY      PALATE SURGERY      UMBILICAL HERNIA REPAIR  04/14/2017    UPPER GASTROINTESTINAL ENDOSCOPY      UPPER GASTROINTESTINAL ENDOSCOPY  05/08/2017    UPPER GASTROINTESTINAL ENDOSCOPY  04/2017     Family History   Problem Relation Age of Onset    Cancer Mother         leukemia     Social History     Socioeconomic History    Marital status:      Spouse name: Not on file    Number of children: Not on file    Vitals:    07/08/22 1138   BP: 114/72   Pulse: 60   Temp: 97.1 °F (36.2 °C)   TempSrc: Temporal   SpO2: 98%   Weight: 279 lb (126.6 kg)   Height: 5' 10\" (1.778 m)       Physical Exam  Vitals and nursing note reviewed. Constitutional:       General: He is not in acute distress. Skin:     General: Skin is warm and dry. Findings: Lesion present. Comments: Area of concern is slightly raised keratotic appearing lesion suggestive of a seborrheic keratosis. Did have some surrounding bruising   Neurological:      Mental Status: He is alert. Psychiatric:         Mood and Affect: Mood normal.         Behavior: Behavior normal.         Thought Content: Thought content normal.                 Assessment and Plan:  Carla Olmos was seen today for mass. Diagnoses and all orders for this visit:    Skin lesion    Seborrheic keratosis      Plan: I told patient and wife that I was not suspicious of this to be a malignancy at this time. I asked him to watch closely over the next month and call me with an update. If any growth or change we would have to take it off. They were comfortable with this. Keep next appointment. No follow-ups on file. Seen By:  Carlos Moise MD      *Document was created using voice recognition software. Note was reviewed however may contain grammatical errors.

## 2022-07-09 LAB
ALBUMIN SERPL-MCNC: 3.9 G/DL (ref 3.5–5.2)
ALP BLD-CCNC: 61 U/L (ref 40–129)
ALT SERPL-CCNC: 21 U/L (ref 0–40)
ANION GAP SERPL CALCULATED.3IONS-SCNC: 15 MMOL/L (ref 7–16)
AST SERPL-CCNC: 47 U/L (ref 0–39)
BILIRUB SERPL-MCNC: 1.2 MG/DL (ref 0–1.2)
BUN BLDV-MCNC: 19 MG/DL (ref 6–23)
CALCIUM SERPL-MCNC: 9.5 MG/DL (ref 8.6–10.2)
CHLORIDE BLD-SCNC: 101 MMOL/L (ref 98–107)
CO2: 22 MMOL/L (ref 22–29)
CREAT SERPL-MCNC: 1.3 MG/DL (ref 0.7–1.2)
GFR AFRICAN AMERICAN: >60
GFR NON-AFRICAN AMERICAN: 54 ML/MIN/1.73
GLUCOSE BLD-MCNC: 87 MG/DL (ref 74–99)
POTASSIUM SERPL-SCNC: 4.8 MMOL/L (ref 3.5–5)
SODIUM BLD-SCNC: 138 MMOL/L (ref 132–146)
TOTAL PROTEIN: 7.2 G/DL (ref 6.4–8.3)

## 2022-07-12 ENCOUNTER — TELEPHONE (OUTPATIENT)
Dept: FAMILY MEDICINE CLINIC | Age: 72
End: 2022-07-12

## 2022-07-12 LAB
AFP-TUMOR MARKER: 3 NG/ML (ref 0–9)
KEPPRA: 58 UG/ML (ref 10–40)

## 2022-07-12 NOTE — TELEPHONE ENCOUNTER
Left message for his wife Patience Merida to call the office back. Labs have been faxed to his neurologist, Alisha Thomas.

## 2022-10-17 ENCOUNTER — OFFICE VISIT (OUTPATIENT)
Dept: FAMILY MEDICINE CLINIC | Age: 72
End: 2022-10-17
Payer: MEDICARE

## 2022-10-17 ENCOUNTER — PROCEDURE VISIT (OUTPATIENT)
Dept: PODIATRY | Age: 72
End: 2022-10-17
Payer: MEDICARE

## 2022-10-17 ENCOUNTER — TELEPHONE (OUTPATIENT)
Dept: FAMILY MEDICINE CLINIC | Age: 72
End: 2022-10-17

## 2022-10-17 VITALS
SYSTOLIC BLOOD PRESSURE: 110 MMHG | BODY MASS INDEX: 37.88 KG/M2 | TEMPERATURE: 97.5 F | WEIGHT: 264 LBS | DIASTOLIC BLOOD PRESSURE: 72 MMHG

## 2022-10-17 VITALS
SYSTOLIC BLOOD PRESSURE: 110 MMHG | TEMPERATURE: 97.5 F | HEIGHT: 70 IN | WEIGHT: 264.4 LBS | HEART RATE: 76 BPM | OXYGEN SATURATION: 98 % | BODY MASS INDEX: 37.85 KG/M2 | DIASTOLIC BLOOD PRESSURE: 72 MMHG

## 2022-10-17 DIAGNOSIS — B35.1 TINEA UNGUIUM: Primary | ICD-10-CM

## 2022-10-17 DIAGNOSIS — M79.675 PAIN IN LEFT TOE(S): ICD-10-CM

## 2022-10-17 DIAGNOSIS — I73.9 PERIPHERAL VASCULAR DISEASE, UNSPECIFIED (HCC): ICD-10-CM

## 2022-10-17 DIAGNOSIS — D69.6 THROMBOCYTOPENIA (HCC): ICD-10-CM

## 2022-10-17 DIAGNOSIS — L89.309 PRESSURE INJURY OF SKIN OF BUTTOCK, UNSPECIFIED INJURY STAGE, UNSPECIFIED LATERALITY: ICD-10-CM

## 2022-10-17 DIAGNOSIS — M05.9 RHEUMATOID ARTHRITIS WITH POSITIVE RHEUMATOID FACTOR, INVOLVING UNSPECIFIED SITE (HCC): ICD-10-CM

## 2022-10-17 DIAGNOSIS — R19.8 ABNORMAL BOWEL MOVEMENT: ICD-10-CM

## 2022-10-17 DIAGNOSIS — G40.909 SEIZURE DISORDER (HCC): ICD-10-CM

## 2022-10-17 DIAGNOSIS — N18.31 STAGE 3A CHRONIC KIDNEY DISEASE (HCC): ICD-10-CM

## 2022-10-17 DIAGNOSIS — K70.30 ALCOHOLIC CIRRHOSIS OF LIVER WITHOUT ASCITES (HCC): ICD-10-CM

## 2022-10-17 DIAGNOSIS — R26.2 DIFFICULTY WALKING: ICD-10-CM

## 2022-10-17 DIAGNOSIS — M79.674 PAIN IN TOE OF RIGHT FOOT: ICD-10-CM

## 2022-10-17 DIAGNOSIS — K64.9 ACUTE HEMORRHOID: ICD-10-CM

## 2022-10-17 PROBLEM — N18.30 CHRONIC RENAL DISEASE, STAGE III (HCC): Status: ACTIVE | Noted: 2022-10-17

## 2022-10-17 PROCEDURE — 3017F COLORECTAL CA SCREEN DOC REV: CPT | Performed by: INTERNAL MEDICINE

## 2022-10-17 PROCEDURE — 99213 OFFICE O/P EST LOW 20 MIN: CPT | Performed by: INTERNAL MEDICINE

## 2022-10-17 PROCEDURE — 11721 DEBRIDE NAIL 6 OR MORE: CPT | Performed by: PODIATRIST

## 2022-10-17 PROCEDURE — G8484 FLU IMMUNIZE NO ADMIN: HCPCS | Performed by: INTERNAL MEDICINE

## 2022-10-17 PROCEDURE — G8417 CALC BMI ABV UP PARAM F/U: HCPCS | Performed by: INTERNAL MEDICINE

## 2022-10-17 PROCEDURE — 90694 VACC AIIV4 NO PRSRV 0.5ML IM: CPT | Performed by: INTERNAL MEDICINE

## 2022-10-17 PROCEDURE — 1123F ACP DISCUSS/DSCN MKR DOCD: CPT | Performed by: INTERNAL MEDICINE

## 2022-10-17 PROCEDURE — 1036F TOBACCO NON-USER: CPT | Performed by: INTERNAL MEDICINE

## 2022-10-17 PROCEDURE — G0008 ADMIN INFLUENZA VIRUS VAC: HCPCS | Performed by: INTERNAL MEDICINE

## 2022-10-17 PROCEDURE — G8427 DOCREV CUR MEDS BY ELIG CLIN: HCPCS | Performed by: INTERNAL MEDICINE

## 2022-10-17 RX ORDER — NADOLOL 40 MG/1
40 TABLET ORAL 2 TIMES DAILY
Qty: 180 TABLET | Refills: 1 | Status: CANCELLED | OUTPATIENT
Start: 2022-10-17

## 2022-10-17 RX ORDER — SPIRONOLACTONE 25 MG/1
25 TABLET ORAL DAILY
Qty: 90 TABLET | Refills: 1 | Status: CANCELLED | OUTPATIENT
Start: 2022-10-17 | End: 2022-11-16

## 2022-10-17 ASSESSMENT — PATIENT HEALTH QUESTIONNAIRE - PHQ9
SUM OF ALL RESPONSES TO PHQ QUESTIONS 1-9: 0
SUM OF ALL RESPONSES TO PHQ9 QUESTIONS 1 & 2: 0
SUM OF ALL RESPONSES TO PHQ QUESTIONS 1-9: 0
2. FEELING DOWN, DEPRESSED OR HOPELESS: 0
1. LITTLE INTEREST OR PLEASURE IN DOING THINGS: 0
SUM OF ALL RESPONSES TO PHQ QUESTIONS 1-9: 0
SUM OF ALL RESPONSES TO PHQ QUESTIONS 1-9: 0

## 2022-10-17 ASSESSMENT — LIFESTYLE VARIABLES
HOW MANY STANDARD DRINKS CONTAINING ALCOHOL DO YOU HAVE ON A TYPICAL DAY: PATIENT DOES NOT DRINK
HOW OFTEN DO YOU HAVE A DRINK CONTAINING ALCOHOL: NEVER

## 2022-10-17 ASSESSMENT — ENCOUNTER SYMPTOMS
RESPIRATORY NEGATIVE: 1
GASTROINTESTINAL NEGATIVE: 1
EYES NEGATIVE: 1

## 2022-10-17 NOTE — PROGRESS NOTES
Hayden Aguilar  Return Patient    Chief Complaint   Patient presents with    Toe Pain     Saw pcp Dr. Jarod Montaño 10/17/22       Subjective: This Miguel Banegas comes to clinic for foot and nail care. Pt currently has complaint of thickened, painful, elongated nails that he/she cannot manage by themselves. Pt. Relates pain to nails with shoe gear. Pt's primary care physician is Coral Todd MD.  Past Medical History:   Diagnosis Date    Alcoholic cirrhosis ([address]     Alcoholism ([address]     Has been sober / dry since 01/2016. Anxiety     Arthritis     Chronic atrial fibrillation (HCC)     Esophageal varices (HCC)     UGI bleed ~2012 - has had variceal banding 2x around that time. History of kidney stones     Hypertension     Lumbar compression fracture ([address] 02/2017    Lung tumor 2017    Osteopenia     Osteopenia     S/P TAVR (transcatheter aortic valve replacement) 9/20/2018    Seizure disorder ([address] 42/0117    Complicated a stroke with occipital ICH. Seizures ([address]     Sepsis with MRSE bacteremia 09/2016    resolved    Severe aortic stenosis     Confirmed by echo 9/2016, BROWN 10/2016. Undergoing w/u at [facility] for TAVR, as of 4/2017. Sleep apnea     Couldn't tolerate CPAP therapy ~2007. Had had UPPP. Stroke [facility]) 09/2016    With receptive aphasia, poor memory that are improving as of 04/2017. Pt was found to have an occipital ICH at that time, with possible amyloid angiopathy as cause per MRI subsequently. Had seizure and sepsis (d/t MRSE bacteremia) complicating stroke.        No Known Allergies  Current Outpatient Medications on File Prior to Visit   Medication Sig Dispense Refill    nadolol (CORGARD) 40 MG tablet Take 1 tablet by mouth 2 times daily 180 tablet 1    spironolactone (ALDACTONE) 25 MG tablet Take 1 tablet by mouth daily 90 tablet 1    Handicap Placard MISC by Does not apply route Duration: 5 years 1 each 0    levETIRAcetam (KEPPRA) 1000 MG tablet Take 1,000 mg by mouth 2 times daily predniSONE (DELTASONE) 5 MG tablet Take 1 tablet by mouth daily 90 tablet 1    potassium citrate (UROCIT-K) 10 MEQ (1080 MG) extended release tablet Take by mouth 3 tabs bid      lacosamide (VIMPAT) 100 MG TABS tablet Take 100 mg by mouth 2 times daily. Candy Choudhury omeprazole (PRILOSEC) 40 MG delayed release capsule Take 40 mg by mouth daily       Calcium Citrate-Vitamin D (CALCIUM CITRATE + D PO) Take 600 mg by mouth daily      vitamin B-1 (THIAMINE) 100 MG tablet Take 100 mg by mouth daily      acetaminophen (TYLENOL) 500 MG tablet Take 500 mg by mouth in the morning and 500 mg at noon and 500 mg in the evening and 500 mg before bedtime. aspirin 81 MG tablet Take 81 mg by mouth daily      hydroxychloroquine (PLAQUENIL) 200 MG tablet Take 400 mg by mouth daily Indications: taking 239 mg       folic acid (FOLVITE) 1 MG tablet Take 1 mg by mouth daily       VITAMIN D PO Take 4,000 Units by mouth daily       silver sulfADIAZINE (SILVADENE) 1 % cream Apply topically daily. 50 g 0     No current facility-administered medications on file prior to visit. Review of Systems   Constitutional: Negative. HENT: Negative. Eyes: Negative. Respiratory: Negative. Cardiovascular: Negative. Gastrointestinal: Negative. Endocrine: Negative. Genitourinary: Negative. Musculoskeletal: Negative. Objective:  General: AAO x 3 in NAD.     Derm  Toenail Description  Sites of Onychomycosis Involvement (Check affected area)  [x] [x] [x] [x] [x] [x] [x] [x] [x] [x]  5 4 3 2 1 1 2 3 4 5                          Right                                        Left    Thickness  [x] [x] [x] [x] [x] [x] [x] [x] [x] [x]  5 4 3 2 1 1 2 3 4 5                         Right                                        Left    Dystrophic Changes   [x] [x] [x] [x] [x] [x] [x] [x] [x] [x]  5 4 3 2 1 1 2 3 4 5                         Right Left    Color  [x] [x] [x] [x] [x] [x] [x] [x] [x] [x]  5 4 3 2 1 1 2 3 4 5                          Right                                        Left    Incurvation/Ingrowin   [x] [x] [x] [x] [x] [x] [x] [x] [x] [x]  5 4 3 2 1 1 2 3 4 5                         Right                                        Left    Inflammation/Pain   [x] [x] [x] [x] [x] [x] [x] [x] [x] [x]  5 4 3  2 1 1 2 3 4 5                         Right                                        Left      Nails that are described above are all elongated thickened pitting mycotic yellowish incurvated causing pain with both shoe gear. Palpation nails greater then 3 mm thick painful       Dermatologic Exam:hair loss noted  lower extremity    Skin lesion/ulceration   Skin   Callus neg  Musculoskeletal:     1st MPJ ROM normal, Bilateral.  Muscle strength 5/5, Bilateral.  Pain present upon palpation of toenails 1-5  Bilateral., Bilateral.  Ankle ROM normal,Bilateral.    Dorsally contracted digits , Bilateral.     Vascular:  Pulses   bilateral DP absnet    PT absient    severe varicose veins noted to lower extremity there is loss of hair cool to touch discoloration to feet nails are mycotic dystrophic         Neurological:  Sensation present to light touch to level of digits, Bilateral.    Foot Exam    General  General Appearance: appears stated age and healthy   Orientation: alert and oriented to person, place, and time       Right Foot/Ankle     Inspection and Palpation  Skin Exam: skin changes and abnormal color; Neurovascular  Dorsalis pedis: 1+  Posterior tibial: absent      Left Foot/Ankle      Neurovascular  Dorsalis pedis: absent  Posterior tibial: absent         Ortho Exam  Q7   []Yes    []No                Q8   [x]Yes    []No                     Q9   []Yes    []No  Assessment:  67 y.o. male with:   1. Tinea unguium    2. Peripheral vascular disease, unspecified ([address]    3. Pain in toe of right foot    4. Pain in left toe(s)    5.  Difficulty walking     No orders of the defined types were placed in this encounter. Plan:   Pt was evaluated and examined. Patient was given personalized discharge instructions. Nails 1-10 were debrided in length and thickness sharply with a nail nipper and  without incident. Pt will follow up in 9 weeks or sooner if any problems arise. Diagnosis was discussed with the pt and all of their questions were answered in detail. Proper foot hygiene and care was discussed with the pt. Patient to check feet daily and contact the office with any questions/problems/concerns. Other comorbidity noted and will be managed by PCP. Pain waiver discussed with patient and confirmed.    10/17/2022      Electronically signed by Jocelyne Bolton DPM on 10/17/2022 at 1:53 PM  10/17/2022

## 2022-10-18 NOTE — TELEPHONE ENCOUNTER
Patient today with a complaint of hemorrhoids. Trial some 1% hydrocortisone cream to be used daily for about a week as well as picking up a sitz bath for trial couple times a day. Needs to try and stop sitting so much and get up and move a little bit which should help not only with the hemorrhoids but also the small decubitus he complained of today as well. Tell him that if the hemorrhoids are bothersome enough to him over the next several weeks to notify us and I would have one of the surgeons look at them.

## 2022-10-18 NOTE — PROGRESS NOTES
[address] [place]     10/18/22  Davy Mulligan : 1950 Sex: male  Age: 67 y.o. Chief Complaint   Patient presents with    Hypertension     4 months       HPI  Patient presents today accompanied by wife for 4-month follow-up visit on his medical problems. Since I have seen him he has seen gastroenterology for his cirrhosis/portal hypertensive gastropathy/esophageal varices, rheumatology for his rheumatoid arthritis and podiatry. I reviewed all of their notes. In general he tells me has been doing well. States he is lost 15 pounds and initially upon query states has not been trying however upon further questioning he and his wife state that he has only been eating between noon and 6 PM and has changed his dietary pattern. Does like this is probably a contributing factor. Still is not active at all and sits a lot. I did look over his forearm skin lesion again and again does not appear suspicious to me but more like a keratotic lesion. It has not changed in size. I did asked him to continue watching and notify me if changes. Follows with [place] radiation oncology for his lung cancer and appears to be stable without metastatic disease. No further seizures on his antiseizure medication. He continues in chronic atrial fibrillation with controlled ventricular response on no anticoagulation secondary to his bleeding including an intracerebral bleed thought to be caused by cerebral amyloid angiopathy and his esophageal varices with cirrhosis. Denies any falls. Wife manages all of his appointments and states to be up-to-date with everybody. He follows with numerous consultants including urology, pulmonary, GI, rheumatology, podiatry, and neurology for seizure/stroke, cardiology radiation oncology. He no longer sees  neurosurgery        Review of Systems    Constitutional:  Negative for activity change, fatigue, appetite change, chills, diaphoresis, fever and unexpected weight change. HENT:  Negative for congestion, rhinorrhea and sinus pain. Respiratory: Negative for cough, shortness of breath and wheezing.-History of lung cancer with radiation  Cardiovascular: Negative for chest pain, palpitations and leg swelling. Chronic atrial fibrillation not on anticoagulation secondary to falls and bleeding-he is currently on no anticoagulated with cirrhosis. Gastrointestinal: Negative for abdominal pain, blood in stool, constipation, diarrhea, nausea and vomiting. Alcoholic cirrhosis. No longer taking lactulose on probiotic that is supposed to help with his ammonia level endocrine: Negative. Genitourinary: Negative for difficulty urinating, dysuria, frequency, hematuria and urgency. Decreased urinary stream--saw urology 1/17 for hematuria------ continues to see them  Musculoskeletal: Positive for arthralgias and back pain. Negative for gait problem and myalgias. rePorts arthritis and lower back pain/low back pain severe at times has been taking Tylenol on occasion. Some improvement. Rheumatoid arthritis--neurosurgery did not recommend surgery  for his back  Allergic/Immunologic: Negative for environmental allergies and immunocompromised state. Neurological: Negative for dizziness, weakness, light-headedness, numbness and headaches. Reports, numbness, seizures (although not recent) TIA-watchman procedure failed and we are unable to anticoagulate this gentleman   Hematological: Negative. Does have chronic thrombocytopenia associated with his cirrhosis. Psychiatric/Behavioral: Negative for behavioral problems, confusion and sleep disturbance. The patient is not nervous/anxious. REST OF PERTINENT ROS GONE OVER AND WAS NEGATIVE.    PMH:  Shot Record:  -Fluzone Influenza Virus- Medicare 00/00/00 09/07/17 11/05/15  90750-Shingrix (Shinglesvaccine (HZV), Recombinant, Subunit, Adjuvanted 03/13/19  Toradol-Ketorolac 30MG Injection 03/06/06  90746-Hepatitis B Vaccine Adult 12/18/17 11/17/14 06/24/14 05/22/14 05/22/14  90736-Zoster Vaccine 01/12/10  90732-Pneumococcal Vaccine (Pneumovax) 12/26/17 11/13/13 12/17/09  90715-TdaP For Individuals greater than 11 09/22/17  14030-Xsprtnl 13-NDC#6853-5782-34 12/01/15  90662-Influenza Vaccine High Dose 65+ Preservative Free Im Use 09/17/18  90658-Influenza Vaccine Fluvirin Iiv3 (ages 3 and older) 11/17/14 09/26/12 09/26/12 10/26/11 10/20/10  90632-Hepatitis A Vaccine - Adult 2 Dose age 23 & over 12/18/17 11/17/14 06/24/14 05/22/14 05/22/14. Chronic Diagnosis: Atrial fibrillation, Aortic valve disorder, Other seizures, Thrombocytopenic disorder,  Portal hypertension, Atherosclerotic heart disease of native coronary artery with, Alcoholic cirrhosis,  Taking medication, Rheumatoid arthritis, Essential hypertension, Bleeding esophageal varices, Tobacco  user, Sleep apnea, Benign essential hypertension.   Health Maintenance:  Influenza Vaccination - (9/17/2018)  Colonoscopy - (1/6/2010), 7/21  Couseled on Home Safety - (7/19/2017)  Mini Mental Status - (3/27/2017)  Bone Density Test Screening - (1/4/2008)  Tetanus Immunization - (9/7/2017)  Psa Test - 1/08,3/09,12/09, 7/17-dr jean baptiste  Prostate Exam - 10/07,11/08,2015, dr Daniel Branch  Rectal Exam - 10/07,11/08,dr jean baptiste  Bone Density Test - 1/08  Neg AAA, Mammogram  Hemmocult Cards - 7/10  EGD - 10/11,11/11,6/12,8/12,1/13,10/13,4/15,12/15, 5/20, 5/21  2D ECHO - 6/15  Carotid Artery Study - 2/12, 2/14  EKG - 2/12  Thyroid ultrasound - 7/12, 2/16  Heart catheterization [facility] - 4/17  Medical Problems:  Hypertension  Valvular Heart Disease - SEVERE AS-TAVR  Thyroid Irradiation as A Child, Chemical Exposure At Work  Sleep Apnea - status post uvuloplasty  Kidney Stones - uric acid--followed by dr Daniel Branch  Colon Polyps  Thyroid Cyst--Asp & US - MULTINODULAR GOITER  Esophageal Varices, GI bleed  thyroid nodule - adenomatous  liver BX - steatosis and fibrosis     renal cysts, adrenal adenoma, cholelithiasis, Benign Prostatic Hypertrophy  microhematuria eval - neg  hematology eval - elevated MCV/MCH/thrombocytopenia  Ugi Bleed - x's 2 with esophageal varicies/Banding  blood transfusions, Rheumatoid Arthritis  Atrial Fibrillation - paroxysmal-unable to anticoagulate  Rheumatoid Arthritis  hepatitis studies - jesica. 7/13-neg  Cirrhosis - alcoholic  Cerebral amyloid angiopathy - Intracerebral bleed  Pancreatitis, Pneumonia, Seizure Disorder  Methicillin-resistant staph epidermidis bacteremia - Presumed infective endocarditis and treated for 6  weeks with vancomycin  Osteoporosis - Follows with rheumatology  Liver biopsy - ×3  Evaluation for liver transplant, [facility] - 5/13  Lung mass - Following with pulmonary  Follows with - Pulmonary, rheumatology, GI, cardiology, urology, podiatry, neurology-no longer seeing  hematology/oncology  transient ischemic attack - 4/17  Tavr - [facility]-6/17--needs Atb prophylaxis prior to dental  squamous cell CA lung - radiation  Lung Cancer - Squamous cell. Radiation treatment completed  Surgical Hx:  Umbilical hernia repair - 4/17 (due to incarcerated ventral hernia with SBO)  right hip fx and repair, Failed watchman procedure  Reviewed, no changes. SH: Patient is . Personal Habits: The patient is a former pipesmoker for years. He still does vaping. Does not  currently consume alcohol, has consumed alcohol in the pastheavily Worked at [employer] now retired  Reviewed, no changes. Current Outpatient Medications:     silver sulfADIAZINE (SILVADENE) 1 % cream, Apply topically daily. , Disp: 50 g, Rfl: 0    nadolol (CORGARD) 40 MG tablet, Take 1 tablet by mouth 2 times daily, Disp: 180 tablet, Rfl: 1    spironolactone (ALDACTONE) 25 MG tablet, Take 1 tablet by mouth daily, Disp: 90 tablet, Rfl: 1    Handicap Placard MISC, by Does not apply route Duration: 5 years, Disp: 1 each, Rfl: 0    levETIRAcetam (KEPPRA) 1000 MG tablet, Take 1,000 mg by mouth 2 times daily, Disp: , Rfl:     predniSONE (DELTASONE) 5 MG tablet, Take 1 tablet by mouth daily, Disp: 90 tablet, Rfl: 1    potassium citrate (UROCIT-K) 10 MEQ (1080 MG) extended release tablet, Take by mouth 3 tabs bid, Disp: , Rfl:     lacosamide (VIMPAT) 100 MG TABS tablet, Take 100 mg by mouth 2 times daily. ., Disp: , Rfl:     omeprazole (PRILOSEC) 40 MG delayed release capsule, Take 40 mg by mouth daily , Disp: , Rfl:     Calcium Citrate-Vitamin D (CALCIUM CITRATE + D PO), Take 600 mg by mouth daily, Disp: , Rfl:     vitamin B-1 (THIAMINE) 100 MG tablet, Take 100 mg by mouth daily, Disp: , Rfl:     acetaminophen (TYLENOL) 500 MG tablet, Take 500 mg by mouth in the morning and 500 mg at noon and 500 mg in the evening and 500 mg before bedtime. , Disp: , Rfl:     aspirin 81 MG tablet, Take 81 mg by mouth daily, Disp: , Rfl:     hydroxychloroquine (PLAQUENIL) 200 MG tablet, Take 400 mg by mouth daily Indications: taking 400 mg , Disp: , Rfl:     folic acid (FOLVITE) 1 MG tablet, Take 1 mg by mouth daily , Disp: , Rfl:     VITAMIN D PO, Take 4,000 Units by mouth daily , Disp: , Rfl:   No Known Allergies    Past Medical History:   Diagnosis Date    Alcoholic cirrhosis [address]     Alcoholism ([facility]     Has been sober / dry since 01/2016. Anxiety     Arthritis     Chronic atrial fibrillation (HCC)     Esophageal varices (HCC)     UGI bleed ~2012 - has had variceal banding 2x around that time. History of kidney stones     Hypertension     Lumbar compression fracture ([address] 02/2017    Lung tumor 2017    Osteopenia     Osteopenia     S/P TAVR (transcatheter aortic valve replacement) 9/20/2018    Seizure disorder ([address] 62/8110    Complicated a stroke with occipital ICH. Seizures ([address]     Sepsis with MRSE bacteremia 09/2016    resolved    Severe aortic stenosis     Confirmed by echo 9/2016, BROWN 10/2016. Undergoing w/u at [facility] for TAVR, as of 4/2017.     Sleep apnea     Couldn't tolerate CPAP therapy ~. Had had UPPP. Stroke [facility]) 2016    With receptive aphasia, poor memory that are improving as of 2017. Pt was found to have an occipital ICH at that time, with possible amyloid angiopathy as cause per MRI subsequently. Had seizure and sepsis (d/t MRSE bacteremia) complicating stroke. Past Surgical History:   Procedure Laterality Date    ECHO COMPL W DOP COLOR FLOW  2012         ECHOCARDIOGRAM COMPLETE 2D W DOPPLER W COLOR  2013         ESOPHAGEAL VARICE LIGATION      HIP SURGERY      PALATE SURGERY      UMBILICAL HERNIA REPAIR  2017    UPPER GASTROINTESTINAL ENDOSCOPY      UPPER GASTROINTESTINAL ENDOSCOPY  2017    UPPER GASTROINTESTINAL ENDOSCOPY  2017     Family History   Problem Relation Age of Onset    Cancer Mother         leukemia     Social History     Socioeconomic History    Marital status:      Spouse name: Not on file    Number of children: Not on file    Years of education: Not on file    Highest education level: Not on file   Occupational History    Not on file   Tobacco Use    Smoking status: Former     Packs/day: 1.00     Years: 55.00     Pack years: 55.00     Types: Cigarettes, Pipe     Start date: 7/10/1966     Quit date: 7/10/2015     Years since quittin.2    Smokeless tobacco: Never   Vaping Use    Vaping Use: Never used   Substance and Sexual Activity    Alcohol use: No     Comment: no alcohol since     Drug use: Never    Sexual activity: Not Currently     Partners: Female   Other Topics Concern    Not on file   Social History Narrative    Drinks occ Cola.      Social Determinants of Health     Financial Resource Strain: Not on file   Food Insecurity: Not on file   Transportation Needs: Not on file   Physical Activity: Not on file   Stress: Not on file   Social Connections: Not on file   Intimate Partner Violence: Not on file   Housing Stability: Not on file       Vitals:    10/17/22 1245   BP: 110/72   Pulse: 76   Temp: 97.5 °F (36.4 °C)   TempSrc: Temporal   SpO2: 98%   Weight: 264 lb 6.4 oz (119.9 kg)   Height: 5' 10\" (1.778 m)       Physical Exam  Constitutional: He is oriented to person, place, and time. He appears well-developed and well-nourished. No distress. Obese  Neck: Normal range of motion. Neck supple. No JVD present. No thyromegaly present. No thyroid nodules   Cardiovascular: Normal rate and intact distal pulses. Exam reveals no gallop and no friction rub. Lavanda [place] Heart: Irregularly irregular with controlled ventricular response. Systolic murmur did improve postoperatively. Pulmonary/Chest: Effort normal and breath sounds normal. No respiratory distress. He has no wheezes. He has no rales. Abdominal: Soft. Bowel sounds are normal. He exhibits no distension and no mass. There is no tenderness. Spleen tip and liver were palpable with deep inspiration   Musculoskeletal: Normal range of motion. He exhibits edema. Walks with a slow gait. Lymph nodes     He has no cervical adenopathy. He has no axillary adenopathy. Right: No inguinal adenopathy present. Left: No inguinal adenopathy present. Neurological: He is alert and oriented to person, place, and time. He displays normal reflexes. A sensory deficit is present. He exhibits normal muscle tone. Coordination normal.   Diminished sensation light touch in the ankle area bilaterally. Skin: Skin is warm and dry. No rash noted. No erythema. Psychiatric: He has a normal mood and affect. His behavior is normal. Judgment and thought content normal.   Rectal-I did examine the anal area and he does have external hemorrhoids noted. There is no bleeding and nonpainful. Assessment and Plan:  Anibal Fisher was seen today for hypertension.     Diagnoses and all orders for this visit:    Alcoholic cirrhosis of liver without ascites (HCC)    Rheumatoid arthritis with positive rheumatoid factor, involving unspecified site ([facility]    Stage 3a chronic kidney disease (HCC)    Seizure disorder (HCC)    Thrombocytopenia (HCC)    Abnormal bowel movement  -     External Referral To Gastroenterology    Acute hemorrhoid    Pressure injury of skin of buttock, unspecified injury stage, unspecified laterality    Other orders  -     Influenza, FLUAD, (age 72 y+), IM, Preservative Free, 0.5 mL  -     silver sulfADIAZINE (SILVADENE) 1 % cream; Apply topically daily. Plan: Patient was about to walk out of the room he states she had some other issues she wanted to discuss. He states he has had an ulcer to the buttock area for the last several months and this is related to all the sitting he does. States it is painful at times. Denies any drainage from the site. Also states he has hemorrhoids which are not painful or bleeding however caused issues with hygiene and wanted me to look at them. Also states has had change in bowel habits and states stool does not appear to be normal.  Does have a lot of loose stool. States this is all changed in the last few months. He does follow-up with Dr. Carla Encarnacion of gastroenterology and did just have colonoscopy in February of this year. I told him I would like him reassessed again and put a referral and. Can also look at his hemorrhoids at the same time. If he does not wish to address them I can have surgery take a look at him if patient wishes. I asked him to try some 1% hydrocortisone cream to the area and use sitz bath's  Did have rather large external hemorrhoids noted on exam.  Regarding his decubitus very tiny area of superficial right medial buttock area. I gave him Silvadene cream to use daily for the next couple weeks and notify me if not improving. I told him the key was to get up and start moving and while sitting can use a doughnut to take the pressure off the site. I discussed this with the wife as well. Set him up with new physician in the next 4 months follow-up with above consultants.   Notify us if not improving. Return in about 4 months (around 2/17/2023). Seen By:  Jhonny Woods MD      *Document was created using voice recognition software. Note was reviewed however may contain grammatical errors.

## 2022-10-19 NOTE — TELEPHONE ENCOUNTER
[facility] returned our call. I reviewed the recommendations w/ her and Noe. She picked up the medication and will get the sitz bath. She will let us know if he is not improving.

## 2023-02-19 SDOH — HEALTH STABILITY: PHYSICAL HEALTH: ON AVERAGE, HOW MANY DAYS PER WEEK DO YOU ENGAGE IN MODERATE TO STRENUOUS EXERCISE (LIKE A BRISK WALK)?: 0 DAYS

## 2023-02-19 ASSESSMENT — SOCIAL DETERMINANTS OF HEALTH (SDOH)
WITHIN THE LAST YEAR, HAVE YOU BEEN KICKED, HIT, SLAPPED, OR OTHERWISE PHYSICALLY HURT BY YOUR PARTNER OR EX-PARTNER?: NO
WITHIN THE LAST YEAR, HAVE YOU BEEN AFRAID OF YOUR PARTNER OR EX-PARTNER?: NO
WITHIN THE LAST YEAR, HAVE TO BEEN RAPED OR FORCED TO HAVE ANY KIND OF SEXUAL ACTIVITY BY YOUR PARTNER OR EX-PARTNER?: NO
WITHIN THE LAST YEAR, HAVE YOU BEEN HUMILIATED OR EMOTIONALLY ABUSED IN OTHER WAYS BY YOUR PARTNER OR EX-PARTNER?: NO

## 2023-02-20 ENCOUNTER — PROCEDURE VISIT (OUTPATIENT)
Dept: PODIATRY | Age: 73
End: 2023-02-20
Payer: MEDICARE

## 2023-02-20 ENCOUNTER — OFFICE VISIT (OUTPATIENT)
Dept: FAMILY MEDICINE CLINIC | Age: 73
End: 2023-02-20
Payer: MEDICARE

## 2023-02-20 VITALS
SYSTOLIC BLOOD PRESSURE: 118 MMHG | BODY MASS INDEX: 37.85 KG/M2 | HEART RATE: 77 BPM | HEIGHT: 70 IN | DIASTOLIC BLOOD PRESSURE: 72 MMHG | WEIGHT: 264.4 LBS | TEMPERATURE: 97.1 F | OXYGEN SATURATION: 98 %

## 2023-02-20 VITALS
BODY MASS INDEX: 37.88 KG/M2 | TEMPERATURE: 97.1 F | DIASTOLIC BLOOD PRESSURE: 72 MMHG | SYSTOLIC BLOOD PRESSURE: 118 MMHG | WEIGHT: 264 LBS

## 2023-02-20 DIAGNOSIS — K70.30 ALCOHOLIC CIRRHOSIS OF LIVER WITHOUT ASCITES (HCC): ICD-10-CM

## 2023-02-20 DIAGNOSIS — M79.675 PAIN IN LEFT TOE(S): ICD-10-CM

## 2023-02-20 DIAGNOSIS — G40.909 SEIZURE DISORDER (HCC): ICD-10-CM

## 2023-02-20 DIAGNOSIS — E66.01 MORBID OBESITY WITH BMI OF 40.0-44.9, ADULT (HCC): ICD-10-CM

## 2023-02-20 DIAGNOSIS — E66.01 MORBID OBESITY WITH BMI OF 40.0-44.9, ADULT (HCC): Primary | ICD-10-CM

## 2023-02-20 DIAGNOSIS — I48.11 LONGSTANDING PERSISTENT ATRIAL FIBRILLATION (HCC): ICD-10-CM

## 2023-02-20 DIAGNOSIS — R26.2 DIFFICULTY WALKING: ICD-10-CM

## 2023-02-20 DIAGNOSIS — M79.674 PAIN IN TOE OF RIGHT FOOT: ICD-10-CM

## 2023-02-20 DIAGNOSIS — I73.9 PERIPHERAL VASCULAR DISEASE, UNSPECIFIED (HCC): ICD-10-CM

## 2023-02-20 DIAGNOSIS — B35.1 TINEA UNGUIUM: Primary | ICD-10-CM

## 2023-02-20 DIAGNOSIS — M05.9 RHEUMATOID ARTHRITIS WITH POSITIVE RHEUMATOID FACTOR, INVOLVING UNSPECIFIED SITE (HCC): ICD-10-CM

## 2023-02-20 LAB
ALBUMIN SERPL-MCNC: 4 G/DL (ref 3.5–5.2)
ALP BLD-CCNC: 68 U/L (ref 40–129)
ALT SERPL-CCNC: 23 U/L (ref 0–40)
ANION GAP SERPL CALCULATED.3IONS-SCNC: 18 MMOL/L (ref 7–16)
AST SERPL-CCNC: 64 U/L (ref 0–39)
BASOPHILS ABSOLUTE: 0.02 E9/L (ref 0–0.2)
BASOPHILS RELATIVE PERCENT: 0.3 % (ref 0–2)
BILIRUB SERPL-MCNC: 1.4 MG/DL (ref 0–1.2)
BUN BLDV-MCNC: 19 MG/DL (ref 6–23)
CALCIUM SERPL-MCNC: 10 MG/DL (ref 8.6–10.2)
CHLORIDE BLD-SCNC: 103 MMOL/L (ref 98–107)
CHOLESTEROL, TOTAL: 133 MG/DL (ref 0–199)
CO2: 22 MMOL/L (ref 22–29)
CREAT SERPL-MCNC: 1.2 MG/DL (ref 0.7–1.2)
EOSINOPHILS ABSOLUTE: 0.01 E9/L (ref 0.05–0.5)
EOSINOPHILS RELATIVE PERCENT: 0.2 % (ref 0–6)
GFR SERPL CREATININE-BSD FRML MDRD: >60 ML/MIN/1.73
GLUCOSE BLD-MCNC: 95 MG/DL (ref 74–99)
HCT VFR BLD CALC: 42.1 % (ref 37–54)
HDLC SERPL-MCNC: 70 MG/DL
HEMOGLOBIN: 14 G/DL (ref 12.5–16.5)
IMMATURE GRANULOCYTES #: 0.02 E9/L
IMMATURE GRANULOCYTES %: 0.3 % (ref 0–5)
LDL CHOLESTEROL CALCULATED: 46 MG/DL (ref 0–99)
LYMPHOCYTES ABSOLUTE: 0.7 E9/L (ref 1.5–4)
LYMPHOCYTES RELATIVE PERCENT: 10.7 % (ref 20–42)
MCH RBC QN AUTO: 32 PG (ref 26–35)
MCHC RBC AUTO-ENTMCNC: 33.3 % (ref 32–34.5)
MCV RBC AUTO: 96.1 FL (ref 80–99.9)
MONOCYTES ABSOLUTE: 0.46 E9/L (ref 0.1–0.95)
MONOCYTES RELATIVE PERCENT: 7.1 % (ref 2–12)
NEUTROPHILS ABSOLUTE: 5.31 E9/L (ref 1.8–7.3)
NEUTROPHILS RELATIVE PERCENT: 81.4 % (ref 43–80)
PDW BLD-RTO: 13.9 FL (ref 11.5–15)
PLATELET # BLD: 80 E9/L (ref 130–450)
PLATELET CONFIRMATION: NORMAL
PMV BLD AUTO: 10.8 FL (ref 7–12)
POTASSIUM SERPL-SCNC: 5 MMOL/L (ref 3.5–5)
RBC # BLD: 4.38 E12/L (ref 3.8–5.8)
SODIUM BLD-SCNC: 143 MMOL/L (ref 132–146)
TOTAL PROTEIN: 7.3 G/DL (ref 6.4–8.3)
TRIGL SERPL-MCNC: 85 MG/DL (ref 0–149)
VITAMIN D 25-HYDROXY: 55 NG/ML (ref 30–100)
VLDLC SERPL CALC-MCNC: 17 MG/DL
WBC # BLD: 6.5 E9/L (ref 4.5–11.5)

## 2023-02-20 PROCEDURE — 3017F COLORECTAL CA SCREEN DOC REV: CPT | Performed by: FAMILY MEDICINE

## 2023-02-20 PROCEDURE — 11721 DEBRIDE NAIL 6 OR MORE: CPT | Performed by: PODIATRIST

## 2023-02-20 PROCEDURE — 1036F TOBACCO NON-USER: CPT | Performed by: FAMILY MEDICINE

## 2023-02-20 PROCEDURE — G8427 DOCREV CUR MEDS BY ELIG CLIN: HCPCS | Performed by: FAMILY MEDICINE

## 2023-02-20 PROCEDURE — G8484 FLU IMMUNIZE NO ADMIN: HCPCS | Performed by: FAMILY MEDICINE

## 2023-02-20 PROCEDURE — 1123F ACP DISCUSS/DSCN MKR DOCD: CPT | Performed by: FAMILY MEDICINE

## 2023-02-20 PROCEDURE — 99214 OFFICE O/P EST MOD 30 MIN: CPT | Performed by: FAMILY MEDICINE

## 2023-02-20 PROCEDURE — 3074F SYST BP LT 130 MM HG: CPT | Performed by: FAMILY MEDICINE

## 2023-02-20 PROCEDURE — G8417 CALC BMI ABV UP PARAM F/U: HCPCS | Performed by: FAMILY MEDICINE

## 2023-02-20 PROCEDURE — 3078F DIAST BP <80 MM HG: CPT | Performed by: FAMILY MEDICINE

## 2023-02-20 RX ORDER — NADOLOL 40 MG/1
40 TABLET ORAL 2 TIMES DAILY
Qty: 180 TABLET | Refills: 1 | Status: SHIPPED | OUTPATIENT
Start: 2023-02-20

## 2023-02-20 RX ORDER — SPIRONOLACTONE 25 MG/1
25 TABLET ORAL DAILY
Qty: 90 TABLET | Refills: 1 | Status: SHIPPED | OUTPATIENT
Start: 2023-02-20 | End: 2023-03-22

## 2023-02-20 SDOH — ECONOMIC STABILITY: HOUSING INSECURITY
IN THE LAST 12 MONTHS, WAS THERE A TIME WHEN YOU DID NOT HAVE A STEADY PLACE TO SLEEP OR SLEPT IN A SHELTER (INCLUDING NOW)?: NO

## 2023-02-20 SDOH — ECONOMIC STABILITY: FOOD INSECURITY: WITHIN THE PAST 12 MONTHS, THE FOOD YOU BOUGHT JUST DIDN'T LAST AND YOU DIDN'T HAVE MONEY TO GET MORE.: NEVER TRUE

## 2023-02-20 SDOH — ECONOMIC STABILITY: FOOD INSECURITY: WITHIN THE PAST 12 MONTHS, YOU WORRIED THAT YOUR FOOD WOULD RUN OUT BEFORE YOU GOT MONEY TO BUY MORE.: NEVER TRUE

## 2023-02-20 SDOH — ECONOMIC STABILITY: INCOME INSECURITY: HOW HARD IS IT FOR YOU TO PAY FOR THE VERY BASICS LIKE FOOD, HOUSING, MEDICAL CARE, AND HEATING?: NOT HARD AT ALL

## 2023-02-20 ASSESSMENT — ENCOUNTER SYMPTOMS
CONSTIPATION: 0
VOMITING: 0
COLOR CHANGE: 0
SINUS PRESSURE: 0
GASTROINTESTINAL NEGATIVE: 1
ABDOMINAL DISTENTION: 0
COUGH: 0
FACIAL SWELLING: 0
APNEA: 0
CHEST TIGHTNESS: 0
PHOTOPHOBIA: 0
RHINORRHEA: 0
DIARRHEA: 0
SHORTNESS OF BREATH: 0
SINUS PAIN: 0
BLOOD IN STOOL: 0

## 2023-02-20 ASSESSMENT — PATIENT HEALTH QUESTIONNAIRE - PHQ9
SUM OF ALL RESPONSES TO PHQ QUESTIONS 1-9: 0
SUM OF ALL RESPONSES TO PHQ9 QUESTIONS 1 & 2: 0
SUM OF ALL RESPONSES TO PHQ QUESTIONS 1-9: 0
2. FEELING DOWN, DEPRESSED OR HOPELESS: 0
1. LITTLE INTEREST OR PLEASURE IN DOING THINGS: 0

## 2023-02-20 NOTE — PROGRESS NOTES
Lucia Kidd is a 67 y.o. male accompanied by his wife, who is the primary historian   CC:   Chief Complaint   Patient presents with    Hypertension     New to provider, former Dr Carole Mcmillan patient; 4 months     4 month follow up:     Back Pain:   Chronic  Gradually worsening   Not proceeding down his legs  Mosly lumbar   --> hx of 3 compression fractures. Non radiating     Weight Loss:   Difficulty. Eats to much. Does have some back pain that is holding him from it. LDCT:  Recently had from [facility]  Stable mass     Liver Cirrhosis:  Doing well   No acute issues   Follow with Dr. Devang Hale. Patient's past medical, surgical, social and/or family history reviewed, updated in chart, and are non-contributory (unless otherwise stated). Medications and allergies also reviewed and updated in chart. /72   Pulse 77   Temp 97.1 °F (36.2 °C) (Temporal)   Ht 5' 10\" (1.778 m)   Wt 264 lb 6.4 oz (119.9 kg)   SpO2 98%   BMI 37.94 kg/m²     Review of Systems   Constitutional:  Negative for chills, fatigue and fever. HENT:  Negative for congestion, ear pain, facial swelling, rhinorrhea, sinus pressure and sinus pain. Eyes:  Negative for photophobia and visual disturbance. Respiratory:  Negative for apnea, cough, chest tightness and shortness of breath. Cardiovascular:  Negative for chest pain and palpitations. Gastrointestinal:  Negative for abdominal distention, blood in stool, constipation, diarrhea and vomiting. Endocrine: Negative for cold intolerance, polydipsia, polyphagia and polyuria. Genitourinary:  Negative for decreased urine volume, frequency and urgency. Musculoskeletal:  Negative for arthralgias and gait problem. Skin:  Negative for color change. Allergic/Immunologic: Negative for environmental allergies and food allergies. Neurological:  Negative for dizziness, light-headedness and headaches. Hematological:  Negative for adenopathy.    Psychiatric/Behavioral: Negative for agitation and confusion. Physical Exam  Constitutional:       Appearance: He is obese. HENT:      Head: Normocephalic and atraumatic. Right Ear: Tympanic membrane and ear canal normal. There is no impacted cerumen. Left Ear: Tympanic membrane and ear canal normal. There is no impacted cerumen. Nose: Nose normal. No congestion or rhinorrhea. Eyes:      Extraocular Movements: Extraocular movements intact. Pupils: Pupils are equal, round, and reactive to light. Cardiovascular:      Rate and Rhythm: Normal rate and regular rhythm. Heart sounds: No murmur heard. No friction rub. No gallop. Pulmonary:      Effort: Pulmonary effort is normal.      Breath sounds: Normal breath sounds. Chest:      Chest wall: No tenderness. Abdominal:      General: Abdomen is flat. Bowel sounds are normal. There is no distension. Palpations: Abdomen is soft. Tenderness: There is no abdominal tenderness. Musculoskeletal:         General: Normal range of motion. Cervical back: Normal range of motion. Skin:     General: Skin is warm and dry. Neurological:      General: No focal deficit present. Mental Status: He is alert and oriented to person, place, and time. Psychiatric:         Mood and Affect: Mood normal.       Assessment:  Nazanin Peterson was seen today for hypertension. Diagnoses and all orders for this visit:    Morbid obesity with BMI of 40.0-44.9, adult ([facility]  -     Vitamin D 25 Hydroxy; Future  -     Lipid Panel; Future    Alcoholic cirrhosis of liver without ascites (HCC)  -     nadolol (CORGARD) 40 MG tablet; Take 1 tablet by mouth in the morning and 1 tablet in the evening.  -     spironolactone (ALDACTONE) 25 MG tablet; Take 1 tablet by mouth daily  -     Comprehensive Metabolic Panel;  Future  -     CBC with Auto Differential; Future    Rheumatoid arthritis with positive rheumatoid factor, involving unspecified site ([facility]    Longstanding persistent atrial fibrillation (HCC)    Seizure disorder ([facility]    Seizure disorder: Continue with antiseizure medications as per neurology. As far as the patient's rheumatoid arthritis. I will refill his prednisone as well as any other of the other medications including his Plaquenil. With the patient's morbid obesity we did discuss that he simply has to eat less at this point time. He continues to eat a diet that is woefully too rich in calories. The patient's alcoholic liver cirrhosis has been stable for several years and he is to continue to follow with gastroenterology. Follow Up:  No follow-ups on file. As above. Call or go to ED immediately if symptoms worsen or persist.  No follow-ups on file. , or sooner if necessary. Counseled regarding above diagnosis, including possible risks and complications,  especially if left uncontrolled. Counseledregarding the possible side effects, risks, benefits and alternatives to treatment; patient and/or guardian verbalizes understanding, agrees, feels comfortable with and wishes to proceed with above treatment plan. patient to call with any new medication issues, and read all Rx info from pharmacy to assure aware of all possible risks and side effects of medication before taking. Patient and/or guardian verbalizes understanding and agrees with above counseling,assessment and plan. All questions answered.

## 2023-02-20 NOTE — PROGRESS NOTES
[facility]  Return Patient    Chief Complaint   Patient presents with    Toe Pain     Saw pcp Dr. Randolph Klein 2/20/23       Subjective: This Emily Neville comes to clinic for foot and nail care. Pt currently has complaint of thickened, painful, elongated nails that he/she cannot manage by themselves. Pt. Relates pain to nails with shoe gear. Pt's primary care physician is Randolph Klein MD.  Past Medical History:   Diagnosis Date    Alcoholic cirrhosis ([address]     Alcoholism ([address]     Has been sober / dry since 01/2016. Anxiety     Arthritis     Chronic atrial fibrillation (HCC)     Esophageal varices (HCC)     UGI bleed ~2012 - has had variceal banding 2x around that time. History of kidney stones     Hypertension     Lumbar compression fracture ([address] 02/2017    Lung tumor 2017    Osteopenia     Osteopenia     S/P TAVR (transcatheter aortic valve replacement) 9/20/2018    Seizure disorder ([address] 45/3795    Complicated a stroke with occipital ICH. Seizures ([address]     Sepsis with MRSE bacteremia 09/2016    resolved    Severe aortic stenosis     Confirmed by echo 9/2016, BROWN 10/2016. Undergoing w/u at [facility] for TAVR, as of 4/2017. Sleep apnea     Couldn't tolerate CPAP therapy ~2007. Had had UPPP. Stroke [facility]) 09/2016    With receptive aphasia, poor memory that are improving as of 04/2017. Pt was found to have an occipital ICH at that time, with possible amyloid angiopathy as cause per MRI subsequently. Had seizure and sepsis (d/t MRSE bacteremia) complicating stroke. No Known Allergies  Current Outpatient Medications on File Prior to Visit   Medication Sig Dispense Refill    silver sulfADIAZINE (SILVADENE) 1 % cream Apply topically daily.  50 g 0    Handicap Placard MISC by Does not apply route Duration: 5 years 1 each 0    levETIRAcetam (KEPPRA) 1000 MG tablet Take 1,000 mg by mouth 2 times daily      predniSONE (DELTASONE) 5 MG tablet Take 1 tablet by mouth daily 90 tablet 1    potassium citrate (UROCIT-K) 10 MEQ (1080 MG) extended release tablet Take by mouth 3 tabs bid      lacosamide (VIMPAT) 100 MG TABS tablet Take 100 mg by mouth 2 times daily. Wendie Henao omeprazole (PRILOSEC) 40 MG delayed release capsule Take 40 mg by mouth daily       Calcium Citrate-Vitamin D (CALCIUM CITRATE + D PO) Take 600 mg by mouth daily      vitamin B-1 (THIAMINE) 100 MG tablet Take 100 mg by mouth daily      acetaminophen (TYLENOL) 500 MG tablet Take 500 mg by mouth in the morning and 500 mg at noon and 500 mg in the evening and 500 mg before bedtime. aspirin 81 MG tablet Take 81 mg by mouth daily      hydroxychloroquine (PLAQUENIL) 200 MG tablet Take 400 mg by mouth daily Indications: taking 666 mg       folic acid (FOLVITE) 1 MG tablet Take 1 mg by mouth daily       VITAMIN D PO Take 4,000 Units by mouth daily        No current facility-administered medications on file prior to visit. Review of Systems   Constitutional: Negative. HENT: Negative. Cardiovascular: Negative. Gastrointestinal: Negative. Endocrine: Negative. Musculoskeletal: Negative. Objective:  General: AAO x 3 in NAD.     Derm  Toenail Description  Sites of Onychomycosis Involvement (Check affected area)  [x] [x] [x] [x] [x] [x] [x] [x] [x] [x]  5 4 3 2 1 1 2 3 4 5                          Right                                        Left    Thickness  [x] [x] [x] [x] [x] [x] [x] [x] [x] [x]  5 4 3 2 1 1 2 3 4 5                         Right                                        Left    Dystrophic Changes   [x] [x] [x] [x] [x] [x] [x] [x] [x] [x]  5 4 3 2 1 1 2 3 4 5                         Right                                        Left    Color  [x] [x] [x] [x] [x] [x] [x] [x] [x] [x]  5 4 3 2 1 1 2 3 4 5                          Right                                        Left    Incurvation/Ingrowin   [x] [x] [x] [x] [x] [x] [x] [x] [x] [x]  5 4 3 2 1 1 2 3 4 5                         Right Left    Inflammation/Pain   [x] [x] [x] [x] [x] [x] [x] [x] [x] [x]  5 4 3  2 1 1 2 3 4 5                         Right                                        Left      Nails that are described above are all elongated thickened pitting mycotic yellowish incurvated causing pain with both shoe gear. Palpation nails greater then 3 mm thick painful       Dermatologic Exam:hair loss noted  lower extremity    Skin lesion/ulceration   Skin   Callus neg  Musculoskeletal:     1st MPJ ROM normal, Bilateral.  Muscle strength 5/5, Bilateral.  Pain present upon palpation of toenails 1-5  Bilateral., Bilateral.  Ankle ROM normal,Bilateral.    Dorsally contracted digits , Bilateral.     Vascular:  Pulses   bilateral DP absnet    PT absient    severe varicose veins noted to lower extremity there is loss of hair cool to touch discoloration to feet nails are mycotic dystrophic         Neurological:  Sensation present to light touch to level of digits, Bilateral.    Foot Exam    General  General Appearance: appears stated age and healthy   Orientation: alert and oriented to person, place, and time       Right Foot/Ankle     Inspection and Palpation  Skin Exam: skin changes and abnormal color; Neurovascular  Dorsalis pedis: 1+  Posterior tibial: absent      Left Foot/Ankle      Neurovascular  Dorsalis pedis: absent  Posterior tibial: absent         Ortho Exam  Q7   []Yes    []No                Q8   [x]Yes    []No                     Q9   []Yes    []No  Assessment:  67 y.o. male with:   1. Tinea unguium    2. Pain in toe of right foot    3. Peripheral vascular disease, unspecified ([address]    4. Pain in left toe(s)    5. Difficulty walking     No orders of the defined types were placed in this encounter. Plan:   Pt was evaluated and examined. Patient was given personalized discharge instructions. Nails 1-10 were debrided in length and thickness sharply with a nail nipper and  without incident.  Pt will follow up in 9 weeks or sooner if any problems arise. Diagnosis was discussed with the pt and all of their questions were answered in detail. Proper foot hygiene and care was discussed with the pt. Patient to check feet daily and contact the office with any questions/problems/concerns. Other comorbidity noted and will be managed by PCP. Pain waiver discussed with patient and confirmed.    2/20/2023      Electronically signed by Marquis Pushpa DPM on 2/20/2023 at 2:48 PM  2/20/2023

## 2023-06-19 ENCOUNTER — OFFICE VISIT (OUTPATIENT)
Dept: FAMILY MEDICINE CLINIC | Age: 73
End: 2023-06-19
Payer: MEDICARE

## 2023-06-19 ENCOUNTER — PROCEDURE VISIT (OUTPATIENT)
Dept: PODIATRY | Age: 73
End: 2023-06-19
Payer: MEDICARE

## 2023-06-19 VITALS
OXYGEN SATURATION: 99 % | HEART RATE: 74 BPM | DIASTOLIC BLOOD PRESSURE: 62 MMHG | SYSTOLIC BLOOD PRESSURE: 106 MMHG | TEMPERATURE: 97.3 F | WEIGHT: 265.2 LBS | HEIGHT: 70 IN | BODY MASS INDEX: 37.97 KG/M2

## 2023-06-19 VITALS
WEIGHT: 265 LBS | DIASTOLIC BLOOD PRESSURE: 62 MMHG | BODY MASS INDEX: 38.02 KG/M2 | TEMPERATURE: 97.3 F | SYSTOLIC BLOOD PRESSURE: 106 MMHG

## 2023-06-19 DIAGNOSIS — G40.909 SEIZURE DISORDER (HCC): ICD-10-CM

## 2023-06-19 DIAGNOSIS — B35.1 TINEA UNGUIUM: Primary | ICD-10-CM

## 2023-06-19 DIAGNOSIS — M05.9 RHEUMATOID ARTHRITIS WITH POSITIVE RHEUMATOID FACTOR, INVOLVING UNSPECIFIED SITE (HCC): Primary | ICD-10-CM

## 2023-06-19 DIAGNOSIS — M79.675 PAIN IN LEFT TOE(S): ICD-10-CM

## 2023-06-19 DIAGNOSIS — M79.674 PAIN IN TOE OF RIGHT FOOT: ICD-10-CM

## 2023-06-19 DIAGNOSIS — R26.2 DIFFICULTY WALKING: ICD-10-CM

## 2023-06-19 DIAGNOSIS — I73.9 PERIPHERAL VASCULAR DISEASE, UNSPECIFIED (HCC): ICD-10-CM

## 2023-06-19 DIAGNOSIS — K64.9 ACUTE HEMORRHOID: ICD-10-CM

## 2023-06-19 DIAGNOSIS — K70.30 ALCOHOLIC CIRRHOSIS OF LIVER WITHOUT ASCITES (HCC): ICD-10-CM

## 2023-06-19 PROCEDURE — 3074F SYST BP LT 130 MM HG: CPT | Performed by: FAMILY MEDICINE

## 2023-06-19 PROCEDURE — 3017F COLORECTAL CA SCREEN DOC REV: CPT | Performed by: FAMILY MEDICINE

## 2023-06-19 PROCEDURE — 3078F DIAST BP <80 MM HG: CPT | Performed by: FAMILY MEDICINE

## 2023-06-19 PROCEDURE — 1123F ACP DISCUSS/DSCN MKR DOCD: CPT | Performed by: FAMILY MEDICINE

## 2023-06-19 PROCEDURE — 1036F TOBACCO NON-USER: CPT | Performed by: FAMILY MEDICINE

## 2023-06-19 PROCEDURE — G8417 CALC BMI ABV UP PARAM F/U: HCPCS | Performed by: FAMILY MEDICINE

## 2023-06-19 PROCEDURE — G8427 DOCREV CUR MEDS BY ELIG CLIN: HCPCS | Performed by: FAMILY MEDICINE

## 2023-06-19 PROCEDURE — 11721 DEBRIDE NAIL 6 OR MORE: CPT | Performed by: PODIATRIST

## 2023-06-19 PROCEDURE — 99214 OFFICE O/P EST MOD 30 MIN: CPT | Performed by: FAMILY MEDICINE

## 2023-06-19 RX ORDER — DIAPER,BRIEF,INFANT-TODD,DISP
EACH MISCELLANEOUS 2 TIMES DAILY
COMMUNITY

## 2023-06-19 RX ORDER — TAMSULOSIN HYDROCHLORIDE 0.4 MG/1
CAPSULE ORAL
COMMUNITY
Start: 2023-06-09

## 2023-06-19 RX ORDER — SPIRONOLACTONE 25 MG/1
25 TABLET ORAL DAILY
Qty: 90 TABLET | Refills: 1 | Status: SHIPPED | OUTPATIENT
Start: 2023-06-19 | End: 2023-12-16

## 2023-06-19 RX ORDER — NADOLOL 40 MG/1
40 TABLET ORAL 2 TIMES DAILY
Qty: 180 TABLET | Refills: 1 | Status: SHIPPED | OUTPATIENT
Start: 2023-06-19

## 2023-06-19 ASSESSMENT — ENCOUNTER SYMPTOMS
SINUS PAIN: 0
CONSTIPATION: 0
CHEST TIGHTNESS: 0
APNEA: 0
ABDOMINAL DISTENTION: 0
DIARRHEA: 0
VOMITING: 0
GASTROINTESTINAL NEGATIVE: 1
SHORTNESS OF BREATH: 0
COLOR CHANGE: 0
RHINORRHEA: 0
PHOTOPHOBIA: 0
SINUS PRESSURE: 0
BLOOD IN STOOL: 0
FACIAL SWELLING: 0
COUGH: 0

## 2023-06-19 NOTE — PROGRESS NOTES
[facility] [place]  Return Patient    Chief Complaint   Patient presents with    Toe Pain     Jakob Leyva MD  6/19/2023       Subjective: This Kennis Signs comes to clinic for foot and nail care. Pt currently has complaint of thickened, painful, elongated nails that he/she cannot manage by themselves. Pt. Relates pain to nails with shoe gear. Pt's primary care physician is Jakob Leyva MD.  Past Medical History:   Diagnosis Date    Alcoholic cirrhosis ([address]     Alcoholism ([address]     Has been sober / dry since 01/2016. Anxiety     Arthritis     Chronic atrial fibrillation (HCC)     Esophageal varices (HCC)     UGI bleed ~2012 - has had variceal banding 2x around that time. History of kidney stones     Hypertension     Lumbar compression fracture ([address] 02/2017    Lung tumor 2017    Osteopenia     Osteopenia     S/P TAVR (transcatheter aortic valve replacement) 9/20/2018    Seizure disorder ([address] 90/1867    Complicated a stroke with occipital ICH. Seizures ([address]     Sepsis with MRSE bacteremia 09/2016    resolved    Severe aortic stenosis     Confirmed by echo 9/2016, BROWN 10/2016. Undergoing w/u at [facility] for TAVR, as of 4/2017. Sleep apnea     Couldn't tolerate CPAP therapy ~2007. Had had UPPP. Stroke [facility]) 09/2016    With receptive aphasia, poor memory that are improving as of 04/2017. Pt was found to have an occipital ICH at that time, with possible amyloid angiopathy as cause per MRI subsequently. Had seizure and sepsis (d/t MRSE bacteremia) complicating stroke. No Known Allergies  Current Outpatient Medications on File Prior to Visit   Medication Sig Dispense Refill    tamsulosin (FLOMAX) 0.4 MG capsule take 2 capsules by mouth at bedtime      hydrocortisone 1 % cream Apply topically 2 times daily Dilt 3% / Lido 2% / Hydrocort 1% compound cream  Apply to rectum 2 times daily. nadolol (CORGARD) 40 MG tablet Take 1 tablet by mouth in the morning and 1 tablet in the evening.  180 tablet 1

## 2023-10-16 ENCOUNTER — OFFICE VISIT (OUTPATIENT)
Dept: FAMILY MEDICINE CLINIC | Age: 73
End: 2023-10-16
Payer: MEDICARE

## 2023-10-16 ENCOUNTER — PROCEDURE VISIT (OUTPATIENT)
Dept: PODIATRY | Age: 73
End: 2023-10-16
Payer: MEDICARE

## 2023-10-16 VITALS
WEIGHT: 259 LBS | SYSTOLIC BLOOD PRESSURE: 114 MMHG | TEMPERATURE: 97.2 F | BODY MASS INDEX: 37.16 KG/M2 | DIASTOLIC BLOOD PRESSURE: 70 MMHG

## 2023-10-16 VITALS
TEMPERATURE: 97.2 F | HEART RATE: 68 BPM | OXYGEN SATURATION: 99 % | HEIGHT: 70 IN | DIASTOLIC BLOOD PRESSURE: 70 MMHG | BODY MASS INDEX: 37.14 KG/M2 | SYSTOLIC BLOOD PRESSURE: 114 MMHG | WEIGHT: 259.4 LBS

## 2023-10-16 DIAGNOSIS — N18.31 STAGE 3A CHRONIC KIDNEY DISEASE (HCC): ICD-10-CM

## 2023-10-16 DIAGNOSIS — G62.9 NEUROPATHY: ICD-10-CM

## 2023-10-16 DIAGNOSIS — B35.1 TINEA UNGUIUM: Primary | ICD-10-CM

## 2023-10-16 DIAGNOSIS — R26.2 DIFFICULTY WALKING: ICD-10-CM

## 2023-10-16 DIAGNOSIS — Z09 FOLLOW-UP EXAM, 3-6 MONTHS SINCE PREVIOUS EXAM: ICD-10-CM

## 2023-10-16 DIAGNOSIS — K70.30 ALCOHOLIC CIRRHOSIS OF LIVER WITHOUT ASCITES (HCC): ICD-10-CM

## 2023-10-16 DIAGNOSIS — L89.309 PRESSURE INJURY OF SKIN OF BUTTOCK, UNSPECIFIED INJURY STAGE, UNSPECIFIED LATERALITY: Primary | ICD-10-CM

## 2023-10-16 DIAGNOSIS — M79.675 PAIN IN LEFT TOE(S): ICD-10-CM

## 2023-10-16 DIAGNOSIS — M79.674 PAIN IN TOE OF RIGHT FOOT: ICD-10-CM

## 2023-10-16 DIAGNOSIS — I73.9 PERIPHERAL VASCULAR DISEASE, UNSPECIFIED (HCC): ICD-10-CM

## 2023-10-16 DIAGNOSIS — J44.9 CHRONIC OBSTRUCTIVE PULMONARY DISEASE, UNSPECIFIED COPD TYPE (HCC): ICD-10-CM

## 2023-10-16 PROCEDURE — 3017F COLORECTAL CA SCREEN DOC REV: CPT | Performed by: FAMILY MEDICINE

## 2023-10-16 PROCEDURE — G8417 CALC BMI ABV UP PARAM F/U: HCPCS | Performed by: FAMILY MEDICINE

## 2023-10-16 PROCEDURE — 1036F TOBACCO NON-USER: CPT | Performed by: FAMILY MEDICINE

## 2023-10-16 PROCEDURE — 99214 OFFICE O/P EST MOD 30 MIN: CPT | Performed by: FAMILY MEDICINE

## 2023-10-16 PROCEDURE — G8484 FLU IMMUNIZE NO ADMIN: HCPCS | Performed by: FAMILY MEDICINE

## 2023-10-16 PROCEDURE — 3023F SPIROM DOC REV: CPT | Performed by: FAMILY MEDICINE

## 2023-10-16 PROCEDURE — 3078F DIAST BP <80 MM HG: CPT | Performed by: FAMILY MEDICINE

## 2023-10-16 PROCEDURE — 3074F SYST BP LT 130 MM HG: CPT | Performed by: FAMILY MEDICINE

## 2023-10-16 PROCEDURE — 11721 DEBRIDE NAIL 6 OR MORE: CPT | Performed by: PODIATRIST

## 2023-10-16 PROCEDURE — G8427 DOCREV CUR MEDS BY ELIG CLIN: HCPCS | Performed by: FAMILY MEDICINE

## 2023-10-16 PROCEDURE — 1123F ACP DISCUSS/DSCN MKR DOCD: CPT | Performed by: FAMILY MEDICINE

## 2023-10-16 RX ORDER — TRIAMCINOLONE ACETONIDE 1 MG/G
CREAM TOPICAL
Qty: 45 G | Refills: 0 | Status: CANCELLED | OUTPATIENT
Start: 2023-10-16

## 2023-10-16 RX ORDER — TRIAMCINOLONE ACETONIDE 1 MG/G
CREAM TOPICAL
Qty: 45 G | Refills: 2 | Status: SHIPPED | OUTPATIENT
Start: 2023-10-16

## 2023-10-16 RX ORDER — ALFUZOSIN HYDROCHLORIDE 10 MG/1
10 TABLET, EXTENDED RELEASE ORAL DAILY
COMMUNITY
Start: 2023-10-12

## 2023-10-16 ASSESSMENT — ENCOUNTER SYMPTOMS
SINUS PRESSURE: 0
PHOTOPHOBIA: 0
ABDOMINAL DISTENTION: 0
FACIAL SWELLING: 0
COUGH: 0
DIARRHEA: 0
CONSTIPATION: 0
RHINORRHEA: 0
COLOR CHANGE: 0
GASTROINTESTINAL NEGATIVE: 1
BLOOD IN STOOL: 0
SHORTNESS OF BREATH: 0
APNEA: 0
CHEST TIGHTNESS: 0
VOMITING: 0
SINUS PAIN: 0

## 2023-10-16 NOTE — PROGRESS NOTES
tibial: absent           Ortho Exam  Q7   []Yes    []No                Q8   [x]Yes    []No                     Q9   []Yes    []No  Assessment:  75 y.o. male with:   1. Tinea unguium    2. Pain in toe of right foot    3. Difficulty walking    4. Peripheral vascular disease, unspecified ([address])    5. Pain in left toe(s)     No orders of the defined types were placed in this encounter. Plan:   Pt was evaluated and examined. Patient was given personalized discharge instructions. Nails 1-10 were debrided in length and thickness sharply with a nail nipper and  without incident. Pt will follow up in 9 weeks or sooner if any problems arise. Diagnosis was discussed with the pt and all of their questions were answered in detail. Proper foot hygiene and care was discussed with the pt. Patient to check feet daily and contact the office with any questions/problems/concerns. Other comorbidity noted and will be managed by PCP. Pain waiver discussed with patient and confirmed.    10/16/2023      Electronically signed by Darío Yanes DPM on 10/16/2023 at 2:18 PM  10/16/2023

## 2023-10-29 DIAGNOSIS — K70.30 ALCOHOLIC CIRRHOSIS OF LIVER WITHOUT ASCITES (HCC): ICD-10-CM

## 2023-10-30 RX ORDER — NADOLOL 40 MG/1
40 TABLET ORAL
Qty: 180 TABLET | Refills: 1 | Status: SHIPPED | OUTPATIENT
Start: 2023-10-30

## 2023-10-30 NOTE — TELEPHONE ENCOUNTER
Last Appointment:  10/16/2023    Future appts:  Future Appointments   Date Time Provider [address]   2/19/2024  1:00 PM Danette Ramirez  [address]   2/19/2024  1:30 PM Jordan Hobbs DPM [facility]

## 2023-12-31 DIAGNOSIS — K70.30 ALCOHOLIC CIRRHOSIS OF LIVER WITHOUT ASCITES (HCC): ICD-10-CM

## 2024-01-02 NOTE — TELEPHONE ENCOUNTER
Message left for patient, requested return call. Would like to confirm that refill is needed prior to routing request to Dr Rutherford.     Last Appointment:  10/16/2023  Future Appointments   Date Time Provider Department Center   2/19/2024  1:00 PM Ashley Rutherford MD [facility]   2/19/2024  1:15 PM Ashley Rutherford MD [facility]   2/19/2024  1:30 PM Alvaro Emmanuel DPM [facility]    ;

## 2024-01-03 ENCOUNTER — PATIENT MESSAGE (OUTPATIENT)
Dept: FAMILY MEDICINE CLINIC | Age: 74
End: 2024-01-03

## 2024-01-03 RX ORDER — SPIRONOLACTONE 25 MG/1
25 TABLET ORAL DAILY
Qty: 90 TABLET | Refills: 0 | OUTPATIENT
Start: 2024-01-03 | End: 2024-04-02

## 2024-01-03 NOTE — TELEPHONE ENCOUNTER
Spoke w/ wife, Noe has 6 months worth of medication on hand and does not need a refill at this time.

## 2024-01-03 NOTE — TELEPHONE ENCOUNTER
Spoke w/ wife, Noe has 6 months worth of Aldactone on hand and does not need a refill atthis time.

## 2024-02-07 LAB — PLATELET CONFIRMATION: NORMAL

## 2024-02-18 SDOH — HEALTH STABILITY: PHYSICAL HEALTH: ON AVERAGE, HOW MANY DAYS PER WEEK DO YOU ENGAGE IN MODERATE TO STRENUOUS EXERCISE (LIKE A BRISK WALK)?: 0 DAYS

## 2024-02-18 SDOH — HEALTH STABILITY: PHYSICAL HEALTH: ON AVERAGE, HOW MANY MINUTES DO YOU ENGAGE IN EXERCISE AT THIS LEVEL?: 0 MIN

## 2024-02-18 ASSESSMENT — PATIENT HEALTH QUESTIONNAIRE - PHQ9
SUM OF ALL RESPONSES TO PHQ9 QUESTIONS 1 & 2: 0
SUM OF ALL RESPONSES TO PHQ QUESTIONS 1-9: 0
1. LITTLE INTEREST OR PLEASURE IN DOING THINGS: 0
2. FEELING DOWN, DEPRESSED OR HOPELESS: 0
SUM OF ALL RESPONSES TO PHQ QUESTIONS 1-9: 0

## 2024-02-18 ASSESSMENT — LIFESTYLE VARIABLES
HOW MANY STANDARD DRINKS CONTAINING ALCOHOL DO YOU HAVE ON A TYPICAL DAY: 0
HOW OFTEN DO YOU HAVE A DRINK CONTAINING ALCOHOL: NEVER
HOW OFTEN DO YOU HAVE SIX OR MORE DRINKS ON ONE OCCASION: 1
HOW OFTEN DO YOU HAVE A DRINK CONTAINING ALCOHOL: 1
HOW MANY STANDARD DRINKS CONTAINING ALCOHOL DO YOU HAVE ON A TYPICAL DAY: PATIENT DOES NOT DRINK

## 2024-02-19 ENCOUNTER — OFFICE VISIT (OUTPATIENT)
Dept: FAMILY MEDICINE CLINIC | Age: 74
End: 2024-02-19
Payer: MEDICARE

## 2024-02-19 ENCOUNTER — PROCEDURE VISIT (OUTPATIENT)
Dept: PODIATRY | Age: 74
End: 2024-02-19
Payer: MEDICARE

## 2024-02-19 VITALS
DIASTOLIC BLOOD PRESSURE: 72 MMHG | TEMPERATURE: 98 F | SYSTOLIC BLOOD PRESSURE: 120 MMHG | BODY MASS INDEX: 37.08 KG/M2 | OXYGEN SATURATION: 97 % | HEART RATE: 77 BPM | WEIGHT: 259 LBS | HEIGHT: 70 IN

## 2024-02-19 VITALS
BODY MASS INDEX: 37.08 KG/M2 | DIASTOLIC BLOOD PRESSURE: 72 MMHG | HEIGHT: 70 IN | HEART RATE: 77 BPM | WEIGHT: 259 LBS | SYSTOLIC BLOOD PRESSURE: 120 MMHG

## 2024-02-19 VITALS
BODY MASS INDEX: 37.16 KG/M2 | TEMPERATURE: 98 F | DIASTOLIC BLOOD PRESSURE: 72 MMHG | WEIGHT: 259 LBS | SYSTOLIC BLOOD PRESSURE: 120 MMHG

## 2024-02-19 DIAGNOSIS — R26.2 DIFFICULTY WALKING: ICD-10-CM

## 2024-02-19 DIAGNOSIS — M79.674 PAIN IN TOE OF RIGHT FOOT: ICD-10-CM

## 2024-02-19 DIAGNOSIS — N18.31 STAGE 3A CHRONIC KIDNEY DISEASE (HCC): ICD-10-CM

## 2024-02-19 DIAGNOSIS — I73.9 PERIPHERAL VASCULAR DISEASE, UNSPECIFIED (HCC): ICD-10-CM

## 2024-02-19 DIAGNOSIS — I48.11 LONGSTANDING PERSISTENT ATRIAL FIBRILLATION (HCC): ICD-10-CM

## 2024-02-19 DIAGNOSIS — Z00.00 MEDICARE ANNUAL WELLNESS VISIT, SUBSEQUENT: Primary | ICD-10-CM

## 2024-02-19 DIAGNOSIS — G89.29 CHRONIC BILATERAL LOW BACK PAIN WITHOUT SCIATICA: ICD-10-CM

## 2024-02-19 DIAGNOSIS — M54.50 CHRONIC BILATERAL LOW BACK PAIN WITHOUT SCIATICA: ICD-10-CM

## 2024-02-19 DIAGNOSIS — B35.1 TINEA UNGUIUM: Primary | ICD-10-CM

## 2024-02-19 DIAGNOSIS — K70.30 ALCOHOLIC CIRRHOSIS OF LIVER WITHOUT ASCITES (HCC): ICD-10-CM

## 2024-02-19 DIAGNOSIS — M79.675 PAIN IN LEFT TOE(S): ICD-10-CM

## 2024-02-19 DIAGNOSIS — D69.6 THROMBOCYTOPENIA (HCC): ICD-10-CM

## 2024-02-19 DIAGNOSIS — M05.9 RHEUMATOID ARTHRITIS WITH POSITIVE RHEUMATOID FACTOR, INVOLVING UNSPECIFIED SITE (HCC): Primary | ICD-10-CM

## 2024-02-19 DIAGNOSIS — J44.9 CHRONIC OBSTRUCTIVE PULMONARY DISEASE, UNSPECIFIED COPD TYPE (HCC): ICD-10-CM

## 2024-02-19 PROCEDURE — 3023F SPIROM DOC REV: CPT | Performed by: FAMILY MEDICINE

## 2024-02-19 PROCEDURE — 99214 OFFICE O/P EST MOD 30 MIN: CPT | Performed by: FAMILY MEDICINE

## 2024-02-19 PROCEDURE — G8417 CALC BMI ABV UP PARAM F/U: HCPCS | Performed by: FAMILY MEDICINE

## 2024-02-19 PROCEDURE — 11721 DEBRIDE NAIL 6 OR MORE: CPT | Performed by: PODIATRIST

## 2024-02-19 PROCEDURE — 3074F SYST BP LT 130 MM HG: CPT | Performed by: FAMILY MEDICINE

## 2024-02-19 PROCEDURE — 3017F COLORECTAL CA SCREEN DOC REV: CPT | Performed by: FAMILY MEDICINE

## 2024-02-19 PROCEDURE — G0439 PPPS, SUBSEQ VISIT: HCPCS | Performed by: FAMILY MEDICINE

## 2024-02-19 PROCEDURE — G8484 FLU IMMUNIZE NO ADMIN: HCPCS | Performed by: FAMILY MEDICINE

## 2024-02-19 PROCEDURE — 3078F DIAST BP <80 MM HG: CPT | Performed by: FAMILY MEDICINE

## 2024-02-19 PROCEDURE — 1123F ACP DISCUSS/DSCN MKR DOCD: CPT | Performed by: FAMILY MEDICINE

## 2024-02-19 PROCEDURE — 1036F TOBACCO NON-USER: CPT | Performed by: FAMILY MEDICINE

## 2024-02-19 PROCEDURE — G8427 DOCREV CUR MEDS BY ELIG CLIN: HCPCS | Performed by: FAMILY MEDICINE

## 2024-02-19 RX ORDER — IBUPROFEN 200 MG
200 TABLET ORAL EVERY 6 HOURS PRN
COMMUNITY

## 2024-02-19 ASSESSMENT — ENCOUNTER SYMPTOMS
SINUS PAIN: 0
SHORTNESS OF BREATH: 0
RHINORRHEA: 0
COLOR CHANGE: 0
BLOOD IN STOOL: 0
APNEA: 0
SINUS PRESSURE: 0
VOMITING: 0
FACIAL SWELLING: 0
PHOTOPHOBIA: 0
COUGH: 0
CONSTIPATION: 0
CHEST TIGHTNESS: 0
ABDOMINAL DISTENTION: 0
DIARRHEA: 0

## 2024-02-19 NOTE — PROGRESS NOTES
Housekeeping, Banking/Finances, Shopping, Food Preparation, Transportation, Taking Medications  Interventions:  See AVS for additional education material    Lung Cancer Screening:  Guidelines regarding LDCT screening for lung cancer reviewed. Patient declined screening.                  Objective   Vitals:    02/19/24 1300   BP: 120/72   Pulse: 77   Weight: 117.5 kg (259 lb)   Height: 1.778 m (5' 10\")      Body mass index is 37.16 kg/m².               No Known Allergies  Prior to Visit Medications    Medication Sig Taking? Authorizing Provider   ibuprofen (ADVIL;MOTRIN) 200 MG tablet Take 1 tablet by mouth every 6 hours as needed for Pain  Geoffrey Tai MD   NONFORMULARY Nitric Oxide 1 tab bid  Geoffrey Tai MD   NONFORMULARY Take 2 Capfuls by mouth in the morning and at bedtime Neuro Renew  Geoffrey Tai MD   nadolol (CORGARD) 40 MG tablet Take 1 tablet by mouth in the morning and 1 tablet in the evening.  Ashley Rutherford MD   alfuzosin (UROXATRAL) 10 MG extended release tablet Take 1 tablet by mouth daily  Geoffrey Tai MD   triamcinolone (KENALOG) 0.1 % cream Apply topically to affected areas 2 times daily.  Ashley Rutherford MD   silver sulfADIAZINE (SILVADENE) 1 % cream Apply topically daily.  Ashley Rutherford MD   hydrocortisone 1 % cream Apply topically 2 times daily Dilt 3% / Lido 2% / Hydrocort 1% compound cream  Apply to rectum 2 times daily.  Geoffrey Tai MD   spironolactone (ALDACTONE) 25 MG tablet Take 1 tablet by mouth daily  Ashley Rutherford MD   Handicap Placard MISC by Does not apply route Duration: 5 years  Amando Hamilton MD   levETIRAcetam (KEPPRA) 1000 MG tablet Take 1 tablet by mouth 2 times daily  Geoffrey Tai MD   predniSONE (DELTASONE) 5 MG tablet Take 1 tablet by mouth daily  Little Vaca MD   potassium citrate (UROCIT-K) 10 MEQ (1080 MG) extended release tablet Take 3 tablets by mouth in the morning and at bedtime  Geoffrey Tai MD   lacosamide

## 2024-02-19 NOTE — PROGRESS NOTES
(HCC)  Comments:  m/l d/t etoh cirrhosis   would like hematology opinion  Orders:  -     Yancy Benson MD, Hematology and Oncology, [place]    Chronic bilateral low back pain without sciatica  Comments:  Xray   PT   ? ref to pain in future ?  Orders:  -     XR LUMBAR SPINE (MIN 4 VIEWS); Future  -     [facility]            Follow Up:  No follow-ups on file.      As above.  Call or go to ED immediately if symptoms worsen or persist.  No follow-ups on file., or sooner if necessary.      Counseled regarding above diagnosis, including possible risks and complications,  especially if left uncontrolled.    Counseledregarding the possible side effects, risks, benefits and alternatives to treatment; patient and/or guardian verbalizes understanding, agrees, feels comfortable with and wishes to proceed with above treatment plan.  patient to call with any new medication issues, and read all Rx info from pharmacy to assure aware of all possible risks and side effects of medication before taking.    Reviewed age and gender appropriatehealth screening exams and vaccinations.  Advised patient regarding importance of keeping up with recommended health maintenance and to schedule as soon as possible if overdue, as this is important in assessing forundiagnosed pathology, especially cancer, as well as protecting against potentially harmful/life threatening disease.        Patient and/or guardian verbalizes understanding and agrees with above counseling,assessment and plan.    All questions answered.

## 2024-02-19 NOTE — PATIENT INSTRUCTIONS
important vitamins and minerals. High-fiber foods include whole-grain cereals and breads, oatmeal, beans, brown rice, citrus fruits, and apples.     Eat lean proteins. Heart-healthy proteins include seafood, lean meats and poultry, eggs, beans, peas, nuts, seeds, and soy products.     Limit drinks and foods with added sugar. These include candy, desserts, and soda pop.   Lifestyle changes    If your doctor recommends it, get more exercise. Walking is a good choice. Bit by bit, increase the amount you walk every day. Try for at least 30 minutes on most days of the week. You also may want to swim, bike, or do other activities.     Do not smoke. If you need help quitting, talk to your doctor about stop-smoking programs and medicines. These can increase your chances of quitting for good. Quitting smoking may be the most important step you can take to protect your heart. It is never too late to quit.     Limit alcohol to 2 drinks a day for men and 1 drink a day for women. Too much alcohol can cause health problems.     Manage other health problems such as diabetes, high blood pressure, and high cholesterol. If you think you may have a problem with alcohol or drug use, talk to your doctor.   Medicines    Take your medicines exactly as prescribed. Call your doctor if you think you are having a problem with your medicine.     If your doctor recommends aspirin, take the amount directed each day. Make sure you take aspirin and not another kind of pain reliever, such as acetaminophen (Tylenol).   When should you call for help?   Call 911 if you have symptoms of a heart attack. These may include:    Chest pain or pressure, or a strange feeling in the chest.     Sweating.     Shortness of breath.     Pain, pressure, or a strange feeling in the back, neck, jaw, or upper belly or in one or both shoulders or arms.     Lightheadedness or sudden weakness.     A fast or irregular heartbeat.   After you call 911, the  may tell

## 2024-02-19 NOTE — PROGRESS NOTES
[facility]  Return Patient    Chief Complaint   Patient presents with    Toe Pain     Ashley Rutherford MD  2/19/24       Subjective: This Jermaine Burks comes to clinic for foot and nail care.  Pt currently has complaint of thickened, painful, elongated nails that he/she cannot manage by themselves.  Pt. Relates pain to nails with shoe gear.  Pt's primary care physician is Ashley Rutherford MD.  Past Medical History:   Diagnosis Date    Alcoholic cirrhosis (HCC)     Alcoholism (HCC)     Has been sober / dry since 01/2016.    Anxiety     Arthritis     Chronic atrial fibrillation (HCC)     Esophageal varices (HCC)     UGI bleed ~2012 - has had variceal banding 2x around that time.    History of kidney stones     Hypertension     Lumbar compression fracture (HCC) 02/2017    Lung tumor 2017    Osteopenia     Osteopenia     S/P TAVR (transcatheter aortic valve replacement) 9/20/2018    Seizure disorder ([facility]) 09/2016    Complicated a stroke with occipital ICH.    Seizures ([facility])     Sepsis with MRSE bacteremia 09/2016    resolved    Severe aortic stenosis     Confirmed by echo 9/2016, BROWN 10/2016. Undergoing w/u at [facility] for TAVR, as of 4/2017.    Sleep apnea     Couldn't tolerate CPAP therapy ~2007. Had had UPPP.    Stroke ([facility]) 09/2016    With receptive aphasia, poor memory that are improving as of 04/2017. Pt was found to have an occipital ICH at that time, with possible amyloid angiopathy as cause per MRI subsequently. Had seizure and sepsis (d/t MRSE bacteremia) complicating stroke.       No Known Allergies  Current Outpatient Medications on File Prior to Visit   Medication Sig Dispense Refill    ibuprofen (ADVIL;MOTRIN) 200 MG tablet Take 1 tablet by mouth every 6 hours as needed for Pain      NONFORMULARY Nitric Oxide 1 tab bid      NONFORMULARY Take 2 Capfuls by mouth in the morning and at bedtime Neuro Renew      nadolol (CORGARD) 40 MG tablet Take 1 tablet by mouth in the morning and 1 tablet in the evening. 180

## 2024-02-20 ENCOUNTER — TELEPHONE (OUTPATIENT)
Dept: PHYSICAL THERAPY | Age: 74
End: 2024-02-20

## 2024-02-20 NOTE — TELEPHONE ENCOUNTER
Spoke with patient's wife to schedule physical therapy evaluation appointment. They would like to wait for results from xrays before scheduling. I gave them our phone number. We agreed that if we dont hear from them I can contact them in a week.

## 2024-02-27 LAB — DIAGNOSTIC PSA: 0.59 NG/ML (ref 0–4)

## 2024-03-04 ENCOUNTER — TELEPHONE (OUTPATIENT)
Dept: PHYSICAL THERAPY | Age: 74
End: 2024-03-04

## 2024-03-21 ENCOUNTER — OFFICE VISIT (OUTPATIENT)
Dept: ONCOLOGY | Age: 74
End: 2024-03-21
Payer: MEDICARE

## 2024-03-21 ENCOUNTER — HOSPITAL ENCOUNTER (OUTPATIENT)
Dept: INFUSION THERAPY | Age: 74
Discharge: HOME | End: 2024-03-21

## 2024-03-21 VITALS
HEIGHT: 70 IN | BODY MASS INDEX: 35.71 KG/M2 | OXYGEN SATURATION: 100 % | WEIGHT: 249.4 LBS | SYSTOLIC BLOOD PRESSURE: 142 MMHG | TEMPERATURE: 96.9 F | DIASTOLIC BLOOD PRESSURE: 70 MMHG | HEART RATE: 65 BPM

## 2024-03-21 DIAGNOSIS — D64.9 ANEMIA, UNSPECIFIED TYPE: ICD-10-CM

## 2024-03-21 DIAGNOSIS — D69.6 THROMBOCYTOPENIA (HCC): Primary | ICD-10-CM

## 2024-03-21 DIAGNOSIS — D69.6 THROMBOCYTOPENIA (HCC): ICD-10-CM

## 2024-03-21 DIAGNOSIS — K70.30 ALCOHOLIC CIRRHOSIS OF LIVER WITHOUT ASCITES (HCC): ICD-10-CM

## 2024-03-21 LAB
ABSOLUTE RETIC #: 0.06 M/UL
DAT, POLYSPECIFIC: NEGATIVE
FERRITIN: 62 NG/ML
FOLATE: >20 NG/ML (ref 4.8–24.2)
IMMATURE RETIC FRACT: 8 % (ref 2.3–13.4)
IRON % SATURATION: 33 % (ref 20–55)
IRON: 112 UG/DL (ref 59–158)
RETIC %: 1.5 % (ref 0.4–1.9)
RETIC HEMOGLOBIN: 35.8 PG (ref 28.2–36.6)
TOTAL IRON BINDING CAPACITY: 335 UG/DL (ref 250–450)
VITAMIN B-12: >2000 PG/ML (ref 211–946)

## 2024-03-21 PROCEDURE — 3077F SYST BP >= 140 MM HG: CPT | Performed by: INTERNAL MEDICINE

## 2024-03-21 PROCEDURE — 1123F ACP DISCUSS/DSCN MKR DOCD: CPT | Performed by: INTERNAL MEDICINE

## 2024-03-21 PROCEDURE — 99214 OFFICE O/P EST MOD 30 MIN: CPT

## 2024-03-21 PROCEDURE — 3078F DIAST BP <80 MM HG: CPT | Performed by: INTERNAL MEDICINE

## 2024-03-21 PROCEDURE — 99205 OFFICE O/P NEW HI 60 MIN: CPT | Performed by: INTERNAL MEDICINE

## 2024-03-21 NOTE — PROGRESS NOTES
Abused: No     Sexually Abused: No   Housing Stability: Unknown (2/20/2023)    Housing Stability Vital Sign     Unable to Pay for Housing in the Last Year: Not on file     Number of Places Lived in the Last Year: Not on file     Unstable Housing in the Last Year: No           Occupation: auto worker  Retired:  YES: Patient is retired from auto worker.          REVIEW OF SYSTEMS: <<For Level 5, 10 or more systems>>     Pacemaker/Defibulator/ICD:  No    Mediport: No           FALLS RISK SCREENING ASSESSMENT    Instructions:  Assess the patient and [place] the appropriate indicators that are present for fall risk identification.   Total the numbers circled and assign a fall risk score from Table 2.  Reassess patient at a minimum every 12 weeks or with status change.    Assessment   Date  3/21/2024     1.  Mental Ability: confusion/cognitively impaired No - 0       2.  Elimination Issues: incontinence, frequency No - 0       3.  Ambulatory: use of assistive devices (walker, cane, off-loading devices), attached to equipment (IV pole, oxygen) Yes - 2     4.  Sensory Limitations: dizziness, vertigo, impaired vision No - 0       5.  Age 65 years or greater - 1       6.  Medication: diuretics, strong analgesics, hypnotics, sedatives, antihypertensive agents   Yes - 3   7.  Falls:  recent history of falls within the last 3 months (not to include slipping or tripping)   Yes - 7   TOTAL 13    If score of 4 or greater was education given? Yes       TABLE 2   Risk Score Risk Level Plan of Care   0-3 Little or  No Risk 1.  Provide assistance as indicated for ambulation activities  2.  Reorient confused/cognitively impaired patient  3.  Call-light/bell within patient's reach  4.  Chair/bed in low position, stretcher/bed with siderails up except when performing patient care activities  5.  Educate patient/family/caregiver on falls prevention  6.  Reassess in 12 weeks or with any noted change in patient condition which places them at 
fever or acute illness, more than 10K.  --Could use eltrombopag if needed for thrombocytopenia in light of liver disease  --In case of bleed, also check INR and fibrinogen and replace factors accordingly.      # Lung squamous cell carcinoma 2017  --Continue to follow at [facility] for lung cancer surveillance    Follow-up in 4 weeks, or earlier should he experience new or worsening symptoms.     I encouraged patient to ask questions.  I answered their questions and addressed their concerns to their satisfaction.  I spent 60 minutes reviewing his chart and counseling him and his wife on above assessment and plan.  They are in agreement.

## 2024-03-22 LAB
ABSOLUTE IMMATURE GRANULOCYTE: <0.03 K/UL (ref 0–0.58)
ALBUMIN (CALCULATED): 3.5 G/DL (ref 3.5–4.7)
ALPHA-1-GLOBULIN: 0.2 G/DL (ref 0.2–0.4)
ALPHA-2-GLOBULIN: 0.6 G/DL (ref 0.5–1)
BASOPHILS ABSOLUTE: 0.01 K/UL (ref 0–0.2)
BASOPHILS RELATIVE PERCENT: 0 % (ref 0–2)
BETA GLOBULIN: 0.9 G/DL (ref 0.8–1.3)
EOSINOPHILS ABSOLUTE: 0.02 K/UL (ref 0.05–0.5)
EOSINOPHILS RELATIVE PERCENT: 1 % (ref 0–6)
GAMMA GLOBULIN: 1.3 G/DL (ref 0.7–1.6)
HCT VFR BLD CALC: 39 % (ref 37–54)
HEMOGLOBIN: 12.4 G/DL (ref 12.5–16.5)
IMMATURE GRANULOCYTES: 0 % (ref 0–5)
LYMPHOCYTES ABSOLUTE: 0.34 K/UL (ref 1.5–4)
LYMPHOCYTES RELATIVE PERCENT: 9 % (ref 20–42)
MCH RBC QN AUTO: 32.5 PG (ref 26–35)
MCHC RBC AUTO-ENTMCNC: 31.8 G/DL (ref 32–34.5)
MCV RBC AUTO: 102.4 FL (ref 80–99.9)
MONOCYTES ABSOLUTE: 0.21 K/UL (ref 0.1–0.95)
MONOCYTES RELATIVE PERCENT: 6 % (ref 2–12)
NEUTROPHILS ABSOLUTE: 3.14 K/UL (ref 1.8–7.3)
NEUTROPHILS RELATIVE PERCENT: 85 % (ref 43–80)
PATHOLOGIST REVIEW: NORMAL
PATHOLOGIST: NORMAL
PDW BLD-RTO: 14.1 % (ref 11.5–15)
PLATELET CONFIRMATION: NORMAL
PLATELET, FLUORESCENCE: 51 K/UL (ref 130–450)
PMV BLD AUTO: 11.7 FL (ref 7–12)
PROTEIN ELECTROPHORESIS, SERUM: NORMAL
RBC # BLD: 3.81 M/UL (ref 3.8–5.8)
RBC # BLD: ABNORMAL 10*6/UL
TOTAL PROTEIN: 6.5 G/DL (ref 6.4–8.3)
WBC # BLD: 3.7 K/UL (ref 4.5–11.5)

## 2024-03-22 RX ORDER — CALCITONIN SALMON 200 [IU]/.09ML
1 SPRAY, METERED NASAL DAILY
Qty: 1 EACH | Refills: 3 | Status: SHIPPED | OUTPATIENT
Start: 2024-03-22

## 2024-03-23 LAB
FREE KAPPA/LAMBDA RATIO: 1.66 (ref 0.22–1.74)
KAPPA FREE LIGHT CHAINS QNT: 54.4 MG/L
LAMBDA FREE LIGHT CHAINS QNT: 32.7 MG/L (ref 4.2–27.7)

## 2024-03-25 LAB
EER IMMUNOFIX ELECTROPHORESIS SERUM: NORMAL
IMMUNOFIXATION ELECTROPHORESIS: NORMAL

## 2024-03-26 LAB
APPEARANCE, BODY FLUID: CLEAR
BILIRUBIN URINE: NEGATIVE
COLOR, URINE: YELLOW
ERYTHROCYTES URINE: NORMAL /HPF
GLUCOSE URINE: NEGATIVE MG/DL
KETONES, URINE: NEGATIVE MG/DL
MICROSCOPIC URINE: YES
MUCUS, URINE: NORMAL /HPF
NITRATE, UA: NEGATIVE
PH UA: 6 (ref 4.6–8)
PROTEIN UA: NEGATIVE MG/DL
RBC URINE: NEGATIVE
SPECIFIC GRAVITY, URINE: 1.02 (ref 1–1.03)
UROBILINOGEN, URINE: 2 MG/DL
WBC COUNT, UR AUTO: NORMAL /HPF
WBC URINE: NEGATIVE

## 2024-03-28 ENCOUNTER — TELEPHONE (OUTPATIENT)
Dept: FAMILY MEDICINE CLINIC | Age: 74
End: 2024-03-28

## 2024-03-28 LAB
SEND OUT REPORT: NORMAL
TEST NAME: NORMAL
URINE CULTURE, ROUTINE: NORMAL

## 2024-03-28 NOTE — PROGRESS NOTES
[facility] faxed a copy of patient's UA C&S from 3/26/24. These results were already showing in Epic so I did not scan in the fax.     Results are available for your review.

## 2024-03-28 NOTE — TELEPHONE ENCOUNTER
[facility] faxed a copy of patient's UA C&S from 3/26/24. These results were already showing in Epic so I did not scan in the fax.      Results are available for your review.

## 2024-05-02 ENCOUNTER — OFFICE VISIT (OUTPATIENT)
Dept: ONCOLOGY | Age: 74
End: 2024-05-02
Payer: MEDICARE

## 2024-05-02 ENCOUNTER — HOSPITAL ENCOUNTER (OUTPATIENT)
Dept: INFUSION THERAPY | Age: 74
Discharge: HOME | End: 2024-05-02

## 2024-05-02 VITALS
BODY MASS INDEX: 35.79 KG/M2 | SYSTOLIC BLOOD PRESSURE: 95 MMHG | DIASTOLIC BLOOD PRESSURE: 62 MMHG | HEIGHT: 70 IN | HEART RATE: 78 BPM | OXYGEN SATURATION: 100 % | WEIGHT: 250 LBS | TEMPERATURE: 97.6 F

## 2024-05-02 DIAGNOSIS — D69.6 THROMBOCYTOPENIA (HCC): Primary | ICD-10-CM

## 2024-05-02 DIAGNOSIS — D64.9 ANEMIA, UNSPECIFIED TYPE: ICD-10-CM

## 2024-05-02 DIAGNOSIS — K70.30 ALCOHOLIC CIRRHOSIS OF LIVER WITHOUT ASCITES (HCC): ICD-10-CM

## 2024-05-02 PROCEDURE — 3074F SYST BP LT 130 MM HG: CPT | Performed by: INTERNAL MEDICINE

## 2024-05-02 PROCEDURE — 99212 OFFICE O/P EST SF 10 MIN: CPT

## 2024-05-02 PROCEDURE — 3078F DIAST BP <80 MM HG: CPT | Performed by: INTERNAL MEDICINE

## 2024-05-02 PROCEDURE — 1123F ACP DISCUSS/DSCN MKR DOCD: CPT | Performed by: INTERNAL MEDICINE

## 2024-05-02 PROCEDURE — 99214 OFFICE O/P EST MOD 30 MIN: CPT | Performed by: INTERNAL MEDICINE

## 2024-05-02 NOTE — PROGRESS NOTES
Hematology oncology follow-up note      Diagnosis:   Diagnosis Orders   1. Thrombocytopenia (HCC)        2. Anemia, unspecified type        3. Alcoholic cirrhosis of liver without ascites (HCC)          Interval history:  Patient is at his baseline.  He has no new complaints.  No bleeding.    HPI:   Jermaine Burks is a 73 y.o. male with past history significant for:    Lung squamous cell carcinoma 2017 (Undergoing surveillance at [facility])    10/16/2023 CT chest without at [facility] for lung squamous cancer surveillance  -Progressive but small right-sided pleural effusion  -Consolidative opacity within RUL extending to the right perihilar region, stable  - Several 3 mm noncalcified right lung nodules, stable continue to follow the [facility]  -hepatic cirrhosis, stable.  Cholelithiasis is again    RA (On hydroxychloroquine and prednisone 5 mg p.o. daily, low chance to cause thrombocytopenia),     neuropathy, back pain, BMI 37, COPD, HTN, A-fib on baby aspirin, history of brain bleed (9/2016 felt to be related to amyloid angiopathy, same time bacteremia), severe AS s/p TAVR, CKD    Seizure on Keppra and lacosamide (very low chance for thrombocytopenia)    Thrombocytopenia, alcoholic liver cirrhosis (sober since 1/2016), Esophageal varices upper GI bleed 2012   Stable splenomegaly reported on previous imaging example CT chest 2019    Last labs 2/7/2024  -Thrombocytopenia 60s-70s (alcohol liver disease and splenomegaly) at least since 2012  -WBC 4.2 but normal prior to that although trending down since 10/2023  -Lymphopenia 0.37, at least since 2012 (alcohol and the prednisone)  -Normal hemoglobin 13.5 (previously anemic)  -High  (alcohol liver disease)    10/16/2023 CMP  -Creatinine 1.3, GFR 58  -TB 1.1, AST 52, normal alk phos and ALT  -Normal TP and albumin  -Last INR 1.3 on 11/2022    Today patient is here with his wife.  He reports chronic unchanged fatigue.  He use a cane.  He reports

## 2024-05-15 ENCOUNTER — HOSPITAL ENCOUNTER (OUTPATIENT)
Age: 74
Discharge: HOME | End: 2024-05-17

## 2024-05-15 PROCEDURE — 88305 TISSUE EXAM BY PATHOLOGIST: CPT

## 2024-05-17 LAB — SURGICAL PATHOLOGY REPORT: NORMAL

## 2024-06-06 DIAGNOSIS — K70.30 ALCOHOLIC CIRRHOSIS OF LIVER WITHOUT ASCITES (HCC): ICD-10-CM

## 2024-06-07 RX ORDER — NADOLOL 40 MG/1
TABLET ORAL
Qty: 180 TABLET | Refills: 3 | Status: SHIPPED | OUTPATIENT
Start: 2024-06-07

## 2024-06-15 SDOH — ECONOMIC STABILITY: FOOD INSECURITY: WITHIN THE PAST 12 MONTHS, THE FOOD YOU BOUGHT JUST DIDN'T LAST AND YOU DIDN'T HAVE MONEY TO GET MORE.: NEVER TRUE

## 2024-06-15 SDOH — ECONOMIC STABILITY: FOOD INSECURITY: WITHIN THE PAST 12 MONTHS, YOU WORRIED THAT YOUR FOOD WOULD RUN OUT BEFORE YOU GOT MONEY TO BUY MORE.: NEVER TRUE

## 2024-06-15 SDOH — ECONOMIC STABILITY: TRANSPORTATION INSECURITY
IN THE PAST 12 MONTHS, HAS LACK OF TRANSPORTATION KEPT YOU FROM MEETINGS, WORK, OR FROM GETTING THINGS NEEDED FOR DAILY LIVING?: NO

## 2024-06-15 SDOH — ECONOMIC STABILITY: INCOME INSECURITY: HOW HARD IS IT FOR YOU TO PAY FOR THE VERY BASICS LIKE FOOD, HOUSING, MEDICAL CARE, AND HEATING?: NOT HARD AT ALL

## 2024-06-17 ENCOUNTER — OFFICE VISIT (OUTPATIENT)
Dept: FAMILY MEDICINE CLINIC | Age: 74
End: 2024-06-17
Payer: MEDICARE

## 2024-06-17 VITALS
WEIGHT: 238.5 LBS | SYSTOLIC BLOOD PRESSURE: 96 MMHG | BODY MASS INDEX: 34.14 KG/M2 | HEIGHT: 70 IN | TEMPERATURE: 98.1 F | HEART RATE: 86 BPM | DIASTOLIC BLOOD PRESSURE: 66 MMHG | OXYGEN SATURATION: 97 %

## 2024-06-17 DIAGNOSIS — I10 ESSENTIAL HYPERTENSION: ICD-10-CM

## 2024-06-17 DIAGNOSIS — G62.9 NEUROPATHY: ICD-10-CM

## 2024-06-17 DIAGNOSIS — K70.30 ALCOHOLIC CIRRHOSIS OF LIVER WITHOUT ASCITES (HCC): ICD-10-CM

## 2024-06-17 DIAGNOSIS — S32.030D COMPRESSION FRACTURE OF L3 VERTEBRA WITH ROUTINE HEALING, SUBSEQUENT ENCOUNTER: Primary | ICD-10-CM

## 2024-06-17 PROBLEM — G40.909 SEIZURE DISORDER (HCC): Chronic | Status: RESOLVED | Noted: 2017-04-14 | Resolved: 2024-06-17

## 2024-06-17 PROBLEM — R56.9 SEIZURE (HCC): Status: RESOLVED | Noted: 2017-12-11 | Resolved: 2024-06-17

## 2024-06-17 LAB
ALBUMIN: 3.5 G/DL (ref 3.5–5.2)
ALP BLD-CCNC: 66 U/L (ref 40–129)
ALT SERPL-CCNC: 30 U/L (ref 0–40)
ANION GAP SERPL CALCULATED.3IONS-SCNC: 11 MMOL/L (ref 7–16)
AST SERPL-CCNC: 60 U/L (ref 0–39)
BILIRUB SERPL-MCNC: 1.6 MG/DL (ref 0–1.2)
BUN BLDV-MCNC: 18 MG/DL (ref 6–23)
CALCIUM SERPL-MCNC: 9.5 MG/DL (ref 8.6–10.2)
CHLORIDE BLD-SCNC: 103 MMOL/L (ref 98–107)
CO2: 26 MMOL/L (ref 22–29)
CREAT SERPL-MCNC: 1.3 MG/DL (ref 0.7–1.2)
GFR, ESTIMATED: 59 ML/MIN/1.73M2
GLUCOSE BLD-MCNC: 125 MG/DL (ref 74–99)
POTASSIUM SERPL-SCNC: 5.3 MMOL/L (ref 3.5–5)
SODIUM BLD-SCNC: 140 MMOL/L (ref 132–146)
TOTAL PROTEIN: 6.4 G/DL (ref 6.4–8.3)

## 2024-06-17 PROCEDURE — 1036F TOBACCO NON-USER: CPT | Performed by: FAMILY MEDICINE

## 2024-06-17 PROCEDURE — 3078F DIAST BP <80 MM HG: CPT | Performed by: FAMILY MEDICINE

## 2024-06-17 PROCEDURE — 99214 OFFICE O/P EST MOD 30 MIN: CPT | Performed by: FAMILY MEDICINE

## 2024-06-17 PROCEDURE — 1123F ACP DISCUSS/DSCN MKR DOCD: CPT | Performed by: FAMILY MEDICINE

## 2024-06-17 PROCEDURE — 3017F COLORECTAL CA SCREEN DOC REV: CPT | Performed by: FAMILY MEDICINE

## 2024-06-17 PROCEDURE — G8417 CALC BMI ABV UP PARAM F/U: HCPCS | Performed by: FAMILY MEDICINE

## 2024-06-17 PROCEDURE — G8427 DOCREV CUR MEDS BY ELIG CLIN: HCPCS | Performed by: FAMILY MEDICINE

## 2024-06-17 PROCEDURE — 3074F SYST BP LT 130 MM HG: CPT | Performed by: FAMILY MEDICINE

## 2024-06-17 RX ORDER — SPIRONOLACTONE 25 MG/1
25 TABLET ORAL DAILY
Qty: 90 TABLET | Refills: 1 | Status: SHIPPED | OUTPATIENT
Start: 2024-06-17 | End: 2024-12-14

## 2024-06-17 RX ORDER — CALCITONIN SALMON 200 [IU]/.09ML
1 SPRAY, METERED NASAL DAILY
Qty: 3 EACH | Refills: 1 | Status: SHIPPED | OUTPATIENT
Start: 2024-06-17

## 2024-06-17 ASSESSMENT — ENCOUNTER SYMPTOMS
ABDOMINAL DISTENTION: 0
VOMITING: 0
FACIAL SWELLING: 0
SINUS PAIN: 0
CONSTIPATION: 0
BLOOD IN STOOL: 0
APNEA: 0
PHOTOPHOBIA: 0
SHORTNESS OF BREATH: 0
RHINORRHEA: 0
SINUS PRESSURE: 0
COUGH: 0
CHEST TIGHTNESS: 0
DIARRHEA: 0
COLOR CHANGE: 0

## 2024-06-17 NOTE — PROGRESS NOTES
Jermaine Burks is a 73 y.o. male accompanied by his wife  CC:   Chief Complaint   Patient presents with    4 month follow up       4-month follow-up:  Doing well      His alcoholic liver cirrhosis without ascites has been well addressed and well treated.  The patient is doing well.  He is very stable from this aspect.      Back pain:  The patient was seen by me, and we obtained a back x-ray.  At that point time the back x-ray showed compression fractures of possible L1-L2 and L3.  The patient was placed on Miacalcin.  He is here today because he is in continued amounts of pain.  He still does not have any significant neurologic components.  However he does have some numbness in his toes.  The patient does have a persistent history of neuropathy.      Neuropathy:  His neuropathy has been being treated by [facility] neuropathy.  He is doing well otherwise.    Patient's past medical, surgical, social and/or family history reviewed, updated in chart, and are non-contributory (unless otherwise stated).  Medications and allergies also reviewed and updated in chart.      BP 96/66 (Site: Right Upper Arm, Position: Sitting, Cuff Size: Medium Adult)   Pulse 86   Temp 98.1 °F (36.7 °C)   Ht 1.778 m (5' 10\")   Wt 108.2 kg (238 lb 8 oz)   SpO2 97%   BMI 34.22 kg/m²     Review of Systems   Constitutional:  Negative for chills, fatigue and fever.   HENT:  Negative for congestion, ear pain, facial swelling, rhinorrhea, sinus pressure and sinus pain.    Eyes:  Negative for photophobia and visual disturbance.   Respiratory:  Negative for apnea, cough, chest tightness and shortness of breath.    Cardiovascular:  Negative for chest pain and palpitations.   Gastrointestinal:  Negative for abdominal distention, blood in stool, constipation, diarrhea and vomiting.   Endocrine: Negative for cold intolerance, polydipsia, polyphagia and polyuria.   Genitourinary:  Negative for decreased urine volume, frequency and urgency.

## 2024-07-08 ENCOUNTER — OFFICE VISIT (OUTPATIENT)
Dept: PAIN MANAGEMENT | Age: 74
End: 2024-07-08
Payer: MEDICARE

## 2024-07-08 VITALS
TEMPERATURE: 97.1 F | HEIGHT: 69 IN | BODY MASS INDEX: 35.25 KG/M2 | RESPIRATION RATE: 16 BRPM | HEART RATE: 67 BPM | SYSTOLIC BLOOD PRESSURE: 101 MMHG | OXYGEN SATURATION: 97 % | DIASTOLIC BLOOD PRESSURE: 64 MMHG | WEIGHT: 238 LBS

## 2024-07-08 DIAGNOSIS — M47.816 LUMBAR SPONDYLOSIS: ICD-10-CM

## 2024-07-08 DIAGNOSIS — S32.010S CLOSED COMPRESSION FRACTURE OF L1 VERTEBRA, SEQUELA: Primary | ICD-10-CM

## 2024-07-08 DIAGNOSIS — M51.36 LUMBAR DEGENERATIVE DISC DISEASE: ICD-10-CM

## 2024-07-08 DIAGNOSIS — M43.17 ANTEROLISTHESIS OF LUMBOSACRAL SPINE: ICD-10-CM

## 2024-07-08 DIAGNOSIS — S32.030K CLOSED COMPRESSION FRACTURE OF L3 LUMBAR VERTEBRA WITH NONUNION, SUBSEQUENT ENCOUNTER: ICD-10-CM

## 2024-07-08 PROCEDURE — 3074F SYST BP LT 130 MM HG: CPT | Performed by: STUDENT IN AN ORGANIZED HEALTH CARE EDUCATION/TRAINING PROGRAM

## 2024-07-08 PROCEDURE — G8417 CALC BMI ABV UP PARAM F/U: HCPCS | Performed by: STUDENT IN AN ORGANIZED HEALTH CARE EDUCATION/TRAINING PROGRAM

## 2024-07-08 PROCEDURE — 1123F ACP DISCUSS/DSCN MKR DOCD: CPT | Performed by: STUDENT IN AN ORGANIZED HEALTH CARE EDUCATION/TRAINING PROGRAM

## 2024-07-08 PROCEDURE — 1036F TOBACCO NON-USER: CPT | Performed by: STUDENT IN AN ORGANIZED HEALTH CARE EDUCATION/TRAINING PROGRAM

## 2024-07-08 PROCEDURE — G8427 DOCREV CUR MEDS BY ELIG CLIN: HCPCS | Performed by: STUDENT IN AN ORGANIZED HEALTH CARE EDUCATION/TRAINING PROGRAM

## 2024-07-08 PROCEDURE — 3017F COLORECTAL CA SCREEN DOC REV: CPT | Performed by: STUDENT IN AN ORGANIZED HEALTH CARE EDUCATION/TRAINING PROGRAM

## 2024-07-08 PROCEDURE — 3078F DIAST BP <80 MM HG: CPT | Performed by: STUDENT IN AN ORGANIZED HEALTH CARE EDUCATION/TRAINING PROGRAM

## 2024-07-08 PROCEDURE — 99204 OFFICE O/P NEW MOD 45 MIN: CPT | Performed by: STUDENT IN AN ORGANIZED HEALTH CARE EDUCATION/TRAINING PROGRAM

## 2024-07-22 ENCOUNTER — EVALUATION (OUTPATIENT)
Dept: PHYSICAL THERAPY | Age: 74
End: 2024-07-22
Payer: MEDICARE

## 2024-07-22 DIAGNOSIS — S32.030D COMPRESSION FRACTURE OF L3 VERTEBRA WITH ROUTINE HEALING, SUBSEQUENT ENCOUNTER: Primary | ICD-10-CM

## 2024-07-22 PROCEDURE — 97161 PT EVAL LOW COMPLEX 20 MIN: CPT | Performed by: PHYSICAL THERAPIST

## 2024-07-29 ENCOUNTER — PROCEDURE VISIT (OUTPATIENT)
Dept: PODIATRY | Age: 74
End: 2024-07-29
Payer: MEDICARE

## 2024-07-29 VITALS — TEMPERATURE: 98.2 F | WEIGHT: 238 LBS | BODY MASS INDEX: 35.15 KG/M2

## 2024-07-29 DIAGNOSIS — M79.674 PAIN IN TOE OF RIGHT FOOT: ICD-10-CM

## 2024-07-29 DIAGNOSIS — R26.2 DIFFICULTY WALKING: ICD-10-CM

## 2024-07-29 DIAGNOSIS — B35.1 TINEA UNGUIUM: Primary | ICD-10-CM

## 2024-07-29 DIAGNOSIS — I73.9 PERIPHERAL VASCULAR DISEASE, UNSPECIFIED (HCC): ICD-10-CM

## 2024-07-29 DIAGNOSIS — M79.675 PAIN IN LEFT TOE(S): ICD-10-CM

## 2024-07-29 PROCEDURE — 11721 DEBRIDE NAIL 6 OR MORE: CPT | Performed by: PODIATRIST

## 2024-07-29 NOTE — PROGRESS NOTES
[facility]  Return Patient    Chief Complaint   Patient presents with    Toe Pain     Routine nail care.  Ashley Rutherford MD seen 6/17/2024       Subjective: This Jermaine Burks comes to clinic for foot and nail care.  Pt currently has complaint of thickened, painful, elongated nails that he/she cannot manage by themselves.  Pt. Relates pain to nails with shoe gear.  Pt's primary care physician is Ashley Rutherford MD.  Past Medical History:   Diagnosis Date    Alcoholic cirrhosis (HCC)     Alcoholism (HCC)     Has been sober / dry since 01/2016.    Anxiety     Arthritis     Chronic atrial fibrillation (HCC)     Esophageal varices (HCC)     UGI bleed ~2012 - has had variceal banding 2x around that time.    History of kidney stones     Hypertension     Lumbar compression fracture (HCC) 02/2017    Lung tumor 2017    Osteopenia     Osteopenia     S/P TAVR (transcatheter aortic valve replacement) 9/20/2018    Seizure disorder ([facility]) 09/2016    Complicated a stroke with occipital ICH.    Seizures ([facility])     Sepsis with MRSE bacteremia 09/2016    resolved    Severe aortic stenosis     Confirmed by echo 9/2016, BROWN 10/2016. Undergoing w/u at [facility] for TAVR, as of 4/2017.    Sleep apnea     Couldn't tolerate CPAP therapy ~2007. Had had UPPP.    Stroke ([facility]) 09/2016    With receptive aphasia, poor memory that are improving as of 04/2017. Pt was found to have an occipital ICH at that time, with possible amyloid angiopathy as cause per MRI subsequently. Had seizure and sepsis (d/t MRSE bacteremia) complicating stroke.       No Known Allergies  Current Outpatient Medications on File Prior to Visit   Medication Sig Dispense Refill    Calcium Carbonate (CALCIUM 600 PO) Take 1 tablet by mouth daily      spironolactone (ALDACTONE) 25 MG tablet Take 1 tablet by mouth daily 90 tablet 1    calcitonin (MIACALCIN) 200 UNIT/ACT nasal spray 1 spray by Nasal route daily 3 each 1    nadolol (CORGARD) 40 MG tablet TAKE 1 TABLET BY MOUTH IN

## 2024-07-31 ENCOUNTER — TREATMENT (OUTPATIENT)
Dept: PHYSICAL THERAPY | Age: 74
End: 2024-07-31
Payer: MEDICARE

## 2024-07-31 DIAGNOSIS — S32.030D COMPRESSION FRACTURE OF L3 VERTEBRA WITH ROUTINE HEALING, SUBSEQUENT ENCOUNTER: Primary | ICD-10-CM

## 2024-07-31 PROCEDURE — 97110 THERAPEUTIC EXERCISES: CPT | Performed by: PHYSICAL THERAPIST

## 2024-07-31 NOTE — PROGRESS NOTES
[facility]   Phone: [phone]   Fax: [phone]      Physical Therapy Treatment Note    Date: 2024  Patient Name: Jermaine Burks  : 1950   MRN: [id number]  DOInjury: years  DOSx: NA  Referring Provider: Ashley Rutherford MD     Medical Diagnosis: S32.030D (ICD-10-CM) - Compression fracture of L3 vertebra with routine healing, subsequent encounter     Outcome Measure:  Oswestry 52%    S: Pt reports 3-410 pain today.  Pt presents in w/c today.   O:  Time 1300- 1335     Visit 2/10 Repeat outcome measure at mid point and end.    Pain See above      ROM      Modalities      MH + ES            Exercise      ALL EXERCISE DONE WITH DRAW-IN TECHNIQUE                            Functional activities To aid in reaching , pushing, pulling tasks at home     ROWS: H  \"    ROWS: M  \"    ROWS: L  \"    Obliques - high  \"    Obliques - low  \"     THEREX     Bike      Punches      Lat pulldowns      Triceps ext standing                  Trunk ext TB      Trunk flex TB      Hip abd seated  20x  Band, HEP    Hip EXT standing  10x      TG Squats      LAQ with core activation 2 x 10 B  HEP    Hip adduction  20x  Ball , HEP    Seated marches  20x  With core activation, HEP    Standing hip abd  X 10 B      A:  Tolerated fairly well.  Rest breaks taken throughout as needed.  Pt's wife present for session. Pt given band and printout to add seated exercises to HEP as noted above.     P: Continue with rehab plan  Waleska Barnes, PT  PT DPT [id number]    Treatment Charges: Mins Units   Initial Evaluation     Re-Evaluation     Ther Exercise         TE 35 2   Manual Therapy     MT     Ther Activities        TA     Gait Training          GT     Neuro Re-education NR     Modalities     Non-Billable Service Time     Other     Total Time/Units 35 2

## 2024-08-07 ENCOUNTER — TREATMENT (OUTPATIENT)
Dept: PHYSICAL THERAPY | Age: 74
End: 2024-08-07
Payer: MEDICARE

## 2024-08-07 DIAGNOSIS — S32.030D COMPRESSION FRACTURE OF L3 VERTEBRA WITH ROUTINE HEALING, SUBSEQUENT ENCOUNTER: Primary | ICD-10-CM

## 2024-08-07 PROCEDURE — 97110 THERAPEUTIC EXERCISES: CPT | Performed by: PHYSICAL THERAPIST

## 2024-08-07 NOTE — PROGRESS NOTES
[facility]   Phone: [phone]   Fax: [phone]      Physical Therapy Treatment Note    Date: 2024  Patient Name: Jermaine Burks  : 1950   MRN: [id number]  DOInjury: years  DOSx: NA  Referring Provider: Ashley Rutherford MD     Medical Diagnosis: S32.030D (ICD-10-CM) - Compression fracture of L3 vertebra with routine healing, subsequent encounter     Outcome Measure:  Oswestry 52%    S: Pt reports feels achy muscles today 3-4/10 only with activity.   O:  Time 1300- 1340      Visit 3/10 Repeat outcome measure at mid point and end.    Pain See above      ROM      Modalities      MH + ES            Exercise      ALL EXERCISE DONE WITH DRAW-IN TECHNIQUE                            Functional activities To aid in reaching , pushing, pulling tasks at home     ROWS: H  \"    ROWS: M  \"    ROWS: L  \"    Obliques - high  \"    Obliques - low  \"     THEREX     Bike      Punches      Lat pulldowns      Triceps ext standing            Sit to stands 10x   With UE support     Trunk ext TB      Trunk flex TB      Hip abd seated  20x   GTB,  HEP    Hip EXT standing      TG Squats      LAQ with core activation 2 x 10 B   HEP    Hip adduction  20x   Ball , HEP    Seated marches  20x  With core activation, HEP    Standing hip abd  X 10 B      A:  Tolerated fairly well.  Requires rest breaks throughout.  Pt's wife present for session.     P: Continue with rehab plan  Waleska Barnes, PT  PT DPT [id number]    Treatment Charges: Mins Units   Initial Evaluation     Re-Evaluation     Ther Exercise         TE 40 3   Manual Therapy     MT     Ther Activities        TA     Gait Training          GT     Neuro Re-education NR     Modalities     Non-Billable Service Time     Other     Total Time/Units 40 3

## 2024-08-08 ENCOUNTER — OFFICE VISIT (OUTPATIENT)
Dept: PAIN MANAGEMENT | Age: 74
End: 2024-08-08
Payer: MEDICARE

## 2024-08-08 VITALS
OXYGEN SATURATION: 96 % | HEIGHT: 69 IN | BODY MASS INDEX: 35.27 KG/M2 | DIASTOLIC BLOOD PRESSURE: 76 MMHG | RESPIRATION RATE: 16 BRPM | TEMPERATURE: 96.8 F | WEIGHT: 238.1 LBS | HEART RATE: 57 BPM | SYSTOLIC BLOOD PRESSURE: 116 MMHG

## 2024-08-08 DIAGNOSIS — M53.2X7 LUMBOSACRAL SPINE INSTABILITY: ICD-10-CM

## 2024-08-08 DIAGNOSIS — S32.010S CLOSED COMPRESSION FRACTURE OF L1 VERTEBRA, SEQUELA: Primary | ICD-10-CM

## 2024-08-08 DIAGNOSIS — M51.36 LUMBAR DEGENERATIVE DISC DISEASE: ICD-10-CM

## 2024-08-08 DIAGNOSIS — M47.816 LUMBAR SPONDYLOSIS: ICD-10-CM

## 2024-08-08 DIAGNOSIS — S32.030K CLOSED COMPRESSION FRACTURE OF L3 LUMBAR VERTEBRA WITH NONUNION, SUBSEQUENT ENCOUNTER: ICD-10-CM

## 2024-08-08 DIAGNOSIS — M43.17 ANTEROLISTHESIS OF LUMBOSACRAL SPINE: ICD-10-CM

## 2024-08-08 PROCEDURE — 99214 OFFICE O/P EST MOD 30 MIN: CPT | Performed by: STUDENT IN AN ORGANIZED HEALTH CARE EDUCATION/TRAINING PROGRAM

## 2024-08-08 PROCEDURE — 3074F SYST BP LT 130 MM HG: CPT | Performed by: STUDENT IN AN ORGANIZED HEALTH CARE EDUCATION/TRAINING PROGRAM

## 2024-08-08 PROCEDURE — G8417 CALC BMI ABV UP PARAM F/U: HCPCS | Performed by: STUDENT IN AN ORGANIZED HEALTH CARE EDUCATION/TRAINING PROGRAM

## 2024-08-08 PROCEDURE — 3017F COLORECTAL CA SCREEN DOC REV: CPT | Performed by: STUDENT IN AN ORGANIZED HEALTH CARE EDUCATION/TRAINING PROGRAM

## 2024-08-08 PROCEDURE — 1123F ACP DISCUSS/DSCN MKR DOCD: CPT | Performed by: STUDENT IN AN ORGANIZED HEALTH CARE EDUCATION/TRAINING PROGRAM

## 2024-08-08 PROCEDURE — 1036F TOBACCO NON-USER: CPT | Performed by: STUDENT IN AN ORGANIZED HEALTH CARE EDUCATION/TRAINING PROGRAM

## 2024-08-08 PROCEDURE — G8427 DOCREV CUR MEDS BY ELIG CLIN: HCPCS | Performed by: STUDENT IN AN ORGANIZED HEALTH CARE EDUCATION/TRAINING PROGRAM

## 2024-08-08 PROCEDURE — 3078F DIAST BP <80 MM HG: CPT | Performed by: STUDENT IN AN ORGANIZED HEALTH CARE EDUCATION/TRAINING PROGRAM

## 2024-08-08 RX ORDER — HYDROCORTISONE ACETATE PRAMOXINE HCL 2.5; 1 G/100G; G/100G
CREAM TOPICAL
COMMUNITY
Start: 2024-07-17

## 2024-08-08 RX ORDER — FAMOTIDINE 40 MG/1
TABLET, FILM COATED ORAL
COMMUNITY
Start: 2024-05-13

## 2024-08-09 NOTE — PROGRESS NOTES
Jermaine Burks presents to the [facility] on 8/8/2024. Jermaine is complaining of pain low back. The pain is intermittent. The pain is described as aching. Pain is rated on his best day at a 1, on his worst day at a 8, and on average at a 6 on the VAS scale. He took his last dose of Motrin today.     Any procedures since your last visit: No.    Pacemaker or defibrillator: No.    He is  on NSAIDS and is  on anticoagulation medications to include ASA and is managed by Ashley Rutherford MD.     Medication Contract and Consent for Opioid Use Documents Filed        No documents found                    /76   Pulse 57   Temp 96.8 °F (36 °C) (Infrared)   Resp 16   Ht 1.753 m (5' 9\")   Wt 108 kg (238 lb 1.6 oz)   SpO2 96%   BMI 35.16 kg/m²      No LMP for male patient.  
bilateral   Range of motion: Flexion Normal,  extension Normal,  bilateral  rotation is normal and is not painful.   Spurling's: negative bilaterally   Shaw's: negative bilaterally      Thoracic spine: Inspection: normal   Palpation:No tenderness over the midline and paraspinal area, bilaterally  Range of motion: normal in flexion, extension rotation bilateral and is not painful.      Lumbar spine: Inspection: normal   Spine Range of motion: Flexion Decreased,  extension Decreased,  bilateral  Lateral bending, and rotation is reduced and is somewhat painful.   Palpation:tenderness paravertebral muscles.  Facet Loading: left -positive and right-negative.     Musculoskeletal:  SIJ Compression Test: (-) neg bilaterally  SIJ Distraction: (-) neg bilaterally  CEE test: (-) neg bilaterally   Gaenslen's test:(-) neg bilaterally  Thigh Thrust: (-) neg bilaterally  SLR : (-) neg bilaterally   Trochanteric bursa tenderness: (-) neg bilaterally        Extremities:  Tremors: No  - bilateral upper and lower   Hips: no pain with internal rotation of bilateral  hips   Pulses: present Lt radial   Cyanosis: none   Edema: No       Neurological: Sensory:normal to light touch  , CN 2-12 grossly intact      MOTOR Left (x/5) Right (x/5)   Shoulder Abduction (C5)  5 5   Biceps - Elbow Flexion (C6)  5 5   Triceps - Elbow Extension (C7)  5 5   Hand  (C8)  5 5   Iliopsoas - Hip flex /Abd (L2)  5 5   Quadriceps - Knee Ext (L3)  5 5   Ant Tibialis - Ankle Dorsiflex (L4)  5 5   Post Tibialis - Hip Ext/Abd Foot dorsiflex (L5)  5 5   Gastrocnemius - Plantar flex (S1)  5 5      REFLEXES Left Right   Brachioradialis (C5/6)  2+ 2+   Biceps (C5/6)  2+ 2+   Triceps (C7)  2+ 2+   Quadriceps / Patellar (L4)  2+ 2+   Achilles (S1)  2+ 2+      Gait:antalgic  Assistance Devices: canes     Dermatology: Skin: scattered purpura    Impression:  Assessment/Plan:    ICD-10-CM    1. Closed compression fracture of L1 vertebra, sequela  S32.010S [facility]

## 2024-08-12 ENCOUNTER — TREATMENT (OUTPATIENT)
Dept: PHYSICAL THERAPY | Age: 74
End: 2024-08-12
Payer: MEDICARE

## 2024-08-12 DIAGNOSIS — S32.030D COMPRESSION FRACTURE OF L3 VERTEBRA WITH ROUTINE HEALING, SUBSEQUENT ENCOUNTER: Primary | ICD-10-CM

## 2024-08-12 PROCEDURE — 97110 THERAPEUTIC EXERCISES: CPT

## 2024-08-12 NOTE — PROGRESS NOTES
[facility]   Phone: [phone]   Fax: [phone]      Physical Therapy Treatment Note    Date: 2024  Patient Name: Jermaine Burks  : 1950   MRN: [id number]  DOInjury: years  DOSx: NA  Referring Provider: Ashley Rutherford MD     Medical Diagnosis: S32.030D (ICD-10-CM) - Compression fracture of L3 vertebra with routine healing, subsequent encounter     Outcome Measure:  Oswestry 52%    S: Pt reports feels achy muscles today no rating given.   O:  Time 1296- 9848     Visit 4/10 Repeat outcome measure at mid point and end.    Pain See above      ROM      Modalities      MH + ES            Exercise      ALL EXERCISE DONE WITH DRAW-IN TECHNIQUE                            Functional activities To aid in reaching , pushing, pulling tasks at home     ROWS: H  \"    ROWS: M 2 x 10  RTB  Pt seated, difficulty with proper technique    ROWS: L  \"      \"    Obliques - low  \"     THEREX     Bike NEXT     Punches      Lat pulldowns      Triceps ext standing            Sit to stands 2 x 5 With UE support     Trunk ext TB      Trunk flex TB      Hip abd seated  20x   GTB,  HEP    Hip EXT standing      TG Squats      LAQ with core activation 2 x 10 B   HEP Pt alternating, reporting it was easier to keep count.     Scap retraction  X 20            Hip adduction  20x   Ball , HEP    Seated marches  20x  With core activation, HEP    Standing hip abd  2 X 10 B      A:  Tolerated fairly well. Reports muscle soreness at times during session. Limited activity tolerance noted.  Requires rest breaks throughout.  Pt's wife present for session.     P: Continue with rehab plan  MARNIE ODONNELL, PTA  [id number]    Treatment Charges: Mins Units   Initial Evaluation     Re-Evaluation     Ther Exercise         TE 35 2   Manual Therapy     MT     Ther Activities        TA     Gait Training          GT     Neuro Re-education NR     Modalities     Non-Billable Service Time     Other     Total Time/Units 35

## 2024-08-15 ENCOUNTER — TREATMENT (OUTPATIENT)
Dept: PHYSICAL THERAPY | Age: 74
End: 2024-08-15
Payer: MEDICARE

## 2024-08-15 DIAGNOSIS — S32.030D COMPRESSION FRACTURE OF L3 VERTEBRA WITH ROUTINE HEALING, SUBSEQUENT ENCOUNTER: Primary | ICD-10-CM

## 2024-08-15 PROCEDURE — 97110 THERAPEUTIC EXERCISES: CPT | Performed by: PHYSICAL THERAPIST

## 2024-08-15 NOTE — PROGRESS NOTES
[facility]   Phone: [phone]   Fax: [phone]      Physical Therapy Treatment Note    Date: 8/15/2024  Patient Name: Jermaine Burks  : 1950   MRN: [id number]  DOInjury: years  DOSx: NA  Referring Provider: Ashley Rutherford MD     Medical Diagnosis: S32.030D (ICD-10-CM) - Compression fracture of L3 vertebra with routine healing, subsequent encounter     Outcome Measure:  Oswestry 52%    S: Pt reports feels 8/10  low back pain today.   O:  Time 1302 - 1025      Visit 5/10 Repeat outcome measure at mid point and end.    Pain See above      ROM      Modalities      MH + ES            Exercise      ALL EXERCISE DONE WITH DRAW-IN TECHNIQUE                            Functional activities To aid in reaching , pushing, pulling tasks at home     ROWS: H  \"    ROWS: M 2 x 10  RTB  Pt seated, difficulty with proper technique    ROWS: L  \"      \"    Obliques - low  \"     THEREX     Bike 5 minutes      Punches      Lat pulldowns      Triceps ext standing            Sit to stands  x 5 With UE support     Trunk ext TB      Trunk flex TB      Hip abd seated  20x   GTB,  HEP    Hip EXT standing      TG Squats      LAQ with core activation 2 x 10 B   HEP      Scap retraction  X 20            Hip adduction  20x   Ball , HEP    Seated marches  20x  With core activation, HEP    Standing hip abd  2 X 10 B      A:  Tolerated fairly well.  Requires rest breaks throughout.  Pt's wife present for session.     P: Continue with rehab plan  Waleska Barnes, PT  [id number]    Treatment Charges: Mins Units   Initial Evaluation     Re-Evaluation     Ther Exercise         TE 36 2   Manual Therapy     MT     Ther Activities        TA     Gait Training          GT     Neuro Re-education NR     Modalities     Non-Billable Service Time     Other     Total Time/Units 36 2

## 2024-08-21 ENCOUNTER — TREATMENT (OUTPATIENT)
Dept: PHYSICAL THERAPY | Age: 74
End: 2024-08-21
Payer: MEDICARE

## 2024-08-21 DIAGNOSIS — S32.030D COMPRESSION FRACTURE OF L3 VERTEBRA WITH ROUTINE HEALING, SUBSEQUENT ENCOUNTER: Primary | ICD-10-CM

## 2024-08-21 PROCEDURE — 97110 THERAPEUTIC EXERCISES: CPT | Performed by: PHYSICAL THERAPIST

## 2024-08-21 NOTE — PROGRESS NOTES
[facility]   Phone: [phone]   Fax: [phone]      Physical Therapy Treatment Note    Date: 2024  Patient Name: Jermaine Burks  : 1950   MRN: [id number]  DOInjury: years  DOSx: NA  Referring Provider: Ashley Rutherford MD     Medical Diagnosis: S32.030D (ICD-10-CM) - Compression fracture of L3 vertebra with routine healing, subsequent encounter     Outcome Measure:  Oswestry 52%    S: Pt reports pain 5/10 sitting and 10/10 walking, 8/10 standing.  Reports today pain is somewhat worse than normal. Reports not sure why.  Pt reports possibly due to position patient slept in.  Wife reports possibly due to not having time to rest after getting ready before coming to therapy.      O:  Time 1114 - 1157     Visit 6/10 Repeat outcome measure at mid point and end.    Pain See above      ROM      Modalities      MH + ES            Exercise      ALL EXERCISE DONE WITH DRAW-IN TECHNIQUE                            Functional activities To aid in reaching , pushing, pulling tasks at home     ROWS: H  \"    ROWS: M 2 x 10  GTB  Pt seated,    ROWS: L  \"      \"    Obliques - low  \"     THEREX     Bike 5 minutes      Punches      Lat pulldowns      Triceps ext standing            Sit to stands  x 5 With UE support     Trunk ext TB      Trunk flex TB      Hip abd seated  20x   GTB,  HEP    Hip EXT standing  10x      TG Squats      LAQ with core activation 2 x 10 B   HEP      Scap retraction  X 20            Hip adduction  20x   Ball , HEP    Seated marches  20x  With core activation, HEP    Standing hip abd  2 X 10 B      A:  Tolerated fairly well.  Again requires rest breaks throughout.  Pt's wife present for session.     P: Continue with rehab plan  Waleska Barnes, PT  [id number]    Treatment Charges: Mins Units   Initial Evaluation     Re-Evaluation     Ther Exercise         TE 43 3   Manual Therapy     MT     Ther Activities        TA     Gait Training          GT     Neuro

## 2024-08-22 ENCOUNTER — TREATMENT (OUTPATIENT)
Dept: PHYSICAL THERAPY | Age: 74
End: 2024-08-22
Payer: MEDICARE

## 2024-08-22 DIAGNOSIS — S32.030D COMPRESSION FRACTURE OF L3 VERTEBRA WITH ROUTINE HEALING, SUBSEQUENT ENCOUNTER: Primary | ICD-10-CM

## 2024-08-22 PROCEDURE — 97110 THERAPEUTIC EXERCISES: CPT

## 2024-08-22 NOTE — PROGRESS NOTES
[facility]   Phone: [phone]   Fax: [phone]      Physical Therapy Treatment Note    Date: 2024  Patient Name: Jermaine Burks  : 1950   MRN: [id number]  DOInjury: years  DOSx: NA  Referring Provider: Ashley Rutherford MD     Medical Diagnosis: S32.030D (ICD-10-CM) - Compression fracture of L3 vertebra with routine healing, subsequent encounter     Outcome Measure:  Oswestry 52%    S: Pt reports pain muscle \"soreness\" today. No pain rating given.     O:  Time 3153-9581     Visit 7/10 Repeat outcome measure at mid point and end.    Pain See above      ROM      Modalities      MH + ES            Exercise      ALL EXERCISE DONE WITH DRAW-IN TECHNIQUE                            Functional activities To aid in reaching , pushing, pulling tasks at home     ROWS: H  \"    ROWS: M 2 x 10  GTB  Pt seated,    ROWS: L  \"      \"    Obliques - low  \"     THEREX     Bike 5 minutes      Punches      Lat pulldowns      Triceps ext standing            Sit to stands  x 5 With UE support     Trunk ext TB      Trunk flex TB      Hip abd seated  20x   GTB,  HEP    Hip EXT standing  10x      TG Squats      LAQ with core activation 2 x 10 B   HEP      Scap retraction  X 20            Hip adduction  20x   Ball , HEP    Seated marches  20x  With core activation, HEP    Standing hip abd  2 X 10 B      A:  Tolerated fairly well.  Again requires rest breaks throughout.  Pt's wife present for session. Sit to stand exercise done from chair (not WC). Pt alternating hip abd with hip ext each rep. Added seated HS exercise with GTB.       P: Continue with rehab plan  MARNIE ODONNELL, PTA  [id number]    Treatment Charges: Mins Units   Initial Evaluation     Re-Evaluation     Ther Exercise         TE 35 2   Manual Therapy     MT     Ther Activities        TA     Gait Training          GT     Neuro Re-education NR     Modalities     Non-Billable Service Time     Other     Total Time/Units 35 2

## 2024-08-22 NOTE — PROGRESS NOTES
[facility]   Phone: [phone]   Fax: [phone]      Physical Therapy Treatment Note    Date: 2024  Patient Name: Jermaine Burks  : 1950   MRN: [id number]  DOInjury: years  DOSx: NA  Referring Provider: Ashley Rutherford MD     Medical Diagnosis: S32.030D (ICD-10-CM) - Compression fracture of L3 vertebra with routine healing, subsequent encounter     Outcome Measure:  Oswestry 52%    S: Pt reports pain muscle \"soreness\" today. No pain rating given.     O:  Time 1114 - 1157     Visit 6/10 Repeat outcome measure at mid point and end.    Pain See above      ROM      Modalities      MH + ES            Exercise      ALL EXERCISE DONE WITH DRAW-IN TECHNIQUE                            Functional activities To aid in reaching , pushing, pulling tasks at home     ROWS: H  \"    ROWS: M 2 x 10  GTB  Pt seated,    ROWS: L  \"      \"    Obliques - low  \"     THEREX     Bike 5 minutes      Punches      Lat pulldowns      Triceps ext standing            Sit to stands  x 5 With UE support     Trunk ext TB      Trunk flex TB      Hip abd seated  20x   GTB,  HEP    Hip EXT standing  10x      TG Squats      LAQ with core activation 2 x 10 B   HEP      Scap retraction  X 20            Hip adduction  20x   Ball , HEP    Seated marches  20x  With core activation, HEP    Standing hip abd  2 X 10 B      A:  Tolerated fairly well.  Again requires rest breaks throughout.  Pt's wife present for session.     P: Continue with rehab plan  MARNIE ODONENLL, PTA  [id number]    Treatment Charges: Mins Units   Initial Evaluation     Re-Evaluation     Ther Exercise         TE 43 3   Manual Therapy     MT     Ther Activities        TA     Gait Training          GT     Neuro Re-education NR     Modalities     Non-Billable Service Time     Other     Total Time/Units 43 3

## 2024-08-28 ENCOUNTER — TREATMENT (OUTPATIENT)
Dept: PHYSICAL THERAPY | Age: 74
End: 2024-08-28

## 2024-08-28 DIAGNOSIS — S32.030D COMPRESSION FRACTURE OF L3 VERTEBRA WITH ROUTINE HEALING, SUBSEQUENT ENCOUNTER: Primary | ICD-10-CM

## 2024-08-28 NOTE — PROGRESS NOTES
[facility]   Phone: [phone]   Fax: [phone]      Physical Therapy Treatment Note    Date: 2024  Patient Name: Jermaine Burks  : 1950   MRN: [id number]  DOInjury: years  DOSx: NA  Referring Provider: Ashley Rutherford MD     Medical Diagnosis: S32.030D (ICD-10-CM) - Compression fracture of L3 vertebra with routine healing, subsequent encounter     Outcome Measure:  Oswestry 52%    S: Pt reports 2/10 pain in back today.  Reports hasn't been compliant with HEP recently. Pt and pt's wife report haven't noticed changes since pt started therapy.  Pt reports would still like to finish out last few sessions.     O:  Time 1301- 1340     Visit 8 /10 Repeat outcome measure at mid point and end.    Pain See above      ROM      Modalities      MH + ES            Exercise      ALL EXERCISE DONE WITH DRAW-IN TECHNIQUE                            Functional activities To aid in reaching , pushing, pulling tasks at home     ROWS: H  \"    ROWS: M GTB  Pt seated,    ROWS: L  \"      \"    Obliques - low  \"     THEREX     Bike 10 minutes      Punches      Lat pulldowns      Triceps ext standing            Sit to stands  x 5 With UE support     Trunk ext TB      Trunk flex TB      Hip abd seated  GTB,  HEP    Hip EXT standing  10x      TG Squats      LAQ with core activation 2 x 10 B   HEP      Scap retraction            Hip adduction  20x   Ball , HEP    Seated marches  20x  With core activation, HEP    Standing hip abd  2 X 10 B      A:  Tolerated fairly well.  Pt requires rest breaks throughout.  Encouraged pt to be compliant with HEP at home. Pt would benefit from continuing PT to continue to work on strengthening.       P: Continue with rehab plan  Waleska Barnes, PT  [id number]    Treatment Charges: Mins Units   Initial Evaluation     Re-Evaluation     Ther Exercise         TE 39 3   Manual Therapy     MT     Ther Activities        TA     Gait Training          GT     Neuro  Re-education NR     Modalities     Non-Billable Service Time     Other     Total Time/Units 39 3

## 2024-09-04 ENCOUNTER — TREATMENT (OUTPATIENT)
Dept: PHYSICAL THERAPY | Age: 74
End: 2024-09-04
Payer: MEDICARE

## 2024-09-04 DIAGNOSIS — S32.030D COMPRESSION FRACTURE OF L3 VERTEBRA WITH ROUTINE HEALING, SUBSEQUENT ENCOUNTER: Primary | ICD-10-CM

## 2024-09-04 PROCEDURE — 97110 THERAPEUTIC EXERCISES: CPT | Performed by: PHYSICAL THERAPIST

## 2024-09-04 NOTE — PROGRESS NOTES
[facility]   Phone: [phone]   Fax: [phone]      Physical Therapy Treatment Note    Date: 2024  Patient Name: Jermaine Burks  : 1950   MRN: [id number]  DOInjury: years  DOSx: NA  Referring Provider: Ashley Rutherford MD     Medical Diagnosis: S32.030D (ICD-10-CM) - Compression fracture of L3 vertebra with routine healing, subsequent encounter     Outcome Measure:  Oswestry 52%    S: Pt reports 3-4/10 pain today in back. Pt reports noncompliance with HEP.  Again encouraged to be more consistent with HEP to gain strength.      O:  Time 1256 - 1338      Visit 9  /10 Repeat outcome measure at mid point and end.    Pain See above      ROM      Modalities      MH + ES            Exercise      ALL EXERCISE DONE WITH DRAW-IN TECHNIQUE                            Functional activities To aid in reaching , pushing, pulling tasks at home     ROWS: H  \"    ROWS: M GTB  Pt seated,    ROWS: L  \"      \"    Obliques - low  \"     THEREX     Bike 10 minutes      Punches      Lat pulldowns      Triceps ext standing            Sit to stands  x 5 With UE support     Trunk ext TB      Trunk flex TB      Hip abd seated  GTB,  HEP    Hip EXT standing  20x      TG Squats      LAQ with core activation 2 x 10 B   HEP      Scap retraction  X 20            Hip adduction  25x  Ball , HEP    Seated marches  20x  With core activation, HEP    Standing hip abd  2 X 10 B      A:  Tolerated fairly well.  Pt again requires rest breaks throughout.  Pt relies heavily upon UE's for sit to stands.  Pt reports weakness limits patient more than pain with sit to stands.    P: Continue with rehab plan  Waleska Barnes, PT  [id number]    Treatment Charges: Mins Units   Initial Evaluation     Re-Evaluation     Ther Exercise         TE 42 3   Manual Therapy     MT     Ther Activities        TA     Gait Training          GT     Neuro Re-education NR     Modalities     Non-Billable Service Time     Other

## 2024-09-06 ENCOUNTER — TREATMENT (OUTPATIENT)
Dept: PHYSICAL THERAPY | Age: 74
End: 2024-09-06

## 2024-09-06 DIAGNOSIS — S32.030D COMPRESSION FRACTURE OF L3 VERTEBRA WITH ROUTINE HEALING, SUBSEQUENT ENCOUNTER: Primary | ICD-10-CM

## 2024-09-06 NOTE — PROGRESS NOTES
[facility]   Phone: [phone]   Fax: [phone]      Physical Therapy Treatment Note    Date: 2024  Patient Name: Jermaine Burks  : 1950   MRN: [id number]  DOInjury: years  DOSx: NA  Referring Provider: Ashley Rutherford MD     Medical Diagnosis: S32.030D (ICD-10-CM) - Compression fracture of L3 vertebra with routine healing, subsequent encounter     Outcome Measure:  Oswestry 54%    S: Pt reports pain 7.5/10 today. Pt's wife present for session.     O:  Time 6886-7898      Visit 10  /10 Repeat outcome measure at mid point and end.    Pain See above      ROM      Modalities      MH + ES            Exercise      ALL EXERCISE DONE WITH DRAW-IN TECHNIQUE                            Functional activities To aid in reaching , pushing, pulling tasks at home     ROWS: H  \"    ROWS: M GTB  Pt seated,    ROWS: L  \"      \"    Obliques - low  \"         Bike     Punches     Lat pulldowns     Triceps ext standing          Sit to stands With UE support     Trunk ext TB     Trunk flex TB     Hip abd seated  GTB,  HEP    Hip EXT standing      TG Squats     LAQ with core activation HEP      Scap retraction           Hip adduction  Ball , HEP    Seated marches  With core activation, HEP    Standing hip abd      A:  Tolerated fairly well.  Reassessment completed today. See note for details.  After lengthy discussion with pt and pt's wife, pt choosing to discharge at this time. They report pt has appointment with neurosurgery coming up soon. Pt and pt's wife discussed if pt is not a surgical candidate may pursue further therapy after neurosurgery consult. Per pt and pt's wife request, printed HEP for pt to continue with at home. Recommend pt continue with HEP at home as tolerated and call with any questions. Will discharge pt at this time.       P: will discharge pt at this time.   Waleska Barnes, PT  [id number]    Treatment Charges: Mins Units   Initial Evaluation     Re-Evaluation 40 
_______________  Physician     Date

## 2024-09-16 ENCOUNTER — OFFICE VISIT (OUTPATIENT)
Dept: NEUROSURGERY | Age: 74
End: 2024-09-16
Payer: MEDICARE

## 2024-09-16 VITALS
HEIGHT: 69 IN | HEART RATE: 66 BPM | BODY MASS INDEX: 35.55 KG/M2 | DIASTOLIC BLOOD PRESSURE: 76 MMHG | WEIGHT: 240 LBS | SYSTOLIC BLOOD PRESSURE: 132 MMHG | RESPIRATION RATE: 18 BRPM | OXYGEN SATURATION: 96 %

## 2024-09-16 DIAGNOSIS — S32.010A CLOSED COMPRESSION FRACTURE OF BODY OF L1 VERTEBRA (HCC): ICD-10-CM

## 2024-09-16 DIAGNOSIS — S32.040D CLOSED COMPRESSION FRACTURE OF L4 LUMBAR VERTEBRA WITH ROUTINE HEALING, SUBSEQUENT ENCOUNTER: ICD-10-CM

## 2024-09-16 DIAGNOSIS — M54.50 CHRONIC BILATERAL LOW BACK PAIN WITHOUT SCIATICA: Primary | ICD-10-CM

## 2024-09-16 DIAGNOSIS — S32.030D CLOSED COMPRESSION FRACTURE OF L3 LUMBAR VERTEBRA WITH ROUTINE HEALING, SUBSEQUENT ENCOUNTER: ICD-10-CM

## 2024-09-16 DIAGNOSIS — G89.29 CHRONIC BILATERAL LOW BACK PAIN WITHOUT SCIATICA: Primary | ICD-10-CM

## 2024-09-16 PROCEDURE — 1123F ACP DISCUSS/DSCN MKR DOCD: CPT | Performed by: STUDENT IN AN ORGANIZED HEALTH CARE EDUCATION/TRAINING PROGRAM

## 2024-09-16 PROCEDURE — 3078F DIAST BP <80 MM HG: CPT | Performed by: STUDENT IN AN ORGANIZED HEALTH CARE EDUCATION/TRAINING PROGRAM

## 2024-09-16 PROCEDURE — G8427 DOCREV CUR MEDS BY ELIG CLIN: HCPCS | Performed by: STUDENT IN AN ORGANIZED HEALTH CARE EDUCATION/TRAINING PROGRAM

## 2024-09-16 PROCEDURE — 3017F COLORECTAL CA SCREEN DOC REV: CPT | Performed by: STUDENT IN AN ORGANIZED HEALTH CARE EDUCATION/TRAINING PROGRAM

## 2024-09-16 PROCEDURE — 3075F SYST BP GE 130 - 139MM HG: CPT | Performed by: STUDENT IN AN ORGANIZED HEALTH CARE EDUCATION/TRAINING PROGRAM

## 2024-09-16 PROCEDURE — 1036F TOBACCO NON-USER: CPT | Performed by: STUDENT IN AN ORGANIZED HEALTH CARE EDUCATION/TRAINING PROGRAM

## 2024-09-16 PROCEDURE — 99203 OFFICE O/P NEW LOW 30 MIN: CPT | Performed by: STUDENT IN AN ORGANIZED HEALTH CARE EDUCATION/TRAINING PROGRAM

## 2024-09-16 PROCEDURE — G8417 CALC BMI ABV UP PARAM F/U: HCPCS | Performed by: STUDENT IN AN ORGANIZED HEALTH CARE EDUCATION/TRAINING PROGRAM

## 2024-09-16 ASSESSMENT — ENCOUNTER SYMPTOMS
SHORTNESS OF BREATH: 0
BACK PAIN: 1
PHOTOPHOBIA: 0
TROUBLE SWALLOWING: 0
ABDOMINAL PAIN: 0

## 2024-10-01 ENCOUNTER — OFFICE VISIT (OUTPATIENT)
Dept: FAMILY MEDICINE CLINIC | Age: 74
End: 2024-10-01
Payer: MEDICARE

## 2024-10-01 VITALS
HEART RATE: 96 BPM | SYSTOLIC BLOOD PRESSURE: 112 MMHG | HEIGHT: 69 IN | TEMPERATURE: 98.6 F | BODY MASS INDEX: 35.44 KG/M2 | OXYGEN SATURATION: 95 % | DIASTOLIC BLOOD PRESSURE: 70 MMHG

## 2024-10-01 DIAGNOSIS — U07.1 COVID-19: Primary | ICD-10-CM

## 2024-10-01 DIAGNOSIS — J18.9 PNEUMONIA OF RIGHT UPPER LOBE DUE TO INFECTIOUS ORGANISM: ICD-10-CM

## 2024-10-01 LAB
Lab: ABNORMAL
PERFORMING INSTRUMENT: ABNORMAL
QC PASS/FAIL: ABNORMAL
SARS-COV-2, POC: DETECTED

## 2024-10-01 PROCEDURE — G8484 FLU IMMUNIZE NO ADMIN: HCPCS | Performed by: PHYSICIAN ASSISTANT

## 2024-10-01 PROCEDURE — 3078F DIAST BP <80 MM HG: CPT | Performed by: PHYSICIAN ASSISTANT

## 2024-10-01 PROCEDURE — 99214 OFFICE O/P EST MOD 30 MIN: CPT | Performed by: PHYSICIAN ASSISTANT

## 2024-10-01 PROCEDURE — 1036F TOBACCO NON-USER: CPT | Performed by: PHYSICIAN ASSISTANT

## 2024-10-01 PROCEDURE — G8417 CALC BMI ABV UP PARAM F/U: HCPCS | Performed by: PHYSICIAN ASSISTANT

## 2024-10-01 PROCEDURE — 87426 SARSCOV CORONAVIRUS AG IA: CPT | Performed by: PHYSICIAN ASSISTANT

## 2024-10-01 PROCEDURE — G8427 DOCREV CUR MEDS BY ELIG CLIN: HCPCS | Performed by: PHYSICIAN ASSISTANT

## 2024-10-01 PROCEDURE — 1123F ACP DISCUSS/DSCN MKR DOCD: CPT | Performed by: PHYSICIAN ASSISTANT

## 2024-10-01 PROCEDURE — 3017F COLORECTAL CA SCREEN DOC REV: CPT | Performed by: PHYSICIAN ASSISTANT

## 2024-10-01 PROCEDURE — 3074F SYST BP LT 130 MM HG: CPT | Performed by: PHYSICIAN ASSISTANT

## 2024-10-01 RX ORDER — DOXYCYCLINE HYCLATE 100 MG
100 TABLET ORAL 2 TIMES DAILY
Qty: 20 TABLET | Refills: 0 | Status: SHIPPED | OUTPATIENT
Start: 2024-10-01 | End: 2024-10-11

## 2024-10-01 RX ORDER — CEFDINIR 300 MG/1
300 CAPSULE ORAL 2 TIMES DAILY
Qty: 20 CAPSULE | Refills: 0 | Status: SHIPPED | OUTPATIENT
Start: 2024-10-01 | End: 2024-10-11

## 2024-10-01 NOTE — PROGRESS NOTES
gastrointestinal endoscopy (04/2017); and XR MIDLINE EQUAL OR GREATER THAN 5 YEARS (10/3/2016).  Social History:  reports that he quit smoking about 9 years ago. His smoking use included cigarettes and pipe. He started smoking about 58 years ago. He has a 55 pack-year smoking history. He has been exposed to tobacco smoke. He has never used smokeless tobacco. He reports that he does not drink alcohol and does not use drugs.  Family History: family history includes Cancer in his mother; Heart Disease in his father.   Allergies: Patient has no known allergies.    Physical Exam         VS:  /70   Pulse 96   Temp 98.6 °F (37 °C)   Ht 1.753 m (5' 9\")   SpO2 95%   BMI 35.44 kg/m²    Oxygen Saturation Interpretation: Normal.    Constitutional:  Alert, development consistent with age. NAD.  Head:  NC/NT. Airway patent.  No TTP over the sinuses bilaterally.  Mouth: Posterior pharynx with mild erythema and clear postnasal drip. No tonsillar hypertrophy or exudate.   Neck:  Normal ROM. Supple. No anterior cervical adenopathy noted.  Lungs: Faint rhonchi appreciated throughout the lung fields.  No wheezes or rales.  No signs of respiratory distress noted.   CV:  Regular rate and rhythm, normal heart sounds, without pathological murmurs, ectopy, gallops, or rubs.  Skin:  Normal turgor.  Warm, dry, without visible rash.  Lymphatic: No lymphangitis or adenopathy noted.  Neurological:  Oriented.  Motor functions intact.    Lab / Imaging Results   (All laboratory and radiology results have been personally reviewed by myself)  Labs:  Results for orders placed or performed in visit on 10/01/24   POCT COVID-19, Antigen   Result Value Ref Range    SARS-COV-2, POC Detected (A) Not Detected    Lot Number [id number]     QC Pass/Fail pass     Performing Instrument BD Veritor        Imaging:  All Radiology results interpreted by Radiologist unless otherwise noted.      Assessment / Plan     Impression(s):  Jermaine \"Noe\" was seen

## 2024-10-02 ENCOUNTER — TELEPHONE (OUTPATIENT)
Dept: FAMILY MEDICINE CLINIC | Age: 74
End: 2024-10-02

## 2024-10-02 NOTE — TELEPHONE ENCOUNTER
Patient's wife, Dione, called. Spoke with Dione. Dione provided the patient's first and last name, and date of birth. Dione stated that the patient, Jermaine, was seen recently through walk-in and was prescribed Paxlovid. Dione stated that the patient needs this prescription sent to a different pharmacy, and that the pharmacy it was sent to doesn't carry this medication. Dione stated that this medication needs sent to [facility] in [place], [place].    Patient's wife requested that she be called back when this is sent to pharmacy.  Call back number: [phone]    Thank you.

## 2024-10-09 ENCOUNTER — PATIENT MESSAGE (OUTPATIENT)
Dept: FAMILY MEDICINE CLINIC | Age: 74
End: 2024-10-09

## 2024-10-10 NOTE — TELEPHONE ENCOUNTER
Spoke w/ Dione about concerns. Noe has one day left of antibiotics. He was weak prior to getting Covid and pneumonia, but muscle weakness has increased since he is lying around more. He is not eating much and is still having an issue w/ nausea. He sometimes takes antibiotics w/ food. He is not currently using a probiotic. Has occasional SOB with activity.     Will check w/ Dr Rutherford to see if patient can be seen sooner that 10/18.

## 2024-10-11 ENCOUNTER — OFFICE VISIT (OUTPATIENT)
Dept: FAMILY MEDICINE CLINIC | Age: 74
End: 2024-10-11
Payer: MEDICARE

## 2024-10-11 ENCOUNTER — HOSPITAL ENCOUNTER (INPATIENT)
Age: 74
LOS: 3 days | Discharge: NURSING, REHAB OR LONG-TERM CARE | DRG: 433 | End: 2024-10-15
Attending: STUDENT IN AN ORGANIZED HEALTH CARE EDUCATION/TRAINING PROGRAM | Admitting: FAMILY MEDICINE
Payer: MEDICARE

## 2024-10-11 ENCOUNTER — APPOINTMENT (OUTPATIENT)
Dept: GENERAL RADIOLOGY | Age: 74
DRG: 433 | End: 2024-10-11
Payer: MEDICARE

## 2024-10-11 VITALS
OXYGEN SATURATION: 97 % | SYSTOLIC BLOOD PRESSURE: 90 MMHG | BODY MASS INDEX: 33.03 KG/M2 | DIASTOLIC BLOOD PRESSURE: 60 MMHG | WEIGHT: 223 LBS | TEMPERATURE: 98.6 F | HEART RATE: 78 BPM | HEIGHT: 69 IN

## 2024-10-11 DIAGNOSIS — S32.030D COMPRESSION FRACTURE OF L3 VERTEBRA WITH ROUTINE HEALING, SUBSEQUENT ENCOUNTER: ICD-10-CM

## 2024-10-11 DIAGNOSIS — M54.50 CHRONIC LOW BACK PAIN, UNSPECIFIED BACK PAIN LATERALITY, UNSPECIFIED WHETHER SCIATICA PRESENT: ICD-10-CM

## 2024-10-11 DIAGNOSIS — R53.1 WEAKNESS: ICD-10-CM

## 2024-10-11 DIAGNOSIS — E87.5 HYPERKALEMIA: Primary | ICD-10-CM

## 2024-10-11 DIAGNOSIS — D64.9 ANEMIA, UNSPECIFIED TYPE: ICD-10-CM

## 2024-10-11 DIAGNOSIS — J18.9 PNEUMONIA OF RIGHT UPPER LOBE DUE TO INFECTIOUS ORGANISM: Primary | ICD-10-CM

## 2024-10-11 DIAGNOSIS — G89.29 CHRONIC LOW BACK PAIN, UNSPECIFIED BACK PAIN LATERALITY, UNSPECIFIED WHETHER SCIATICA PRESENT: ICD-10-CM

## 2024-10-11 LAB
ALBUMIN SERPL-MCNC: 3.2 G/DL (ref 3.5–5.2)
ALP SERPL-CCNC: 76 U/L (ref 40–129)
ALT SERPL-CCNC: 37 U/L (ref 0–40)
ANION GAP SERPL CALCULATED.3IONS-SCNC: 8 MMOL/L (ref 7–16)
AST SERPL-CCNC: 91 U/L (ref 0–39)
BILIRUB SERPL-MCNC: 1.6 MG/DL (ref 0–1.2)
BUN SERPL-MCNC: 31 MG/DL (ref 6–23)
CALCIUM SERPL-MCNC: 10 MG/DL (ref 8.6–10.2)
CHLORIDE SERPL-SCNC: 97 MMOL/L (ref 98–107)
CO2 SERPL-SCNC: 28 MMOL/L (ref 22–29)
CREAT SERPL-MCNC: 1.9 MG/DL (ref 0.7–1.2)
GFR, ESTIMATED: 37 ML/MIN/1.73M2
GLUCOSE SERPL-MCNC: 75 MG/DL (ref 74–99)
POTASSIUM SERPL-SCNC: 6.7 MMOL/L (ref 3.5–5)
PROT SERPL-MCNC: 6.4 G/DL (ref 6.4–8.3)
SODIUM SERPL-SCNC: 133 MMOL/L (ref 132–146)

## 2024-10-11 PROCEDURE — 93005 ELECTROCARDIOGRAM TRACING: CPT | Performed by: NURSE PRACTITIONER

## 2024-10-11 PROCEDURE — 3078F DIAST BP <80 MM HG: CPT | Performed by: FAMILY MEDICINE

## 2024-10-11 PROCEDURE — 94640 AIRWAY INHALATION TREATMENT: CPT

## 2024-10-11 PROCEDURE — 96365 THER/PROPH/DIAG IV INF INIT: CPT

## 2024-10-11 PROCEDURE — 2580000003 HC RX 258

## 2024-10-11 PROCEDURE — G8417 CALC BMI ABV UP PARAM F/U: HCPCS | Performed by: FAMILY MEDICINE

## 2024-10-11 PROCEDURE — 80053 COMPREHEN METABOLIC PANEL: CPT

## 2024-10-11 PROCEDURE — 1036F TOBACCO NON-USER: CPT | Performed by: FAMILY MEDICINE

## 2024-10-11 PROCEDURE — G8484 FLU IMMUNIZE NO ADMIN: HCPCS | Performed by: FAMILY MEDICINE

## 2024-10-11 PROCEDURE — 96375 TX/PRO/DX INJ NEW DRUG ADDON: CPT

## 2024-10-11 PROCEDURE — 99285 EMERGENCY DEPT VISIT HI MDM: CPT

## 2024-10-11 PROCEDURE — 3074F SYST BP LT 130 MM HG: CPT | Performed by: FAMILY MEDICINE

## 2024-10-11 PROCEDURE — 99215 OFFICE O/P EST HI 40 MIN: CPT | Performed by: FAMILY MEDICINE

## 2024-10-11 PROCEDURE — 94664 DEMO&/EVAL PT USE INHALER: CPT

## 2024-10-11 PROCEDURE — 6360000002 HC RX W HCPCS

## 2024-10-11 PROCEDURE — 3017F COLORECTAL CA SCREEN DOC REV: CPT | Performed by: FAMILY MEDICINE

## 2024-10-11 PROCEDURE — G8427 DOCREV CUR MEDS BY ELIG CLIN: HCPCS | Performed by: FAMILY MEDICINE

## 2024-10-11 PROCEDURE — 1123F ACP DISCUSS/DSCN MKR DOCD: CPT | Performed by: FAMILY MEDICINE

## 2024-10-11 PROCEDURE — 71046 X-RAY EXAM CHEST 2 VIEWS: CPT

## 2024-10-11 PROCEDURE — 6370000000 HC RX 637 (ALT 250 FOR IP)

## 2024-10-11 RX ORDER — GLUCAGON 1 MG/ML
1 KIT INJECTION PRN
Status: DISCONTINUED | OUTPATIENT
Start: 2024-10-11 | End: 2024-10-15 | Stop reason: HOSPADM

## 2024-10-11 RX ORDER — KETOROLAC TROMETHAMINE 15 MG/ML
15 INJECTION, SOLUTION INTRAMUSCULAR; INTRAVENOUS ONCE
Status: DISCONTINUED | OUTPATIENT
Start: 2024-10-11 | End: 2024-10-11

## 2024-10-11 RX ORDER — DEXTROSE MONOHYDRATE 100 MG/ML
INJECTION, SOLUTION INTRAVENOUS CONTINUOUS PRN
Status: DISCONTINUED | OUTPATIENT
Start: 2024-10-11 | End: 2024-10-15 | Stop reason: HOSPADM

## 2024-10-11 RX ORDER — 0.9 % SODIUM CHLORIDE 0.9 %
1000 INTRAVENOUS SOLUTION INTRAVENOUS ONCE
Status: DISCONTINUED | OUTPATIENT
Start: 2024-10-11 | End: 2024-10-11

## 2024-10-11 RX ORDER — CALCIUM GLUCONATE 20 MG/ML
1000 INJECTION, SOLUTION INTRAVENOUS ONCE
Status: COMPLETED | OUTPATIENT
Start: 2024-10-11 | End: 2024-10-11

## 2024-10-11 RX ORDER — SODIUM POLYSTYRENE SULFONATE 15 G/60ML
30 SUSPENSION ORAL; RECTAL ONCE
Status: DISCONTINUED | OUTPATIENT
Start: 2024-10-11 | End: 2024-10-11

## 2024-10-11 RX ORDER — KETOROLAC TROMETHAMINE 15 MG/ML
15 INJECTION, SOLUTION INTRAMUSCULAR; INTRAVENOUS EVERY 6 HOURS PRN
Status: DISCONTINUED | OUTPATIENT
Start: 2024-10-11 | End: 2024-10-11

## 2024-10-11 RX ORDER — 0.9 % SODIUM CHLORIDE 0.9 %
500 INTRAVENOUS SOLUTION INTRAVENOUS ONCE
Status: COMPLETED | OUTPATIENT
Start: 2024-10-11 | End: 2024-10-11

## 2024-10-11 RX ORDER — SODIUM POLYSTYRENE SULFONATE 15 G/60ML
15 SUSPENSION ORAL; RECTAL ONCE
Status: COMPLETED | OUTPATIENT
Start: 2024-10-11 | End: 2024-10-11

## 2024-10-11 RX ORDER — FENTANYL CITRATE 50 UG/ML
50 INJECTION, SOLUTION INTRAMUSCULAR; INTRAVENOUS ONCE
Status: COMPLETED | OUTPATIENT
Start: 2024-10-11 | End: 2024-10-11

## 2024-10-11 RX ORDER — ALBUTEROL SULFATE 0.83 MG/ML
10 SOLUTION RESPIRATORY (INHALATION) ONCE
Status: COMPLETED | OUTPATIENT
Start: 2024-10-11 | End: 2024-10-11

## 2024-10-11 RX ADMIN — CALCIUM GLUCONATE 1000 MG: 20 INJECTION, SOLUTION INTRAVENOUS at 22:51

## 2024-10-11 RX ADMIN — FENTANYL CITRATE 50 MCG: 50 INJECTION INTRAMUSCULAR; INTRAVENOUS at 23:28

## 2024-10-11 RX ADMIN — SODIUM POLYSTYRENE SULFONATE 15 G: 15 SUSPENSION ORAL; RECTAL at 22:52

## 2024-10-11 RX ADMIN — INSULIN HUMAN 10 UNITS: 100 INJECTION, SOLUTION PARENTERAL at 22:47

## 2024-10-11 RX ADMIN — DEXTROSE MONOHYDRATE 250 ML: 100 INJECTION, SOLUTION INTRAVENOUS at 22:52

## 2024-10-11 RX ADMIN — ALBUTEROL SULFATE 10 MG: 0.83 SOLUTION RESPIRATORY (INHALATION) at 22:23

## 2024-10-11 RX ADMIN — SODIUM CHLORIDE 500 ML: 9 INJECTION, SOLUTION INTRAVENOUS at 22:01

## 2024-10-11 ASSESSMENT — PAIN - FUNCTIONAL ASSESSMENT: PAIN_FUNCTIONAL_ASSESSMENT: 0-10

## 2024-10-11 ASSESSMENT — PAIN SCALES - GENERAL: PAINLEVEL_OUTOF10: 4

## 2024-10-11 NOTE — ED NOTES
Department of Emergency Medicine  FIRST PROVIDER TRIAGE NOTE             Independent MLP           10/11/24  7:20 PM EDT    Date of Encounter: 10/11/24   MRN: [id number]      HPI: Jermaine Burks is a 74 y.o. male who presents to the ED for Back Pain (Upper back pain, recent pneumonia, hx of lower chronic back pain)     For back pain along with chronic lower back pain.  Was recently diagnosed with pneumonia and COVID-19.  Was having a cough, nausea and diarrhea.  Denies any bowel or bladder incontinence.    Vitals:    10/11/24 1917   BP: (!) 86/63   Pulse: 60   Resp: 16   Temp: 98.6 °F (37 °C)   SpO2: 97%         ROS: Negative for cp or sob.    PE: Gen Appearance/Constitutional: alert  Musculoskeletal: moves all extremities x 4     Initial Plan of Care:  instructed nursing staff patient needs a room due to blood pressure.     Provider-Patient relationship only established for Provider In Triage (PIT) secondary to high volume, low staffing, and/or boarding- patient to await bed availability..  Full assessment, HPI and examination not performed.  Discussed with patient that the provider in triage evaluation is not a comprehensive medical screening exam and this is not yet possible at the end of the provider in triage encounter, to state whether or not an emergency medical condition exist  This ends my PIT-Patient relationship.    Electronically signed by NADEEN Jaquez CNP   DD: 10/11/24       Sascha Huber APRN - CNP  10/11/24 1922

## 2024-10-12 ENCOUNTER — APPOINTMENT (OUTPATIENT)
Dept: ULTRASOUND IMAGING | Age: 74
DRG: 433 | End: 2024-10-12
Payer: MEDICARE

## 2024-10-12 PROBLEM — R26.2 AMBULATORY DYSFUNCTION: Status: ACTIVE | Noted: 2024-10-12

## 2024-10-12 PROBLEM — F05 DELIRIUM DUE TO ANOTHER MEDICAL CONDITION: Status: RESOLVED | Noted: 2017-04-20 | Resolved: 2024-10-12

## 2024-10-12 PROBLEM — E87.5 HYPERKALEMIA: Status: ACTIVE | Noted: 2024-10-12

## 2024-10-12 LAB
BACTERIA URNS QL MICRO: ABNORMAL
BASOPHILS # BLD: 0.02 K/UL (ref 0–0.2)
BASOPHILS NFR BLD: 0 % (ref 0–2)
BILIRUB UR QL STRIP: NEGATIVE
CLARITY UR: CLEAR
COLOR UR: YELLOW
CREAT UR-MCNC: 134.3 MG/DL (ref 40–278)
EOSINOPHIL # BLD: 0 K/UL (ref 0.05–0.5)
EOSINOPHILS RELATIVE PERCENT: 0 % (ref 0–6)
ERYTHROCYTE [DISTWIDTH] IN BLOOD BY AUTOMATED COUNT: 14.8 % (ref 11.5–15)
GLUCOSE BLD-MCNC: 79 MG/DL (ref 74–99)
GLUCOSE UR STRIP-MCNC: NEGATIVE MG/DL
HCT VFR BLD AUTO: 38.8 % (ref 37–54)
HGB BLD-MCNC: 13.3 G/DL (ref 12.5–16.5)
HGB UR QL STRIP.AUTO: NEGATIVE
IMM GRANULOCYTES # BLD AUTO: <0.03 K/UL (ref 0–0.58)
IMM GRANULOCYTES NFR BLD: 0 % (ref 0–5)
KETONES UR STRIP-MCNC: ABNORMAL MG/DL
LEUKOCYTE ESTERASE UR QL STRIP: ABNORMAL
LYMPHOCYTES NFR BLD: 0.8 K/UL (ref 1.5–4)
LYMPHOCYTES RELATIVE PERCENT: 12 % (ref 20–42)
MAGNESIUM SERPL-MCNC: 1.7 MG/DL (ref 1.6–2.6)
MCH RBC QN AUTO: 34.5 PG (ref 26–35)
MCHC RBC AUTO-ENTMCNC: 34.3 G/DL (ref 32–34.5)
MCV RBC AUTO: 100.5 FL (ref 80–99.9)
MONOCYTES NFR BLD: 0.76 K/UL (ref 0.1–0.95)
MONOCYTES NFR BLD: 12 % (ref 2–12)
NEUTROPHILS NFR BLD: 75 % (ref 43–80)
NEUTS SEG NFR BLD: 4.86 K/UL (ref 1.8–7.3)
NITRITE UR QL STRIP: NEGATIVE
PH UR STRIP: 6.5 [PH] (ref 5–9)
PLATELET CONFIRMATION: NORMAL
PLATELET, FLUORESCENCE: 90 K/UL (ref 130–450)
PMV BLD AUTO: 10.6 FL (ref 7–12)
PROT UR STRIP-MCNC: 30 MG/DL
RBC # BLD AUTO: 3.86 M/UL (ref 3.8–5.8)
RBC #/AREA URNS HPF: ABNORMAL /HPF
SODIUM UR-SCNC: 76 MMOL/L
SP GR UR STRIP: 1.02 (ref 1–1.03)
UROBILINOGEN UR STRIP-ACNC: 0.2 EU/DL (ref 0–1)
WBC #/AREA URNS HPF: ABNORMAL /HPF
WBC OTHER # BLD: 6.5 K/UL (ref 4.5–11.5)

## 2024-10-12 PROCEDURE — 85025 COMPLETE CBC W/AUTO DIFF WBC: CPT

## 2024-10-12 PROCEDURE — 6370000000 HC RX 637 (ALT 250 FOR IP)

## 2024-10-12 PROCEDURE — 6360000002 HC RX W HCPCS

## 2024-10-12 PROCEDURE — 82962 GLUCOSE BLOOD TEST: CPT

## 2024-10-12 PROCEDURE — 84300 ASSAY OF URINE SODIUM: CPT

## 2024-10-12 PROCEDURE — 82570 ASSAY OF URINE CREATININE: CPT

## 2024-10-12 PROCEDURE — 76705 ECHO EXAM OF ABDOMEN: CPT

## 2024-10-12 PROCEDURE — 2580000003 HC RX 258

## 2024-10-12 PROCEDURE — 2060000000 HC ICU INTERMEDIATE R&B

## 2024-10-12 PROCEDURE — 87449 NOS EACH ORGANISM AG IA: CPT

## 2024-10-12 PROCEDURE — 81001 URINALYSIS AUTO W/SCOPE: CPT

## 2024-10-12 PROCEDURE — 87324 CLOSTRIDIUM AG IA: CPT

## 2024-10-12 PROCEDURE — 99222 1ST HOSP IP/OBS MODERATE 55: CPT

## 2024-10-12 PROCEDURE — 2580000003 HC RX 258: Performed by: STUDENT IN AN ORGANIZED HEALTH CARE EDUCATION/TRAINING PROGRAM

## 2024-10-12 PROCEDURE — 83735 ASSAY OF MAGNESIUM: CPT

## 2024-10-12 RX ORDER — SODIUM CHLORIDE 9 MG/ML
INJECTION, SOLUTION INTRAVENOUS CONTINUOUS
Status: ACTIVE | OUTPATIENT
Start: 2024-10-12 | End: 2024-10-12

## 2024-10-12 RX ORDER — IBUPROFEN 400 MG/1
400 TABLET, FILM COATED ORAL EVERY 6 HOURS PRN
Status: DISCONTINUED | OUTPATIENT
Start: 2024-10-12 | End: 2024-10-12

## 2024-10-12 RX ORDER — LORAZEPAM 1 MG/1
4 TABLET ORAL
Status: DISCONTINUED | OUTPATIENT
Start: 2024-10-12 | End: 2024-10-12

## 2024-10-12 RX ORDER — SODIUM CHLORIDE 0.9 % (FLUSH) 0.9 %
5-40 SYRINGE (ML) INJECTION PRN
Status: DISCONTINUED | OUTPATIENT
Start: 2024-10-12 | End: 2024-10-15 | Stop reason: HOSPADM

## 2024-10-12 RX ORDER — LEVETIRACETAM 500 MG/1
1000 TABLET ORAL 2 TIMES DAILY
Status: DISCONTINUED | OUTPATIENT
Start: 2024-10-12 | End: 2024-10-15 | Stop reason: HOSPADM

## 2024-10-12 RX ORDER — HYDROXYCHLOROQUINE SULFATE 200 MG/1
200 TABLET, FILM COATED ORAL 2 TIMES DAILY
Status: DISCONTINUED | OUTPATIENT
Start: 2024-10-12 | End: 2024-10-15 | Stop reason: HOSPADM

## 2024-10-12 RX ORDER — SODIUM CHLORIDE 0.9 % (FLUSH) 0.9 %
5-40 SYRINGE (ML) INJECTION EVERY 12 HOURS SCHEDULED
Status: DISCONTINUED | OUTPATIENT
Start: 2024-10-12 | End: 2024-10-15 | Stop reason: HOSPADM

## 2024-10-12 RX ORDER — LORAZEPAM 1 MG/1
3 TABLET ORAL
Status: DISCONTINUED | OUTPATIENT
Start: 2024-10-12 | End: 2024-10-12

## 2024-10-12 RX ORDER — ONDANSETRON 4 MG/1
4 TABLET, ORALLY DISINTEGRATING ORAL EVERY 8 HOURS PRN
Status: DISCONTINUED | OUTPATIENT
Start: 2024-10-12 | End: 2024-10-15 | Stop reason: HOSPADM

## 2024-10-12 RX ORDER — LORAZEPAM 2 MG/ML
2 INJECTION INTRAMUSCULAR
Status: DISCONTINUED | OUTPATIENT
Start: 2024-10-12 | End: 2024-10-12

## 2024-10-12 RX ORDER — LORAZEPAM 2 MG/ML
1 INJECTION INTRAMUSCULAR
Status: DISCONTINUED | OUTPATIENT
Start: 2024-10-12 | End: 2024-10-12

## 2024-10-12 RX ORDER — LORAZEPAM 1 MG/1
2 TABLET ORAL
Status: DISCONTINUED | OUTPATIENT
Start: 2024-10-12 | End: 2024-10-12

## 2024-10-12 RX ORDER — LORAZEPAM 2 MG/ML
3 INJECTION INTRAMUSCULAR
Status: DISCONTINUED | OUTPATIENT
Start: 2024-10-12 | End: 2024-10-12

## 2024-10-12 RX ORDER — LORAZEPAM 1 MG/1
1 TABLET ORAL
Status: DISCONTINUED | OUTPATIENT
Start: 2024-10-12 | End: 2024-10-12

## 2024-10-12 RX ORDER — LANOLIN ALCOHOL/MO/W.PET/CERES
100 CREAM (GRAM) TOPICAL DAILY
Status: DISCONTINUED | OUTPATIENT
Start: 2024-10-12 | End: 2024-10-15 | Stop reason: HOSPADM

## 2024-10-12 RX ORDER — IBUPROFEN 200 MG
3 CAPSULE ORAL NIGHTLY
COMMUNITY

## 2024-10-12 RX ORDER — SPIRONOLACTONE 25 MG/1
25 TABLET ORAL DAILY
Status: DISCONTINUED | OUTPATIENT
Start: 2024-10-12 | End: 2024-10-15 | Stop reason: HOSPADM

## 2024-10-12 RX ORDER — FAMOTIDINE 20 MG/1
40 TABLET, FILM COATED ORAL DAILY
Status: DISCONTINUED | OUTPATIENT
Start: 2024-10-12 | End: 2024-10-14

## 2024-10-12 RX ORDER — ONDANSETRON 2 MG/ML
4 INJECTION INTRAMUSCULAR; INTRAVENOUS EVERY 6 HOURS PRN
Status: DISCONTINUED | OUTPATIENT
Start: 2024-10-12 | End: 2024-10-15 | Stop reason: HOSPADM

## 2024-10-12 RX ORDER — ACETAMINOPHEN 650 MG/1
650 SUPPOSITORY RECTAL EVERY 6 HOURS PRN
Status: DISCONTINUED | OUTPATIENT
Start: 2024-10-12 | End: 2024-10-15 | Stop reason: HOSPADM

## 2024-10-12 RX ORDER — LORAZEPAM 2 MG/ML
4 INJECTION INTRAMUSCULAR
Status: DISCONTINUED | OUTPATIENT
Start: 2024-10-12 | End: 2024-10-12

## 2024-10-12 RX ORDER — PREDNISONE 5 MG/1
5 TABLET ORAL DAILY
Status: DISCONTINUED | OUTPATIENT
Start: 2024-10-12 | End: 2024-10-15 | Stop reason: HOSPADM

## 2024-10-12 RX ORDER — ACETAMINOPHEN 500 MG
500 TABLET ORAL ONCE
Status: COMPLETED | OUTPATIENT
Start: 2024-10-12 | End: 2024-10-12

## 2024-10-12 RX ORDER — LACOSAMIDE 100 MG/1
100 TABLET ORAL 2 TIMES DAILY
Status: DISCONTINUED | OUTPATIENT
Start: 2024-10-12 | End: 2024-10-15 | Stop reason: HOSPADM

## 2024-10-12 RX ORDER — NADOLOL 40 MG/1
40 TABLET ORAL DAILY
Status: DISCONTINUED | OUTPATIENT
Start: 2024-10-12 | End: 2024-10-15 | Stop reason: HOSPADM

## 2024-10-12 RX ORDER — ENOXAPARIN SODIUM 100 MG/ML
30 INJECTION SUBCUTANEOUS 2 TIMES DAILY
Status: DISCONTINUED | OUTPATIENT
Start: 2024-10-12 | End: 2024-10-13

## 2024-10-12 RX ORDER — SODIUM CHLORIDE 9 MG/ML
INJECTION, SOLUTION INTRAVENOUS PRN
Status: DISCONTINUED | OUTPATIENT
Start: 2024-10-12 | End: 2024-10-15 | Stop reason: HOSPADM

## 2024-10-12 RX ORDER — FOLIC ACID 1 MG/1
1 TABLET ORAL DAILY
Status: DISCONTINUED | OUTPATIENT
Start: 2024-10-12 | End: 2024-10-15 | Stop reason: HOSPADM

## 2024-10-12 RX ORDER — LANOLIN ALCOHOL/MO/W.PET/CERES
100 CREAM (GRAM) TOPICAL DAILY
Status: DISCONTINUED | OUTPATIENT
Start: 2024-10-12 | End: 2024-10-12

## 2024-10-12 RX ORDER — ACETAMINOPHEN 325 MG/1
650 TABLET ORAL EVERY 6 HOURS PRN
Status: DISCONTINUED | OUTPATIENT
Start: 2024-10-12 | End: 2024-10-15 | Stop reason: HOSPADM

## 2024-10-12 RX ORDER — POLYETHYLENE GLYCOL 3350 17 G/17G
17 POWDER, FOR SOLUTION ORAL DAILY PRN
Status: DISCONTINUED | OUTPATIENT
Start: 2024-10-12 | End: 2024-10-15 | Stop reason: HOSPADM

## 2024-10-12 RX ORDER — ASPIRIN 81 MG/1
81 TABLET, CHEWABLE ORAL DAILY
Status: DISCONTINUED | OUTPATIENT
Start: 2024-10-12 | End: 2024-10-15 | Stop reason: HOSPADM

## 2024-10-12 RX ADMIN — PREDNISONE 5 MG: 5 TABLET ORAL at 11:00

## 2024-10-12 RX ADMIN — LACOSAMIDE 100 MG: 100 TABLET, FILM COATED ORAL at 13:39

## 2024-10-12 RX ADMIN — LACOSAMIDE 100 MG: 100 TABLET, FILM COATED ORAL at 20:29

## 2024-10-12 RX ADMIN — SODIUM CHLORIDE: 9 INJECTION, SOLUTION INTRAVENOUS at 10:57

## 2024-10-12 RX ADMIN — Medication 100 MG: at 10:59

## 2024-10-12 RX ADMIN — LEVETIRACETAM 1000 MG: 500 TABLET, FILM COATED ORAL at 10:58

## 2024-10-12 RX ADMIN — SODIUM CHLORIDE, PRESERVATIVE FREE 10 ML: 5 INJECTION INTRAVENOUS at 13:39

## 2024-10-12 RX ADMIN — ENOXAPARIN SODIUM 30 MG: 100 INJECTION SUBCUTANEOUS at 20:30

## 2024-10-12 RX ADMIN — LEVETIRACETAM 1000 MG: 500 TABLET, FILM COATED ORAL at 20:29

## 2024-10-12 RX ADMIN — HYDROXYCHLOROQUINE SULFATE 200 MG: 200 TABLET ORAL at 20:30

## 2024-10-12 RX ADMIN — FAMOTIDINE 40 MG: 20 TABLET ORAL at 10:59

## 2024-10-12 RX ADMIN — HYDROXYCHLOROQUINE SULFATE 200 MG: 200 TABLET ORAL at 13:36

## 2024-10-12 RX ADMIN — ASPIRIN 81 MG CHEWABLE TABLET 81 MG: 81 TABLET CHEWABLE at 10:58

## 2024-10-12 RX ADMIN — ACETAMINOPHEN 650 MG: 325 TABLET ORAL at 20:30

## 2024-10-12 RX ADMIN — ENOXAPARIN SODIUM 30 MG: 100 INJECTION SUBCUTANEOUS at 10:58

## 2024-10-12 RX ADMIN — ACETAMINOPHEN 500 MG: 500 TABLET ORAL at 05:05

## 2024-10-12 RX ADMIN — FOLIC ACID 1 MG: 1 TABLET ORAL at 10:58

## 2024-10-12 ASSESSMENT — PAIN SCALES - GENERAL: PAINLEVEL_OUTOF10: 3

## 2024-10-12 ASSESSMENT — PAIN DESCRIPTION - FREQUENCY: FREQUENCY: INTERMITTENT

## 2024-10-12 ASSESSMENT — PAIN - FUNCTIONAL ASSESSMENT: PAIN_FUNCTIONAL_ASSESSMENT: PREVENTS OR INTERFERES SOME ACTIVE ACTIVITIES AND ADLS

## 2024-10-12 ASSESSMENT — PAIN DESCRIPTION - ONSET: ONSET: GRADUAL

## 2024-10-12 ASSESSMENT — PAIN DESCRIPTION - LOCATION: LOCATION: HEAD

## 2024-10-12 ASSESSMENT — PAIN DESCRIPTION - PAIN TYPE: TYPE: ACUTE PAIN

## 2024-10-12 ASSESSMENT — PAIN DESCRIPTION - DESCRIPTORS: DESCRIPTORS: ACHING

## 2024-10-12 NOTE — ED NOTES
Patient had a bowel movement.  Patient has been cleaned up and is resting in the bed.  He has no further needs at this time.

## 2024-10-12 NOTE — ED PROVIDER NOTES
suspension 15 g (15 g Oral Given 10/11/24 3255)   fentaNYL (SUBLIMAZE) injection 50 mcg (50 mcg IntraVENous Given 10/11/24 2352)    (medications given in the ED and medications ordered by admitting physician at time of submitting note)      CONSULTS: (Who and What was discussed)  IP CONSULT TO SOCIAL WORK  IP CONSULT TO SOCIAL WORK      Social Determinants : None      Records Reviewed (source and summary):   Office visit from Paty Phoenixdamian on 10/1/24  Jermaine Burks is a 74 y.o. old male presenting to the walk in clinic for evaluation of diarrhea, mild abdominal cramping, headache, and postnasal drainage which has been present for the past 5 days. Denies any fever, chills, CP, dyspnea, LE edema, abdominal pain, vomiting, rash, or lethargy. Denies any hx of asthma or COPD. Patient's wife was recently sick with a similar illness.     I am the Primary Clinician of Record.    FINAL IMPRESSION      1. Hyperkalemia    2. Chronic low back pain, unspecified back pain laterality, unspecified whether sciatica present          DISPOSITION/PLAN     DISPOSITION Admitted 10/12/2024 01:19:13 AM  Condition at Disposition: Data Unavailable      PATIENT REFERRED TO:  No follow-up provider specified.    DISCHARGE MEDICATIONS:  New Prescriptions    No medications on file       DISCONTINUED MEDICATIONS:  Discontinued Medications    No medications on file              (Please note that portions of this note were completed with a voice recognition program.  Efforts were made to edit the dictations but occasionally words are mis-transcribed.)    Robert Whelan, DO PGY-2

## 2024-10-12 NOTE — PROGRESS NOTES
[facility]  Progress Note    Chief complaint :  Chief Complaint   Patient presents with    Back Pain     Upper back pain, recent pneumonia, hx of lower chronic back pain       Subjective:    No overnight problems continues to have some back pain.  Patient feels mildly improved compared to yesterday.  Denies any chest pain shortness of breath or abdominal pain.      Past medical, surgical, family and social history were reviewed, non-contributory, and unchanged unless otherwise stated.    Review of Systems  See above    Objective:  BP 96/66   Pulse 76   Temp 98.6 °F (37 °C) (Tympanic)   Resp 26   Ht 1.753 m (5' 9\")   Wt 101.2 kg (223 lb)   SpO2 96%   BMI 32.93 kg/m²     Physical Exam  Constitutional:       Appearance: Normal appearance. He is normal weight.   HENT:      Head: Normocephalic and atraumatic.   Eyes:      Extraocular Movements: Extraocular movements intact.      Pupils: Pupils are equal, round, and reactive to light.   Cardiovascular:      Rate and Rhythm: Normal rate and regular rhythm.      Pulses: Normal pulses.      Heart sounds: Normal heart sounds. No murmur heard.  Pulmonary:      Effort: Pulmonary effort is normal.      Breath sounds: Normal breath sounds. No wheezing.   Abdominal:      General: Abdomen is flat. Bowel sounds are normal.      Palpations: Abdomen is soft.      Tenderness: There is no abdominal tenderness.   Musculoskeletal:      Right lower leg: No edema.      Left lower leg: No edema.      Comments: No tenderness to back while palpating   Skin:     General: Skin is warm and dry.      Capillary Refill: Capillary refill takes less than 2 seconds.      Coloration: Skin is not jaundiced.   Neurological:      General: No focal deficit present.      Mental Status: He is alert. Mental status is at baseline.         Labs:  Recent Results (from the past 24 hour(s))   EKG 12 Lead    Collection Time: 10/11/24  8:24 PM   Result Value Ref Range

## 2024-10-12 NOTE — ED NOTES
Patient is asking for something for his headache.  Advised Dr. Whelan and he will put in tylenol.

## 2024-10-12 NOTE — ED NOTES
Assumed care of patient at 7:30. Introduced self to patient as RN. Patient denies any complaints at this time. Call light within reach Awaiting bed assignment

## 2024-10-12 NOTE — ED NOTES
Patient was able to urinate with the male external catheter.  Sample was collected and sent to the lab. All needs met at this time.

## 2024-10-12 NOTE — PROGRESS NOTES
4 Eyes Skin Assessment     NAME:  Jermaine Burks  YOB: 1950  MEDICAL RECORD NUMBER:  [id number]    The patient is being assessed for  Admission    I agree that at least one RN has performed a thorough Head to Toe Skin Assessment on the patient. ALL assessment sites listed below have been assessed.      Areas assessed by both nurses:    Head, Face, Ears, Shoulders, Back, Chest, Arms, Elbows, Hands, Sacrum. Buttock, Coccyx, Ischium, Legs. Feet and Heels, and Under Medical Devices         Does the Patient have a Wound? No noted wound(s)       Jesus Prevention initiated by RN: Yes  Wound Care Orders initiated by RN: No    Pressure Injury (Stage 3,4, Unstageable, DTI, NWPT, and Complex wounds) if present, place Wound referral order by RN under : No    New Ostomies, if present place, Ostomy referral order under : No     Nurse 1 eSignature: Electronically signed by Maulik Ghotra RN on 10/12/24 at 4:05 PM EDT    **SHARE this note so that the co-signing nurse can place an eSignature**    Nurse 2 eSignature: Electronically signed by Yuri Holcomb RN on 10/12/24 at 6:41 PM EDT

## 2024-10-12 NOTE — H&P
[facility]  Resident History and Physical      Chief Complaint:    Chief Complaint   Patient presents with    Back Pain     Upper back pain, recent pneumonia, hx of lower chronic back pain        History of Present Illness:   Jermaine Burks  is a 74 y.o. male patient of Ashley Rutherford MD  with a pertinent PMHx of JAY closed compression fracutre of lumbar vertebrae, alcoholic cirrhosis, CKD stage III, COPD, paroxysmal afib HTN who presented to the ED from home with chief complaint of worsening back pain and ambulatory dysfunction. He has had chronic upper back pain which has gotten progressively worse over the past 5-6 weeks. He was seen at his PCP's office today who advised him to go to the ED. Pain is worse when moving and when he is vertical. Pain was 1-2/10 during examination. Denies any chest pain shortness of breath abdominal pain incontinence or numbness. He was diagnosed with covid on 10/1/24 but symptoms have since resolved. Patient uncertain about his medications and wife is not in the room.     In the ED vitals are notable for blood pressure 86/63 which improved to 103/68. HR was 59. Labs are notable for a potassium of 6.7, creatinine of 1.9 with baseline of. CXR shows cardiomegaly and evidence of bronchopneumonia. Family medicine was asked to admit for ambulatory dysfunction.     Past Medical History:   Diagnosis Date    Alcoholic cirrhosis (HCC)     Alcoholism (HCC)     Has been sober / dry since 01/2016.    Anxiety     Arthritis     Chronic atrial fibrillation (HCC)     Esophageal varices (HCC)     UGI bleed ~2012 - has had variceal banding 2x around that time.    History of kidney stones     Hypertension     Lumbar compression fracture (HCC) 02/2017    Lung tumor 2017    Osteopenia     Osteopenia     S/P TAVR (transcatheter aortic valve replacement) 9/20/2018    Seizure disorder ([facility]) 09/2016    Complicated a stroke with occipital ICH.    Seizures (HCC)         Atrial Rate 54 BPM    QRS Duration 174 ms    Q-T Interval 434 ms    QTc Calculation (Bazett) 465 ms    R Axis -45 degrees    T Axis 128 degrees   Comprehensive Metabolic Panel    Collection Time: 10/11/24  9:02 PM   Result Value Ref Range    Sodium 133 132 - 146 mmol/L    Potassium 6.7 (HH) 3.5 - 5.0 mmol/L    Chloride 97 (L) 98 - 107 mmol/L    CO2 28 22 - 29 mmol/L    Anion Gap 8 7 - 16 mmol/L    Glucose 75 74 - 99 mg/dL    BUN 31 (H) 6 - 23 mg/dL    Creatinine 1.9 (H) 0.70 - 1.20 mg/dL    Est, Glom Filt Rate 37 (L) >60 mL/min/1.73m2    Calcium 10.0 8.6 - 10.2 mg/dL    Total Protein 6.4 6.4 - 8.3 g/dL    Albumin 3.2 (L) 3.5 - 5.2 g/dL    Total Bilirubin 1.6 (H) 0.0 - 1.2 mg/dL    Alkaline Phosphatase 76 40 - 129 U/L    ALT 37 0 - 40 U/L    AST 91 (H) 0 - 39 U/L       XR CHEST (2 VW)   Final Result   1. Findings concerning for diffuse scattered bronchopneumonia airspace   disease. Correlate with clinical history. Follow-up to resolution is   recommended.   2. Cardiomegaly.             Assessment/Plan:      Active Hospital Problems    Diagnosis Date Noted    Ambulatory dysfunction [R26.2] 10/12/2024     Ambulatory dysfunction  Secondary to back pain  PT  OT  SW  Will need to call wife to discuss medications in AM     Back pain  MRI lumbar spine shows chronic fractures to T12-L1, L1-2. L3 and L4. Has been prescribed nasal calcitonin at home but will hold while inpatient.    Cervical xray  Ibuprofen     Hyperkalemia  K 6.7 on admission. He was treated with albuterol calcium gluconate 10 u regular insulin and kayexalate  Repeat K  Telemetry  Consider nephrology consult     Hypotension  Continue to monitor, improved while in ED. Secondary to poor renal function vs volume depletion?  Monitor   Holding antihypertensives     Rheumatoid arthritis   Plaquinel 200 mg BID   Prednisone 5 mg     Gerd  Pepcid 40 mg     Cirrhosis secondary to alcohol use   Holding aldactone 25 mg in setting of hypotension  Holding nadolol 40 mg

## 2024-10-12 NOTE — ED NOTES
..ED to Inpatient Handoff Report    Notified Armand that electronic handoff available and patient ready for transport to room 435.    Safety Risks: Risk of falls    Patient in Restraints: no    Constant Observer or Patient : no    Telemetry Monitoring Ordered :Yes           Order to transfer to unit without monitor:YES    Last MEWS: 1 Time completed: 1115    Deterioration Index Score:   Predictive Model Details          31  Factor Value    Calculated 10/12/2024 11:23 39% Age 74 years old    Deterioration Index Model 34% Potassium abnormal (6.7 mmol/L)     18% Respiratory rate 20     3% Sodium 133 mmol/L     3% BUN abnormal (31 mg/dL)     2% Pulse oximetry 99 %     2% Systolic 118     0% Pulse 68     0% Temperature 98.6 °F (37 °C)     0% Hematocrit 38.8 %     0% WBC count 6.5 k/uL        Vitals:    10/12/24 0953 10/12/24 1023 10/12/24 1053 10/12/24 1115   BP:  111/67 114/64 118/68   Pulse: 63  68 68   Resp: 19  19 20   Temp:    98.6 °F (37 °C)   TempSrc:       SpO2:  96% 95% 99%   Weight:       Height:             Opportunity for questions and clarification was provided.

## 2024-10-13 ENCOUNTER — APPOINTMENT (OUTPATIENT)
Dept: GENERAL RADIOLOGY | Age: 74
DRG: 433 | End: 2024-10-13
Payer: MEDICARE

## 2024-10-13 PROBLEM — R19.7 DIARRHEA: Status: ACTIVE | Noted: 2024-10-13

## 2024-10-13 LAB
ALBUMIN SERPL-MCNC: 2.7 G/DL (ref 3.5–5.2)
ALP SERPL-CCNC: 64 U/L (ref 40–129)
ALT SERPL-CCNC: 31 U/L (ref 0–40)
ANION GAP SERPL CALCULATED.3IONS-SCNC: 11 MMOL/L (ref 7–16)
AST SERPL-CCNC: 76 U/L (ref 0–39)
BASOPHILS # BLD: 0.02 K/UL (ref 0–0.2)
BASOPHILS NFR BLD: 1 % (ref 0–2)
BILIRUB SERPL-MCNC: 1.3 MG/DL (ref 0–1.2)
BUN SERPL-MCNC: 25 MG/DL (ref 6–23)
C DIFF GDH + TOXINS A+B STL QL IA.RAPID: NEGATIVE
CALCIUM SERPL-MCNC: 8.6 MG/DL (ref 8.6–10.2)
CHLORIDE SERPL-SCNC: 104 MMOL/L (ref 98–107)
CO2 SERPL-SCNC: 22 MMOL/L (ref 22–29)
CREAT SERPL-MCNC: 1.3 MG/DL (ref 0.7–1.2)
EOSINOPHIL # BLD: 0 K/UL (ref 0.05–0.5)
EOSINOPHILS RELATIVE PERCENT: 0 % (ref 0–6)
ERYTHROCYTE [DISTWIDTH] IN BLOOD BY AUTOMATED COUNT: 14.6 % (ref 11.5–15)
GFR, ESTIMATED: 56 ML/MIN/1.73M2
GLUCOSE BLD-MCNC: 104 MG/DL (ref 74–99)
GLUCOSE SERPL-MCNC: 80 MG/DL (ref 74–99)
HCT VFR BLD AUTO: 38.4 % (ref 37–54)
HGB BLD-MCNC: 12.8 G/DL (ref 12.5–16.5)
IMM GRANULOCYTES # BLD AUTO: <0.03 K/UL (ref 0–0.58)
IMM GRANULOCYTES NFR BLD: 1 % (ref 0–5)
LYMPHOCYTES NFR BLD: 0.67 K/UL (ref 1.5–4)
LYMPHOCYTES RELATIVE PERCENT: 15 % (ref 20–42)
MCH RBC QN AUTO: 34 PG (ref 26–35)
MCHC RBC AUTO-ENTMCNC: 33.3 G/DL (ref 32–34.5)
MCV RBC AUTO: 101.9 FL (ref 80–99.9)
MONOCYTES NFR BLD: 0.48 K/UL (ref 0.1–0.95)
MONOCYTES NFR BLD: 11 % (ref 2–12)
NEUTROPHILS NFR BLD: 73 % (ref 43–80)
NEUTS SEG NFR BLD: 3.25 K/UL (ref 1.8–7.3)
PLATELET # BLD AUTO: 70 K/UL (ref 130–450)
PLATELET CONFIRMATION: NORMAL
PMV BLD AUTO: 10.7 FL (ref 7–12)
POTASSIUM SERPL-SCNC: 4.4 MMOL/L (ref 3.5–5)
PROT SERPL-MCNC: 5.5 G/DL (ref 6.4–8.3)
RBC # BLD AUTO: 3.77 M/UL (ref 3.8–5.8)
SODIUM SERPL-SCNC: 137 MMOL/L (ref 132–146)
SPECIMEN DESCRIPTION: NORMAL
WBC OTHER # BLD: 4.4 K/UL (ref 4.5–11.5)

## 2024-10-13 PROCEDURE — 6360000002 HC RX W HCPCS

## 2024-10-13 PROCEDURE — 2580000003 HC RX 258

## 2024-10-13 PROCEDURE — 80053 COMPREHEN METABOLIC PANEL: CPT

## 2024-10-13 PROCEDURE — 82962 GLUCOSE BLOOD TEST: CPT

## 2024-10-13 PROCEDURE — 6370000000 HC RX 637 (ALT 250 FOR IP)

## 2024-10-13 PROCEDURE — 99232 SBSQ HOSP IP/OBS MODERATE 35: CPT

## 2024-10-13 PROCEDURE — 2060000000 HC ICU INTERMEDIATE R&B

## 2024-10-13 PROCEDURE — 85025 COMPLETE CBC W/AUTO DIFF WBC: CPT

## 2024-10-13 PROCEDURE — 72040 X-RAY EXAM NECK SPINE 2-3 VW: CPT

## 2024-10-13 RX ORDER — ENOXAPARIN SODIUM 100 MG/ML
40 INJECTION SUBCUTANEOUS DAILY
Status: DISCONTINUED | OUTPATIENT
Start: 2024-10-13 | End: 2024-10-15 | Stop reason: HOSPADM

## 2024-10-13 RX ADMIN — PREDNISONE 5 MG: 5 TABLET ORAL at 09:27

## 2024-10-13 RX ADMIN — ENOXAPARIN SODIUM 40 MG: 100 INJECTION SUBCUTANEOUS at 10:25

## 2024-10-13 RX ADMIN — SODIUM CHLORIDE, PRESERVATIVE FREE 10 ML: 5 INJECTION INTRAVENOUS at 21:03

## 2024-10-13 RX ADMIN — ACETAMINOPHEN 650 MG: 325 TABLET ORAL at 17:02

## 2024-10-13 RX ADMIN — ACETAMINOPHEN 650 MG: 325 TABLET ORAL at 10:25

## 2024-10-13 RX ADMIN — SODIUM CHLORIDE, PRESERVATIVE FREE 10 ML: 5 INJECTION INTRAVENOUS at 09:30

## 2024-10-13 RX ADMIN — LACOSAMIDE 100 MG: 100 TABLET, FILM COATED ORAL at 09:26

## 2024-10-13 RX ADMIN — ASPIRIN 81 MG CHEWABLE TABLET 81 MG: 81 TABLET CHEWABLE at 09:26

## 2024-10-13 RX ADMIN — HYDROXYCHLOROQUINE SULFATE 200 MG: 200 TABLET ORAL at 21:03

## 2024-10-13 RX ADMIN — Medication 100 MG: at 09:26

## 2024-10-13 RX ADMIN — LEVETIRACETAM 1000 MG: 500 TABLET, FILM COATED ORAL at 09:26

## 2024-10-13 RX ADMIN — LACOSAMIDE 100 MG: 100 TABLET, FILM COATED ORAL at 21:03

## 2024-10-13 RX ADMIN — FOLIC ACID 1 MG: 1 TABLET ORAL at 09:26

## 2024-10-13 RX ADMIN — LEVETIRACETAM 1000 MG: 500 TABLET, FILM COATED ORAL at 21:03

## 2024-10-13 RX ADMIN — FAMOTIDINE 40 MG: 20 TABLET ORAL at 09:27

## 2024-10-13 RX ADMIN — HYDROXYCHLOROQUINE SULFATE 200 MG: 200 TABLET ORAL at 09:26

## 2024-10-13 ASSESSMENT — PAIN SCALES - GENERAL
PAINLEVEL_OUTOF10: 2
PAINLEVEL_OUTOF10: 0
PAINLEVEL_OUTOF10: 2
PAINLEVEL_OUTOF10: 2
PAINLEVEL_OUTOF10: 0
PAINLEVEL_OUTOF10: 0
PAINLEVEL_OUTOF10: 3

## 2024-10-13 ASSESSMENT — PAIN DESCRIPTION - LOCATION
LOCATION: HEAD
LOCATION: BACK;HEAD

## 2024-10-13 ASSESSMENT — PAIN DESCRIPTION - PAIN TYPE: TYPE: ACUTE PAIN

## 2024-10-13 ASSESSMENT — PAIN DESCRIPTION - DESCRIPTORS
DESCRIPTORS: ACHING
DESCRIPTORS: ACHING

## 2024-10-13 NOTE — PROGRESS NOTES
[facility]  Progress Note    Chief complaint :  Chief Complaint   Patient presents with    Back Pain     Upper back pain, recent pneumonia, hx of lower chronic back pain       Subjective:    No overnight problems. Patient describes feeling better. Patient denies chest pain, SOB, nausea, vomiting, fever, chills. Patient is tolerating diet.    As per nurse, no overnight events    Past medical, surgical, family and social history were reviewed, non-contributory, and unchanged unless otherwise stated.    Review of Systems  Negative unless stated above    Objective:  /81   Pulse 66   Temp 97.8 °F (36.6 °C)   Resp 18   Ht 1.753 m (5' 9\")   Wt 103.4 kg (227 lb 15.3 oz)   SpO2 95%   BMI 33.66 kg/m²     Physical Exam  Constitutional:       Appearance: Normal appearance. He is obese.   HENT:      Nose: Nose normal.      Mouth/Throat:      Mouth: Mucous membranes are moist.   Eyes:      Conjunctiva/sclera: Conjunctivae normal.   Cardiovascular:      Rate and Rhythm: Normal rate and regular rhythm.      Pulses: Normal pulses.      Heart sounds: Normal heart sounds.   Pulmonary:      Effort: Pulmonary effort is normal.      Breath sounds: Normal breath sounds. No stridor. No wheezing, rhonchi or rales.   Abdominal:      General: Bowel sounds are normal. There is no distension.      Palpations: Abdomen is soft. There is no mass.      Tenderness: There is no abdominal tenderness.      Hernia: No hernia is present.   Musculoskeletal:      Cervical back: Normal range of motion.      Comments: No spinal step-off, no spinal tenderness   Skin:     General: Skin is warm.   Neurological:      General: No focal deficit present.      Mental Status: He is alert.   Psychiatric:         Mood and Affect: Mood normal.         Labs:  Recent Results (from the past 24 hour(s))   POCT Glucose    Collection Time: 10/13/24 10:54 AM   Result Value Ref Range    POC Glucose 104 (H) 74 - 99 mg/dL  daily     GERD  Pepcid 40 mg      History of focal seizures with impairment of consciousness 9/2017  Vimpat 100 mg TID  Keppra 1000 mg BID    Hyperkalemia, resolved  K 6.7 on admission. 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  Holding nephrotoxic agents  Strict I/O    DVT ppx: lovenox  GI ppx: pepcid  Code Status: full      Disposition: Discharge to Skilled Facility    Justina Zavaleta MD   Family Medicine Resident PGY-2  10/14/24   5:58 AM

## 2024-10-13 NOTE — PROGRESS NOTES
Discussed with attending regarding platelets being 70K and the patient being on Lovenox.  Patient currently asymptomatic, no bleeding GA or mucocutaneous.  Platelets were confirmed at 70K.  Yesterday the platelets were at 68K.    Advise  1.  Convert to Lovenox 40 Mg daily  2.  Monitor platelets and trend

## 2024-10-13 NOTE — PROGRESS NOTES
[facility]  Progress Note    Chief complaint :  Chief Complaint   Patient presents with    Back Pain     Upper back pain, recent pneumonia, hx of lower chronic back pain       Subjective:    No overnight problems continues to have some back pain with some improvement  Denies any chest pain shortness of breath or abdominal pain.      Past medical, surgical, family and social history were reviewed, non-contributory, and unchanged unless otherwise stated.    Review of Systems  See above    Objective:  /75   Pulse 64   Temp 98.8 °F (37.1 °C)   Resp 18   Ht 1.753 m (5' 9\")   Wt 104.5 kg (230 lb 6.1 oz)   SpO2 98%   BMI 34.02 kg/m²     Physical Exam  Constitutional:       Appearance: Normal appearance. He is normal weight.   HENT:      Head: Normocephalic and atraumatic.   Eyes:      Extraocular Movements: Extraocular movements intact.      Pupils: Pupils are equal, round, and reactive to light.   Cardiovascular:      Rate and Rhythm: Normal rate and regular rhythm.      Pulses: Normal pulses.      Heart sounds: Normal heart sounds. No murmur heard.  Pulmonary:      Effort: Pulmonary effort is normal.      Breath sounds: Normal breath sounds. No wheezing.   Abdominal:      General: Abdomen is flat. Bowel sounds are normal.      Palpations: Abdomen is soft.      Tenderness: There is no abdominal tenderness.   Musculoskeletal:      Right lower leg: No edema.      Left lower leg: No edema.      Comments: No tenderness to back while palpating   Skin:     General: Skin is warm and dry.      Capillary Refill: Capillary refill takes less than 2 seconds.      Coloration: Skin is not jaundiced.   Neurological:      General: No focal deficit present.      Mental Status: He is alert. Mental status is at baseline.         Labs:  Recent Results (from the past 24 hour(s))   POCT Glucose    Collection Time: 10/12/24  8:35 PM   Result Value Ref Range    POC Glucose 79 74 - 99 mg/dL

## 2024-10-13 NOTE — PLAN OF CARE
Problem: Safety - Adult  Goal: Free from fall injury  Outcome: Progressing     Problem: Chronic Conditions and Co-morbidities  Goal: Patient's chronic conditions and co-morbidity symptoms are monitored and maintained or improved  Outcome: Progressing  Flowsheets (Taken 10/12/2024 2015)  Care Plan - Patient's Chronic Conditions and Co-Morbidity Symptoms are Monitored and Maintained or Improved: Monitor and assess patient's chronic conditions and comorbid symptoms for stability, deterioration, or improvement     Problem: Discharge Planning  Goal: Discharge to home or other facility with appropriate resources  Outcome: Progressing  Flowsheets (Taken 10/12/2024 2015)  Discharge to home or other facility with appropriate resources: Identify barriers to discharge with patient and caregiver     Problem: ABCDS Injury Assessment  Goal: Absence of physical injury  Outcome: Progressing     Problem: Skin/Tissue Integrity  Goal: Absence of new skin breakdown  Description: 1.  Monitor for areas of redness and/or skin breakdown  2.  Assess vascular access sites hourly  3.  Every 4-6 hours minimum:  Change oxygen saturation probe site  4.  Every 4-6 hours:  If on nasal continuous positive airway pressure, respiratory therapy assess nares and determine need for appliance change or resting period.  Outcome: Progressing     Problem: Pain  Goal: Verbalizes/displays adequate comfort level or baseline comfort level  Outcome: Progressing

## 2024-10-13 NOTE — PLAN OF CARE
Problem: Safety - Adult  Goal: Free from fall injury  10/13/2024 1426 by Shruthi Stover RN  Outcome: Progressing     Problem: Chronic Conditions and Co-morbidities  Goal: Patient's chronic conditions and co-morbidity symptoms are monitored and maintained or improved  10/13/2024 1426 by Shruthi Stover RN  Outcome: Progressing     Problem: Discharge Planning  Goal: Discharge to home or other facility with appropriate resources  10/13/2024 1426 by Shruthi Stover RN  Outcome: Progressing     Problem: ABCDS Injury Assessment  Goal: Absence of physical injury  10/13/2024 1426 by Shruthi Stover RN  Outcome: Progressing     Problem: Skin/Tissue Integrity  Goal: Absence of new skin breakdown  Description: 1.  Monitor for areas of redness and/or skin breakdown  2.  Assess vascular access sites hourly  3.  Every 4-6 hours minimum:  Change oxygen saturation probe site  4.  Every 4-6 hours:  If on nasal continuous positive airway pressure, respiratory therapy assess nares and determine need for appliance change or resting period.  10/13/2024 1426 by Shruthi Stover RN  Outcome: Progressing     Problem: Pain  Goal: Verbalizes/displays adequate comfort level or baseline comfort level  10/13/2024 1426 by Shruthi Stover RN  Outcome: Progressing

## 2024-10-13 NOTE — PROGRESS NOTES
Spoke with infection control regarding need for Covid isolation since pt tested positive on 10/1 in PCP office. No need for isolation at this time since pt has no symptoms and it is day 10 since positive test.

## 2024-10-14 LAB
ALBUMIN SERPL-MCNC: 2.8 G/DL (ref 3.5–5.2)
ALP SERPL-CCNC: 62 U/L (ref 40–129)
ALT SERPL-CCNC: 29 U/L (ref 0–40)
ANION GAP SERPL CALCULATED.3IONS-SCNC: 10 MMOL/L (ref 7–16)
AST SERPL-CCNC: 72 U/L (ref 0–39)
BASOPHILS # BLD: 0.02 K/UL (ref 0–0.2)
BASOPHILS NFR BLD: 1 % (ref 0–2)
BILIRUB SERPL-MCNC: 1.3 MG/DL (ref 0–1.2)
BUN SERPL-MCNC: 18 MG/DL (ref 6–23)
CALCIUM SERPL-MCNC: 8.9 MG/DL (ref 8.6–10.2)
CHLORIDE SERPL-SCNC: 104 MMOL/L (ref 98–107)
CO2 SERPL-SCNC: 23 MMOL/L (ref 22–29)
CREAT SERPL-MCNC: 1.1 MG/DL (ref 0.7–1.2)
EKG ATRIAL RATE: 54 BPM
EKG Q-T INTERVAL: 434 MS
EKG QRS DURATION: 174 MS
EKG QTC CALCULATION (BAZETT): 465 MS
EKG R AXIS: -45 DEGREES
EKG T AXIS: 128 DEGREES
EKG VENTRICULAR RATE: 69 BPM
EOSINOPHIL # BLD: 0 K/UL (ref 0.05–0.5)
EOSINOPHILS RELATIVE PERCENT: 0 % (ref 0–6)
ERYTHROCYTE [DISTWIDTH] IN BLOOD BY AUTOMATED COUNT: 14.5 % (ref 11.5–15)
GFR, ESTIMATED: 69 ML/MIN/1.73M2
GLUCOSE SERPL-MCNC: 74 MG/DL (ref 74–99)
HCT VFR BLD AUTO: 40.4 % (ref 37–54)
HGB BLD-MCNC: 13.6 G/DL (ref 12.5–16.5)
IMM GRANULOCYTES # BLD AUTO: <0.03 K/UL (ref 0–0.58)
IMM GRANULOCYTES NFR BLD: 0 % (ref 0–5)
LYMPHOCYTES NFR BLD: 0.67 K/UL (ref 1.5–4)
LYMPHOCYTES RELATIVE PERCENT: 16 % (ref 20–42)
MCH RBC QN AUTO: 34.6 PG (ref 26–35)
MCHC RBC AUTO-ENTMCNC: 33.7 G/DL (ref 32–34.5)
MCV RBC AUTO: 102.8 FL (ref 80–99.9)
MONOCYTES NFR BLD: 0.4 K/UL (ref 0.1–0.95)
MONOCYTES NFR BLD: 10 % (ref 2–12)
NEUTROPHILS NFR BLD: 74 % (ref 43–80)
NEUTS SEG NFR BLD: 3.1 K/UL (ref 1.8–7.3)
PLATELET CONFIRMATION: NORMAL
PLATELET, FLUORESCENCE: 63 K/UL (ref 130–450)
PMV BLD AUTO: 10.8 FL (ref 7–12)
POTASSIUM SERPL-SCNC: 3.8 MMOL/L (ref 3.5–5)
PROT SERPL-MCNC: 5.9 G/DL (ref 6.4–8.3)
RBC # BLD AUTO: 3.93 M/UL (ref 3.8–5.8)
SODIUM SERPL-SCNC: 137 MMOL/L (ref 132–146)
WBC OTHER # BLD: 4.2 K/UL (ref 4.5–11.5)

## 2024-10-14 PROCEDURE — 6370000000 HC RX 637 (ALT 250 FOR IP)

## 2024-10-14 PROCEDURE — 6360000002 HC RX W HCPCS

## 2024-10-14 PROCEDURE — 99232 SBSQ HOSP IP/OBS MODERATE 35: CPT | Performed by: FAMILY MEDICINE

## 2024-10-14 PROCEDURE — 6370000000 HC RX 637 (ALT 250 FOR IP): Performed by: FAMILY MEDICINE

## 2024-10-14 PROCEDURE — 2580000003 HC RX 258

## 2024-10-14 PROCEDURE — 85025 COMPLETE CBC W/AUTO DIFF WBC: CPT

## 2024-10-14 PROCEDURE — 97165 OT EVAL LOW COMPLEX 30 MIN: CPT

## 2024-10-14 PROCEDURE — 36415 COLL VENOUS BLD VENIPUNCTURE: CPT

## 2024-10-14 PROCEDURE — 97161 PT EVAL LOW COMPLEX 20 MIN: CPT

## 2024-10-14 PROCEDURE — 2060000000 HC ICU INTERMEDIATE R&B

## 2024-10-14 PROCEDURE — 6360000002 HC RX W HCPCS: Performed by: FAMILY MEDICINE

## 2024-10-14 PROCEDURE — 93010 ELECTROCARDIOGRAM REPORT: CPT | Performed by: INTERNAL MEDICINE

## 2024-10-14 PROCEDURE — 80053 COMPREHEN METABOLIC PANEL: CPT

## 2024-10-14 RX ORDER — PANTOPRAZOLE SODIUM 40 MG/10ML
40 INJECTION, POWDER, LYOPHILIZED, FOR SOLUTION INTRAVENOUS ONCE
Status: COMPLETED | OUTPATIENT
Start: 2024-10-14 | End: 2024-10-14

## 2024-10-14 RX ORDER — PANTOPRAZOLE SODIUM 40 MG/1
40 TABLET, DELAYED RELEASE ORAL
Status: DISCONTINUED | OUTPATIENT
Start: 2024-10-15 | End: 2024-10-15 | Stop reason: HOSPADM

## 2024-10-14 RX ORDER — GABAPENTIN 300 MG/1
300 CAPSULE ORAL 2 TIMES DAILY
Status: DISCONTINUED | OUTPATIENT
Start: 2024-10-14 | End: 2024-10-15 | Stop reason: HOSPADM

## 2024-10-14 RX ADMIN — PREDNISONE 5 MG: 5 TABLET ORAL at 08:01

## 2024-10-14 RX ADMIN — ENOXAPARIN SODIUM 40 MG: 100 INJECTION SUBCUTANEOUS at 08:00

## 2024-10-14 RX ADMIN — GABAPENTIN 300 MG: 300 CAPSULE ORAL at 20:01

## 2024-10-14 RX ADMIN — LEVETIRACETAM 1000 MG: 500 TABLET, FILM COATED ORAL at 20:00

## 2024-10-14 RX ADMIN — GABAPENTIN 300 MG: 300 CAPSULE ORAL at 13:19

## 2024-10-14 RX ADMIN — LEVETIRACETAM 1000 MG: 500 TABLET, FILM COATED ORAL at 08:00

## 2024-10-14 RX ADMIN — SODIUM CHLORIDE, PRESERVATIVE FREE 10 ML: 5 INJECTION INTRAVENOUS at 20:01

## 2024-10-14 RX ADMIN — ASPIRIN 81 MG CHEWABLE TABLET 81 MG: 81 TABLET CHEWABLE at 08:00

## 2024-10-14 RX ADMIN — LACOSAMIDE 100 MG: 100 TABLET, FILM COATED ORAL at 20:01

## 2024-10-14 RX ADMIN — FAMOTIDINE 40 MG: 20 TABLET ORAL at 08:01

## 2024-10-14 RX ADMIN — HYDROXYCHLOROQUINE SULFATE 200 MG: 200 TABLET ORAL at 08:00

## 2024-10-14 RX ADMIN — Medication 100 MG: at 08:00

## 2024-10-14 RX ADMIN — SODIUM CHLORIDE, PRESERVATIVE FREE 10 ML: 5 INJECTION INTRAVENOUS at 08:01

## 2024-10-14 RX ADMIN — FOLIC ACID 1 MG: 1 TABLET ORAL at 08:00

## 2024-10-14 RX ADMIN — HYDROXYCHLOROQUINE SULFATE 200 MG: 200 TABLET ORAL at 20:01

## 2024-10-14 RX ADMIN — PANTOPRAZOLE SODIUM 40 MG: 40 INJECTION, POWDER, FOR SOLUTION INTRAVENOUS at 14:02

## 2024-10-14 RX ADMIN — LACOSAMIDE 100 MG: 100 TABLET, FILM COATED ORAL at 08:00

## 2024-10-14 ASSESSMENT — PAIN SCALES - GENERAL
PAINLEVEL_OUTOF10: 0
PAINLEVEL_OUTOF10: 0

## 2024-10-14 NOTE — PROGRESS NOTES
multilevel degenerative changes identified diffusely throughout the   cervical spine with no acute fracture or subluxation.         US ABDOMEN LIMITED Specify organ? LIVER   Final Result   1. Cholelithiasis. No sonographic evidence of acute cholecystitis.  Normal   common bile duct.   2. Minimally heterogeneous liver echogenicity. No focal hepatic masses are   identified.         XR CHEST (2 VW)   Final Result   1. Findings concerning for diffuse scattered bronchopneumonia airspace   disease. Correlate with clinical history. Follow-up to resolution is   recommended.   2. Cardiomegaly.             Assessment:  Active Hospital Problems    Diagnosis Date Noted    Chronic renal disease, stage III (HCC) [579760] [N18.30] 10/17/2022     Priority: Medium    Alcoholic cirrhosis of liver without ascites (HCC) [K70.30] 09/10/2016     Priority: Medium    Diarrhea [R19.7] 10/13/2024    Ambulatory dysfunction [R26.2] 10/12/2024    Hyperkalemia [E87.5] 10/12/2024    Paroxysmal atrial fibrillation (HCC) [I48.0] 09/06/2012    Essential hypertension [I10] 09/06/2012    COPD (chronic obstructive pulmonary disease) (HCC) [J44.9] 10/13/2011       Plan:  Chronic Renal Disease stage III  - Creatinine wnl at 1.1  - Continue to monitor CMP daily  - Continue IV fluids    Alcoholic cirrhosis of liver without ascites  Thrombocytopenia  - Consider reintroducing spironolactone, as BP is up to 117/72 and no longer hypotensive as he was in the ED  - continue Lovenox 40 mg  - continue monitoring platelets  - hold nadolol 40 mg ISO hypotension    Diarrhea  - Continue to treat supportively  - Encourage hydration and ambulation  - Consider loperamide if diarrhea continues to worsen    Ambulatory dysfunction  - PT/OT consult ordered, awaiting recs    Hyperkalemia  - Continue to trend CMP to monitor K+ levels  - last K+ wnl at 3.8  - may consider calcium gluconate and insulin In future if hyperkalemia persists    Hypotension, resolved  - Continue to  trend BP  - last /72  - Consider adding midodrine in future, tentatively after spironolactone reintroduced      Pain Control:  Tylenol 650 mg Q6HR PRN for mild pain  DVT ppx: Lovenox 40 mg daily  GI ppx:  Pepcid 40 mg  Code Status: Full  Diet: General diet      Charo Sharpe   [facility]  10/14/24   12:00 PM

## 2024-10-14 NOTE — PROGRESS NOTES
Nutrition Assessment    Type and Reason for Visit:  Initial, Positive Nutrition Screen    Nutrition Recommendations/Plan:   Continue current diet and ONS, as tolerated  Continue inpatient monitoring     Nutrition Assessment:    Pt admit 2/2 hyperkalemia (6.7) and ambulatory dysfunction. Pt has had upper back pain and lumbar compression fx. PMHx=ETOH cirrhosis, CKD 3, CVA. Will continue current Ensure HP ONS to optimize nutrient intake in the setting of suboptimal PO d/t nausea, diarrhea, pain. Continue inpatient monitoring    Nutrition Related Findings:    A&Ox4, elevated LFT, nausea, trace edema, diarrhea, soft abd +BS, -I/O 2.1L Wound Type: None (redness at skin folds)       Current Nutrition Intake & Therapies:    Average Meal Intake: Unable to assess  Average Supplements Intake: Unable to assess  ADULT DIET; Regular  ADULT ORAL NUTRITION SUPPLEMENT; Breakfast, Lunch, Dinner; Low Calorie/High Protein Oral Supplement    Anthropometric Measures:  Height: 175.3 cm (5' 9\")  Ideal Body Weight (IBW): 160 lbs (73 kg)    Admission Body Weight: 104.5 kg (230 lb 6.1 oz) (10/13 bedscale)  Current Body Weight: 103.4 kg (227 lb 15.3 oz), 142.5 % IBW. Weight Source: Bed Scale (10/14)  Current BMI (kg/m2): 33.6  Usual Body Weight: 113.1 kg (249 lb 6 oz) (3/21/2024 per EMR)  % Weight Change (Calculated): -8.6                    BMI Categories: Obese Class 1 (BMI 30.0-34.9)      Nutrition Diagnosis:   Inadequate oral intake related to pain as evidenced by poor intake prior to admission, nausea    Nutrition Interventions:   Food and/or Nutrient Delivery: Continue Current Diet, Continue Oral Nutrition Supplement  Nutrition Education/Counseling: Education not indicated  Coordination of Nutrition Care: Continue to monitor while inpatient       Goals:     Goals: PO intake 75% or greater, by next RD assessment       Nutrition Monitoring and Evaluation:   Behavioral-Environmental Outcomes: None Identified  Food/Nutrient Intake Outcomes:

## 2024-10-14 NOTE — PROGRESS NOTES
[facility]  Progress Note    Chief complaint :  Chief Complaint   Patient presents with    Back Pain     Upper back pain, recent pneumonia, hx of lower chronic back pain       Subjective:    No overnight problems. Patient describes feeling better. Patient denies chest pain, SOB, nausea, vomiting, fever, chills. Patient is tolerating diet.    As per nurse, no overnight events    Past medical, surgical, family and social history were reviewed, non-contributory, and unchanged unless otherwise stated.    Review of Systems  Negative unless stated above    Objective:  /86   Pulse (!) 110   Temp 98.5 °F (36.9 °C)   Resp 16   Ht 1.753 m (5' 9\")   Wt 103.8 kg (228 lb 13.4 oz)   SpO2 98%   BMI 33.79 kg/m²     Physical Exam  Constitutional:       Appearance: Normal appearance. He is obese.   HENT:      Nose: Nose normal.      Mouth/Throat:      Mouth: Mucous membranes are moist.   Eyes:      Conjunctiva/sclera: Conjunctivae normal.   Cardiovascular:      Rate and Rhythm: Normal rate and regular rhythm.      Pulses: Normal pulses.      Heart sounds: Normal heart sounds.   Pulmonary:      Effort: Pulmonary effort is normal.      Breath sounds: Normal breath sounds. No stridor. No wheezing, rhonchi or rales.   Abdominal:      General: Bowel sounds are normal. There is no distension.      Palpations: Abdomen is soft. There is no mass.      Tenderness: There is no abdominal tenderness.      Hernia: No hernia is present.   Musculoskeletal:      Cervical back: Normal range of motion.      Comments: No spinal step-off, no spinal tenderness   Skin:     General: Skin is warm.   Neurological:      General: No focal deficit present.      Mental Status: He is alert.   Psychiatric:         Mood and Affect: Mood normal.         Labs:  No results found for this or any previous visit (from the past 24 hour(s)).      Radiology and other tests reviewed:  XR CERVICAL SPINE (2-3 VIEWS)   Final

## 2024-10-14 NOTE — PROGRESS NOTES
Updated RN case manager and , wife at bedside and patient requesting rehab facility for discharge.

## 2024-10-14 NOTE — PLAN OF CARE
Problem: Safety - Adult  Goal: Free from fall injury  10/14/2024 0825 by Renata Plata RN  Outcome: Progressing  10/14/2024 0120 by Carlo Brenner RN  Outcome: Progressing     Problem: Chronic Conditions and Co-morbidities  Goal: Patient's chronic conditions and co-morbidity symptoms are monitored and maintained or improved  10/14/2024 0825 by Renata Plata RN  Outcome: Progressing  10/14/2024 0120 by Carlo Brenner RN  Outcome: Progressing  Flowsheets (Taken 10/13/2024 2100)  Care Plan - Patient's Chronic Conditions and Co-Morbidity Symptoms are Monitored and Maintained or Improved: Monitor and assess patient's chronic conditions and comorbid symptoms for stability, deterioration, or improvement     Problem: Discharge Planning  Goal: Discharge to home or other facility with appropriate resources  10/14/2024 0825 by Renata Plata RN  Outcome: Progressing  10/14/2024 0120 by Carlo Brenner RN  Outcome: Progressing  Flowsheets (Taken 10/13/2024 2100)  Discharge to home or other facility with appropriate resources: Identify barriers to discharge with patient and caregiver

## 2024-10-14 NOTE — PROGRESS NOTES
Physical Therapy  Facility/Department: [address] INTERMEDIATE 1  Physical Therapy Initial Assessment    Name: Jermaine Burks  : 1950  MRN: [id number]  Date of Service: 10/14/2024        Patient Diagnosis(es): The primary encounter diagnosis was Hyperkalemia. A diagnosis of Chronic low back pain, unspecified back pain laterality, unspecified whether sciatica present was also pertinent to this visit.  Past Medical History:  has a past medical history of Alcoholic cirrhosis (HCC), Alcoholism (HCC), Anxiety, Arthritis, Chronic atrial fibrillation (HCC), Esophageal varices (HCC), History of kidney stones, Hypertension, Lumbar compression fracture (HCC), Lung tumor, Osteopenia, Osteopenia, S/P TAVR (transcatheter aortic valve replacement), Seizure disorder (HCC), Seizures (HCC), Sepsis with MRSE bacteremia, Severe aortic stenosis, Sleep apnea, and Stroke (HCC).  Past Surgical History:  has a past surgical history that includes Upper gastrointestinal endoscopy; Esophageal varice ligation; ECHO Compl W Dop Color Flow (2012); Palate surgery; ECHO Complete 2D W Doppler W Color (2013); Umbilical hernia repair (2017); Upper gastrointestinal endoscopy (2017); hip surgery; Upper gastrointestinal endoscopy (2017); and XR MIDLINE EQUAL OR GREATER THAN 5 YEARS (10/3/2016).      Evaluating Therapist: Elisa Linares PT      Room #:  0435/0435-A  Diagnosis:  Hyperkalemia [E87.5]  Ambulatory dysfunction [R26.2]  Chronic low back pain, unspecified back pain laterality, unspecified whether sciatica present [M54.50, G89.29]  PMHx/PSHx:  Alcoholic cirrhosis, Afib, HTN, CVA  Precautions:  falls, alarm      Social:  Pt lives with wife in a 1 floor plan 2-3 steps and 1 rails to enter.  Prior to admission ambulates with 2 canes. Pt reports that he is \"lazy\" and does not move much     Initial Evaluation  Date: 10/14/24 Treatment      Short Term/ Long Term   Goals   Was pt agreeable to Eval/treatment? yes     Does

## 2024-10-14 NOTE — PLAN OF CARE
Problem: Safety - Adult  Goal: Free from fall injury  10/14/2024 0120 by Carlo Brenner RN  Outcome: Progressing  10/13/2024 1426 by Shruthi Stover RN  Outcome: Progressing     Problem: Chronic Conditions and Co-morbidities  Goal: Patient's chronic conditions and co-morbidity symptoms are monitored and maintained or improved  10/14/2024 0120 by Carlo Brenner RN  Outcome: Progressing  Flowsheets (Taken 10/13/2024 2100)  Care Plan - Patient's Chronic Conditions and Co-Morbidity Symptoms are Monitored and Maintained or Improved: Monitor and assess patient's chronic conditions and comorbid symptoms for stability, deterioration, or improvement  10/13/2024 1426 by Shruthi Stover RN  Outcome: Progressing     Problem: Discharge Planning  Goal: Discharge to home or other facility with appropriate resources  10/14/2024 0120 by Carlo Brenner RN  Outcome: Progressing  Flowsheets (Taken 10/13/2024 2100)  Discharge to home or other facility with appropriate resources: Identify barriers to discharge with patient and caregiver  10/13/2024 1426 by Shruthi Stover RN  Outcome: Progressing     Problem: ABCDS Injury Assessment  Goal: Absence of physical injury  10/14/2024 0120 by Carlo Brenner RN  Outcome: Progressing  10/13/2024 1426 by Shruthi Stover RN  Outcome: Progressing     Problem: Skin/Tissue Integrity  Goal: Absence of new skin breakdown  Description: 1.  Monitor for areas of redness and/or skin breakdown  2.  Assess vascular access sites hourly  3.  Every 4-6 hours minimum:  Change oxygen saturation probe site  4.  Every 4-6 hours:  If on nasal continuous positive airway pressure, respiratory therapy assess nares and determine need for appliance change or resting period.  10/14/2024 0120 by Carlo Brenner RN  Outcome: Progressing  10/13/2024 1426 by Shruthi Stover RN  Outcome: Progressing     Problem: Pain  Goal: Verbalizes/displays adequate comfort level or baseline comfort

## 2024-10-14 NOTE — PROGRESS NOTES
Occupational Therapy  OCCUPATIONAL THERAPY INITIAL EVALUATION  [facility]  [address], [place]    Date: 10/14/2024     Patient Name: Jermaine Burks  MRN: [id number]  : 1950  Room: [facility]    Evaluating OT: Mandie Wallace, OTR/L - [id number]    Referring Provider: Mian Radford MD  Specific Provider Orders/Date: \"OT eval and treat\" - 10/12/2024    Diagnosis: Hyperkalemia [E87.5]  Ambulatory dysfunction [R26.2]  Chronic low back pain, unspecified back pain laterality, unspecified whether sciatica present [M54.50, G89.29]     Pertinent Medical History: alcoholism, anxiety, arthritis, a-fib, osteopenia, seizure disorder, sleep apnea, stroke, receptive aphasia, HTN    Precautions: fall risk, bed/chair alarms    Assessment of Current Deficits:    [x] Functional mobility   [x] ADLs  [x] Strength               [x] Cognition   [x] Functional transfers   [x] IADLs         [x] Safety Awareness   [x] Endurance   [] Fine Motor Coordination  [x] Balance      [] Vision/Perception   [x] Sensation    [] Gross Motor Coordination [] ROM          [] Delirium                  [] Motor Control     OT PLAN OF CARE   OT POC is based on physician orders, patient diagnosis, and results of clinical assessment.  Frequency/Duration 2-5 days/week for 2-4 weeks PRN   Specific OT Treatment Interventions to Include:   * Instruction/training on adapted ADL techniques and AE recommendations to increase functional independence within precautions       * Training on energy conservation strategies, correct breathing pattern and techniques to improve independence/tolerance for self-care routine  * Functional transfer/mobility training/DME recommendations for increased independence, safety, and fall prevention  * Patient/Family education to increase follow through with safety techniques and functional independence  * Recommendation of environmental modifications for

## 2024-10-14 NOTE — ACP (ADVANCE CARE PLANNING)
Advance Care Planning   Healthcare Decision Maker:    Primary Decision Maker: Dione Burks - [facility] - [phone]    Click here to complete Healthcare Decision Makers including selection of the Healthcare Decision Maker Relationship (ie \"Primary\").  Today we documented Decision Maker(s) consistent with Legal Next of Kin hierarchy.

## 2024-10-14 NOTE — CARE COORDINATION
Social Work/Discharge Planning:  Social Work consult noted.  Met with patient and completed initial assessment.  Explained Social Work role and discussed transition of care/discharge planning.  Patient lives with his wife in a one story house with two to three steps to enter.  PTA he uses a cane.  He has a walker and an elevated toilet seat at home.  Reviewed therapy note reflecting need for rehab.  Patient is agreeable to rehab at discharge.  Provided patient with skilled nursing facility choice list to review with his wife.  Will continue to follow and assist with discharge planning.  Electronically signed by EDWIN Beckford on 10/14/2024 at 2:13 PM

## 2024-10-15 VITALS
RESPIRATION RATE: 16 BRPM | OXYGEN SATURATION: 100 % | TEMPERATURE: 98.9 F | SYSTOLIC BLOOD PRESSURE: 116 MMHG | WEIGHT: 228.84 LBS | HEART RATE: 90 BPM | DIASTOLIC BLOOD PRESSURE: 92 MMHG | BODY MASS INDEX: 33.89 KG/M2 | HEIGHT: 69 IN

## 2024-10-15 PROBLEM — E87.5 HYPERKALEMIA: Status: RESOLVED | Noted: 2024-10-12 | Resolved: 2024-10-15

## 2024-10-15 LAB
ALBUMIN SERPL-MCNC: 3 G/DL (ref 3.5–5.2)
ALP SERPL-CCNC: 70 U/L (ref 40–129)
ALT SERPL-CCNC: 29 U/L (ref 0–40)
ANION GAP SERPL CALCULATED.3IONS-SCNC: 8 MMOL/L (ref 7–16)
AST SERPL-CCNC: 68 U/L (ref 0–39)
BASOPHILS # BLD: 0.02 K/UL (ref 0–0.2)
BASOPHILS NFR BLD: 0 % (ref 0–2)
BILIRUB SERPL-MCNC: 1.1 MG/DL (ref 0–1.2)
BUN SERPL-MCNC: 19 MG/DL (ref 6–23)
CALCIUM SERPL-MCNC: 9.3 MG/DL (ref 8.6–10.2)
CHLORIDE SERPL-SCNC: 106 MMOL/L (ref 98–107)
CO2 SERPL-SCNC: 26 MMOL/L (ref 22–29)
CREAT SERPL-MCNC: 1.2 MG/DL (ref 0.7–1.2)
EOSINOPHIL # BLD: 0 K/UL (ref 0.05–0.5)
EOSINOPHILS RELATIVE PERCENT: 0 % (ref 0–6)
ERYTHROCYTE [DISTWIDTH] IN BLOOD BY AUTOMATED COUNT: 15.1 % (ref 11.5–15)
GFR, ESTIMATED: 61 ML/MIN/1.73M2
GLUCOSE SERPL-MCNC: 85 MG/DL (ref 74–99)
HCT VFR BLD AUTO: 39.3 % (ref 37–54)
HGB BLD-MCNC: 13.1 G/DL (ref 12.5–16.5)
IMM GRANULOCYTES # BLD AUTO: 0.03 K/UL (ref 0–0.58)
IMM GRANULOCYTES NFR BLD: 1 % (ref 0–5)
LYMPHOCYTES NFR BLD: 0.65 K/UL (ref 1.5–4)
LYMPHOCYTES RELATIVE PERCENT: 14 % (ref 20–42)
MCH RBC QN AUTO: 33.9 PG (ref 26–35)
MCHC RBC AUTO-ENTMCNC: 33.3 G/DL (ref 32–34.5)
MCV RBC AUTO: 101.8 FL (ref 80–99.9)
MONOCYTES NFR BLD: 0.67 K/UL (ref 0.1–0.95)
MONOCYTES NFR BLD: 14 % (ref 2–12)
NEUTROPHILS NFR BLD: 71 % (ref 43–80)
NEUTS SEG NFR BLD: 3.42 K/UL (ref 1.8–7.3)
PLATELET CONFIRMATION: NORMAL
PLATELET, FLUORESCENCE: 68 K/UL (ref 130–450)
PMV BLD AUTO: 10.6 FL (ref 7–12)
POTASSIUM SERPL-SCNC: 5.3 MMOL/L (ref 3.5–5)
PROT SERPL-MCNC: 5.9 G/DL (ref 6.4–8.3)
RBC # BLD AUTO: 3.86 M/UL (ref 3.8–5.8)
SODIUM SERPL-SCNC: 140 MMOL/L (ref 132–146)
WBC OTHER # BLD: 4.8 K/UL (ref 4.5–11.5)

## 2024-10-15 PROCEDURE — 2580000003 HC RX 258

## 2024-10-15 PROCEDURE — 97530 THERAPEUTIC ACTIVITIES: CPT

## 2024-10-15 PROCEDURE — 6360000002 HC RX W HCPCS

## 2024-10-15 PROCEDURE — 85025 COMPLETE CBC W/AUTO DIFF WBC: CPT

## 2024-10-15 PROCEDURE — 36415 COLL VENOUS BLD VENIPUNCTURE: CPT

## 2024-10-15 PROCEDURE — 80053 COMPREHEN METABOLIC PANEL: CPT

## 2024-10-15 PROCEDURE — 6370000000 HC RX 637 (ALT 250 FOR IP)

## 2024-10-15 PROCEDURE — 99238 HOSP IP/OBS DSCHRG MGMT 30/<: CPT | Performed by: FAMILY MEDICINE

## 2024-10-15 PROCEDURE — 6370000000 HC RX 637 (ALT 250 FOR IP): Performed by: FAMILY MEDICINE

## 2024-10-15 RX ORDER — PANTOPRAZOLE SODIUM 40 MG/1
40 TABLET, DELAYED RELEASE ORAL
DISCHARGE
Start: 2024-10-16

## 2024-10-15 RX ORDER — GABAPENTIN 300 MG/1
300 CAPSULE ORAL 2 TIMES DAILY
DISCHARGE
Start: 2024-10-15 | End: 2024-11-14

## 2024-10-15 RX ORDER — CALCITONIN SALMON 200 [IU]/.09ML
1 SPRAY, METERED NASAL DAILY
Qty: 3 EACH | Refills: 1 | Status: SHIPPED | OUTPATIENT
Start: 2024-10-15

## 2024-10-15 RX ADMIN — LEVETIRACETAM 1000 MG: 500 TABLET, FILM COATED ORAL at 09:25

## 2024-10-15 RX ADMIN — PREDNISONE 5 MG: 5 TABLET ORAL at 09:25

## 2024-10-15 RX ADMIN — HYDROXYCHLOROQUINE SULFATE 200 MG: 200 TABLET ORAL at 09:25

## 2024-10-15 RX ADMIN — Medication 100 MG: at 09:25

## 2024-10-15 RX ADMIN — ENOXAPARIN SODIUM 40 MG: 100 INJECTION SUBCUTANEOUS at 09:25

## 2024-10-15 RX ADMIN — GABAPENTIN 300 MG: 300 CAPSULE ORAL at 09:25

## 2024-10-15 RX ADMIN — SODIUM CHLORIDE, PRESERVATIVE FREE 10 ML: 5 INJECTION INTRAVENOUS at 09:25

## 2024-10-15 RX ADMIN — PANTOPRAZOLE SODIUM 40 MG: 40 TABLET, DELAYED RELEASE ORAL at 06:27

## 2024-10-15 RX ADMIN — FOLIC ACID 1 MG: 1 TABLET ORAL at 09:25

## 2024-10-15 RX ADMIN — ASPIRIN 81 MG CHEWABLE TABLET 81 MG: 81 TABLET CHEWABLE at 09:25

## 2024-10-15 RX ADMIN — LACOSAMIDE 100 MG: 100 TABLET, FILM COATED ORAL at 09:25

## 2024-10-15 NOTE — CARE COORDINATION
Social Work/Discharge Planning:  Met with patient and his wife Dione to follow up on skilled nursing facility choices.  Patient first facility choice is [facility], 2nd-[facility] and 3rd-[facility].  Referral made to liaisabel Jason with [facility] and facility will review patient information.  Referral made to jan Fraser with [facility] and facility will review patient information.  Will continue to follow.  Electronically signed by EDWIN Beckford on 10/15/2024 at 11:46 AM    Addendum:  Eren with [facility] states facility can accept patient.  No pre-cert needed. Cancelled referral to [facility].  Updated patient, his wife Dione and Physician of facility acceptance.  Patient wife is agreeable to private pay wheelchair cost.  Electronic N-17 in epic and [id number] completed.  Will continue to follow.  Electronically signed by EDWIN Beckford on 10/15/2024 at 1:53 PM    Addendum:  Received notification patient will discharge.  Tentatively arranged ambulette transport for 3:30pm with [facility].  Notified patient, his wife, charge nurse and liaison Eren with [facility].  Electronically signed by EDWIN Beckford on 10/15/2024 at 2:19 PM

## 2024-10-15 NOTE — PROGRESS NOTES
Occupational Therapy  OT BEDSIDE TREATMENT NOTE      Date:10/15/2024  Patient Name: Jermaine Burks  MRN: [id number]  : 1950  Room: [address] [place] [address]     Evaluating OT: Mandie Wallace, OTR/L - [id number]     Referring Provider: Mian Radford MD  Specific Provider Orders/Date: \"OT eval and treat\" - 10/12/2024     Diagnosis: Hyperkalemia [E87.5]  Ambulatory dysfunction [R26.2]  Chronic low back pain, unspecified back pain laterality, unspecified whether sciatica present [M54.50, G89.29]      Pertinent Medical History: alcoholism, anxiety, arthritis, a-fib, osteopenia, seizure disorder, sleep apnea, stroke, receptive aphasia, HTN     Precautions: fall risk, bed/chair alarms     Assessment of Current Deficits:    [x] Functional mobility             [x] ADLs          [x] Strength                  [x] Cognition   [x] Functional transfers           [x] IADLs         [x] Safety Awareness   [x] Endurance   [] Fine Motor Coordination    [x] Balance      [] Vision/Perception   [x] Sensation    [] Gross Motor Coordination [] ROM          [] Delirium                  [] Motor Control      OT PLAN OF CARE   OT POC is based on physician orders, patient diagnosis, and results of clinical assessment.  Frequency/Duration 2-5 days/week for 2-4 weeks PRN   Specific OT Treatment Interventions to Include:   * Instruction/training on adapted ADL techniques and AE recommendations to increase functional independence within precautions       * Training on energy conservation strategies, correct breathing pattern and techniques to improve independence/tolerance for self-care routine  * Functional transfer/mobility training/DME recommendations for increased independence, safety, and fall prevention  * Patient/Family education to increase follow through with safety techniques and functional independence  * Recommendation of environmental modifications for increased safety with functional transfers/mobility and ADLs  * Cognitive

## 2024-10-15 NOTE — DISCHARGE INSTR - COC
Continuity of Care Form    Patient Name: Jermaine Burks   :  1950  MRN:  [id number]    Admit date:  10/11/2024  Discharge date:  10/15/2024    Code Status Order: Full Code   Advance Directives:   Advance Care Flowsheet Documentation             Admitting Physician:  Minnie Ribeiro MD  PCP: Ashley Rutherford MD    Discharging Nurse: TP  Discharging Hospital Unit/Room#: 0435/0435-A  Discharging Unit Phone Number: [phone]    Emergency Contact:   Extended Emergency Contact Information  Primary Emergency Contact: Dione Burks  Address: [address]  Home Phone: [phone]  Mobile Phone: [phone]  Relation: Spouse    Past Surgical History:  Past Surgical History:   Procedure Laterality Date    ECHO COMPL W DOP COLOR FLOW  2012         ECHOCARDIOGRAM COMPLETE 2D W DOPPLER W COLOR  2013         ESOPHAGEAL VARICE LIGATION      HIP SURGERY      PALATE SURGERY      UMBILICAL HERNIA REPAIR  2017    UPPER GASTROINTESTINAL ENDOSCOPY      UPPER GASTROINTESTINAL ENDOSCOPY  2017    UPPER GASTROINTESTINAL ENDOSCOPY  2017    XR MIDLINE EQUAL OR GREATER THAN 5 YEARS  10/3/2016    XR MIDLINE EQUAL OR GREATER THAN 5 YEARS       Immunization History:   Immunization History   Administered Date(s) Administered    COVID-19, MODERNA BLUE border, Primary or Immunocompromised, (age 12y+), IM, 100 mcg/0.5mL 2021, 2021    Hep A, HAVRIX, VAQTA, (age 19y+), IM, 1mL 2014, 2014, 2014, 2017    Hep A-Hep B, TWINRIX, (age 18y+), IM, 1mL 2017    Hep B, RECOMBIVAX-HB, (age 20y+), IM, 1mL 2014, 2014, 2014, 2017    Influenza Vaccine, unspecified formulation 2013    Influenza Virus Vaccine 10/20/2010, 10/26/2011, 2012, 2014, 2015, 10/15/2016, 2017    Influenza, FLUAD, (age 65 y+), IM, Quadv, 0.5mL 10/12/2020, 10/18/2021, 10/17/2022    Influenza, FLUAD, (age

## 2024-10-15 NOTE — PROGRESS NOTES
[facility]  Progress Note    Chief complaint :  Chief Complaint   Patient presents with    Back Pain     Upper back pain, recent pneumonia, hx of lower chronic back pain       Subjective:    No overnight problems. Patient describes feeling better, rates his pain a 1-2/10 today. Patient denies chest pain, SOB, nausea, vomiting, fever, chills. Patient is tolerating diet and was eating Arabic toast when I was in the room, stating his nausea is improved. He was able to ambulate to bedside chair yesterday when OT visited. PT/OT continue to follow and SW is working with him to arrange a SNF for him to go to after discharge, which he is agreeable to.    Hx of CKD, alcoholic cirrhosis, COPD, paroxysmal afib, HTN, and compression fracture at T12/L1 L1/L2 L3 and L4.     As per nurse, patient is doing okay. Is still experiencing some loose bowel movements, but improved from previous days.    The most recent labs from yesterday show hyperkalemia is resolved, Creatinine and BUN wnl, low albumin/total protein, and persistent thrombocytopenia, at 63 down from 90 three days ago. This is explained by liver cirrhosis.    Xray cervical spine showed minimal multilevel degenerative changes with no acute fracture.    Past medical, surgical, family and social history were reviewed, non-contributory, and unchanged unless otherwise stated.    Review of Systems  Denies fever, fatigue, chills  Denies chest pain, palpitations  Denies shortness of breath, cough, wheeze  Admits to very minimal upper back pain, denies weakness  Admits to loose stool, Denies abdominal pain, constipation  Denies dysuria, hematuria, urinary urgency    Objective:  /75   Pulse 79   Temp 97.8 °F (36.6 °C) (Oral)   Resp 18   Ht 1.753 m (5' 9\")   Wt 103.8 kg (228 lb 13.4 oz)   SpO2 99%   BMI 33.79 kg/m²     Physical Exam  Gen: patient appears in no acute distress, non ill appearing, non toxic, pleasant and

## 2024-10-15 NOTE — DISCHARGE SUMMARY
Physician Discharge Summary  [facility]     Patient ID:  Jermaine Burks  [id number]  74 y.o.  1950    Admit date: 10/11/2024    Discharge date: 10/15/24   Admission Diagnoses:   Hyperkalemia [E87.5]  Ambulatory dysfunction [R26.2]  Chronic low back pain, unspecified back pain laterality, unspecified whether sciatica present [M54.50, G89.29]    Discharge Diagnoses:  Principal Problem:    Ambulatory dysfunction  Active Problems:    Alcoholic cirrhosis of liver without ascites (HCC)    Chronic renal disease, stage III (HCC) [731360]    COPD (chronic obstructive pulmonary disease) (HCC)    Paroxysmal atrial fibrillation (HCC)    Essential hypertension    Diarrhea  Resolved Problems:    Hyperkalemia      Consults: Physical therapy, Occupational Therapy, social work, nutritionist  Procedures: None    Hospital Course: Jermaine Burks  is a 74 y.o. male patient of Ashley Rutherford MD  with a pertinent PMHx of JAY closed compression fracutre of lumbar vertebrae, alcoholic cirrhosis, CKD stage III, COPD, paroxysmal afib HTN  with chief complaint of worsening back pain and ambulatory dysfunction.  Labs had hyperkalemia of 6.7, and a creatinine of 1.9 which is patient's baseline and history of thrombocytopenia secondary to cirrhosis.  Patient's vital signs were hypotensive and on the softer readings 86/63 improving to 103/68.  Patient cervical x-ray showed multilevel degenerative changes throughout the cervical spine with no acute fracture or subluxation patient had lumbar spine MRI that showed chronic fractures of T12 and L1 and L1-2 L3 and L4.  PT and OT were consulted to evaluate patient for ambulatory dysfunction.  Patient has history of chronic low back pain ambulates with 2 canes does not move too often.  Patient medication was adjusted for back pain management and blood pressure.  There was some concern of poor nutrition for patient, nutritionist was able to evaluate  mg      folic acid (FOLVITE) 1 MG tablet Take 1 tablet by mouth nightly      VITAMIN D PO Take 4,000 Units by mouth daily       Hydrocort-Pramoxine, Perianal, (ANALPRAM-HC) 2.5-1 % rectal cream APPLY TO AFFECTED AREA AT BEDTIME      triamcinolone (KENALOG) 0.1 % cream Apply topically to affected areas 2 times daily.  Qty: 45 g, Refills: 2      silver sulfADIAZINE (SILVADENE) 1 % cream Apply topically daily.  Qty: 50 g, Refills: 0      hydrocortisone 1 % cream Apply topically 2 times daily Dilt 3% / Lido 2% / Hydrocort 1% compound cream  Apply to rectum 2 times daily.      Handicap Placard MISC by Does not apply route Duration: 5 years  Qty: 1 each, Refills: 0            Current Discharge Medication List        START taking these medications    Details   gabapentin (NEURONTIN) 300 MG capsule Take 1 capsule by mouth 2 times daily for 30 days.      pantoprazole (PROTONIX) 40 MG tablet Take 1 tablet by mouth every morning (before breakfast)            Current Discharge Medication List        STOP taking these medications       famotidine (PEPCID) 40 MG tablet Comments:   Reason for Stopping:         Calcium Carbonate (CALCIUM 600 PO) Comments:   Reason for Stopping:         spironolactone (ALDACTONE) 25 MG tablet Comments:   Reason for Stopping:         ibuprofen (ADVIL;MOTRIN) 200 MG tablet Comments:   Reason for Stopping:             Current Discharge Medication List        CONTINUE these medications which have CHANGED    Details   calcitonin (MIACALCIN) 200 UNIT/ACT nasal spray 1 spray by Nasal route daily  Qty: 3 each, Refills: 1    Associated Diagnoses: Compression fracture of L3 vertebra with routine healing, subsequent encounter               Follow up:  Hira guillermo [address]  [facility] [address]  [phone]        Ashley Rutherford MD  [address] [place] [address] [address] [place] [id number] [phone]    Follow up  Hospital Follow Up        Sulaiman Rodriguez MD  Family Medicine Resident

## 2024-10-16 ASSESSMENT — ENCOUNTER SYMPTOMS
FACIAL SWELLING: 0
DIARRHEA: 0
COUGH: 0
PHOTOPHOBIA: 0
COLOR CHANGE: 0
RHINORRHEA: 0
SHORTNESS OF BREATH: 1
CONSTIPATION: 0
APNEA: 0
SINUS PRESSURE: 0
ABDOMINAL DISTENTION: 0
SINUS PAIN: 0
BLOOD IN STOOL: 0
CHEST TIGHTNESS: 0
VOMITING: 0

## 2024-10-16 NOTE — PROGRESS NOTES
Jermaine Burks is a 74 y.o. male accompanied by his wife  CC:   Chief Complaint   Patient presents with    Follow-up     [facility] f[facility]u 10/01[facility]24 Covid/Pneumonia   SOBOE continues.  Has completed Omnicef and Doxy    Nausea     Since 09/27/24       [facility] follow-up.  Patient was seen in express care on October 1 for COVID-pneumonia.  His shortness of breath continues.  He has finished his Omnicef and doxycycline.      Loved 1 inability to care for is getting more more tenuous.  His wife is currently unable to care for him at home anymore.  As he is having a difficult time getting off of the toilet.      Patient's past medical, surgical, social and/or family history reviewed, updated in chart, and are non-contributory (unless otherwise stated).  Medications and allergies also reviewed and updated in chart.      BP 90/60 (Site: Left Upper Arm, Position: Sitting, Cuff Size: Medium Adult)   Pulse 78   Temp 98.6 °F (37 °C) (Temporal)   Ht 1.753 m (5' 9\")   Wt 101.2 kg (223 lb)   SpO2 97%   BMI 32.93 kg/m²     Review of Systems   Constitutional:  Negative for chills, fatigue and fever.   HENT:  Negative for congestion, ear pain, facial swelling, rhinorrhea, sinus pressure and sinus pain.    Eyes:  Negative for photophobia and visual disturbance.   Respiratory:  Positive for shortness of breath. Negative for apnea, cough and chest tightness.    Cardiovascular:  Negative for chest pain and palpitations.   Gastrointestinal:  Negative for abdominal distention, blood in stool, constipation, diarrhea and vomiting.   Endocrine: Negative for cold intolerance, polydipsia, polyphagia and polyuria.   Genitourinary:  Negative for decreased urine volume, frequency and urgency.   Musculoskeletal:  Negative for arthralgias and gait problem.   Skin:  Negative for color change.   Allergic/Immunologic: Negative for environmental allergies and food allergies.   Neurological:  Negative for dizziness, light-headedness and

## 2024-10-30 DIAGNOSIS — K70.30 ALCOHOLIC CIRRHOSIS OF LIVER WITHOUT ASCITES (HCC): ICD-10-CM

## 2024-10-30 RX ORDER — SPIRONOLACTONE 25 MG/1
25 TABLET ORAL DAILY
Qty: 90 TABLET | Refills: 3 | OUTPATIENT
Start: 2024-10-30 | End: 2025-04-28

## 2024-11-01 NOTE — TELEPHONE ENCOUNTER
Last Appointment:  2/19/2024  Future Appointments   Date Time Provider Department Center   6/17/2024  1:45 PM [facility]   7/29/2024  3:30 PM Alvaro Emmanuel DPM Col Podiatry [facility]   11/14/2024  1:00 PM Princess Knott MD [facility] MED ONC [facility]   11/14/2024  1:30 PM JIMMY MED ONC FAST TRACK 1 SEB Med Onc [facility]       
DISPLAY PLAN FREE TEXT

## 2024-11-07 ENCOUNTER — TELEPHONE (OUTPATIENT)
Dept: FAMILY MEDICINE CLINIC | Age: 74
End: 2024-11-07

## 2024-11-07 NOTE — TELEPHONE ENCOUNTER
[facility] - [facility]                         [phone]         Lily calling to ask if you will follow orders for this patient going home from rehab following a hospital stay?        She has orders to see [facility], PT & OT.

## 2024-11-08 ENCOUNTER — OFFICE VISIT (OUTPATIENT)
Dept: FAMILY MEDICINE CLINIC | Age: 74
End: 2024-11-08

## 2024-11-08 ENCOUNTER — TELEPHONE (OUTPATIENT)
Dept: FAMILY MEDICINE CLINIC | Age: 74
End: 2024-11-08

## 2024-11-08 VITALS
HEART RATE: 65 BPM | OXYGEN SATURATION: 98 % | TEMPERATURE: 97.3 F | SYSTOLIC BLOOD PRESSURE: 94 MMHG | DIASTOLIC BLOOD PRESSURE: 60 MMHG | BODY MASS INDEX: 34.5 KG/M2 | WEIGHT: 241 LBS | HEIGHT: 70 IN

## 2024-11-08 DIAGNOSIS — R60.0 LOCALIZED EDEMA: ICD-10-CM

## 2024-11-08 DIAGNOSIS — Z09 HOSPITAL DISCHARGE FOLLOW-UP: Primary | ICD-10-CM

## 2024-11-08 RX ORDER — LORATADINE 10 MG/1
10 TABLET ORAL DAILY PRN
COMMUNITY

## 2024-11-08 RX ORDER — IBUPROFEN 200 MG
200 TABLET ORAL
COMMUNITY
End: 2024-11-08

## 2024-11-08 RX ORDER — BUMETANIDE 0.5 MG/1
0.5 TABLET ORAL DAILY
Qty: 30 TABLET | Refills: 3 | Status: SHIPPED | OUTPATIENT
Start: 2024-11-08

## 2024-11-08 RX ORDER — SPIRONOLACTONE 25 MG/1
25 TABLET ORAL DAILY
COMMUNITY

## 2024-11-08 RX ORDER — BUMETANIDE 0.5 MG/1
0.5 TABLET ORAL DAILY
Qty: 30 TABLET | Refills: 3 | Status: SHIPPED
Start: 2024-11-08 | End: 2024-11-08

## 2024-11-08 NOTE — PROGRESS NOTES
Post-Discharge Transitional Care  Follow Up      Jermaine Burks   YOB: 1950    Date of Office Visit:  11/8/2024  Date of Hospital Admission: 10/11/24  Date of Hospital Discharge: 10/15/24  Risk of hospital readmission (high >=14%. Medium >=10%) :Readmission Risk Score: 18      Care management risk score Rising risk (score 2-5) and Complex Care (Scores >=6): No Risk Score On File     Non face to face  following discharge, date last encounter closed (first attempt may have been earlier): *No documented post hospital discharge outreach found in the last 14 days    Call initiated 2 business days of discharge: *No response recorded in the last 14 days    ASSESSMENT/PLAN:   Hospital discharge follow-up  -     NM DISCHARGE MEDS RECONCILED W/ CURRENT OUTPATIENT MED LIST  Localized edema  -     Echo (TTE) Complete; Future  -     bumetanide (BUMEX) 0.5 MG tablet; Take 1 tablet by mouth daily, Disp-30 tablet, R-3Normal      Medical Decision Making: moderate complexity  Return in 2 weeks (on 11/22/2024).           Subjective:   HPI:  Follow up of Hospital problems/diagnosis(es): Failure to thrive.  CKD.  COPD    PMHx of alcoholic cirrhosis, hx of seizures, CKD stage 3, COPD, PAF presents for a hospital follow up:     Was discharged to the [facility] on 10/15  Swelling in bilateral legs for 1 week  Hurts to touch the legs  Can't wear his shoes  Was given lasix every day since nov 4th  Aldactone was stopped at d/c but according to wife was given at snf  Was getting PT/OT daily  Is better than before  Gabapentin has helped with the back pain     Feels slightly lightheaded  Takes nadolol 40 mg BID      Echo: EF of 65%, indeterminate diastolic function  Takes aspirin 81 mg daily  Has a aortic valve June of 2017  Low platelets, was not put on blood thinner  Had a stroke in October 2016    Inpatient course: Discharge summary reviewed- see chart.    Interval history/Current status: Doing

## 2024-11-08 NOTE — TELEPHONE ENCOUNTER
Hilary from [facility] returned call to office. She stated patient had Lasix 40mg times 3 days (on 11/5,11/6,11/7). She is going to fax over administrative record for this order.

## 2024-11-22 ENCOUNTER — OFFICE VISIT (OUTPATIENT)
Dept: FAMILY MEDICINE CLINIC | Age: 74
End: 2024-11-22
Payer: MEDICARE

## 2024-11-22 VITALS
TEMPERATURE: 97.8 F | WEIGHT: 238 LBS | BODY MASS INDEX: 34.07 KG/M2 | HEART RATE: 67 BPM | OXYGEN SATURATION: 97 % | HEIGHT: 70 IN | DIASTOLIC BLOOD PRESSURE: 60 MMHG | SYSTOLIC BLOOD PRESSURE: 100 MMHG

## 2024-11-22 DIAGNOSIS — S32.030D COMPRESSION FRACTURE OF L3 VERTEBRA WITH ROUTINE HEALING, SUBSEQUENT ENCOUNTER: ICD-10-CM

## 2024-11-22 DIAGNOSIS — E87.5 HYPERKALEMIA: Primary | ICD-10-CM

## 2024-11-22 DIAGNOSIS — E87.5 HYPERKALEMIA: ICD-10-CM

## 2024-11-22 LAB
ANION GAP SERPL CALCULATED.3IONS-SCNC: 5 MMOL/L (ref 7–16)
BUN BLDV-MCNC: 18 MG/DL (ref 6–23)
CALCIUM SERPL-MCNC: 9.1 MG/DL (ref 8.6–10.2)
CHLORIDE BLD-SCNC: 101 MMOL/L (ref 98–107)
CO2: 31 MMOL/L (ref 22–29)
CREAT SERPL-MCNC: 1.2 MG/DL (ref 0.7–1.2)
GFR, ESTIMATED: 64 ML/MIN/1.73M2
GLUCOSE BLD-MCNC: 96 MG/DL (ref 74–99)
POTASSIUM SERPL-SCNC: 4.8 MMOL/L (ref 3.5–5)
SODIUM BLD-SCNC: 137 MMOL/L (ref 132–146)

## 2024-11-22 PROCEDURE — 3078F DIAST BP <80 MM HG: CPT | Performed by: FAMILY MEDICINE

## 2024-11-22 PROCEDURE — 3017F COLORECTAL CA SCREEN DOC REV: CPT | Performed by: FAMILY MEDICINE

## 2024-11-22 PROCEDURE — G8484 FLU IMMUNIZE NO ADMIN: HCPCS | Performed by: FAMILY MEDICINE

## 2024-11-22 PROCEDURE — 3074F SYST BP LT 130 MM HG: CPT | Performed by: FAMILY MEDICINE

## 2024-11-22 PROCEDURE — 1036F TOBACCO NON-USER: CPT | Performed by: FAMILY MEDICINE

## 2024-11-22 PROCEDURE — 1123F ACP DISCUSS/DSCN MKR DOCD: CPT | Performed by: FAMILY MEDICINE

## 2024-11-22 PROCEDURE — 99213 OFFICE O/P EST LOW 20 MIN: CPT | Performed by: FAMILY MEDICINE

## 2024-11-22 PROCEDURE — G8427 DOCREV CUR MEDS BY ELIG CLIN: HCPCS | Performed by: FAMILY MEDICINE

## 2024-11-22 PROCEDURE — G8417 CALC BMI ABV UP PARAM F/U: HCPCS | Performed by: FAMILY MEDICINE

## 2024-11-22 PROCEDURE — 1159F MED LIST DOCD IN RCRD: CPT | Performed by: FAMILY MEDICINE

## 2024-11-22 RX ORDER — CALCITONIN SALMON 200 [IU]/.09ML
1 SPRAY, METERED NASAL DAILY
Qty: 3 EACH | Refills: 1 | Status: SHIPPED | OUTPATIENT
Start: 2024-11-22

## 2024-11-22 RX ORDER — PANTOPRAZOLE SODIUM 40 MG/1
40 TABLET, DELAYED RELEASE ORAL
Qty: 90 TABLET | Refills: 1 | Status: SHIPPED | OUTPATIENT
Start: 2024-11-22

## 2024-11-22 RX ORDER — GABAPENTIN 300 MG/1
300 CAPSULE ORAL 2 TIMES DAILY
Qty: 180 CAPSULE | Refills: 0 | Status: SHIPPED | OUTPATIENT
Start: 2024-11-22 | End: 2025-02-20

## 2024-11-22 ASSESSMENT — ENCOUNTER SYMPTOMS
SINUS PAIN: 0
CONSTIPATION: 0
COLOR CHANGE: 0
VOMITING: 0
APNEA: 0
COUGH: 0
FACIAL SWELLING: 0
DIARRHEA: 0
SINUS PRESSURE: 0
PHOTOPHOBIA: 0
SHORTNESS OF BREATH: 0
RHINORRHEA: 0
CHEST TIGHTNESS: 0
BLOOD IN STOOL: 0
ABDOMINAL DISTENTION: 0

## 2024-11-22 NOTE — PROGRESS NOTES
abdominal distention, blood in stool, constipation, diarrhea and vomiting.   Endocrine: Negative for cold intolerance, polydipsia, polyphagia and polyuria.   Genitourinary:  Negative for decreased urine volume, frequency and urgency.   Musculoskeletal:  Negative for arthralgias and gait problem.   Skin:  Negative for color change.   Allergic/Immunologic: Negative for environmental allergies and food allergies.   Neurological:  Negative for dizziness, light-headedness and headaches.   Hematological:  Negative for adenopathy.   Psychiatric/Behavioral:  Negative for agitation and confusion.        Physical Exam        Assessment:  Assessment & Plan  1. Back Pain - Experiencing more bothersome lower back pain since stopping ibuprofen a week ago.  - Take Tylenol 1 g three times a day as needed for back pain.  - Continue taking gabapentin but adjust the dose to 300 mg three times a day to see if it helps with the back pain. If the three times a day regimen does not provide relief, reduce it to twice a day.  - Patient consents to the medication regimen.    2. Leg Condition - Significant improvement with no signs of cellulitis; skin appears to be drying out, and the redness is now more pink.  - Continue using the cream.  - Continue wearing the stockings indefinitely.    3. Medication Management - Continue taking spironolactone, a potassium-sparing diuretic, to manage potassium levels.  - Basic metabolic profile (BMP) will be ordered today to monitor electrolytes.  - Medications will be refilled based on the BMP results.    4.  Indeterminate diastolic dysfunction.  My suspicion is that the patient may have some heart failure with preserved ejection fraction  - Follow-up echocardiogram scheduled for December 23, 2024.  - Results will be reviewed to assess any changes in condition.    Follow-up  - Basic metabolic profile (BMP) to be done today.  - Follow-up echocardiogram on December 23, 2024.          Follow Up:  No

## 2024-11-25 ENCOUNTER — TELEPHONE (OUTPATIENT)
Dept: FAMILY MEDICINE CLINIC | Age: 74
End: 2024-11-25

## 2024-11-25 NOTE — TELEPHONE ENCOUNTER
Nelida from [facility] called requesting office note from 11/8/24 faxed to [phone], completed

## 2024-12-23 ENCOUNTER — HOSPITAL ENCOUNTER (OUTPATIENT)
Dept: CARDIOLOGY | Age: 74
Discharge: HOME | End: 2024-12-25
Payer: MEDICARE

## 2024-12-23 VITALS
DIASTOLIC BLOOD PRESSURE: 60 MMHG | HEIGHT: 70 IN | WEIGHT: 238 LBS | SYSTOLIC BLOOD PRESSURE: 100 MMHG | BODY MASS INDEX: 34.07 KG/M2

## 2024-12-23 DIAGNOSIS — R60.0 LOCALIZED EDEMA: ICD-10-CM

## 2024-12-23 LAB
ECHO AO ASC DIAM: 3.3 CM
ECHO AO ASCENDING AORTA INDEX: 1.47 CM/M2
ECHO AV AREA PEAK VELOCITY: 2.6 CM2
ECHO AV AREA VTI: 2.3 CM2
ECHO AV AREA/BSA PEAK VELOCITY: 1.2 CM2/M2
ECHO AV AREA/BSA VTI: 1 CM2/M2
ECHO AV CUSP MM: 1.9 CM
ECHO AV MEAN GRADIENT: 5 MMHG
ECHO AV MEAN VELOCITY: 1 M/S
ECHO AV PEAK GRADIENT: 10 MMHG
ECHO AV PEAK VELOCITY: 1.6 M/S
ECHO AV VELOCITY RATIO: 0.75
ECHO AV VTI: 32.7 CM
ECHO BSA: 2.31 M2
ECHO EST RA PRESSURE: 8 MMHG
ECHO IVC PROX: 2.4 CM
ECHO LA DIAMETER INDEX: 2.4 CM/M2
ECHO LA DIAMETER: 5.4 CM
ECHO LA VOL A-L A2C: 140 ML (ref 18–58)
ECHO LA VOL A-L A4C: 155 ML (ref 18–58)
ECHO LA VOL MOD A2C: 135 ML (ref 18–58)
ECHO LA VOL MOD A4C: 146 ML (ref 18–58)
ECHO LA VOLUME AREA LENGTH: 149 ML
ECHO LA VOLUME INDEX A-L A2C: 62 ML/M2 (ref 16–34)
ECHO LA VOLUME INDEX A-L A4C: 69 ML/M2 (ref 16–34)
ECHO LA VOLUME INDEX AREA LENGTH: 66 ML/M2 (ref 16–34)
ECHO LA VOLUME INDEX MOD A2C: 60 ML/M2 (ref 16–34)
ECHO LA VOLUME INDEX MOD A4C: 65 ML/M2 (ref 16–34)
ECHO LV EDV A2C: 91 ML
ECHO LV EDV A4C: 76 ML
ECHO LV EDV BP: 88 ML (ref 67–155)
ECHO LV EDV INDEX A4C: 34 ML/M2
ECHO LV EDV INDEX BP: 39 ML/M2
ECHO LV EDV NDEX A2C: 40 ML/M2
ECHO LV EJECTION FRACTION A2C: 53 %
ECHO LV EJECTION FRACTION A4C: 42 %
ECHO LV EJECTION FRACTION BIPLANE: 48 % (ref 55–100)
ECHO LV ESV A2C: 43 ML
ECHO LV ESV A4C: 44 ML
ECHO LV ESV BP: 46 ML (ref 22–58)
ECHO LV ESV INDEX A2C: 19 ML/M2
ECHO LV ESV INDEX A4C: 20 ML/M2
ECHO LV ESV INDEX BP: 20 ML/M2
ECHO LV FRACTIONAL SHORTENING: 15 % (ref 28–44)
ECHO LV INTERNAL DIMENSION DIASTOLE INDEX: 2.09 CM/M2
ECHO LV INTERNAL DIMENSION DIASTOLIC: 4.7 CM (ref 4.2–5.9)
ECHO LV INTERNAL DIMENSION SYSTOLIC INDEX: 1.78 CM/M2
ECHO LV INTERNAL DIMENSION SYSTOLIC: 4 CM
ECHO LV ISOVOLUMETRIC RELAXATION TIME (IVRT): 49.2 MS
ECHO LV IVSD: 1 CM (ref 0.6–1)
ECHO LV IVSS: 1.3 CM
ECHO LV MASS 2D: 175.8 G (ref 88–224)
ECHO LV MASS INDEX 2D: 78.1 G/M2 (ref 49–115)
ECHO LV POSTERIOR WALL DIASTOLIC: 1.1 CM (ref 0.6–1)
ECHO LV POSTERIOR WALL SYSTOLIC: 1.4 CM
ECHO LV RELATIVE WALL THICKNESS RATIO: 0.47
ECHO LVOT AREA: 3.8 CM2
ECHO LVOT AV VTI INDEX: 0.63
ECHO LVOT DIAM: 2.2 CM
ECHO LVOT MEAN GRADIENT: 3 MMHG
ECHO LVOT PEAK GRADIENT: 6 MMHG
ECHO LVOT PEAK VELOCITY: 1.2 M/S
ECHO LVOT STROKE VOLUME INDEX: 35 ML/M2
ECHO LVOT SV: 78.6 ML
ECHO LVOT VTI: 20.7 CM
ECHO MV AREA PHT: 3 CM2
ECHO MV AREA VTI: 3 CM2
ECHO MV E DECELERATION TIME (DT): 147.7 MS
ECHO MV EROA PISA: 0.1 CM2
ECHO MV LVOT VTI INDEX: 1.27
ECHO MV MAX VELOCITY: 1.1 M/S
ECHO MV MEAN GRADIENT: 2 MMHG
ECHO MV MEAN VELOCITY: 0.6 M/S
ECHO MV PEAK GRADIENT: 5 MMHG
ECHO MV PRESSURE HALF TIME (PHT): 72.4 MS
ECHO MV REGURGITANT ALIASING (NYQUIST) VELOCITY: 37 CM/S
ECHO MV REGURGITANT RADIUS PISA: 0.35 CM
ECHO MV REGURGITANT VELOCITY PISA: 4.1 M/S
ECHO MV REGURGITANT VOLUME PISA: 10.23 ML
ECHO MV REGURGITANT VTIA: 147.4 CM
ECHO MV VTI: 26.2 CM
ECHO PV MAX VELOCITY: 0.8 M/S
ECHO PV MEAN GRADIENT: 1 MMHG
ECHO PV MEAN VELOCITY: 0.5 M/S
ECHO PV PEAK GRADIENT: 3 MMHG
ECHO PV VTI: 18.8 CM
ECHO PVEIN PEAK D VELOCITY: 0.8 M/S
ECHO PVEIN PEAK S VELOCITY: 0.5 M/S
ECHO PVEIN S/D RATIO: 0.6
ECHO RIGHT VENTRICULAR SYSTOLIC PRESSURE (RVSP): 29 MMHG
ECHO RV INTERNAL DIMENSION: 3.7 CM
ECHO RV TAPSE: 1.4 CM (ref 1.7–?)
ECHO TV REGURGITANT MAX VELOCITY: 2.27 M/S
ECHO TV REGURGITANT PEAK GRADIENT: 21 MMHG

## 2024-12-23 PROCEDURE — 93306 TTE W/DOPPLER COMPLETE: CPT

## 2024-12-23 PROCEDURE — 93306 TTE W/DOPPLER COMPLETE: CPT | Performed by: INTERNAL MEDICINE

## 2025-01-01 ENCOUNTER — APPOINTMENT (OUTPATIENT)
Dept: CT IMAGING | Age: 75
DRG: 682 | End: 2025-01-01
Payer: MEDICARE

## 2025-01-01 ENCOUNTER — APPOINTMENT (OUTPATIENT)
Dept: GENERAL RADIOLOGY | Age: 75
DRG: 682 | End: 2025-01-01
Payer: MEDICARE

## 2025-01-01 ENCOUNTER — APPOINTMENT (OUTPATIENT)
Dept: ULTRASOUND IMAGING | Age: 75
DRG: 682 | End: 2025-01-01
Payer: MEDICARE

## 2025-01-01 ENCOUNTER — HOSPITAL ENCOUNTER (INPATIENT)
Age: 75
LOS: 5 days | DRG: 682 | End: 2025-05-26
Attending: STUDENT IN AN ORGANIZED HEALTH CARE EDUCATION/TRAINING PROGRAM | Admitting: FAMILY MEDICINE
Payer: MEDICARE

## 2025-01-01 ENCOUNTER — HOSPITAL ENCOUNTER (EMERGENCY)
Age: 75
Discharge: HOME | DRG: 682 | End: 2025-05-19
Attending: EMERGENCY MEDICINE
Payer: MEDICARE

## 2025-01-01 VITALS
OXYGEN SATURATION: 100 % | RESPIRATION RATE: 18 BRPM | SYSTOLIC BLOOD PRESSURE: 148 MMHG | DIASTOLIC BLOOD PRESSURE: 71 MMHG | HEART RATE: 60 BPM | HEIGHT: 66 IN | WEIGHT: 247.8 LBS | BODY MASS INDEX: 39.82 KG/M2 | TEMPERATURE: 97.2 F

## 2025-01-01 VITALS
DIASTOLIC BLOOD PRESSURE: 70 MMHG | WEIGHT: 225 LBS | RESPIRATION RATE: 19 BRPM | OXYGEN SATURATION: 95 % | HEART RATE: 97 BPM | TEMPERATURE: 98.2 F | BODY MASS INDEX: 32.21 KG/M2 | HEIGHT: 70 IN | SYSTOLIC BLOOD PRESSURE: 105 MMHG

## 2025-01-01 VITALS
WEIGHT: 247.8 LBS | OXYGEN SATURATION: 100 % | HEIGHT: 66 IN | TEMPERATURE: 96.7 F | DIASTOLIC BLOOD PRESSURE: 55 MMHG | BODY MASS INDEX: 39.82 KG/M2 | RESPIRATION RATE: 22 BRPM | HEART RATE: 65 BPM | SYSTOLIC BLOOD PRESSURE: 95 MMHG

## 2025-01-01 DIAGNOSIS — N17.9 AKI (ACUTE KIDNEY INJURY): ICD-10-CM

## 2025-01-01 DIAGNOSIS — I46.9 CARDIAC ARREST (HCC): ICD-10-CM

## 2025-01-01 DIAGNOSIS — I99.9 VASCULAR DISEASE: ICD-10-CM

## 2025-01-01 DIAGNOSIS — E86.0 DEHYDRATION: ICD-10-CM

## 2025-01-01 DIAGNOSIS — M79.89 LEFT ARM SWELLING: ICD-10-CM

## 2025-01-01 DIAGNOSIS — R09.2 RESPIRATORY ARREST (HCC): ICD-10-CM

## 2025-01-01 DIAGNOSIS — I50.20 HFREF (HEART FAILURE WITH REDUCED EJECTION FRACTION) (HCC): ICD-10-CM

## 2025-01-01 DIAGNOSIS — R06.02 SHORTNESS OF BREATH: ICD-10-CM

## 2025-01-01 DIAGNOSIS — R19.7 DIARRHEA, UNSPECIFIED TYPE: Primary | ICD-10-CM

## 2025-01-01 DIAGNOSIS — I95.89 OTHER SPECIFIED HYPOTENSION: ICD-10-CM

## 2025-01-01 DIAGNOSIS — R60.0 BILATERAL LOWER EXTREMITY EDEMA: Primary | ICD-10-CM

## 2025-01-01 LAB
ALBUMIN SERPL-MCNC: 2.6 G/DL (ref 3.5–5.2)
ALBUMIN SERPL-MCNC: 3 G/DL (ref 3.5–5.2)
ALBUMIN SERPL-MCNC: 3.5 G/DL (ref 3.5–5.2)
ALBUMIN SERPL-MCNC: 3.5 G/DL (ref 3.5–5.2)
ALBUMIN SERPL-MCNC: 3.6 G/DL (ref 3.5–5.2)
ALBUMIN SERPL-MCNC: 3.7 G/DL (ref 3.5–5.2)
ALBUMIN SERPL-MCNC: 3.9 G/DL (ref 3.5–5.2)
ALBUMIN SERPL-MCNC: 3.9 G/DL (ref 3.5–5.2)
ALBUMIN SERPL-MCNC: 4 G/DL (ref 3.5–5.2)
ALP SERPL-CCNC: 42 U/L (ref 40–129)
ALP SERPL-CCNC: 48 U/L (ref 40–129)
ALP SERPL-CCNC: 48 U/L (ref 40–129)
ALP SERPL-CCNC: 52 U/L (ref 40–129)
ALP SERPL-CCNC: 55 U/L (ref 40–129)
ALP SERPL-CCNC: 61 U/L (ref 40–129)
ALP SERPL-CCNC: 62 U/L (ref 40–129)
ALP SERPL-CCNC: 67 U/L (ref 40–129)
ALP SERPL-CCNC: 69 U/L (ref 40–129)
ALT SERPL-CCNC: 36 U/L (ref 0–40)
ALT SERPL-CCNC: 38 U/L (ref 0–40)
ALT SERPL-CCNC: 38 U/L (ref 0–40)
ALT SERPL-CCNC: 40 U/L (ref 0–40)
ALT SERPL-CCNC: 51 U/L (ref 0–40)
ALT SERPL-CCNC: 52 U/L (ref 0–40)
ALT SERPL-CCNC: 61 U/L (ref 0–40)
ALT SERPL-CCNC: 72 U/L (ref 0–40)
ALT SERPL-CCNC: 76 U/L (ref 0–40)
AMMONIA PLAS-SCNC: 23 UMOL/L (ref 16–60)
ANION GAP SERPL CALCULATED.3IONS-SCNC: 11 MMOL/L (ref 7–16)
ANION GAP SERPL CALCULATED.3IONS-SCNC: 12 MMOL/L (ref 7–16)
ANION GAP SERPL CALCULATED.3IONS-SCNC: 13 MMOL/L (ref 7–16)
ANION GAP SERPL CALCULATED.3IONS-SCNC: 20 MMOL/L (ref 7–16)
APPEARANCE FLD: NORMAL
AST SERPL-CCNC: 100 U/L (ref 0–39)
AST SERPL-CCNC: 45 U/L (ref 0–39)
AST SERPL-CCNC: 46 U/L (ref 0–39)
AST SERPL-CCNC: 49 U/L (ref 0–39)
AST SERPL-CCNC: 54 U/L (ref 0–39)
AST SERPL-CCNC: 70 U/L (ref 0–39)
AST SERPL-CCNC: 76 U/L (ref 0–39)
AST SERPL-CCNC: 84 U/L (ref 0–39)
AST SERPL-CCNC: 94 U/L (ref 0–39)
B.E.: -16 MMOL/L (ref -3–3)
B.E.: -16.3 MMOL/L (ref -3–3)
B.E.: -4.1 MMOL/L (ref -3–3)
B.E.: -6 MMOL/L (ref -3–3)
BACTERIA URNS QL MICRO: ABNORMAL
BASOPHILS # BLD: 0 K/UL (ref 0–0.2)
BASOPHILS # BLD: 0.01 K/UL (ref 0–0.2)
BASOPHILS # BLD: 0.04 K/UL (ref 0–0.2)
BASOPHILS NFR BLD: 0 % (ref 0–2)
BILIRUB DIRECT SERPL-MCNC: 0.7 MG/DL (ref 0–0.3)
BILIRUB DIRECT SERPL-MCNC: 0.8 MG/DL (ref 0–0.3)
BILIRUB DIRECT SERPL-MCNC: 1 MG/DL (ref 0–0.3)
BILIRUB INDIRECT SERPL-MCNC: 0.8 MG/DL (ref 0–1)
BILIRUB INDIRECT SERPL-MCNC: 1 MG/DL (ref 0–1)
BILIRUB INDIRECT SERPL-MCNC: 1.1 MG/DL (ref 0–1)
BILIRUB INDIRECT SERPL-MCNC: 1.3 MG/DL (ref 0–1)
BILIRUB SERPL-MCNC: 1.5 MG/DL (ref 0–1.2)
BILIRUB SERPL-MCNC: 1.6 MG/DL (ref 0–1.2)
BILIRUB SERPL-MCNC: 1.8 MG/DL (ref 0–1.2)
BILIRUB SERPL-MCNC: 1.9 MG/DL (ref 0–1.2)
BILIRUB SERPL-MCNC: 1.9 MG/DL (ref 0–1.2)
BILIRUB SERPL-MCNC: 2.3 MG/DL (ref 0–1.2)
BILIRUB SERPL-MCNC: 2.6 MG/DL (ref 0–1.2)
BILIRUB UR QL STRIP: ABNORMAL
BILIRUB UR QL STRIP: NEGATIVE
BNP SERPL-MCNC: 8233 PG/ML (ref 0–450)
BODY FLD TYPE: NORMAL
BUN SERPL-MCNC: 19 MG/DL (ref 6–23)
BUN SERPL-MCNC: 24 MG/DL (ref 6–23)
BUN SERPL-MCNC: 28 MG/DL (ref 6–23)
BUN SERPL-MCNC: 29 MG/DL (ref 6–23)
BUN SERPL-MCNC: 29 MG/DL (ref 6–23)
BUN SERPL-MCNC: 35 MG/DL (ref 6–23)
BUN SERPL-MCNC: 36 MG/DL (ref 6–23)
BUN SERPL-MCNC: 42 MG/DL (ref 6–23)
BUN SERPL-MCNC: 44 MG/DL (ref 6–23)
BUN SERPL-MCNC: 50 MG/DL (ref 6–23)
BUN SERPL-MCNC: 52 MG/DL (ref 6–23)
C DIFF GDH + TOXINS A+B STL QL IA.RAPID: POSITIVE
CA-I BLD-SCNC: 1.23 MMOL/L (ref 1.15–1.33)
CALCIUM SERPL-MCNC: 10.1 MG/DL (ref 8.6–10.2)
CALCIUM SERPL-MCNC: 7.7 MG/DL (ref 8.6–10.2)
CALCIUM SERPL-MCNC: 9.1 MG/DL (ref 8.6–10.2)
CALCIUM SERPL-MCNC: 9.2 MG/DL (ref 8.6–10.2)
CALCIUM SERPL-MCNC: 9.3 MG/DL (ref 8.6–10.2)
CALCIUM SERPL-MCNC: 9.4 MG/DL (ref 8.6–10.2)
CALCIUM SERPL-MCNC: 9.5 MG/DL (ref 8.6–10.2)
CALCIUM SERPL-MCNC: 9.7 MG/DL (ref 8.6–10.2)
CALCIUM SERPL-MCNC: 9.7 MG/DL (ref 8.6–10.2)
CASTS #/AREA URNS LPF: ABNORMAL /LPF
CHLORIDE SERPL-SCNC: 100 MMOL/L (ref 98–107)
CHLORIDE SERPL-SCNC: 101 MMOL/L (ref 98–107)
CHLORIDE SERPL-SCNC: 106 MMOL/L (ref 98–107)
CHLORIDE SERPL-SCNC: 93 MMOL/L (ref 98–107)
CHLORIDE SERPL-SCNC: 97 MMOL/L (ref 98–107)
CHLORIDE SERPL-SCNC: 98 MMOL/L (ref 98–107)
CHLORIDE SERPL-SCNC: 99 MMOL/L (ref 98–107)
CHLORIDE UR-SCNC: <20 MMOL/L
CHOLESTEROL FLUID: 26 MG/DL
CLARITY UR: CLEAR
CLARITY UR: CLEAR
CLOT CHECK: NORMAL
CO2 SERPL-SCNC: 13 MMOL/L (ref 22–29)
CO2 SERPL-SCNC: 18 MMOL/L (ref 22–29)
CO2 SERPL-SCNC: 19 MMOL/L (ref 22–29)
CO2 SERPL-SCNC: 20 MMOL/L (ref 22–29)
CO2 SERPL-SCNC: 21 MMOL/L (ref 22–29)
CO2 SERPL-SCNC: 23 MMOL/L (ref 22–29)
CO2 SERPL-SCNC: 26 MMOL/L (ref 22–29)
CO2 SERPL-SCNC: 27 MMOL/L (ref 22–29)
CO2 SERPL-SCNC: 28 MMOL/L (ref 22–29)
COHB: 0 % (ref 0–1.5)
COHB: 0.1 % (ref 0–1.5)
COHB: 0.5 % (ref 0–1.5)
COHB: 0.6 % (ref 0–1.5)
COLOR FLD: NORMAL
COLOR UR: YELLOW
COLOR UR: YELLOW
CREAT SERPL-MCNC: 1.4 MG/DL (ref 0.7–1.2)
CREAT SERPL-MCNC: 1.4 MG/DL (ref 0.7–1.2)
CREAT SERPL-MCNC: 1.6 MG/DL (ref 0.7–1.2)
CREAT SERPL-MCNC: 1.6 MG/DL (ref 0.7–1.2)
CREAT SERPL-MCNC: 1.7 MG/DL (ref 0.7–1.2)
CREAT SERPL-MCNC: 1.8 MG/DL (ref 0.7–1.2)
CREAT SERPL-MCNC: 1.8 MG/DL (ref 0.7–1.2)
CREAT SERPL-MCNC: 2 MG/DL (ref 0.7–1.2)
CREAT SERPL-MCNC: 2 MG/DL (ref 0.7–1.2)
CREAT UR-MCNC: 130.8 MG/DL (ref 40–278)
CRITICAL: ABNORMAL
CRITICAL: NORMAL
CRP SERPL HS-MCNC: 78.3 MG/L (ref 0–5)
D-DIMER QUANTITATIVE: 1735 NG/ML DDU (ref 0–230)
DATE ANALYZED: ABNORMAL
DATE ANALYZED: NORMAL
DATE OF COLLECTION: ABNORMAL
DATE OF COLLECTION: NORMAL
DIGOXIN SERPL-MCNC: 1 NG/ML (ref 0.8–2)
EKG ATRIAL RATE: 129 BPM
EKG ATRIAL RATE: 141 BPM
EKG ATRIAL RATE: 394 BPM
EKG ATRIAL RATE: 48 BPM
EKG ATRIAL RATE: 51 BPM
EKG ATRIAL RATE: 65 BPM
EKG Q-T INTERVAL: 310 MS
EKG Q-T INTERVAL: 328 MS
EKG Q-T INTERVAL: 400 MS
EKG Q-T INTERVAL: 408 MS
EKG Q-T INTERVAL: 438 MS
EKG Q-T INTERVAL: 450 MS
EKG QRS DURATION: 160 MS
EKG QRS DURATION: 162 MS
EKG QRS DURATION: 164 MS
EKG QRS DURATION: 168 MS
EKG QRS DURATION: 172 MS
EKG QRS DURATION: 184 MS
EKG QTC CALCULATION (BAZETT): 440 MS
EKG QTC CALCULATION (BAZETT): 461 MS
EKG QTC CALCULATION (BAZETT): 465 MS
EKG QTC CALCULATION (BAZETT): 479 MS
EKG QTC CALCULATION (BAZETT): 495 MS
EKG QTC CALCULATION (BAZETT): 568 MS
EKG R AXIS: -43 DEGREES
EKG R AXIS: -44 DEGREES
EKG R AXIS: -44 DEGREES
EKG R AXIS: -45 DEGREES
EKG R AXIS: -46 DEGREES
EKG R AXIS: -47 DEGREES
EKG T AXIS: 128 DEGREES
EKG T AXIS: 129 DEGREES
EKG T AXIS: 132 DEGREES
EKG T AXIS: 137 DEGREES
EKG VENTRICULAR RATE: 119 BPM
EKG VENTRICULAR RATE: 121 BPM
EKG VENTRICULAR RATE: 121 BPM
EKG VENTRICULAR RATE: 68 BPM
EKG VENTRICULAR RATE: 73 BPM
EKG VENTRICULAR RATE: 83 BPM
EOSINOPHIL # BLD: 0 K/UL (ref 0.05–0.5)
EOSINOPHILS RELATIVE PERCENT: 0 % (ref 0–6)
ERYTHROCYTE [DISTWIDTH] IN BLOOD BY AUTOMATED COUNT: 14.6 % (ref 11.5–15)
ERYTHROCYTE [DISTWIDTH] IN BLOOD BY AUTOMATED COUNT: 15.3 % (ref 11.5–15)
ERYTHROCYTE [DISTWIDTH] IN BLOOD BY AUTOMATED COUNT: 15.4 % (ref 11.5–15)
ERYTHROCYTE [DISTWIDTH] IN BLOOD BY AUTOMATED COUNT: 15.4 % (ref 11.5–15)
ERYTHROCYTE [DISTWIDTH] IN BLOOD BY AUTOMATED COUNT: 15.9 % (ref 11.5–15)
ERYTHROCYTE [DISTWIDTH] IN BLOOD BY AUTOMATED COUNT: 16 % (ref 11.5–15)
ERYTHROCYTE [DISTWIDTH] IN BLOOD BY AUTOMATED COUNT: 16 % (ref 11.5–15)
ERYTHROCYTE [DISTWIDTH] IN BLOOD BY AUTOMATED COUNT: 16.4 % (ref 11.5–15)
ERYTHROCYTE [SEDIMENTATION RATE] IN BLOOD BY WESTERGREN METHOD: 6 MM/HR (ref 0–15)
FAT QUALITATIVE SPLIT STOOL: NORMAL
FECAL NEUTRAL FAT: NORMAL
FIO2: 100 %
FIO2: 100 %
FIO2: 60 %
GFR, ESTIMATED: 34 ML/MIN/1.73M2
GFR, ESTIMATED: 35 ML/MIN/1.73M2
GFR, ESTIMATED: 38 ML/MIN/1.73M2
GFR, ESTIMATED: 40 ML/MIN/1.73M2
GFR, ESTIMATED: 41 ML/MIN/1.73M2
GFR, ESTIMATED: 41 ML/MIN/1.73M2
GFR, ESTIMATED: 43 ML/MIN/1.73M2
GFR, ESTIMATED: 45 ML/MIN/1.73M2
GFR, ESTIMATED: 46 ML/MIN/1.73M2
GFR, ESTIMATED: 53 ML/MIN/1.73M2
GFR, ESTIMATED: 54 ML/MIN/1.73M2
GLUCOSE BLD-MCNC: 92 MG/DL (ref 74–99)
GLUCOSE FLD-MCNC: 101 MG/DL
GLUCOSE SERPL-MCNC: 102 MG/DL (ref 74–99)
GLUCOSE SERPL-MCNC: 102 MG/DL (ref 74–99)
GLUCOSE SERPL-MCNC: 109 MG/DL (ref 74–99)
GLUCOSE SERPL-MCNC: 109 MG/DL (ref 74–99)
GLUCOSE SERPL-MCNC: 133 MG/DL (ref 74–99)
GLUCOSE SERPL-MCNC: 172 MG/DL (ref 74–99)
GLUCOSE SERPL-MCNC: 190 MG/DL (ref 74–99)
GLUCOSE SERPL-MCNC: 86 MG/DL (ref 74–99)
GLUCOSE SERPL-MCNC: 86 MG/DL (ref 74–99)
GLUCOSE SERPL-MCNC: 88 MG/DL (ref 74–99)
GLUCOSE SERPL-MCNC: 98 MG/DL (ref 74–99)
GLUCOSE UR STRIP-MCNC: NEGATIVE MG/DL
GLUCOSE UR STRIP-MCNC: NEGATIVE MG/DL
HCO3: 12.4 MMOL/L (ref 22–26)
HCO3: 12.6 MMOL/L (ref 22–26)
HCO3: 18.7 MMOL/L (ref 22–26)
HCO3: 21.6 MMOL/L (ref 22–26)
HCT VFR BLD AUTO: 33.2 % (ref 37–54)
HCT VFR BLD AUTO: 34.9 % (ref 37–54)
HCT VFR BLD AUTO: 36.4 % (ref 37–54)
HCT VFR BLD AUTO: 36.5 % (ref 37–54)
HCT VFR BLD AUTO: 36.6 % (ref 37–54)
HCT VFR BLD AUTO: 37.2 % (ref 37–54)
HCT VFR BLD AUTO: 38.1 % (ref 37–54)
HCT VFR BLD AUTO: 39.8 % (ref 37–54)
HGB BLD-MCNC: 10.7 G/DL (ref 12.5–16.5)
HGB BLD-MCNC: 11.1 G/DL (ref 12.5–16.5)
HGB BLD-MCNC: 11.5 G/DL (ref 12.5–16.5)
HGB BLD-MCNC: 12 G/DL (ref 12.5–16.5)
HGB BLD-MCNC: 12.1 G/DL (ref 12.5–16.5)
HGB BLD-MCNC: 12.2 G/DL (ref 12.5–16.5)
HGB BLD-MCNC: 12.4 G/DL (ref 12.5–16.5)
HGB BLD-MCNC: 13.4 G/DL (ref 12.5–16.5)
HGB UR QL STRIP.AUTO: ABNORMAL
HGB UR QL STRIP.AUTO: NEGATIVE
HHB: 28.3 % (ref 0–5)
HHB: 29 % (ref 0–5)
HHB: 4.6 % (ref 0–5)
HHB: 5.6 % (ref 0–5)
IMM GRANULOCYTES # BLD AUTO: 0.03 K/UL (ref 0–0.58)
IMM GRANULOCYTES # BLD AUTO: 0.04 K/UL (ref 0–0.58)
IMM GRANULOCYTES # BLD AUTO: 0.05 K/UL (ref 0–0.58)
IMM GRANULOCYTES # BLD AUTO: 0.73 K/UL (ref 0–0.58)
IMM GRANULOCYTES NFR BLD: 0 % (ref 0–5)
IMM GRANULOCYTES NFR BLD: 1 % (ref 0–5)
IMM GRANULOCYTES NFR BLD: 5 % (ref 0–5)
INR PPP: 1.5
INR PPP: 1.6
INR PPP: >10
KETONES UR STRIP-MCNC: NEGATIVE MG/DL
KETONES UR STRIP-MCNC: NEGATIVE MG/DL
LAB: ABNORMAL
LAB: NORMAL
LACTATE BLDV-SCNC: 1.7 MMOL/L (ref 0.5–2.2)
LACTATE BLDV-SCNC: 1.8 MMOL/L (ref 0.5–2.2)
LACTATE BLDV-SCNC: 1.9 MMOL/L (ref 0.5–2.2)
LACTATE BLDV-SCNC: 2.5 MMOL/L (ref 0.5–2.2)
LACTATE BLDV-SCNC: 2.5 MMOL/L (ref 0.5–2.2)
LACTATE BLDV-SCNC: 3.7 MMOL/L (ref 0.5–2.2)
LACTATE BLDV-SCNC: 8.8 MMOL/L (ref 0.5–2.2)
LDH FLD L TO P-CCNC: 113 U/L
LDH SERPL-CCNC: 240 U/L (ref 135–225)
LEUKOCYTE ESTERASE UR QL STRIP: ABNORMAL
LEUKOCYTE ESTERASE UR QL STRIP: NEGATIVE
LIPASE SERPL-CCNC: 17 U/L (ref 13–60)
LYMPHOCYTES NFR BLD: 0.1 K/UL (ref 1.5–4)
LYMPHOCYTES NFR BLD: 0.2 K/UL (ref 1.5–4)
LYMPHOCYTES NFR BLD: 0.22 K/UL (ref 1.5–4)
LYMPHOCYTES NFR BLD: 0.23 K/UL (ref 1.5–4)
LYMPHOCYTES NFR BLD: 0.3 K/UL (ref 1.5–4)
LYMPHOCYTES NFR BLD: 0.43 K/UL (ref 1.5–4)
LYMPHOCYTES NFR BLD: 0.47 K/UL (ref 1.5–4)
LYMPHOCYTES NFR BLD: 2.24 K/UL (ref 1.5–4)
LYMPHOCYTES RELATIVE PERCENT: 14 % (ref 20–42)
LYMPHOCYTES RELATIVE PERCENT: 2 % (ref 20–42)
LYMPHOCYTES RELATIVE PERCENT: 3 % (ref 20–42)
Lab: 1326
Lab: 1445
Lab: 2042
Lab: 257
Lab: 355
MAGNESIUM SERPL-MCNC: 1.4 MG/DL (ref 1.6–2.6)
MAGNESIUM SERPL-MCNC: 1.8 MG/DL (ref 1.6–2.6)
MAGNESIUM SERPL-MCNC: 2.1 MG/DL (ref 1.6–2.6)
MAGNESIUM SERPL-MCNC: 2.2 MG/DL (ref 1.6–2.6)
MAGNESIUM SERPL-MCNC: 2.2 MG/DL (ref 1.6–2.6)
MAGNESIUM SERPL-MCNC: 2.3 MG/DL (ref 1.6–2.6)
MAGNESIUM SERPL-MCNC: 2.3 MG/DL (ref 1.6–2.6)
MAGNESIUM SERPL-MCNC: 2.5 MG/DL (ref 1.6–2.6)
MCH RBC QN AUTO: 32.6 PG (ref 26–35)
MCH RBC QN AUTO: 32.6 PG (ref 26–35)
MCH RBC QN AUTO: 32.7 PG (ref 26–35)
MCH RBC QN AUTO: 32.9 PG (ref 26–35)
MCH RBC QN AUTO: 32.9 PG (ref 26–35)
MCH RBC QN AUTO: 33.3 PG (ref 26–35)
MCH RBC QN AUTO: 33.5 PG (ref 26–35)
MCH RBC QN AUTO: 33.6 PG (ref 26–35)
MCHC RBC AUTO-ENTMCNC: 31.6 G/DL (ref 32–34.5)
MCHC RBC AUTO-ENTMCNC: 31.8 G/DL (ref 32–34.5)
MCHC RBC AUTO-ENTMCNC: 32 G/DL (ref 32–34.5)
MCHC RBC AUTO-ENTMCNC: 32.2 G/DL (ref 32–34.5)
MCHC RBC AUTO-ENTMCNC: 32.3 G/DL (ref 32–34.5)
MCHC RBC AUTO-ENTMCNC: 33.1 G/DL (ref 32–34.5)
MCHC RBC AUTO-ENTMCNC: 33.7 G/DL (ref 32–34.5)
MCHC RBC AUTO-ENTMCNC: 34 G/DL (ref 32–34.5)
MCV RBC AUTO: 101.2 FL (ref 80–99.9)
MCV RBC AUTO: 102.1 FL (ref 80–99.9)
MCV RBC AUTO: 102.6 FL (ref 80–99.9)
MCV RBC AUTO: 103.3 FL (ref 80–99.9)
MCV RBC AUTO: 104 FL (ref 80–99.9)
MCV RBC AUTO: 98.9 FL (ref 80–99.9)
MCV RBC AUTO: 99.5 FL (ref 80–99.9)
MCV RBC AUTO: 99.5 FL (ref 80–99.9)
METHB: 0.3 % (ref 0–1.5)
MICROORGANISM SPEC CULT: ABNORMAL
MICROORGANISM SPEC CULT: NO GROWTH
MICROORGANISM SPEC CULT: NORMAL
MICROORGANISM SPEC CULT: NORMAL
MICROORGANISM/AGENT SPEC: NORMAL
MODE: ABNORMAL
MODE: ABNORMAL
MODE: AC
MODE: AC
MONOCYTES NFR BLD: 0.07 K/UL (ref 0.1–0.95)
MONOCYTES NFR BLD: 0.3 K/UL (ref 0.1–0.95)
MONOCYTES NFR BLD: 0.44 K/UL (ref 0.1–0.95)
MONOCYTES NFR BLD: 0.75 K/UL (ref 0.1–0.95)
MONOCYTES NFR BLD: 0.78 K/UL (ref 0.1–0.95)
MONOCYTES NFR BLD: 0.84 K/UL (ref 0.1–0.95)
MONOCYTES NFR BLD: 1 % (ref 2–12)
MONOCYTES NFR BLD: 1.14 K/UL (ref 0.1–0.95)
MONOCYTES NFR BLD: 1.16 K/UL (ref 0.1–0.95)
MONOCYTES NFR BLD: 5 % (ref 2–12)
MONOCYTES NFR BLD: 6 % (ref 2–12)
MONOCYTES NFR BLD: 7 % (ref 2–12)
MONOCYTES NFR BLD: 7 % (ref 2–12)
MONOCYTES NFR BLD: 9 % (ref 2–12)
MONOCYTES NFR FLD: 41 %
MYELOCYTES ABSOLUTE COUNT: 0.14 K/UL
MYELOCYTES: 1 %
NEUTROPHILS NFR BLD: 77 % (ref 43–80)
NEUTROPHILS NFR BLD: 88 % (ref 43–80)
NEUTROPHILS NFR BLD: 90 % (ref 43–80)
NEUTROPHILS NFR BLD: 90 % (ref 43–80)
NEUTROPHILS NFR BLD: 92 % (ref 43–80)
NEUTROPHILS NFR BLD: 92 % (ref 43–80)
NEUTROPHILS NFR BLD: 93 % (ref 43–80)
NEUTROPHILS NFR BLD: 97 % (ref 43–80)
NEUTROPHILS NFR FLD: 59 %
NEUTS SEG NFR BLD: 10.63 K/UL (ref 1.8–7.3)
NEUTS SEG NFR BLD: 12.02 K/UL (ref 1.8–7.3)
NEUTS SEG NFR BLD: 12.21 K/UL (ref 1.8–7.3)
NEUTS SEG NFR BLD: 12.42 K/UL (ref 1.8–7.3)
NEUTS SEG NFR BLD: 14.49 K/UL (ref 1.8–7.3)
NEUTS SEG NFR BLD: 5.83 K/UL (ref 1.8–7.3)
NEUTS SEG NFR BLD: 7.43 K/UL (ref 1.8–7.3)
NEUTS SEG NFR BLD: 8.5 K/UL (ref 1.8–7.3)
NITRITE UR QL STRIP: NEGATIVE
NITRITE UR QL STRIP: NEGATIVE
NUCLEATED RED BLOOD CELLS: 1 PER 100 WBC
O2 CONTENT: 11 ML/DL
O2 CONTENT: 12.4 ML/DL
O2 CONTENT: 16.9 ML/DL
O2 CONTENT: 17.3 ML/DL
O2 SATURATION: 70.8 % (ref 92–98.5)
O2 SATURATION: 71.6 % (ref 92–98.5)
O2 SATURATION: 94.3 % (ref 92–98.5)
O2 SATURATION: 95.4 % (ref 92–98.5)
O2HB: 70.2 % (ref 94–97)
O2HB: 71.4 % (ref 94–97)
O2HB: 93.5 % (ref 94–97)
O2HB: 95 % (ref 94–97)
OPERATOR ID: 5133
OPERATOR ID: 5133
OPERATOR ID: 5135
OPERATOR ID: 8634
OPERATOR ID: ABNORMAL
OSMOLALITY SERPL: 299 MOSM/KG (ref 285–310)
OSMOLALITY UR: 427 MOSM/KG (ref 300–900)
PATIENT TEMP: 37 C
PCO2: 34.5 MMHG (ref 35–45)
PCO2: 39.3 MMHG (ref 35–45)
PCO2: 41.3 MMHG (ref 35–45)
PCO2: 42 MMHG (ref 35–45)
PEEP/CPAP: 5 CMH2O
PEEP/CPAP: 5 CMH2O
PEEP/CPAP: 6 CMH2O
PFO2: 0.49 MMHG/%
PFO2: 0.52 MMHG/%
PFO2: 1.4 MMHG/%
PH BLOOD GAS: 7.1 (ref 7.35–7.45)
PH BLOOD GAS: 7.12 (ref 7.35–7.45)
PH BLOOD GAS: 7.33 (ref 7.35–7.45)
PH BLOOD GAS: 7.35 (ref 7.35–7.45)
PH FLUID: 7.45
PH UR STRIP: 5.5 [PH] (ref 5–8)
PH UR STRIP: 5.5 [PH] (ref 5–8)
PHOSPHATE SERPL-MCNC: 2.7 MG/DL (ref 2.5–4.5)
PHOSPHATE SERPL-MCNC: 3.2 MG/DL (ref 2.5–4.5)
PHOSPHATE SERPL-MCNC: 3.5 MG/DL (ref 2.5–4.5)
PHOSPHATE SERPL-MCNC: 3.6 MG/DL (ref 2.5–4.5)
PHOSPHATE SERPL-MCNC: 5.4 MG/DL (ref 2.5–4.5)
PLATELET CONFIRMATION: NORMAL
PLATELET, FLUORESCENCE: 42 K/UL (ref 130–450)
PLATELET, FLUORESCENCE: 45 K/UL (ref 130–450)
PLATELET, FLUORESCENCE: 50 K/UL (ref 130–450)
PLATELET, FLUORESCENCE: 56 K/UL (ref 130–450)
PLATELET, FLUORESCENCE: 67 K/UL (ref 130–450)
PLATELET, FLUORESCENCE: 72 K/UL (ref 130–450)
PLATELET, FLUORESCENCE: 72 K/UL (ref 130–450)
PLATELET, FLUORESCENCE: 76 K/UL (ref 130–450)
PMV BLD AUTO: 11.2 FL (ref 7–12)
PMV BLD AUTO: 11.2 FL (ref 7–12)
PMV BLD AUTO: 11.5 FL (ref 7–12)
PMV BLD AUTO: 11.6 FL (ref 7–12)
PMV BLD AUTO: 11.6 FL (ref 7–12)
PMV BLD AUTO: 12.1 FL (ref 7–12)
PO2: 49.1 MMHG (ref 75–100)
PO2: 51.7 MMHG (ref 75–100)
PO2: 79 MMHG (ref 75–100)
PO2: 84.2 MMHG (ref 75–100)
POTASSIUM SERPL-SCNC: 4.1 MMOL/L (ref 3.5–5)
POTASSIUM SERPL-SCNC: 4.7 MMOL/L (ref 3.5–5)
POTASSIUM SERPL-SCNC: 4.7 MMOL/L (ref 3.5–5)
POTASSIUM SERPL-SCNC: 4.8 MMOL/L (ref 3.5–5)
POTASSIUM SERPL-SCNC: 5 MMOL/L (ref 3.5–5)
POTASSIUM SERPL-SCNC: 5 MMOL/L (ref 3.5–5)
POTASSIUM SERPL-SCNC: 5.1 MMOL/L (ref 3.5–5)
POTASSIUM SERPL-SCNC: 5.1 MMOL/L (ref 3.5–5)
POTASSIUM SERPL-SCNC: 5.2 MMOL/L (ref 3.5–5)
POTASSIUM SERPL-SCNC: 5.7 MMOL/L (ref 3.5–5)
POTASSIUM SERPL-SCNC: 6.1 MMOL/L (ref 3.5–5)
POTASSIUM, UR: 50.6 MMOL/L
PROCALCITONIN SERPL-MCNC: 0.36 NG/ML (ref 0–0.08)
PROT FLD-MCNC: 2.1 G/DL
PROT SERPL-MCNC: 4.9 G/DL (ref 6.4–8.3)
PROT SERPL-MCNC: 5.8 G/DL (ref 6.4–8.3)
PROT SERPL-MCNC: 6.4 G/DL (ref 6.4–8.3)
PROT SERPL-MCNC: 6.4 G/DL (ref 6.4–8.3)
PROT SERPL-MCNC: 6.5 G/DL (ref 6.4–8.3)
PROT SERPL-MCNC: 6.6 G/DL (ref 6.4–8.3)
PROT SERPL-MCNC: 7.1 G/DL (ref 6.4–8.3)
PROT UR STRIP-MCNC: 100 MG/DL
PROT UR STRIP-MCNC: 100 MG/DL
PROTHROMBIN TIME: 15.7 SEC (ref 9.3–12.4)
PROTHROMBIN TIME: 15.8 SEC (ref 9.3–12.4)
PROTHROMBIN TIME: 16.7 SEC (ref 9.3–12.4)
PROTHROMBIN TIME: 17.5 SEC (ref 9.3–12.4)
PROTHROMBIN TIME: >120 SEC (ref 9.3–12.4)
PS: 14 CMH20
RBC # BLD AUTO: 3.28 M/UL (ref 3.8–5.8)
RBC # BLD AUTO: 3.4 M/UL (ref 3.8–5.8)
RBC # BLD AUTO: 3.5 M/UL (ref 3.8–5.8)
RBC # BLD AUTO: 3.6 M/UL (ref 3.8–5.8)
RBC # BLD AUTO: 3.68 M/UL (ref 3.8–5.8)
RBC # BLD AUTO: 3.69 M/UL (ref 3.8–5.8)
RBC # BLD AUTO: 3.73 M/UL (ref 3.8–5.8)
RBC # BLD AUTO: 4 M/UL (ref 3.8–5.8)
RBC # BLD: ABNORMAL 10*6/UL
RBC # FLD: NORMAL CELLS/UL
RBC #/AREA URNS HPF: ABNORMAL /HPF
RBC #/AREA URNS HPF: ABNORMAL /HPF
RI(T): 12.03
RI(T): 12.68
RI(T): 3.63
RR MECHANICAL: 18 B/MIN
RR MECHANICAL: 18 B/MIN
SERVICE CMNT-IMP: ABNORMAL
SERVICE CMNT-IMP: ABNORMAL
SERVICE CMNT-IMP: NORMAL
SODIUM SERPL-SCNC: 130 MMOL/L (ref 132–146)
SODIUM SERPL-SCNC: 131 MMOL/L (ref 132–146)
SODIUM SERPL-SCNC: 131 MMOL/L (ref 132–146)
SODIUM SERPL-SCNC: 132 MMOL/L (ref 132–146)
SODIUM SERPL-SCNC: 133 MMOL/L (ref 132–146)
SODIUM SERPL-SCNC: 133 MMOL/L (ref 132–146)
SODIUM SERPL-SCNC: 134 MMOL/L (ref 132–146)
SODIUM SERPL-SCNC: 134 MMOL/L (ref 132–146)
SODIUM SERPL-SCNC: 135 MMOL/L (ref 132–146)
SODIUM SERPL-SCNC: 136 MMOL/L (ref 132–146)
SODIUM SERPL-SCNC: 137 MMOL/L (ref 132–146)
SODIUM UR-SCNC: <20 MMOL/L
SODIUM UR-SCNC: <20 MMOL/L
SOURCE, BLOOD GAS: ABNORMAL
SOURCE, BLOOD GAS: NORMAL
SP GR UR STRIP: 1.02 (ref 1–1.03)
SP GR UR STRIP: >1.03 (ref 1–1.03)
SPECIMEN DESCRIPTION: ABNORMAL
SPECIMEN DESCRIPTION: NORMAL
SPECIMEN TYPE: NORMAL
THB: 10.9 G/DL (ref 11.5–16.5)
THB: 12.5 G/DL (ref 11.5–16.5)
THB: 12.6 G/DL (ref 11.5–16.5)
THB: 13.1 G/DL (ref 11.5–16.5)
TIME ANALYZED: 1333
TIME ANALYZED: 1503
TIME ANALYZED: 2052
TIME ANALYZED: 311
TIME ANALYZED: 359
TRIGLYCERIDES FLUID: 30 MG/DL
TROPONIN I SERPL HS-MCNC: 158 NG/L (ref 0–22)
TROPONIN I SERPL HS-MCNC: 205 NG/L (ref 0–22)
TROPONIN I SERPL HS-MCNC: 221 NG/L (ref 0–22)
UROBILINOGEN UR STRIP-ACNC: 0.2 EU/DL (ref 0–1)
UROBILINOGEN UR STRIP-ACNC: 0.2 EU/DL (ref 0–1)
UUN UR-MCNC: 498 MG/DL (ref 800–1666)
VT MECHANICAL: 450 ML
VT MECHANICAL: 450 ML
WBC # FLD: 819 CELLS/UL
WBC #/AREA URNS HPF: ABNORMAL /HPF
WBC #/AREA URNS HPF: ABNORMAL /HPF
WBC OTHER # BLD: 11.8 K/UL (ref 4.5–11.5)
WBC OTHER # BLD: 13.3 K/UL (ref 4.5–11.5)
WBC OTHER # BLD: 13.7 K/UL (ref 4.5–11.5)
WBC OTHER # BLD: 16.2 K/UL (ref 4.5–11.5)
WBC OTHER # BLD: 16.2 K/UL (ref 4.5–11.5)
WBC OTHER # BLD: 6.3 K/UL (ref 4.5–11.5)
WBC OTHER # BLD: 7.7 K/UL (ref 4.5–11.5)
WBC OTHER # BLD: 9.3 K/UL (ref 4.5–11.5)

## 2025-01-01 PROCEDURE — 2500000003 HC RX 250 WO HCPCS

## 2025-01-01 PROCEDURE — 92610 EVALUATE SWALLOWING FUNCTION: CPT

## 2025-01-01 PROCEDURE — 83605 ASSAY OF LACTIC ACID: CPT

## 2025-01-01 PROCEDURE — 93010 ELECTROCARDIOGRAM REPORT: CPT | Performed by: INTERNAL MEDICINE

## 2025-01-01 PROCEDURE — 6370000000 HC RX 637 (ALT 250 FOR IP): Performed by: INTERNAL MEDICINE

## 2025-01-01 PROCEDURE — 92950 HEART/LUNG RESUSCITATION CPR: CPT

## 2025-01-01 PROCEDURE — 6370000000 HC RX 637 (ALT 250 FOR IP)

## 2025-01-01 PROCEDURE — 84540 ASSAY OF URINE/UREA-N: CPT

## 2025-01-01 PROCEDURE — 2580000003 HC RX 258: Performed by: INTERNAL MEDICINE

## 2025-01-01 PROCEDURE — 2580000003 HC RX 258

## 2025-01-01 PROCEDURE — 2500000003 HC RX 250 WO HCPCS: Performed by: STUDENT IN AN ORGANIZED HEALTH CARE EDUCATION/TRAINING PROGRAM

## 2025-01-01 PROCEDURE — 6360000002 HC RX W HCPCS: Performed by: INTERNAL MEDICINE

## 2025-01-01 PROCEDURE — 99233 SBSQ HOSP IP/OBS HIGH 50: CPT | Performed by: INTERNAL MEDICINE

## 2025-01-01 PROCEDURE — 93971 EXTREMITY STUDY: CPT

## 2025-01-01 PROCEDURE — 99232 SBSQ HOSP IP/OBS MODERATE 35: CPT | Performed by: FAMILY MEDICINE

## 2025-01-01 PROCEDURE — 85025 COMPLETE CBC W/AUTO DIFF WBC: CPT

## 2025-01-01 PROCEDURE — 2580000003 HC RX 258: Performed by: NURSE PRACTITIONER

## 2025-01-01 PROCEDURE — 36556 INSERT NON-TUNNEL CV CATH: CPT

## 2025-01-01 PROCEDURE — 83735 ASSAY OF MAGNESIUM: CPT

## 2025-01-01 PROCEDURE — 80053 COMPREHEN METABOLIC PANEL: CPT

## 2025-01-01 PROCEDURE — 6360000002 HC RX W HCPCS: Performed by: STUDENT IN AN ORGANIZED HEALTH CARE EDUCATION/TRAINING PROGRAM

## 2025-01-01 PROCEDURE — 82248 BILIRUBIN DIRECT: CPT

## 2025-01-01 PROCEDURE — 89051 BODY FLUID CELL COUNT: CPT

## 2025-01-01 PROCEDURE — 94640 AIRWAY INHALATION TREATMENT: CPT

## 2025-01-01 PROCEDURE — 4A133B1 MONITORING OF ARTERIAL PRESSURE, PERIPHERAL, PERCUTANEOUS APPROACH: ICD-10-PCS | Performed by: INTERNAL MEDICINE

## 2025-01-01 PROCEDURE — 31500 INSERT EMERGENCY AIRWAY: CPT | Performed by: NURSE PRACTITIONER

## 2025-01-01 PROCEDURE — 97165 OT EVAL LOW COMPLEX 30 MIN: CPT

## 2025-01-01 PROCEDURE — 84478 ASSAY OF TRIGLYCERIDES: CPT

## 2025-01-01 PROCEDURE — APPSS180 APP SPLIT SHARED TIME > 60 MINUTES: Performed by: NURSE PRACTITIONER

## 2025-01-01 PROCEDURE — 6360000002 HC RX W HCPCS

## 2025-01-01 PROCEDURE — 88305 TISSUE EXAM BY PATHOLOGIST: CPT

## 2025-01-01 PROCEDURE — 84145 PROCALCITONIN (PCT): CPT

## 2025-01-01 PROCEDURE — 85610 PROTHROMBIN TIME: CPT

## 2025-01-01 PROCEDURE — 82436 ASSAY OF URINE CHLORIDE: CPT

## 2025-01-01 PROCEDURE — 5A09357 ASSISTANCE WITH RESPIRATORY VENTILATION, LESS THAN 24 CONSECUTIVE HOURS, CONTINUOUS POSITIVE AIRWAY PRESSURE: ICD-10-PCS | Performed by: INTERNAL MEDICINE

## 2025-01-01 PROCEDURE — 87324 CLOSTRIDIUM AG IA: CPT

## 2025-01-01 PROCEDURE — 6360000002 HC RX W HCPCS: Performed by: NURSE PRACTITIONER

## 2025-01-01 PROCEDURE — 71046 X-RAY EXAM CHEST 2 VIEWS: CPT

## 2025-01-01 PROCEDURE — 80162 ASSAY OF DIGOXIN TOTAL: CPT

## 2025-01-01 PROCEDURE — 83690 ASSAY OF LIPASE: CPT

## 2025-01-01 PROCEDURE — 99285 EMERGENCY DEPT VISIT HI MDM: CPT

## 2025-01-01 PROCEDURE — 84100 ASSAY OF PHOSPHORUS: CPT

## 2025-01-01 PROCEDURE — 83935 ASSAY OF URINE OSMOLALITY: CPT

## 2025-01-01 PROCEDURE — 2500000003 HC RX 250 WO HCPCS: Performed by: INTERNAL MEDICINE

## 2025-01-01 PROCEDURE — 93005 ELECTROCARDIOGRAM TRACING: CPT | Performed by: FAMILY MEDICINE

## 2025-01-01 PROCEDURE — 84300 ASSAY OF URINE SODIUM: CPT

## 2025-01-01 PROCEDURE — 87102 FUNGUS ISOLATION CULTURE: CPT

## 2025-01-01 PROCEDURE — 83615 LACTATE (LD) (LDH) ENZYME: CPT

## 2025-01-01 PROCEDURE — 99232 SBSQ HOSP IP/OBS MODERATE 35: CPT | Performed by: INTERNAL MEDICINE

## 2025-01-01 PROCEDURE — 93005 ELECTROCARDIOGRAM TRACING: CPT | Performed by: NURSE PRACTITIONER

## 2025-01-01 PROCEDURE — 6360000002 HC RX W HCPCS: Performed by: RADIOLOGY

## 2025-01-01 PROCEDURE — 94660 CPAP INITIATION&MGMT: CPT

## 2025-01-01 PROCEDURE — 81001 URINALYSIS AUTO W/SCOPE: CPT

## 2025-01-01 PROCEDURE — 6370000000 HC RX 637 (ALT 250 FOR IP): Performed by: STUDENT IN AN ORGANIZED HEALTH CARE EDUCATION/TRAINING PROGRAM

## 2025-01-01 PROCEDURE — 99222 1ST HOSP IP/OBS MODERATE 55: CPT | Performed by: FAMILY MEDICINE

## 2025-01-01 PROCEDURE — 82653 EL-1 FECAL QUANTITATIVE: CPT

## 2025-01-01 PROCEDURE — 83986 ASSAY PH BODY FLUID NOS: CPT

## 2025-01-01 PROCEDURE — 87081 CULTURE SCREEN ONLY: CPT

## 2025-01-01 PROCEDURE — 87045 FECES CULTURE AEROBIC BACT: CPT

## 2025-01-01 PROCEDURE — 2580000003 HC RX 258: Performed by: STUDENT IN AN ORGANIZED HEALTH CARE EDUCATION/TRAINING PROGRAM

## 2025-01-01 PROCEDURE — 71045 X-RAY EXAM CHEST 1 VIEW: CPT

## 2025-01-01 PROCEDURE — 97161 PT EVAL LOW COMPLEX 20 MIN: CPT

## 2025-01-01 PROCEDURE — 36620 INSERTION CATHETER ARTERY: CPT

## 2025-01-01 PROCEDURE — 87086 URINE CULTURE/COLONY COUNT: CPT

## 2025-01-01 PROCEDURE — 96375 TX/PRO/DX INJ NEW DRUG ADDON: CPT

## 2025-01-01 PROCEDURE — 51701 INSERT BLADDER CATHETER: CPT

## 2025-01-01 PROCEDURE — 83880 ASSAY OF NATRIURETIC PEPTIDE: CPT

## 2025-01-01 PROCEDURE — 93005 ELECTROCARDIOGRAM TRACING: CPT | Performed by: INTERNAL MEDICINE

## 2025-01-01 PROCEDURE — C1751 CATH, INF, PER/CENT/MIDLINE: HCPCS

## 2025-01-01 PROCEDURE — 96374 THER/PROPH/DIAG INJ IV PUSH: CPT

## 2025-01-01 PROCEDURE — 0W993ZZ DRAINAGE OF RIGHT PLEURAL CAVITY, PERCUTANEOUS APPROACH: ICD-10-PCS | Performed by: RADIOLOGY

## 2025-01-01 PROCEDURE — 82330 ASSAY OF CALCIUM: CPT

## 2025-01-01 PROCEDURE — 82945 GLUCOSE OTHER FLUID: CPT

## 2025-01-01 PROCEDURE — 82962 GLUCOSE BLOOD TEST: CPT

## 2025-01-01 PROCEDURE — 82805 BLOOD GASES W/O2 SATURATION: CPT

## 2025-01-01 PROCEDURE — P9047 ALBUMIN (HUMAN), 25%, 50ML: HCPCS

## 2025-01-01 PROCEDURE — 71275 CT ANGIOGRAPHY CHEST: CPT

## 2025-01-01 PROCEDURE — 93005 ELECTROCARDIOGRAM TRACING: CPT | Performed by: STUDENT IN AN ORGANIZED HEALTH CARE EDUCATION/TRAINING PROGRAM

## 2025-01-01 PROCEDURE — 2500000003 HC RX 250 WO HCPCS: Performed by: NURSE PRACTITIONER

## 2025-01-01 PROCEDURE — 2000000000 HC ICU R&B

## 2025-01-01 PROCEDURE — 6360000004 HC RX CONTRAST MEDICATION: Performed by: RADIOLOGY

## 2025-01-01 PROCEDURE — 82140 ASSAY OF AMMONIA: CPT

## 2025-01-01 PROCEDURE — 87070 CULTURE OTHR SPECIMN AEROBIC: CPT

## 2025-01-01 PROCEDURE — 99291 CRITICAL CARE FIRST HOUR: CPT | Performed by: NURSE PRACTITIONER

## 2025-01-01 PROCEDURE — 82465 ASSAY BLD/SERUM CHOLESTEROL: CPT

## 2025-01-01 PROCEDURE — 74018 RADEX ABDOMEN 1 VIEW: CPT

## 2025-01-01 PROCEDURE — 80048 BASIC METABOLIC PNL TOTAL CA: CPT

## 2025-01-01 PROCEDURE — 51798 US URINE CAPACITY MEASURE: CPT

## 2025-01-01 PROCEDURE — 74177 CT ABD & PELVIS W/CONTRAST: CPT

## 2025-01-01 PROCEDURE — 93005 ELECTROCARDIOGRAM TRACING: CPT | Performed by: EMERGENCY MEDICINE

## 2025-01-01 PROCEDURE — 6370000000 HC RX 637 (ALT 250 FOR IP): Performed by: FAMILY MEDICINE

## 2025-01-01 PROCEDURE — C1729 CATH, DRAINAGE: HCPCS

## 2025-01-01 PROCEDURE — 87449 NOS EACH ORGANISM AG IA: CPT

## 2025-01-01 PROCEDURE — 2700000000 HC OXYGEN THERAPY PER DAY

## 2025-01-01 PROCEDURE — 83930 ASSAY OF BLOOD OSMOLALITY: CPT

## 2025-01-01 PROCEDURE — 82705 FATS/LIPIDS FECES QUAL: CPT

## 2025-01-01 PROCEDURE — 86140 C-REACTIVE PROTEIN: CPT

## 2025-01-01 PROCEDURE — 0BH17EZ INSERTION OF ENDOTRACHEAL AIRWAY INTO TRACHEA, VIA NATURAL OR ARTIFICIAL OPENING: ICD-10-PCS | Performed by: FAMILY MEDICINE

## 2025-01-01 PROCEDURE — 2580000003 HC RX 258: Performed by: EMERGENCY MEDICINE

## 2025-01-01 PROCEDURE — 85379 FIBRIN DEGRADATION QUANT: CPT

## 2025-01-01 PROCEDURE — 85652 RBC SED RATE AUTOMATED: CPT

## 2025-01-01 PROCEDURE — 99222 1ST HOSP IP/OBS MODERATE 55: CPT

## 2025-01-01 PROCEDURE — 87046 STOOL CULTR AEROBIC BACT EA: CPT

## 2025-01-01 PROCEDURE — 87205 SMEAR GRAM STAIN: CPT

## 2025-01-01 PROCEDURE — 1200000000 HC SEMI PRIVATE

## 2025-01-01 PROCEDURE — 84484 ASSAY OF TROPONIN QUANT: CPT

## 2025-01-01 PROCEDURE — 84133 ASSAY OF URINE POTASSIUM: CPT

## 2025-01-01 PROCEDURE — 02HV33Z INSERTION OF INFUSION DEVICE INTO SUPERIOR VENA CAVA, PERCUTANEOUS APPROACH: ICD-10-PCS | Performed by: INTERNAL MEDICINE

## 2025-01-01 PROCEDURE — 94002 VENT MGMT INPAT INIT DAY: CPT

## 2025-01-01 PROCEDURE — 87427 SHIGA-LIKE TOXIN AG IA: CPT

## 2025-01-01 PROCEDURE — 84157 ASSAY OF PROTEIN OTHER: CPT

## 2025-01-01 PROCEDURE — 31500 INSERT EMERGENCY AIRWAY: CPT

## 2025-01-01 PROCEDURE — 99223 1ST HOSP IP/OBS HIGH 75: CPT | Performed by: INTERNAL MEDICINE

## 2025-01-01 PROCEDURE — 88112 CYTOPATH CELL ENHANCE TECH: CPT

## 2025-01-01 PROCEDURE — 5A1935Z RESPIRATORY VENTILATION, LESS THAN 24 CONSECUTIVE HOURS: ICD-10-PCS | Performed by: FAMILY MEDICINE

## 2025-01-01 PROCEDURE — 2060000000 HC ICU INTERMEDIATE R&B

## 2025-01-01 PROCEDURE — 51702 INSERT TEMP BLADDER CATH: CPT

## 2025-01-01 PROCEDURE — 6360000002 HC RX W HCPCS: Performed by: EMERGENCY MEDICINE

## 2025-01-01 PROCEDURE — 4A133J1 MONITORING OF ARTERIAL PULSE, PERIPHERAL, PERCUTANEOUS APPROACH: ICD-10-PCS | Performed by: INTERNAL MEDICINE

## 2025-01-01 PROCEDURE — 82570 ASSAY OF URINE CREATININE: CPT

## 2025-01-01 RX ORDER — ASPIRIN 81 MG/1
81 TABLET, CHEWABLE ORAL DAILY
Status: DISCONTINUED | OUTPATIENT
Start: 2025-01-01 | End: 2025-01-01 | Stop reason: HOSPADM

## 2025-01-01 RX ORDER — ONDANSETRON 4 MG/1
4 TABLET, FILM COATED ORAL DAILY PRN
Qty: 10 TABLET | Refills: 0 | Status: ON HOLD
Start: 2025-01-01 | End: 2025-01-01 | Stop reason: HOSPADM

## 2025-01-01 RX ORDER — 0.9 % SODIUM CHLORIDE 0.9 %
1000 INTRAVENOUS SOLUTION INTRAVENOUS ONCE
Status: COMPLETED | OUTPATIENT
Start: 2025-01-01 | End: 2025-01-01

## 2025-01-01 RX ORDER — BENZOCAINE/MENTHOL 6 MG-10 MG
LOZENGE MUCOUS MEMBRANE 2 TIMES DAILY
Status: DISCONTINUED | OUTPATIENT
Start: 2025-01-01 | End: 2025-01-01 | Stop reason: HOSPADM

## 2025-01-01 RX ORDER — SPIRONOLACTONE 25 MG/1
12.5 TABLET ORAL DAILY
Status: DISCONTINUED | OUTPATIENT
Start: 2025-01-01 | End: 2025-01-01 | Stop reason: HOSPADM

## 2025-01-01 RX ORDER — SODIUM CHLORIDE 0.9 % (FLUSH) 0.9 %
5-40 SYRINGE (ML) INJECTION EVERY 12 HOURS SCHEDULED
Status: DISCONTINUED | OUTPATIENT
Start: 2025-01-01 | End: 2025-01-01

## 2025-01-01 RX ORDER — ARFORMOTEROL TARTRATE 15 UG/2ML
15 SOLUTION RESPIRATORY (INHALATION)
Status: DISCONTINUED | OUTPATIENT
Start: 2025-01-01 | End: 2025-01-01 | Stop reason: HOSPADM

## 2025-01-01 RX ORDER — LIDOCAINE 4 G/G
1 PATCH TOPICAL DAILY
Status: DISCONTINUED | OUTPATIENT
Start: 2025-01-01 | End: 2025-01-01 | Stop reason: HOSPADM

## 2025-01-01 RX ORDER — 0.9 % SODIUM CHLORIDE 0.9 %
500 INTRAVENOUS SOLUTION INTRAVENOUS ONCE
Status: COMPLETED | OUTPATIENT
Start: 2025-01-01 | End: 2025-01-01

## 2025-01-01 RX ORDER — DULOXETIN HYDROCHLORIDE 30 MG/1
30 CAPSULE, DELAYED RELEASE ORAL DAILY
Status: DISCONTINUED | OUTPATIENT
Start: 2025-01-01 | End: 2025-01-01 | Stop reason: HOSPADM

## 2025-01-01 RX ORDER — ONDANSETRON 2 MG/ML
4 INJECTION INTRAMUSCULAR; INTRAVENOUS EVERY 6 HOURS PRN
Status: DISCONTINUED | OUTPATIENT
Start: 2025-01-01 | End: 2025-01-01

## 2025-01-01 RX ORDER — CALCIUM GLUCONATE 98 MG/ML
1000 INJECTION, SOLUTION INTRAVENOUS ONCE
Status: COMPLETED | OUTPATIENT
Start: 2025-01-01 | End: 2025-01-01

## 2025-01-01 RX ORDER — LACOSAMIDE 100 MG/1
100 TABLET ORAL 2 TIMES DAILY
Status: DISCONTINUED | OUTPATIENT
Start: 2025-01-01 | End: 2025-01-01 | Stop reason: HOSPADM

## 2025-01-01 RX ORDER — EPINEPHRINE IN SOD CHLOR,ISO 1 MG/10 ML
SYRINGE (ML) INTRAVENOUS
Status: COMPLETED | OUTPATIENT
Start: 2025-01-01 | End: 2025-01-01

## 2025-01-01 RX ORDER — SACUBITRIL AND VALSARTAN 24; 26 MG/1; MG/1
TABLET, FILM COATED ORAL
Qty: 180 TABLET | Refills: 3 | Status: CANCELLED | OUTPATIENT
Start: 2025-01-01

## 2025-01-01 RX ORDER — SODIUM CHLORIDE 0.9 % (FLUSH) 0.9 %
5-40 SYRINGE (ML) INJECTION PRN
Status: DISCONTINUED | OUTPATIENT
Start: 2025-01-01 | End: 2025-01-01 | Stop reason: HOSPADM

## 2025-01-01 RX ORDER — METOPROLOL SUCCINATE 25 MG/1
25 TABLET, EXTENDED RELEASE ORAL ONCE
Status: COMPLETED | OUTPATIENT
Start: 2025-01-01 | End: 2025-01-01

## 2025-01-01 RX ORDER — PROPOFOL 10 MG/ML
5-50 INJECTION, EMULSION INTRAVENOUS CONTINUOUS
Status: DISCONTINUED | OUTPATIENT
Start: 2025-01-01 | End: 2025-01-01 | Stop reason: HOSPADM

## 2025-01-01 RX ORDER — METHYLPREDNISOLONE SODIUM SUCCINATE 40 MG/ML
40 INJECTION INTRAMUSCULAR; INTRAVENOUS EVERY 8 HOURS
Status: DISCONTINUED | OUTPATIENT
Start: 2025-01-01 | End: 2025-01-01 | Stop reason: HOSPADM

## 2025-01-01 RX ORDER — SODIUM CHLORIDE 9 MG/ML
INJECTION, SOLUTION INTRAVENOUS PRN
Status: DISCONTINUED | OUTPATIENT
Start: 2025-01-01 | End: 2025-01-01

## 2025-01-01 RX ORDER — DIGOXIN 0.25 MG/ML
250 INJECTION INTRAMUSCULAR; INTRAVENOUS ONCE
Status: COMPLETED | OUTPATIENT
Start: 2025-01-01 | End: 2025-01-01

## 2025-01-01 RX ORDER — SODIUM CHLORIDE 9 MG/ML
INJECTION, SOLUTION INTRAVENOUS CONTINUOUS
Status: DISCONTINUED | OUTPATIENT
Start: 2025-01-01 | End: 2025-01-01

## 2025-01-01 RX ORDER — METOPROLOL TARTRATE 1 MG/ML
5 INJECTION, SOLUTION INTRAVENOUS ONCE
Status: COMPLETED | OUTPATIENT
Start: 2025-01-01 | End: 2025-01-01

## 2025-01-01 RX ORDER — MAGNESIUM SULFATE IN WATER 40 MG/ML
4000 INJECTION, SOLUTION INTRAVENOUS ONCE
Status: COMPLETED | OUTPATIENT
Start: 2025-01-01 | End: 2025-01-01

## 2025-01-01 RX ORDER — LANOLIN ALCOHOL/MO/W.PET/CERES
100 CREAM (GRAM) TOPICAL DAILY
Status: DISCONTINUED | OUTPATIENT
Start: 2025-01-01 | End: 2025-01-01 | Stop reason: HOSPADM

## 2025-01-01 RX ORDER — DOPAMINE HYDROCHLORIDE 320 MG/100ML
20 INJECTION, SOLUTION INTRAVENOUS CONTINUOUS
Status: DISCONTINUED | OUTPATIENT
Start: 2025-01-01 | End: 2025-01-01 | Stop reason: HOSPADM

## 2025-01-01 RX ORDER — LIDOCAINE HYDROCHLORIDE 10 MG/ML
INJECTION, SOLUTION EPIDURAL; INFILTRATION; INTRACAUDAL; PERINEURAL PRN
Status: COMPLETED | OUTPATIENT
Start: 2025-01-01 | End: 2025-01-01

## 2025-01-01 RX ORDER — IOPAMIDOL 755 MG/ML
75 INJECTION, SOLUTION INTRAVASCULAR
Status: DISCONTINUED | OUTPATIENT
Start: 2025-01-01 | End: 2025-01-01 | Stop reason: HOSPADM

## 2025-01-01 RX ORDER — CALCITONIN SALMON 200 [IU]/.09ML
1 SPRAY, METERED NASAL DAILY
Status: DISCONTINUED | OUTPATIENT
Start: 2025-01-01 | End: 2025-01-01 | Stop reason: CLARIF

## 2025-01-01 RX ORDER — ENOXAPARIN SODIUM 100 MG/ML
30 INJECTION SUBCUTANEOUS 2 TIMES DAILY
Status: DISCONTINUED | OUTPATIENT
Start: 2025-01-01 | End: 2025-01-01 | Stop reason: HOSPADM

## 2025-01-01 RX ORDER — LOPERAMIDE HYDROCHLORIDE 2 MG/1
2 CAPSULE ORAL 4 TIMES DAILY PRN
Qty: 24 CAPSULE | Refills: 0 | Status: ON HOLD
Start: 2025-01-01 | End: 2025-01-01 | Stop reason: HOSPADM

## 2025-01-01 RX ORDER — METOPROLOL SUCCINATE 25 MG/1
25 TABLET, EXTENDED RELEASE ORAL DAILY
Status: DISCONTINUED | OUTPATIENT
Start: 2025-01-01 | End: 2025-01-01

## 2025-01-01 RX ORDER — METOPROLOL SUCCINATE 25 MG/1
25 TABLET, EXTENDED RELEASE ORAL 2 TIMES DAILY
Status: DISCONTINUED | OUTPATIENT
Start: 2025-01-01 | End: 2025-01-01 | Stop reason: HOSPADM

## 2025-01-01 RX ORDER — SILVER SULFADIAZINE 10 MG/G
CREAM TOPICAL DAILY
Status: DISCONTINUED | OUTPATIENT
Start: 2025-01-01 | End: 2025-01-01 | Stop reason: HOSPADM

## 2025-01-01 RX ORDER — MIDAZOLAM HYDROCHLORIDE 2 MG/2ML
INJECTION, SOLUTION INTRAMUSCULAR; INTRAVENOUS
Status: COMPLETED | OUTPATIENT
Start: 2025-01-01 | End: 2025-01-01

## 2025-01-01 RX ORDER — PREDNISONE 5 MG/1
5 TABLET ORAL DAILY
Status: DISCONTINUED | OUTPATIENT
Start: 2025-01-01 | End: 2025-01-01

## 2025-01-01 RX ORDER — OCTREOTIDE ACETATE 100 UG/ML
200 INJECTION, SOLUTION INTRAVENOUS; SUBCUTANEOUS EVERY 8 HOURS
Status: DISCONTINUED | OUTPATIENT
Start: 2025-01-01 | End: 2025-01-01 | Stop reason: HOSPADM

## 2025-01-01 RX ORDER — BUDESONIDE 0.5 MG/2ML
0.5 INHALANT ORAL
Status: DISCONTINUED | OUTPATIENT
Start: 2025-01-01 | End: 2025-01-01 | Stop reason: HOSPADM

## 2025-01-01 RX ORDER — SODIUM CHLORIDE 0.9 % (FLUSH) 0.9 %
5-40 SYRINGE (ML) INJECTION EVERY 12 HOURS SCHEDULED
Status: DISCONTINUED | OUTPATIENT
Start: 2025-01-01 | End: 2025-01-01 | Stop reason: HOSPADM

## 2025-01-01 RX ORDER — PROCHLORPERAZINE EDISYLATE 5 MG/ML
10 INJECTION INTRAMUSCULAR; INTRAVENOUS EVERY 6 HOURS PRN
Status: DISCONTINUED | OUTPATIENT
Start: 2025-01-01 | End: 2025-01-01 | Stop reason: HOSPADM

## 2025-01-01 RX ORDER — LEVETIRACETAM 500 MG/1
1000 TABLET ORAL 2 TIMES DAILY
Status: DISCONTINUED | OUTPATIENT
Start: 2025-01-01 | End: 2025-01-01 | Stop reason: HOSPADM

## 2025-01-01 RX ORDER — CHOLECALCIFEROL (VITAMIN D3) 50 MCG
4000 TABLET ORAL DAILY
Status: DISCONTINUED | OUTPATIENT
Start: 2025-01-01 | End: 2025-01-01 | Stop reason: HOSPADM

## 2025-01-01 RX ORDER — DOPAMINE HYDROCHLORIDE 320 MG/100ML
INJECTION, SOLUTION INTRAVENOUS
Status: COMPLETED
Start: 2025-01-01 | End: 2025-01-01

## 2025-01-01 RX ORDER — SODIUM CHLORIDE 9 MG/ML
INJECTION, SOLUTION INTRAVENOUS PRN
Status: DISCONTINUED | OUTPATIENT
Start: 2025-01-01 | End: 2025-01-01 | Stop reason: HOSPADM

## 2025-01-01 RX ORDER — PSEUDOEPHEDRINE HCL 30 MG
500 TABLET ORAL NIGHTLY
Status: DISCONTINUED | OUTPATIENT
Start: 2025-01-01 | End: 2025-01-01 | Stop reason: HOSPADM

## 2025-01-01 RX ORDER — PROPOFOL 10 MG/ML
INJECTION, EMULSION INTRAVENOUS CONTINUOUS PRN
Status: COMPLETED | OUTPATIENT
Start: 2025-01-01 | End: 2025-01-01

## 2025-01-01 RX ORDER — LOPERAMIDE HYDROCHLORIDE 2 MG/1
2 CAPSULE ORAL DAILY
Status: DISCONTINUED | OUTPATIENT
Start: 2025-01-01 | End: 2025-01-01 | Stop reason: HOSPADM

## 2025-01-01 RX ORDER — SODIUM CHLORIDE 0.9 % (FLUSH) 0.9 %
5-40 SYRINGE (ML) INJECTION PRN
Status: DISCONTINUED | OUTPATIENT
Start: 2025-01-01 | End: 2025-01-01

## 2025-01-01 RX ORDER — MIDODRINE HYDROCHLORIDE 5 MG/1
10 TABLET ORAL
Status: DISCONTINUED | OUTPATIENT
Start: 2025-01-01 | End: 2025-01-01 | Stop reason: HOSPADM

## 2025-01-01 RX ORDER — PROCHLORPERAZINE MALEATE 10 MG
10 TABLET ORAL EVERY 8 HOURS PRN
Status: DISCONTINUED | OUTPATIENT
Start: 2025-01-01 | End: 2025-01-01 | Stop reason: HOSPADM

## 2025-01-01 RX ORDER — ACETAMINOPHEN 325 MG/1
650 TABLET ORAL EVERY 6 HOURS PRN
Status: DISCONTINUED | OUTPATIENT
Start: 2025-01-01 | End: 2025-01-01 | Stop reason: HOSPADM

## 2025-01-01 RX ORDER — PANTOPRAZOLE SODIUM 40 MG/1
40 TABLET, DELAYED RELEASE ORAL DAILY
Status: DISCONTINUED | OUTPATIENT
Start: 2025-01-01 | End: 2025-01-01 | Stop reason: HOSPADM

## 2025-01-01 RX ORDER — FOLIC ACID 1 MG/1
1 TABLET ORAL NIGHTLY
Status: DISCONTINUED | OUTPATIENT
Start: 2025-01-01 | End: 2025-01-01 | Stop reason: HOSPADM

## 2025-01-01 RX ORDER — FUROSEMIDE 20 MG/1
20 TABLET ORAL DAILY
Status: DISCONTINUED | OUTPATIENT
Start: 2025-01-01 | End: 2025-01-01 | Stop reason: HOSPADM

## 2025-01-01 RX ORDER — CHOLESTYRAMINE 4 G/9G
1 POWDER, FOR SUSPENSION ORAL 2 TIMES DAILY
Status: DISCONTINUED | OUTPATIENT
Start: 2025-01-01 | End: 2025-01-01

## 2025-01-01 RX ORDER — ATROPINE SULFATE 0.1 MG/ML
0.5 INJECTION INTRAVENOUS PRN
Status: DISCONTINUED | OUTPATIENT
Start: 2025-01-01 | End: 2025-01-01 | Stop reason: HOSPADM

## 2025-01-01 RX ORDER — POTASSIUM CITRATE 540 MG/1
30 TABLET, EXTENDED RELEASE ORAL 2 TIMES DAILY
Status: DISCONTINUED | OUTPATIENT
Start: 2025-01-01 | End: 2025-01-01 | Stop reason: HOSPADM

## 2025-01-01 RX ORDER — HYDROXYCHLOROQUINE SULFATE 200 MG/1
200 TABLET, FILM COATED ORAL 2 TIMES DAILY
Status: DISCONTINUED | OUTPATIENT
Start: 2025-01-01 | End: 2025-01-01 | Stop reason: HOSPADM

## 2025-01-01 RX ORDER — LEVALBUTEROL INHALATION SOLUTION 0.63 MG/3ML
0.63 SOLUTION RESPIRATORY (INHALATION) EVERY 8 HOURS PRN
Status: DISCONTINUED | OUTPATIENT
Start: 2025-01-01 | End: 2025-01-01

## 2025-01-01 RX ORDER — DOPAMINE HYDROCHLORIDE 320 MG/100ML
INJECTION, SOLUTION INTRAVENOUS CONTINUOUS PRN
Status: COMPLETED | OUTPATIENT
Start: 2025-01-01 | End: 2025-01-01

## 2025-01-01 RX ORDER — ONDANSETRON 4 MG/1
4 TABLET, ORALLY DISINTEGRATING ORAL EVERY 8 HOURS PRN
Status: DISCONTINUED | OUTPATIENT
Start: 2025-01-01 | End: 2025-01-01

## 2025-01-01 RX ORDER — ALBUMIN (HUMAN) 12.5 G/50ML
25 SOLUTION INTRAVENOUS EVERY 6 HOURS
Status: DISCONTINUED | OUTPATIENT
Start: 2025-01-01 | End: 2025-01-01 | Stop reason: HOSPADM

## 2025-01-01 RX ORDER — LEVALBUTEROL INHALATION SOLUTION 0.63 MG/3ML
0.63 SOLUTION RESPIRATORY (INHALATION) EVERY 6 HOURS
Status: DISCONTINUED | OUTPATIENT
Start: 2025-01-01 | End: 2025-01-01 | Stop reason: HOSPADM

## 2025-01-01 RX ORDER — ONDANSETRON 2 MG/ML
4 INJECTION INTRAMUSCULAR; INTRAVENOUS ONCE
Status: COMPLETED | OUTPATIENT
Start: 2025-01-01 | End: 2025-01-01

## 2025-01-01 RX ORDER — ACETAMINOPHEN 650 MG/1
650 SUPPOSITORY RECTAL EVERY 6 HOURS PRN
Status: DISCONTINUED | OUTPATIENT
Start: 2025-01-01 | End: 2025-01-01 | Stop reason: HOSPADM

## 2025-01-01 RX ORDER — IOPAMIDOL 755 MG/ML
75 INJECTION, SOLUTION INTRAVASCULAR
Status: COMPLETED | OUTPATIENT
Start: 2025-01-01 | End: 2025-01-01

## 2025-01-01 RX ORDER — PROCHLORPERAZINE EDISYLATE 5 MG/ML
10 INJECTION INTRAMUSCULAR; INTRAVENOUS ONCE
Status: COMPLETED | OUTPATIENT
Start: 2025-01-01 | End: 2025-01-01

## 2025-01-01 RX ORDER — GABAPENTIN 300 MG/1
300 CAPSULE ORAL DAILY
Status: DISCONTINUED | OUTPATIENT
Start: 2025-01-01 | End: 2025-01-01 | Stop reason: HOSPADM

## 2025-01-01 RX ORDER — FENTANYL CITRATE-0.9 % NACL/PF 10 MCG/ML
25-200 PLASTIC BAG, INJECTION (ML) INTRAVENOUS CONTINUOUS
Refills: 0 | Status: DISCONTINUED | OUTPATIENT
Start: 2025-01-01 | End: 2025-01-01 | Stop reason: HOSPADM

## 2025-01-01 RX ADMIN — SODIUM CHLORIDE, PRESERVATIVE FREE 10 ML: 5 INJECTION INTRAVENOUS at 21:20

## 2025-01-01 RX ADMIN — DOPAMINE HYDROCHLORIDE 800 MG: 320 INJECTION, SOLUTION INTRAVENOUS at 02:10

## 2025-01-01 RX ADMIN — OCTREOTIDE ACETATE 200 MCG: 100 INJECTION, SOLUTION INTRAVENOUS; SUBCUTANEOUS at 10:47

## 2025-01-01 RX ADMIN — PREDNISONE 5 MG: 5 TABLET ORAL at 08:17

## 2025-01-01 RX ADMIN — BUDESONIDE 500 MCG: 0.5 SUSPENSION RESPIRATORY (INHALATION) at 20:05

## 2025-01-01 RX ADMIN — SODIUM ZIRCONIUM CYCLOSILICATE 10 G: 10 POWDER, FOR SUSPENSION ORAL at 22:40

## 2025-01-01 RX ADMIN — Medication 100 MG: at 08:38

## 2025-01-01 RX ADMIN — Medication 500 MG: at 21:10

## 2025-01-01 RX ADMIN — BUDESONIDE 500 MCG: 0.5 SUSPENSION RESPIRATORY (INHALATION) at 08:04

## 2025-01-01 RX ADMIN — METOPROLOL SUCCINATE 25 MG: 25 TABLET, FILM COATED, EXTENDED RELEASE ORAL at 18:42

## 2025-01-01 RX ADMIN — FOLIC ACID 1 MG: 1 TABLET ORAL at 23:09

## 2025-01-01 RX ADMIN — BUDESONIDE 500 MCG: 0.5 SUSPENSION RESPIRATORY (INHALATION) at 20:04

## 2025-01-01 RX ADMIN — LEVETIRACETAM 1000 MG: 500 TABLET, FILM COATED ORAL at 21:20

## 2025-01-01 RX ADMIN — Medication 125 MG: at 14:42

## 2025-01-01 RX ADMIN — VASOPRESSIN 0.03 UNITS/MIN: 20 INJECTION INTRAVENOUS at 03:53

## 2025-01-01 RX ADMIN — ENOXAPARIN SODIUM 30 MG: 100 INJECTION SUBCUTANEOUS at 21:00

## 2025-01-01 RX ADMIN — Medication 125 MG: at 06:15

## 2025-01-01 RX ADMIN — HYDROXYCHLOROQUINE SULFATE 200 MG: 200 TABLET ORAL at 09:22

## 2025-01-01 RX ADMIN — SODIUM CHLORIDE 100 MCG/MIN: 0.9 INJECTION, SOLUTION INTRAVENOUS at 06:15

## 2025-01-01 RX ADMIN — LEVALBUTEROL HYDROCHLORIDE 0.63 MG: 0.63 SOLUTION RESPIRATORY (INHALATION) at 12:16

## 2025-01-01 RX ADMIN — ONDANSETRON 4 MG: 2 INJECTION, SOLUTION INTRAMUSCULAR; INTRAVENOUS at 17:11

## 2025-01-01 RX ADMIN — LEVALBUTEROL HYDROCHLORIDE 0.63 MG: 0.63 SOLUTION RESPIRATORY (INHALATION) at 08:04

## 2025-01-01 RX ADMIN — METHYLPREDNISOLONE SODIUM SUCCINATE 40 MG: 40 INJECTION INTRAMUSCULAR; INTRAVENOUS at 21:10

## 2025-01-01 RX ADMIN — DILTIAZEM HYDROCHLORIDE 5 MG/HR: 5 INJECTION, SOLUTION INTRAVENOUS at 01:20

## 2025-01-01 RX ADMIN — LEVALBUTEROL HYDROCHLORIDE 0.63 MG: 0.63 SOLUTION RESPIRATORY (INHALATION) at 07:49

## 2025-01-01 RX ADMIN — PREDNISONE 5 MG: 5 TABLET ORAL at 07:52

## 2025-01-01 RX ADMIN — SODIUM CHLORIDE 1000 ML: 0.9 INJECTION, SOLUTION INTRAVENOUS at 17:11

## 2025-01-01 RX ADMIN — HYDROXYCHLOROQUINE SULFATE 200 MG: 200 TABLET ORAL at 00:45

## 2025-01-01 RX ADMIN — ASPIRIN 81 MG: 81 TABLET, CHEWABLE ORAL at 08:38

## 2025-01-01 RX ADMIN — Medication 125 MG: at 00:18

## 2025-01-01 RX ADMIN — ARFORMOTEROL TARTRATE 15 MCG: 15 SOLUTION RESPIRATORY (INHALATION) at 07:49

## 2025-01-01 RX ADMIN — ASPIRIN 81 MG: 81 TABLET, CHEWABLE ORAL at 08:18

## 2025-01-01 RX ADMIN — GABAPENTIN 300 MG: 300 CAPSULE ORAL at 08:18

## 2025-01-01 RX ADMIN — HYDROXYCHLOROQUINE SULFATE 200 MG: 200 TABLET ORAL at 23:09

## 2025-01-01 RX ADMIN — LEVETIRACETAM 1000 MG: 500 TABLET, FILM COATED ORAL at 09:23

## 2025-01-01 RX ADMIN — SODIUM CHLORIDE 10 MCG/MIN: 0.9 INJECTION, SOLUTION INTRAVENOUS at 03:00

## 2025-01-01 RX ADMIN — ALBUMIN (HUMAN) 25 G: 0.25 INJECTION, SOLUTION INTRAVENOUS at 04:24

## 2025-01-01 RX ADMIN — SODIUM CHLORIDE, PRESERVATIVE FREE 10 ML: 5 INJECTION INTRAVENOUS at 21:11

## 2025-01-01 RX ADMIN — MIDODRINE HYDROCHLORIDE 10 MG: 5 TABLET ORAL at 11:42

## 2025-01-01 RX ADMIN — ARFORMOTEROL TARTRATE 15 MCG: 15 SOLUTION RESPIRATORY (INHALATION) at 20:05

## 2025-01-01 RX ADMIN — HYDROXYCHLOROQUINE SULFATE 200 MG: 200 TABLET ORAL at 21:45

## 2025-01-01 RX ADMIN — MIDODRINE HYDROCHLORIDE 10 MG: 5 TABLET ORAL at 17:10

## 2025-01-01 RX ADMIN — LEVETIRACETAM 1000 MG: 500 TABLET, FILM COATED ORAL at 07:51

## 2025-01-01 RX ADMIN — LEVALBUTEROL HYDROCHLORIDE 0.63 MG: 0.63 SOLUTION RESPIRATORY (INHALATION) at 12:01

## 2025-01-01 RX ADMIN — PROPOFOL 20 MCG/KG/MIN: 10 INJECTION, EMULSION INTRAVENOUS at 02:40

## 2025-01-01 RX ADMIN — Medication 500 MG: at 00:45

## 2025-01-01 RX ADMIN — Medication 125 MG: at 06:21

## 2025-01-01 RX ADMIN — SODIUM CHLORIDE, PRESERVATIVE FREE 10 ML: 5 INJECTION INTRAVENOUS at 08:39

## 2025-01-01 RX ADMIN — SODIUM CHLORIDE 1000 ML: 0.9 INJECTION, SOLUTION INTRAVENOUS at 15:36

## 2025-01-01 RX ADMIN — Medication 500 MG: at 21:00

## 2025-01-01 RX ADMIN — HYDROCORTISONE: 1 CREAM TOPICAL at 21:13

## 2025-01-01 RX ADMIN — GABAPENTIN 300 MG: 300 CAPSULE ORAL at 09:23

## 2025-01-01 RX ADMIN — Medication 4000 UNITS: at 07:52

## 2025-01-01 RX ADMIN — HYDROXYCHLOROQUINE SULFATE 200 MG: 200 TABLET ORAL at 08:38

## 2025-01-01 RX ADMIN — Medication 1 MG: at 02:15

## 2025-01-01 RX ADMIN — IOPAMIDOL 75 ML: 755 INJECTION, SOLUTION INTRAVENOUS at 17:12

## 2025-01-01 RX ADMIN — HYDROXYCHLOROQUINE SULFATE 200 MG: 200 TABLET ORAL at 21:00

## 2025-01-01 RX ADMIN — SODIUM CHLORIDE, PRESERVATIVE FREE 10 ML: 5 INJECTION INTRAVENOUS at 00:36

## 2025-01-01 RX ADMIN — LACOSAMIDE 100 MG: 100 TABLET, FILM COATED ORAL at 00:07

## 2025-01-01 RX ADMIN — SODIUM CHLORIDE 500 ML: 0.9 INJECTION, SOLUTION INTRAVENOUS at 19:40

## 2025-01-01 RX ADMIN — Medication 125 MG: at 17:11

## 2025-01-01 RX ADMIN — LEVALBUTEROL HYDROCHLORIDE 0.63 MG: 0.63 SOLUTION RESPIRATORY (INHALATION) at 01:55

## 2025-01-01 RX ADMIN — LEVALBUTEROL HYDROCHLORIDE 0.63 MG: 0.63 SOLUTION RESPIRATORY (INHALATION) at 12:08

## 2025-01-01 RX ADMIN — SODIUM CHLORIDE: 0.9 INJECTION, SOLUTION INTRAVENOUS at 17:00

## 2025-01-01 RX ADMIN — HYDROCORTISONE: 1 CREAM TOPICAL at 23:14

## 2025-01-01 RX ADMIN — ALBUMIN (HUMAN) 25 G: 0.25 INJECTION, SOLUTION INTRAVENOUS at 21:17

## 2025-01-01 RX ADMIN — SODIUM CHLORIDE, PRESERVATIVE FREE 10 ML: 5 INJECTION INTRAVENOUS at 21:00

## 2025-01-01 RX ADMIN — DULOXETINE 30 MG: 30 CAPSULE, DELAYED RELEASE ORAL at 09:22

## 2025-01-01 RX ADMIN — LACOSAMIDE 100 MG: 100 TABLET, FILM COATED ORAL at 08:38

## 2025-01-01 RX ADMIN — LACOSAMIDE 100 MG: 100 TABLET, FILM COATED ORAL at 21:00

## 2025-01-01 RX ADMIN — CHOLESTYRAMINE 4 G: 4 POWDER, FOR SUSPENSION ORAL at 00:07

## 2025-01-01 RX ADMIN — DULOXETINE 30 MG: 30 CAPSULE, DELAYED RELEASE ORAL at 08:38

## 2025-01-01 RX ADMIN — SODIUM CHLORIDE: 0.9 INJECTION, SOLUTION INTRAVENOUS at 00:07

## 2025-01-01 RX ADMIN — LACOSAMIDE 100 MG: 100 TABLET, FILM COATED ORAL at 21:10

## 2025-01-01 RX ADMIN — BUDESONIDE 500 MCG: 0.5 SUSPENSION RESPIRATORY (INHALATION) at 10:24

## 2025-01-01 RX ADMIN — ALBUMIN (HUMAN) 25 G: 0.25 INJECTION, SOLUTION INTRAVENOUS at 10:45

## 2025-01-01 RX ADMIN — SODIUM CHLORIDE 500 ML: 0.9 INJECTION, SOLUTION INTRAVENOUS at 00:38

## 2025-01-01 RX ADMIN — LEVALBUTEROL HYDROCHLORIDE 0.63 MG: 0.63 SOLUTION RESPIRATORY (INHALATION) at 20:26

## 2025-01-01 RX ADMIN — PANTOPRAZOLE SODIUM 40 MG: 40 TABLET, DELAYED RELEASE ORAL at 07:52

## 2025-01-01 RX ADMIN — NOREPINEPHRINE BITARTRATE 10 MCG/MIN: 1 INJECTION, SOLUTION, CONCENTRATE INTRAVENOUS at 02:58

## 2025-01-01 RX ADMIN — LEVALBUTEROL HYDROCHLORIDE 0.63 MG: 0.63 SOLUTION RESPIRATORY (INHALATION) at 20:04

## 2025-01-01 RX ADMIN — FOLIC ACID 1 MG: 1 TABLET ORAL at 21:10

## 2025-01-01 RX ADMIN — Medication 100 MG: at 08:17

## 2025-01-01 RX ADMIN — BUDESONIDE 500 MCG: 0.5 SUSPENSION RESPIRATORY (INHALATION) at 20:26

## 2025-01-01 RX ADMIN — LEVETIRACETAM 1000 MG: 500 TABLET, FILM COATED ORAL at 08:18

## 2025-01-01 RX ADMIN — LEVETIRACETAM 1000 MG: 500 TABLET, FILM COATED ORAL at 23:09

## 2025-01-01 RX ADMIN — PANTOPRAZOLE SODIUM 40 MG: 40 TABLET, DELAYED RELEASE ORAL at 08:38

## 2025-01-01 RX ADMIN — METOPROLOL SUCCINATE 25 MG: 25 TABLET, FILM COATED, EXTENDED RELEASE ORAL at 08:18

## 2025-01-01 RX ADMIN — Medication 1 MG: at 02:19

## 2025-01-01 RX ADMIN — HYDROXYCHLOROQUINE SULFATE 200 MG: 200 TABLET ORAL at 21:10

## 2025-01-01 RX ADMIN — Medication 1 MG: at 02:34

## 2025-01-01 RX ADMIN — DULOXETINE 30 MG: 30 CAPSULE, DELAYED RELEASE ORAL at 07:52

## 2025-01-01 RX ADMIN — LACOSAMIDE 100 MG: 100 TABLET, FILM COATED ORAL at 08:18

## 2025-01-01 RX ADMIN — SODIUM CHLORIDE, PRESERVATIVE FREE 10 ML: 5 INJECTION INTRAVENOUS at 23:15

## 2025-01-01 RX ADMIN — PROCHLORPERAZINE EDISYLATE 10 MG: 5 INJECTION INTRAMUSCULAR; INTRAVENOUS at 23:58

## 2025-01-01 RX ADMIN — METOPROLOL SUCCINATE 25 MG: 25 TABLET, FILM COATED, EXTENDED RELEASE ORAL at 21:20

## 2025-01-01 RX ADMIN — Medication 125 MG: at 12:02

## 2025-01-01 RX ADMIN — Medication 100 MG: at 09:22

## 2025-01-01 RX ADMIN — FOLIC ACID 1 MG: 1 TABLET ORAL at 21:00

## 2025-01-01 RX ADMIN — Medication 500 MG: at 23:08

## 2025-01-01 RX ADMIN — HYDROCORTISONE: 1 CREAM TOPICAL at 07:53

## 2025-01-01 RX ADMIN — ARFORMOTEROL TARTRATE 15 MCG: 15 SOLUTION RESPIRATORY (INHALATION) at 20:26

## 2025-01-01 RX ADMIN — BUDESONIDE 500 MCG: 0.5 SUSPENSION RESPIRATORY (INHALATION) at 21:02

## 2025-01-01 RX ADMIN — DIGOXIN 250 MCG: 0.25 INJECTION INTRAMUSCULAR; INTRAVENOUS at 17:59

## 2025-01-01 RX ADMIN — ARFORMOTEROL TARTRATE 15 MCG: 15 SOLUTION RESPIRATORY (INHALATION) at 08:04

## 2025-01-01 RX ADMIN — DIGOXIN 250 MCG: 0.25 INJECTION INTRAMUSCULAR; INTRAVENOUS at 15:04

## 2025-01-01 RX ADMIN — GABAPENTIN 300 MG: 300 CAPSULE ORAL at 08:38

## 2025-01-01 RX ADMIN — LEVETIRACETAM 1000 MG: 500 TABLET, FILM COATED ORAL at 21:00

## 2025-01-01 RX ADMIN — LEVALBUTEROL HYDROCHLORIDE 0.63 MG: 0.63 SOLUTION RESPIRATORY (INHALATION) at 00:56

## 2025-01-01 RX ADMIN — BUDESONIDE 500 MCG: 0.5 SUSPENSION RESPIRATORY (INHALATION) at 07:38

## 2025-01-01 RX ADMIN — ARFORMOTEROL TARTRATE 15 MCG: 15 SOLUTION RESPIRATORY (INHALATION) at 21:02

## 2025-01-01 RX ADMIN — LACOSAMIDE 100 MG: 100 TABLET, FILM COATED ORAL at 21:20

## 2025-01-01 RX ADMIN — Medication 1 MG: at 02:05

## 2025-01-01 RX ADMIN — SODIUM CHLORIDE: 0.9 INJECTION, SOLUTION INTRAVENOUS at 08:37

## 2025-01-01 RX ADMIN — Medication 500 MG: at 21:45

## 2025-01-01 RX ADMIN — OCTREOTIDE ACETATE 200 MCG: 100 INJECTION, SOLUTION INTRAVENOUS; SUBCUTANEOUS at 04:22

## 2025-01-01 RX ADMIN — LACOSAMIDE 100 MG: 100 TABLET, FILM COATED ORAL at 23:09

## 2025-01-01 RX ADMIN — HYDROCORTISONE: 1 CREAM TOPICAL at 21:00

## 2025-01-01 RX ADMIN — HYDROCORTISONE: 1 CREAM TOPICAL at 21:20

## 2025-01-01 RX ADMIN — SODIUM CHLORIDE 1000 ML: 0.9 INJECTION, SOLUTION INTRAVENOUS at 21:00

## 2025-01-01 RX ADMIN — LACOSAMIDE 100 MG: 100 TABLET, FILM COATED ORAL at 07:52

## 2025-01-01 RX ADMIN — LEVETIRACETAM 1000 MG: 500 TABLET, FILM COATED ORAL at 08:38

## 2025-01-01 RX ADMIN — LEVETIRACETAM 1000 MG: 500 TABLET, FILM COATED ORAL at 21:10

## 2025-01-01 RX ADMIN — Medication 4000 UNITS: at 08:37

## 2025-01-01 RX ADMIN — SODIUM CHLORIDE: 0.9 INJECTION, SOLUTION INTRAVENOUS at 01:15

## 2025-01-01 RX ADMIN — Medication 125 MG: at 00:00

## 2025-01-01 RX ADMIN — METHYLPREDNISOLONE SODIUM SUCCINATE 40 MG: 40 INJECTION INTRAMUSCULAR; INTRAVENOUS at 23:09

## 2025-01-01 RX ADMIN — METHYLPREDNISOLONE SODIUM SUCCINATE 40 MG: 40 INJECTION INTRAMUSCULAR; INTRAVENOUS at 11:40

## 2025-01-01 RX ADMIN — ENOXAPARIN SODIUM 30 MG: 100 INJECTION SUBCUTANEOUS at 07:52

## 2025-01-01 RX ADMIN — Medication 125 MG: at 23:09

## 2025-01-01 RX ADMIN — METOPROLOL SUCCINATE 25 MG: 25 TABLET, FILM COATED, EXTENDED RELEASE ORAL at 09:22

## 2025-01-01 RX ADMIN — FOLIC ACID 1 MG: 1 TABLET ORAL at 21:20

## 2025-01-01 RX ADMIN — ASPIRIN 81 MG: 81 TABLET, CHEWABLE ORAL at 07:52

## 2025-01-01 RX ADMIN — HYDROCORTISONE: 1 CREAM TOPICAL at 08:38

## 2025-01-01 RX ADMIN — PREDNISONE 5 MG: 5 TABLET ORAL at 09:22

## 2025-01-01 RX ADMIN — LEVALBUTEROL HYDROCHLORIDE 0.63 MG: 0.63 SOLUTION RESPIRATORY (INHALATION) at 10:24

## 2025-01-01 RX ADMIN — Medication 125 MG: at 17:39

## 2025-01-01 RX ADMIN — Medication 125 MG: at 17:58

## 2025-01-01 RX ADMIN — DULOXETINE 30 MG: 30 CAPSULE, DELAYED RELEASE ORAL at 08:18

## 2025-01-01 RX ADMIN — PROCHLORPERAZINE EDISYLATE 10 MG: 5 INJECTION INTRAMUSCULAR; INTRAVENOUS at 16:42

## 2025-01-01 RX ADMIN — CHOLESTYRAMINE 4 G: 4 POWDER, FOR SUSPENSION ORAL at 07:51

## 2025-01-01 RX ADMIN — DOPAMINE HYDROCHLORIDE 10 MCG/KG/MIN: 320 INJECTION, SOLUTION INTRAVENOUS at 02:05

## 2025-01-01 RX ADMIN — SODIUM CHLORIDE 10 MCG/MIN: 0.9 INJECTION, SOLUTION INTRAVENOUS at 02:58

## 2025-01-01 RX ADMIN — HYDROXYCHLOROQUINE SULFATE 200 MG: 200 TABLET ORAL at 07:50

## 2025-01-01 RX ADMIN — PROPOFOL 20 MCG/KG/MIN: 10 INJECTION, EMULSION INTRAVENOUS at 03:00

## 2025-01-01 RX ADMIN — ARFORMOTEROL TARTRATE 15 MCG: 15 SOLUTION RESPIRATORY (INHALATION) at 07:38

## 2025-01-01 RX ADMIN — Medication 125 MG: at 00:45

## 2025-01-01 RX ADMIN — FOLIC ACID 1 MG: 1 TABLET ORAL at 00:45

## 2025-01-01 RX ADMIN — Medication 4000 UNITS: at 09:23

## 2025-01-01 RX ADMIN — LIDOCAINE HYDROCHLORIDE 10 ML: 10 INJECTION, SOLUTION EPIDURAL; INFILTRATION; INTRACAUDAL; PERINEURAL at 14:46

## 2025-01-01 RX ADMIN — LEVALBUTEROL HYDROCHLORIDE 0.63 MG: 0.63 SOLUTION RESPIRATORY (INHALATION) at 21:02

## 2025-01-01 RX ADMIN — HYDROXYCHLOROQUINE SULFATE 200 MG: 200 TABLET ORAL at 08:17

## 2025-01-01 RX ADMIN — PANTOPRAZOLE SODIUM 40 MG: 40 TABLET, DELAYED RELEASE ORAL at 09:23

## 2025-01-01 RX ADMIN — LEVALBUTEROL HYDROCHLORIDE 0.63 MG: 0.63 SOLUTION RESPIRATORY (INHALATION) at 20:05

## 2025-01-01 RX ADMIN — ALBUMIN (HUMAN) 25 G: 0.25 INJECTION, SOLUTION INTRAVENOUS at 17:12

## 2025-01-01 RX ADMIN — ASPIRIN 81 MG: 81 TABLET, CHEWABLE ORAL at 09:23

## 2025-01-01 RX ADMIN — BUDESONIDE 500 MCG: 0.5 SUSPENSION RESPIRATORY (INHALATION) at 07:48

## 2025-01-01 RX ADMIN — Medication 4000 UNITS: at 08:18

## 2025-01-01 RX ADMIN — METHYLPREDNISOLONE SODIUM SUCCINATE 40 MG: 40 INJECTION INTRAMUSCULAR; INTRAVENOUS at 06:07

## 2025-01-01 RX ADMIN — LACOSAMIDE 100 MG: 100 TABLET, FILM COATED ORAL at 09:23

## 2025-01-01 RX ADMIN — LEVETIRACETAM 1000 MG: 500 TABLET, FILM COATED ORAL at 00:07

## 2025-01-01 RX ADMIN — DOPAMINE HYDROCHLORIDE 20 MCG/KG/MIN: 320 INJECTION, SOLUTION INTRAVENOUS at 02:10

## 2025-01-01 RX ADMIN — ARFORMOTEROL TARTRATE 15 MCG: 15 SOLUTION RESPIRATORY (INHALATION) at 20:03

## 2025-01-01 RX ADMIN — Medication 125 MG: at 11:42

## 2025-01-01 RX ADMIN — METHYLPREDNISOLONE SODIUM SUCCINATE 40 MG: 40 INJECTION INTRAMUSCULAR; INTRAVENOUS at 13:42

## 2025-01-01 RX ADMIN — MAGNESIUM SULFATE HEPTAHYDRATE 4000 MG: 40 INJECTION, SOLUTION INTRAVENOUS at 11:45

## 2025-01-01 RX ADMIN — OCTREOTIDE ACETATE 200 MCG: 100 INJECTION, SOLUTION INTRAVENOUS; SUBCUTANEOUS at 17:35

## 2025-01-01 RX ADMIN — ARFORMOTEROL TARTRATE 15 MCG: 15 SOLUTION RESPIRATORY (INHALATION) at 10:23

## 2025-01-01 RX ADMIN — LEVALBUTEROL HYDROCHLORIDE 0.63 MG: 0.63 SOLUTION RESPIRATORY (INHALATION) at 07:38

## 2025-01-01 RX ADMIN — LEVALBUTEROL HYDROCHLORIDE 0.63 MG: 0.63 SOLUTION RESPIRATORY (INHALATION) at 01:04

## 2025-01-01 RX ADMIN — MIDAZOLAM HYDROCHLORIDE 2 MG: 2 INJECTION, SOLUTION INTRAMUSCULAR; INTRAVENOUS at 02:16

## 2025-01-01 RX ADMIN — METOPROLOL TARTRATE 5 MG: 5 INJECTION INTRAVENOUS at 19:44

## 2025-01-01 RX ADMIN — METOPROLOL SUCCINATE 25 MG: 25 TABLET, FILM COATED, EXTENDED RELEASE ORAL at 06:10

## 2025-01-01 RX ADMIN — Medication 125 MG: at 12:44

## 2025-01-01 RX ADMIN — METOPROLOL SUCCINATE 25 MG: 25 TABLET, FILM COATED, EXTENDED RELEASE ORAL at 09:18

## 2025-01-01 RX ADMIN — LEVALBUTEROL HYDROCHLORIDE 0.63 MG: 0.63 SOLUTION RESPIRATORY (INHALATION) at 12:30

## 2025-01-01 RX ADMIN — CALCIUM GLUCONATE 1000 MG: 98 INJECTION INTRAVENOUS at 16:43

## 2025-01-01 RX ADMIN — Medication 100 MG: at 07:52

## 2025-01-01 RX ADMIN — SODIUM CHLORIDE, PRESERVATIVE FREE 10 ML: 5 INJECTION INTRAVENOUS at 23:09

## 2025-01-01 RX ADMIN — HYDROCORTISONE: 1 CREAM TOPICAL at 00:45

## 2025-01-01 RX ADMIN — PANTOPRAZOLE SODIUM 40 MG: 40 TABLET, DELAYED RELEASE ORAL at 08:18

## 2025-01-01 RX ADMIN — LEVALBUTEROL HYDROCHLORIDE 0.63 MG: 0.63 SOLUTION RESPIRATORY (INHALATION) at 01:20

## 2025-01-01 ASSESSMENT — PAIN DESCRIPTION - ORIENTATION
ORIENTATION: LOWER
ORIENTATION: LOWER

## 2025-01-01 ASSESSMENT — PAIN SCALES - GENERAL
PAINLEVEL_OUTOF10: 4
PAINLEVEL_OUTOF10: 0
PAINLEVEL_OUTOF10: 5
PAINLEVEL_OUTOF10: 0
PAINLEVEL_OUTOF10: 0
PAINLEVEL_OUTOF10: 4
PAINLEVEL_OUTOF10: 0
PAINLEVEL_OUTOF10: 2

## 2025-01-01 ASSESSMENT — PAIN DESCRIPTION - LOCATION
LOCATION: BACK
LOCATION: BACK

## 2025-01-01 ASSESSMENT — PULMONARY FUNCTION TESTS
PIF_VALUE: 23

## 2025-01-01 ASSESSMENT — LIFESTYLE VARIABLES
HOW MANY STANDARD DRINKS CONTAINING ALCOHOL DO YOU HAVE ON A TYPICAL DAY: PATIENT DOES NOT DRINK
HOW OFTEN DO YOU HAVE A DRINK CONTAINING ALCOHOL: NEVER
HOW MANY STANDARD DRINKS CONTAINING ALCOHOL DO YOU HAVE ON A TYPICAL DAY: PATIENT DOES NOT DRINK
HOW OFTEN DO YOU HAVE A DRINK CONTAINING ALCOHOL: NEVER

## 2025-01-01 ASSESSMENT — PAIN DESCRIPTION - ONSET: ONSET: ON-GOING

## 2025-01-07 ENCOUNTER — TELEPHONE (OUTPATIENT)
Dept: FAMILY MEDICINE CLINIC | Age: 75
End: 2025-01-07

## 2025-01-25 ASSESSMENT — PATIENT HEALTH QUESTIONNAIRE - PHQ9
SUM OF ALL RESPONSES TO PHQ9 QUESTIONS 1 & 2: 0
SUM OF ALL RESPONSES TO PHQ9 QUESTIONS 1 & 2: 0
1. LITTLE INTEREST OR PLEASURE IN DOING THINGS: NOT AT ALL
1. LITTLE INTEREST OR PLEASURE IN DOING THINGS: NOT AT ALL
2. FEELING DOWN, DEPRESSED OR HOPELESS: NOT AT ALL
SUM OF ALL RESPONSES TO PHQ QUESTIONS 1-9: 0
2. FEELING DOWN, DEPRESSED OR HOPELESS: NOT AT ALL
SUM OF ALL RESPONSES TO PHQ QUESTIONS 1-9: 0

## 2025-01-28 ENCOUNTER — OFFICE VISIT (OUTPATIENT)
Dept: FAMILY MEDICINE CLINIC | Age: 75
End: 2025-01-28

## 2025-01-28 VITALS
RESPIRATION RATE: 16 BRPM | OXYGEN SATURATION: 96 % | WEIGHT: 228.6 LBS | DIASTOLIC BLOOD PRESSURE: 76 MMHG | TEMPERATURE: 97.5 F | HEIGHT: 70 IN | HEART RATE: 91 BPM | BODY MASS INDEX: 32.73 KG/M2 | SYSTOLIC BLOOD PRESSURE: 106 MMHG

## 2025-01-28 DIAGNOSIS — I50.20 HFREF (HEART FAILURE WITH REDUCED EJECTION FRACTION) (HCC): Primary | ICD-10-CM

## 2025-01-28 DIAGNOSIS — M05.9 RHEUMATOID ARTHRITIS WITH POSITIVE RHEUMATOID FACTOR, INVOLVING UNSPECIFIED SITE (HCC): ICD-10-CM

## 2025-01-28 DIAGNOSIS — J42 CHRONIC BRONCHITIS, UNSPECIFIED CHRONIC BRONCHITIS TYPE (HCC): ICD-10-CM

## 2025-01-28 DIAGNOSIS — N18.31 STAGE 3A CHRONIC KIDNEY DISEASE (HCC): ICD-10-CM

## 2025-01-28 DIAGNOSIS — K70.30 ALCOHOLIC CIRRHOSIS OF LIVER WITHOUT ASCITES (HCC): ICD-10-CM

## 2025-01-28 DIAGNOSIS — I48.0 PAROXYSMAL ATRIAL FIBRILLATION (HCC): ICD-10-CM

## 2025-01-28 RX ORDER — SPIRONOLACTONE 25 MG/1
25 TABLET ORAL DAILY
Qty: 90 TABLET | Refills: 1 | Status: SHIPPED | OUTPATIENT
Start: 2025-01-28

## 2025-01-28 RX ORDER — GABAPENTIN 300 MG/1
300 CAPSULE ORAL 2 TIMES DAILY
Qty: 180 CAPSULE | Refills: 0 | Status: SHIPPED
Start: 2025-01-28 | End: 2025-01-28

## 2025-01-28 RX ORDER — GABAPENTIN 100 MG/1
100 CAPSULE ORAL 2 TIMES DAILY
Qty: 180 CAPSULE | Refills: 0 | Status: SHIPPED | OUTPATIENT
Start: 2025-01-28 | End: 2025-04-28

## 2025-01-30 ASSESSMENT — ENCOUNTER SYMPTOMS
APNEA: 0
CHEST TIGHTNESS: 0
SHORTNESS OF BREATH: 0
COUGH: 0
CONSTIPATION: 0
COLOR CHANGE: 0
DIARRHEA: 0
ABDOMINAL DISTENTION: 0
PHOTOPHOBIA: 0
RHINORRHEA: 0
SINUS PAIN: 0
SINUS PRESSURE: 0
BLOOD IN STOOL: 0
FACIAL SWELLING: 0
VOMITING: 0

## 2025-01-30 NOTE — PROGRESS NOTES
if overdue, as this is important in assessing forundiagnosed pathology, especially cancer, as well as protecting against potentially harmful/life threatening disease.        Patient and/or guardian verbalizes understanding and agrees with above counseling,assessment and plan.    All questions answered.     The patient (or guardian, if applicable) and other individuals in attendance with the patient were advised that Artificial Intelligence will be utilized during this visit to record, process the conversation to generate a clinical note, and support improvement of the AI technology. The patient (or guardian, if applicable) and other individuals in attendance at the appointment consented to the use of AI, including the recording.

## 2025-02-06 ENCOUNTER — OFFICE VISIT (OUTPATIENT)
Dept: CARDIOLOGY CLINIC | Age: 75
End: 2025-02-06

## 2025-02-06 VITALS
WEIGHT: 230.4 LBS | OXYGEN SATURATION: 98 % | HEART RATE: 80 BPM | BODY MASS INDEX: 37.03 KG/M2 | TEMPERATURE: 97.1 F | DIASTOLIC BLOOD PRESSURE: 76 MMHG | SYSTOLIC BLOOD PRESSURE: 102 MMHG | HEIGHT: 66 IN | RESPIRATION RATE: 16 BRPM

## 2025-02-06 DIAGNOSIS — I48.0 PAROXYSMAL ATRIAL FIBRILLATION (HCC): ICD-10-CM

## 2025-02-06 DIAGNOSIS — E66.01 MORBID OBESITY WITH BMI OF 40.0-44.9, ADULT: ICD-10-CM

## 2025-02-06 DIAGNOSIS — S32.010A CLOSED COMPRESSION FRACTURE OF L1 VERTEBRA, INITIAL ENCOUNTER (HCC): ICD-10-CM

## 2025-02-06 DIAGNOSIS — R53.83 OTHER FATIGUE: ICD-10-CM

## 2025-02-06 DIAGNOSIS — Z86.73 HISTORY OF TIA (TRANSIENT ISCHEMIC ATTACK): ICD-10-CM

## 2025-02-06 DIAGNOSIS — R06.02 SHORTNESS OF BREATH: ICD-10-CM

## 2025-02-06 DIAGNOSIS — J41.8 MIXED SIMPLE AND MUCOPURULENT CHRONIC BRONCHITIS (HCC): ICD-10-CM

## 2025-02-06 DIAGNOSIS — F17.211 CIGARETTE NICOTINE DEPENDENCE IN REMISSION: Chronic | ICD-10-CM

## 2025-02-06 DIAGNOSIS — Z86.79 HISTORY OF INTRACRANIAL HEMORRHAGE: ICD-10-CM

## 2025-02-06 DIAGNOSIS — K70.30 ALCOHOLIC CIRRHOSIS OF LIVER WITHOUT ASCITES (HCC): ICD-10-CM

## 2025-02-06 DIAGNOSIS — I50.20 HFREF (HEART FAILURE WITH REDUCED EJECTION FRACTION) (HCC): Primary | ICD-10-CM

## 2025-02-06 DIAGNOSIS — R29.818 SUSPECTED SLEEP APNEA: ICD-10-CM

## 2025-02-06 DIAGNOSIS — Z95.2 S/P TAVR (TRANSCATHETER AORTIC VALVE REPLACEMENT): ICD-10-CM

## 2025-02-06 DIAGNOSIS — R53.83 LOW ENERGY: ICD-10-CM

## 2025-02-06 DIAGNOSIS — R06.02 SOB (SHORTNESS OF BREATH): ICD-10-CM

## 2025-02-06 DIAGNOSIS — R00.2 PALPITATIONS: ICD-10-CM

## 2025-02-06 DIAGNOSIS — E66.811 CLASS 1 OBESITY DUE TO EXCESS CALORIES IN ADULT, UNSPECIFIED BMI, UNSPECIFIED WHETHER SERIOUS COMORBIDITY PRESENT: Chronic | ICD-10-CM

## 2025-02-06 DIAGNOSIS — K43.7 VENTRAL HERNIA WITH GANGRENE: ICD-10-CM

## 2025-02-06 DIAGNOSIS — M81.8 OTHER OSTEOPOROSIS WITHOUT CURRENT PATHOLOGICAL FRACTURE: ICD-10-CM

## 2025-02-06 DIAGNOSIS — N18.31 STAGE 3A CHRONIC KIDNEY DISEASE (HCC): ICD-10-CM

## 2025-02-06 DIAGNOSIS — M05.79 RHEUMATOID ARTHRITIS INVOLVING MULTIPLE SITES WITH POSITIVE RHEUMATOID FACTOR (HCC): ICD-10-CM

## 2025-02-06 DIAGNOSIS — R00.2 PALPITATION: ICD-10-CM

## 2025-02-06 DIAGNOSIS — G47.33 OBSTRUCTIVE SLEEP APNEA: Chronic | ICD-10-CM

## 2025-02-06 DIAGNOSIS — I48.19 PERSISTENT ATRIAL FIBRILLATION (HCC): ICD-10-CM

## 2025-02-06 DIAGNOSIS — D69.6 THROMBOCYTOPENIA (HCC): ICD-10-CM

## 2025-02-06 DIAGNOSIS — E66.09 CLASS 1 OBESITY DUE TO EXCESS CALORIES IN ADULT, UNSPECIFIED BMI, UNSPECIFIED WHETHER SERIOUS COMORBIDITY PRESENT: Chronic | ICD-10-CM

## 2025-02-06 DIAGNOSIS — I10 ESSENTIAL HYPERTENSION: ICD-10-CM

## 2025-02-06 RX ORDER — METOPROLOL SUCCINATE 25 MG/1
25 TABLET, EXTENDED RELEASE ORAL DAILY
Qty: 90 TABLET | Refills: 3 | Status: SHIPPED | OUTPATIENT
Start: 2025-02-06

## 2025-02-06 RX ORDER — REGADENOSON 0.08 MG/ML
0.4 INJECTION, SOLUTION INTRAVENOUS
OUTPATIENT
Start: 2025-02-06

## 2025-02-06 RX ORDER — IBUPROFEN 200 MG
200 TABLET ORAL EVERY 6 HOURS PRN
COMMUNITY

## 2025-02-06 NOTE — PROGRESS NOTES
Patient was seen today and a 14 day monitor was placed. Monitor was ordered by Dr. CORTEZ. The monitor was applied, instructions were given to the patient. Patient stated understanding and gave verbalize readback.  Monitor company: [employer]  Serial number: [id number]        RICARDO PAZ    
MD Ashley   silver sulfADIAZINE (SILVADENE) 1 % cream Apply topically daily. 10/16/23  Yes Ashley Rutherford MD   hydrocortisone 1 % cream Apply topically 2 times daily Dilt 3% / Lido 2% / Hydrocort 1% compound cream  Apply to rectum 2 times daily.   Yes Geoffrey Tai MD   Handicap Placard MISC by Does not apply route Duration: 5 years 2/21/22  Yes Amando Hamilton MD   levETIRAcetam (KEPPRA) 1000 MG tablet Take 1 tablet by mouth 2 times daily   Yes Geoffrey Tai MD   predniSONE (DELTASONE) 5 MG tablet Take 1 tablet by mouth daily 8/19/19  Yes Little Vaca MD   lacosamide (VIMPAT) 100 MG TABS tablet Take 1 tablet by mouth 2 times daily.   Yes Geoffrey Tai MD   vitamin B-1 (THIAMINE) 100 MG tablet Take 1 tablet by mouth daily   Yes Geoffrey Tai MD   aspirin 81 MG tablet Take 1 tablet by mouth daily   Yes Geoffrey Tai MD   hydroxychloroquine (PLAQUENIL) 200 MG tablet Take 1 tablet by mouth 2 times daily Indications: taking 400 mg   Yes Geoffrey Tai MD   folic acid (FOLVITE) 1 MG tablet Take 1 tablet by mouth nightly   Yes Geoffrey Tai MD   VITAMIN D PO Take 4,000 Units by mouth daily    Yes Geoffrey Tai MD       Current Medications:  Current Outpatient Medications   Medication Sig Dispense Refill    ibuprofen (ADVIL;MOTRIN) 200 MG tablet Take 1 tablet by mouth every 6 hours as needed for Pain      metoprolol succinate (TOPROL XL) 25 MG extended release tablet Take 1 tablet by mouth daily 90 tablet 3    sacubitril-valsartan (ENTRESTO) 24-26 MG per tablet Take 1 tablet by mouth 2 times daily Hold entresto if systolic blood pressure below 100 60 tablet 3    spironolactone (ALDACTONE) 25 MG tablet Take 1 tablet by mouth daily 90 tablet 1    gabapentin (NEURONTIN) 100 MG capsule Take 1 capsule by mouth 2 times daily for 90 days. 180 capsule 0    pantoprazole (PROTONIX) 40 MG tablet Take 1 tablet by mouth every morning (before breakfast) 90 tablet 1

## 2025-02-10 ENCOUNTER — PROCEDURE VISIT (OUTPATIENT)
Dept: PODIATRY | Age: 75
End: 2025-02-10
Payer: MEDICARE

## 2025-02-10 VITALS
HEART RATE: 78 BPM | WEIGHT: 230 LBS | BODY MASS INDEX: 37.12 KG/M2 | DIASTOLIC BLOOD PRESSURE: 64 MMHG | OXYGEN SATURATION: 94 % | TEMPERATURE: 97.8 F | SYSTOLIC BLOOD PRESSURE: 109 MMHG

## 2025-02-10 DIAGNOSIS — M79.675 PAIN IN LEFT TOE(S): ICD-10-CM

## 2025-02-10 DIAGNOSIS — M79.674 PAIN IN RIGHT TOE(S): ICD-10-CM

## 2025-02-10 DIAGNOSIS — I73.9 PERIPHERAL VASCULAR DISEASE, UNSPECIFIED (HCC): ICD-10-CM

## 2025-02-10 DIAGNOSIS — B35.1 TINEA UNGUIUM: Primary | ICD-10-CM

## 2025-02-10 DIAGNOSIS — R26.2 DIFFICULTY WALKING: ICD-10-CM

## 2025-02-10 PROCEDURE — 11721 DEBRIDE NAIL 6 OR MORE: CPT | Performed by: PODIATRIST

## 2025-02-10 NOTE — PROGRESS NOTES
[facility]  Return Patient    Chief Complaint   Patient presents with    Toe Pain     Ashley Rutherford MD  2/6/25       Subjective: This Jermaine Burks comes to clinic for foot and nail care.  Pt currently has complaint of thickened, painful, elongated nails that he/she cannot manage by themselves.  Pt. Relates pain to nails with shoe gear.  Pt's primary care physician is Ashley Rutherford MD.  Past Medical History:   Diagnosis Date    Alcoholic cirrhosis (HCC)     Alcoholism (HCC)     Has been sober / dry since 01/2016.    Anxiety     Arthritis     Chronic atrial fibrillation (HCC)     Esophageal varices (HCC)     UGI bleed ~2012 - has had variceal banding 2x around that time.    History of kidney stones     Hypertension     Lumbar compression fracture (HCC) 02/2017    Lung tumor 2017    Osteopenia     Osteopenia     S/P TAVR (transcatheter aortic valve replacement) 9/20/2018    Seizure disorder ([facility]) 09/2016    Complicated a stroke with occipital ICH.    Seizures ([facility])     Sepsis with MRSE bacteremia 09/2016    resolved    Severe aortic stenosis     Confirmed by echo 9/2016, BROWN 10/2016. Undergoing w/u at [facility] for TAVR, as of 4/2017.    Sleep apnea     Couldn't tolerate CPAP therapy ~2007. Had had UPPP.    Stroke ([facility]) 09/2016    With receptive aphasia, poor memory that are improving as of 04/2017. Pt was found to have an occipital ICH at that time, with possible amyloid angiopathy as cause per MRI subsequently. Had seizure and sepsis (d/t MRSE bacteremia) complicating stroke.       No Known Allergies  Current Outpatient Medications on File Prior to Visit   Medication Sig Dispense Refill    ibuprofen (ADVIL;MOTRIN) 200 MG tablet Take 1 tablet by mouth every 6 hours as needed for Pain      metoprolol succinate (TOPROL XL) 25 MG extended release tablet Take 1 tablet by mouth daily 90 tablet 3    sacubitril-valsartan (ENTRESTO) 24-26 MG per tablet Take 1 tablet by mouth 2 times daily Hold entresto if systolic

## 2025-02-13 DIAGNOSIS — R00.2 PALPITATION: ICD-10-CM

## 2025-02-13 DIAGNOSIS — I50.20 HFREF (HEART FAILURE WITH REDUCED EJECTION FRACTION) (HCC): ICD-10-CM

## 2025-02-13 LAB — TSH SERPL DL<=0.05 MIU/L-ACNC: 4.49 UIU/ML (ref 0.27–4.2)

## 2025-02-17 ENCOUNTER — TELEPHONE (OUTPATIENT)
Dept: CARDIOLOGY | Age: 75
End: 2025-02-17

## 2025-02-17 NOTE — TELEPHONE ENCOUNTER
Spoke with patient's wife and confirmed Lexiscan stress test appointment on 2/19/25 at 0930. Instructions for test given. Questions answered. Patient's wife verbalized understanding.

## 2025-02-18 ENCOUNTER — TELEPHONE (OUTPATIENT)
Dept: CARDIOLOGY CLINIC | Age: 75
End: 2025-02-18

## 2025-02-18 ENCOUNTER — TELEPHONE (OUTPATIENT)
Dept: FAMILY MEDICINE CLINIC | Age: 75
End: 2025-02-18

## 2025-02-18 DIAGNOSIS — R79.89 ABNORMAL TSH: Primary | ICD-10-CM

## 2025-02-18 DIAGNOSIS — R79.89 ELEVATED TSH: ICD-10-CM

## 2025-02-18 DIAGNOSIS — E07.9 DISORDER OF THYROID, UNSPECIFIED: ICD-10-CM

## 2025-02-18 NOTE — TELEPHONE ENCOUNTER
----- Message from Dr. Dario Guerra MD sent at 2/18/2025  7:07 AM EST -----  Please forward TSH result to PCP to guide evaluation and management.

## 2025-02-18 NOTE — TELEPHONE ENCOUNTER
Dr Guerra ordered a TSH on Jermaine and it came back elevated. Can you address?     Dario Guerra MD  2/18/2025  7:07 AM EST Back to Top      Please forward TSH result to PCP to guide evaluation and management.

## 2025-02-19 ENCOUNTER — HOSPITAL ENCOUNTER (OUTPATIENT)
Dept: CARDIOLOGY | Age: 75
Discharge: HOME | End: 2025-02-21
Payer: MEDICARE

## 2025-02-19 VITALS
RESPIRATION RATE: 18 BRPM | BODY MASS INDEX: 36.96 KG/M2 | HEIGHT: 66 IN | WEIGHT: 230 LBS | HEART RATE: 82 BPM | DIASTOLIC BLOOD PRESSURE: 70 MMHG | SYSTOLIC BLOOD PRESSURE: 110 MMHG

## 2025-02-19 DIAGNOSIS — I50.20 HFREF (HEART FAILURE WITH REDUCED EJECTION FRACTION) (HCC): ICD-10-CM

## 2025-02-19 DIAGNOSIS — R06.02 SHORTNESS OF BREATH: Primary | ICD-10-CM

## 2025-02-19 LAB
ECHO BSA: 2.2 M2
NUC STRESS EJECTION FRACTION: 40 %
STRESS BASELINE DIAS BP: 70 MMHG
STRESS BASELINE HR: 84 BPM
STRESS BASELINE SYS BP: 110 MMHG
STRESS ESTIMATED WORKLOAD: 1 METS
STRESS PEAK DIAS BP: 62 MMHG
STRESS PEAK SYS BP: 96 MMHG
STRESS PERCENT HR ACHIEVED: 62 %
STRESS POST PEAK HR: 90 BPM
STRESS RATE PRESSURE PRODUCT: 8640 BPM*MMHG
STRESS TARGET HR: 145 BPM

## 2025-02-19 PROCEDURE — A9500 TC99M SESTAMIBI: HCPCS | Performed by: INTERNAL MEDICINE

## 2025-02-19 PROCEDURE — 78452 HT MUSCLE IMAGE SPECT MULT: CPT

## 2025-02-19 PROCEDURE — 3430000000 HC RX DIAGNOSTIC RADIOPHARMACEUTICAL: Performed by: INTERNAL MEDICINE

## 2025-02-19 PROCEDURE — 2500000003 HC RX 250 WO HCPCS: Performed by: INTERNAL MEDICINE

## 2025-02-19 PROCEDURE — 6360000002 HC RX W HCPCS: Performed by: INTERNAL MEDICINE

## 2025-02-19 RX ORDER — REGADENOSON 0.08 MG/ML
0.4 INJECTION, SOLUTION INTRAVENOUS
Status: COMPLETED | OUTPATIENT
Start: 2025-02-19 | End: 2025-02-19

## 2025-02-19 RX ORDER — TETRAKIS(2-METHOXYISOBUTYLISOCYANIDE)COPPER(I) TETRAFLUOROBORATE 1 MG/ML
10.1 INJECTION, POWDER, LYOPHILIZED, FOR SOLUTION INTRAVENOUS
Status: COMPLETED | OUTPATIENT
Start: 2025-02-19 | End: 2025-02-19

## 2025-02-19 RX ORDER — TETRAKIS(2-METHOXYISOBUTYLISOCYANIDE)COPPER(I) TETRAFLUOROBORATE 1 MG/ML
31.3 INJECTION, POWDER, LYOPHILIZED, FOR SOLUTION INTRAVENOUS
Status: COMPLETED | OUTPATIENT
Start: 2025-02-19 | End: 2025-02-19

## 2025-02-19 RX ORDER — SODIUM CHLORIDE 0.9 % (FLUSH) 0.9 %
10 SYRINGE (ML) INJECTION PRN
Status: DISCONTINUED | OUTPATIENT
Start: 2025-02-19 | End: 2025-02-22 | Stop reason: HOSPADM

## 2025-02-19 RX ADMIN — REGADENOSON 0.4 MG: 0.08 INJECTION, SOLUTION INTRAVENOUS at 11:06

## 2025-02-19 RX ADMIN — SODIUM CHLORIDE, PRESERVATIVE FREE 10 ML: 5 INJECTION INTRAVENOUS at 11:07

## 2025-02-19 RX ADMIN — SODIUM CHLORIDE, PRESERVATIVE FREE 10 ML: 5 INJECTION INTRAVENOUS at 11:06

## 2025-02-19 RX ADMIN — Medication 31.3 MILLICURIE: at 11:06

## 2025-02-19 RX ADMIN — Medication 10.1 MILLICURIE: at 09:57

## 2025-02-19 RX ADMIN — SODIUM CHLORIDE, PRESERVATIVE FREE 10 ML: 5 INJECTION INTRAVENOUS at 09:58

## 2025-02-27 DIAGNOSIS — R79.89 ELEVATED TSH: ICD-10-CM

## 2025-02-27 DIAGNOSIS — R79.89 ABNORMAL TSH: ICD-10-CM

## 2025-02-27 DIAGNOSIS — E07.9 DISORDER OF THYROID, UNSPECIFIED: ICD-10-CM

## 2025-02-27 LAB — TSH SERPL DL<=0.05 MIU/L-ACNC: 4.04 UIU/ML (ref 0.27–4.2)

## 2025-03-04 ENCOUNTER — TELEPHONE (OUTPATIENT)
Dept: CARDIOLOGY CLINIC | Age: 75
End: 2025-03-04

## 2025-03-04 NOTE — TELEPHONE ENCOUNTER
----- Message from Dr. Dario Guerra MD sent at 3/4/2025 11:37 AM EST -----  Stress test shows no ischemia. Ejection fraction is still low. Details will be discussed during f/u visit

## 2025-03-05 DIAGNOSIS — I50.20 HFREF (HEART FAILURE WITH REDUCED EJECTION FRACTION) (HCC): ICD-10-CM

## 2025-03-05 DIAGNOSIS — R00.2 PALPITATIONS: ICD-10-CM

## 2025-03-05 DIAGNOSIS — R06.02 SHORTNESS OF BREATH: ICD-10-CM

## 2025-03-20 ENCOUNTER — OFFICE VISIT (OUTPATIENT)
Dept: FAMILY MEDICINE CLINIC | Age: 75
End: 2025-03-20
Payer: MEDICARE

## 2025-03-20 VITALS
DIASTOLIC BLOOD PRESSURE: 50 MMHG | HEART RATE: 95 BPM | OXYGEN SATURATION: 98 % | TEMPERATURE: 97.9 F | SYSTOLIC BLOOD PRESSURE: 84 MMHG | WEIGHT: 225 LBS | BODY MASS INDEX: 36.32 KG/M2

## 2025-03-20 VITALS — HEIGHT: 66 IN | BODY MASS INDEX: 37.12 KG/M2

## 2025-03-20 DIAGNOSIS — R60.0 LOCALIZED EDEMA: ICD-10-CM

## 2025-03-20 DIAGNOSIS — I10 ESSENTIAL HYPERTENSION: Primary | ICD-10-CM

## 2025-03-20 DIAGNOSIS — S32.030D COMPRESSION FRACTURE OF L3 VERTEBRA WITH ROUTINE HEALING, SUBSEQUENT ENCOUNTER: ICD-10-CM

## 2025-03-20 DIAGNOSIS — Z00.00 MEDICARE ANNUAL WELLNESS VISIT, SUBSEQUENT: Primary | ICD-10-CM

## 2025-03-20 PROCEDURE — G8417 CALC BMI ABV UP PARAM F/U: HCPCS | Performed by: FAMILY MEDICINE

## 2025-03-20 PROCEDURE — 3074F SYST BP LT 130 MM HG: CPT | Performed by: FAMILY MEDICINE

## 2025-03-20 PROCEDURE — 99214 OFFICE O/P EST MOD 30 MIN: CPT | Performed by: FAMILY MEDICINE

## 2025-03-20 PROCEDURE — G8427 DOCREV CUR MEDS BY ELIG CLIN: HCPCS | Performed by: FAMILY MEDICINE

## 2025-03-20 PROCEDURE — 1123F ACP DISCUSS/DSCN MKR DOCD: CPT | Performed by: FAMILY MEDICINE

## 2025-03-20 PROCEDURE — G0439 PPPS, SUBSEQ VISIT: HCPCS | Performed by: FAMILY MEDICINE

## 2025-03-20 PROCEDURE — 1036F TOBACCO NON-USER: CPT | Performed by: FAMILY MEDICINE

## 2025-03-20 PROCEDURE — 1159F MED LIST DOCD IN RCRD: CPT | Performed by: FAMILY MEDICINE

## 2025-03-20 PROCEDURE — 3017F COLORECTAL CA SCREEN DOC REV: CPT | Performed by: FAMILY MEDICINE

## 2025-03-20 PROCEDURE — 3078F DIAST BP <80 MM HG: CPT | Performed by: FAMILY MEDICINE

## 2025-03-20 RX ORDER — BUMETANIDE 0.5 MG/1
0.5 TABLET ORAL DAILY
Qty: 30 TABLET | Refills: 3 | Status: SHIPPED | OUTPATIENT
Start: 2025-03-20

## 2025-03-20 RX ORDER — GABAPENTIN 100 MG/1
100 CAPSULE ORAL 2 TIMES DAILY
Qty: 180 CAPSULE | Refills: 0 | Status: SHIPPED | OUTPATIENT
Start: 2025-03-20 | End: 2025-06-18

## 2025-03-20 RX ORDER — DULOXETIN HYDROCHLORIDE 30 MG/1
30 CAPSULE, DELAYED RELEASE ORAL DAILY
Qty: 30 CAPSULE | Refills: 1 | Status: SHIPPED | OUTPATIENT
Start: 2025-03-20

## 2025-03-20 SDOH — HEALTH STABILITY: PHYSICAL HEALTH: ON AVERAGE, HOW MANY DAYS PER WEEK DO YOU ENGAGE IN MODERATE TO STRENUOUS EXERCISE (LIKE A BRISK WALK)?: 0 DAYS

## 2025-03-20 SDOH — HEALTH STABILITY: PHYSICAL HEALTH: ON AVERAGE, HOW MANY MINUTES DO YOU ENGAGE IN EXERCISE AT THIS LEVEL?: 10 MIN

## 2025-03-20 ASSESSMENT — LIFESTYLE VARIABLES
HOW MANY STANDARD DRINKS CONTAINING ALCOHOL DO YOU HAVE ON A TYPICAL DAY: PATIENT DOES NOT DRINK
HOW MANY STANDARD DRINKS CONTAINING ALCOHOL DO YOU HAVE ON A TYPICAL DAY: 0
HOW OFTEN DO YOU HAVE A DRINK CONTAINING ALCOHOL: 1
HOW OFTEN DO YOU HAVE SIX OR MORE DRINKS ON ONE OCCASION: 1
HOW OFTEN DO YOU HAVE A DRINK CONTAINING ALCOHOL: NEVER

## 2025-03-20 ASSESSMENT — PATIENT HEALTH QUESTIONNAIRE - PHQ9
1. LITTLE INTEREST OR PLEASURE IN DOING THINGS: NOT AT ALL
2. FEELING DOWN, DEPRESSED OR HOPELESS: NOT AT ALL
SUM OF ALL RESPONSES TO PHQ QUESTIONS 1-9: 0

## 2025-03-21 ENCOUNTER — RESULTS FOLLOW-UP (OUTPATIENT)
Dept: CARDIOLOGY CLINIC | Age: 75
End: 2025-03-21

## 2025-03-21 DIAGNOSIS — I48.0 PAROXYSMAL ATRIAL FIBRILLATION (HCC): Primary | ICD-10-CM

## 2025-03-21 NOTE — TELEPHONE ENCOUNTER
----- Message from Dr. Dario Guerra MD sent at 3/21/2025 12:34 PM EDT -----  Zio patch shows 100% A-fib burden.  Since patient is having palpitations refer patient to EP to discuss further options

## 2025-03-21 NOTE — TELEPHONE ENCOUNTER
Spoke with patients wife and advised of heart monitor results; she verbalized understanding. Referral placed to EP

## 2025-03-31 ASSESSMENT — ENCOUNTER SYMPTOMS
APNEA: 0
DIARRHEA: 0
VOMITING: 0
RHINORRHEA: 0
CONSTIPATION: 0
SHORTNESS OF BREATH: 0
SINUS PRESSURE: 0
CHEST TIGHTNESS: 0
PHOTOPHOBIA: 0
BLOOD IN STOOL: 0
COUGH: 0
COLOR CHANGE: 0
ABDOMINAL DISTENTION: 0
SINUS PAIN: 0
FACIAL SWELLING: 0

## 2025-03-31 NOTE — PROGRESS NOTES
Medicare Annual Wellness Visit    Jermaine Bukrs is here for Medicare AWV    Assessment & Plan   Medicare annual wellness visit, subsequent     Return for Medicare Annual Wellness Visit in 1 year.     Subjective       Patient's complete Health Risk Assessment and screening values have been reviewed and are found in Flowsheets. The following problems were reviewed today and where indicated follow up appointments were made and/or referrals ordered.    Positive Risk Factor Screenings with Interventions:    Fall Risk:  Do you feel unsteady or are you worried about falling? : (!) (Patient-Rptd) yes  2 or more falls in past year?: (Patient-Rptd) no  Fall with injury in past year?: (Patient-Rptd) no     Interventions:    Reviewed medications, home hazards, visual acuity, and co-morbidities that can increase risk for falls             Inactivity:  On average, how many days per week do you engage in moderate to strenuous exercise (like a brisk walk)?: (Patient-Rptd) 0 days (!) Abnormal  On average, how many minutes do you engage in exercise at this level?: (Patient-Rptd) 10 min  Interventions:  Patient advised to follow up in the office for further evaluation and treatment    Poor Eating Habits/Diet:  Do you eat balanced/healthy meals regularly?: (!) (Patient-Rptd) No  Interventions:  Patient advised to follow-up in this office for further evaluation and treatment    Abnormal BMI (obese):  Body mass index is 37.12 kg/m². (!) Abnormal  Interventions:  Patient advised to follow-up in this office for further evaluation and treatment        Dentist Screen:  Have you seen the dentist within the past year?: (!) (Patient-Rptd) No    Intervention:  Patient declines any further evaluation or treatment     Vision Screen:  Do you have difficulty driving, watching TV, or doing any of your daily activities because of your eyesight?: (!) (Patient-Rptd) Yes  Have you had an eye exam within the past year?: (!) (Patient-Rptd)

## 2025-03-31 NOTE — PROGRESS NOTES
Jermaine Burks is a 74 y.o. male   CC: No chief complaint on file.      History of Present Illness  The patient presents for a regular checkup.    Discontinuation of Bumex  He has expressed a desire to discontinue Bumex due to the associated urinary frequency, which he finds disruptive. He has been off this medication for the past 5 days due to exhaustion of his supply. Despite this, he reports no leg swelling. A prescription for Bumex is requested.  - Onset: Off Bumex for the past 5 days.  - Character: Urinary frequency.  - Alleviating/Aggravating Factors: Discontinued due to exhaustion of supply and disruptive urinary frequency.  - Severity: No leg swelling reported despite discontinuation.    Back Pain Management with Gabapentin  He is currently on gabapentin 100 mg for back pain management. He has a 9-month supply of gabapentin 300 mg and a 1-month supply of gabapentin 100 mg. His dosage was previously reduced from 300 mg twice daily to 100 mg due to excessive daytime sleepiness. He reports no sleep disturbances or pain while lying down. He has sought consultation with a neurosurgeon at [facility], who advised that no further intervention could be made due to his platelet condition.  - Onset: Dosage reduced from 300 mg twice daily to 100 mg.  - Character: Back pain managed with gabapentin.  - Alleviating/Aggravating Factors: Dosage reduction due to excessive daytime sleepiness.  - Severity: No sleep disturbances or pain while lying down.    Blood Pressure Management with Entresto  He is also on Entresto, prescribed by Dr. Escalona, to be taken twice daily only if his blood pressure exceeds 100. His blood pressure readings have consistently been above 100. His blood pressure reading this morning was 114/80, prior to taking his medications. He has an upcoming appointment with Dr. Escalona in April 2025.  - Onset: Blood pressure readings consistently above 100.  - Character: Blood pressure management with

## 2025-03-31 NOTE — PATIENT INSTRUCTIONS
plate fruits and vegetables, one-fourth whole grains, and one-fourth lean proteins. Try including dairy with your meals.   Eat more fruits and vegetables. Try to have them with most meals and snacks.   Foods for healthy eating        Fruits   These can be fresh, frozen, canned, or dried.  Try to choose whole fruit rather than fruit juice.  Eat a variety of colors.        Vegetables   These can be fresh, frozen, canned, or dried.  Beans, peas, and lentils count too.        Whole grains   Choose whole-grain breads, cereals, and noodles.  Try brown rice.        Lean proteins   These can include lean meat, poultry, fish, and eggs.  You can also have tofu, beans, peas, lentils, nuts, and seeds.        Dairy   Try milk, yogurt, and cheese.  Choose low-fat or fat-free when you can.  If you need to, use lactose-free milk or fortified plant-based milk products, such as soy milk.        Water   Drink water when you're thirsty.  Limit sugar-sweetened drinks, including soda, fruit drinks, and sports drinks.  Where can you learn more?  Go to https://www.[employer].net/patientEd and enter T756 to learn more about \"Eating Healthy Foods: Care Instructions.\"  Current as of: October 7, 2024  Content Version: 14.4  © 2024-2025 [employer].   Care instructions adapted under license by [employer]. If you have questions about a medical condition or this instruction, always ask your healthcare professional. [employer] disclaims any warranty or liability for your use of this information.         Starting a Weight-Loss Plan: Care Instructions  Overview    It can be a challenge to lose weight. But your doctor can help you make a weight-loss plan that meets your needs.  You don't have to make a lot of big changes at once. A better idea might be to focus on small changes and stick with them. When those changes become habit, you can add a few more changes.  Some people find it helpful to take an exercise or nutrition

## 2025-04-11 RX ORDER — PANTOPRAZOLE SODIUM 40 MG/1
40 TABLET, DELAYED RELEASE ORAL
Qty: 90 TABLET | Refills: 3 | OUTPATIENT
Start: 2025-04-11

## 2025-04-15 ENCOUNTER — TELEPHONE (OUTPATIENT)
Dept: CARDIOLOGY CLINIC | Age: 75
End: 2025-04-15

## 2025-04-15 NOTE — TELEPHONE ENCOUNTER
Sent:4/15/2025 11:17 AM EDT         To:Dr. Dario Guerra MD    Subject:Suyapa Liu probably hasn't taken half of the prescribed pills because his blood pressure has often been below 100.  And if it is above 100 we forget to check in the evening. So he ends only taking one pill for the day.    Even if the morning pressure is below 100 we try to do it later but often forget.   I'm not expecting any decision before his May 8 appointment.  I just thought it would be good if you knew .  Dione Burks

## 2025-05-08 ENCOUNTER — HOSPITAL ENCOUNTER (INPATIENT)
Age: 75
LOS: 4 days | Discharge: HOME | DRG: 193 | End: 2025-05-13
Attending: STUDENT IN AN ORGANIZED HEALTH CARE EDUCATION/TRAINING PROGRAM | Admitting: FAMILY MEDICINE
Payer: MEDICARE

## 2025-05-08 ENCOUNTER — OFFICE VISIT (OUTPATIENT)
Dept: CARDIOLOGY CLINIC | Age: 75
End: 2025-05-08

## 2025-05-08 VITALS
DIASTOLIC BLOOD PRESSURE: 60 MMHG | WEIGHT: 225 LBS | HEART RATE: 100 BPM | OXYGEN SATURATION: 100 % | BODY MASS INDEX: 36.16 KG/M2 | HEIGHT: 66 IN | RESPIRATION RATE: 18 BRPM | SYSTOLIC BLOOD PRESSURE: 98 MMHG | TEMPERATURE: 96.9 F

## 2025-05-08 DIAGNOSIS — R06.02 SHORTNESS OF BREATH: ICD-10-CM

## 2025-05-08 DIAGNOSIS — I48.0 PAROXYSMAL ATRIAL FIBRILLATION (HCC): Primary | ICD-10-CM

## 2025-05-08 DIAGNOSIS — N17.9 AKI (ACUTE KIDNEY INJURY): ICD-10-CM

## 2025-05-08 DIAGNOSIS — E87.5 HYPERKALEMIA: ICD-10-CM

## 2025-05-08 DIAGNOSIS — I50.20 HFREF (HEART FAILURE WITH REDUCED EJECTION FRACTION) (HCC): ICD-10-CM

## 2025-05-08 DIAGNOSIS — I48.19 PERSISTENT ATRIAL FIBRILLATION (HCC): ICD-10-CM

## 2025-05-08 DIAGNOSIS — I10 ESSENTIAL HYPERTENSION: ICD-10-CM

## 2025-05-08 DIAGNOSIS — I50.9 CHRONIC CONGESTIVE HEART FAILURE, UNSPECIFIED HEART FAILURE TYPE (HCC): ICD-10-CM

## 2025-05-08 DIAGNOSIS — M05.79 RHEUMATOID ARTHRITIS INVOLVING MULTIPLE SITES WITH POSITIVE RHEUMATOID FACTOR (HCC): ICD-10-CM

## 2025-05-08 DIAGNOSIS — J18.9 PNEUMONIA DUE TO INFECTIOUS ORGANISM, UNSPECIFIED LATERALITY, UNSPECIFIED PART OF LUNG: ICD-10-CM

## 2025-05-08 DIAGNOSIS — G47.33 OBSTRUCTIVE SLEEP APNEA: Chronic | ICD-10-CM

## 2025-05-08 DIAGNOSIS — K74.60 HEPATIC CIRRHOSIS, UNSPECIFIED HEPATIC CIRRHOSIS TYPE, UNSPECIFIED WHETHER ASCITES PRESENT (HCC): ICD-10-CM

## 2025-05-08 DIAGNOSIS — R00.2 PALPITATION: ICD-10-CM

## 2025-05-08 DIAGNOSIS — G47.33 OSA (OBSTRUCTIVE SLEEP APNEA): ICD-10-CM

## 2025-05-08 DIAGNOSIS — J96.01 ACUTE HYPOXEMIC RESPIRATORY FAILURE (HCC): Primary | ICD-10-CM

## 2025-05-08 DIAGNOSIS — Z95.2 S/P TAVR (TRANSCATHETER AORTIC VALVE REPLACEMENT): ICD-10-CM

## 2025-05-08 DIAGNOSIS — N18.31 STAGE 3A CHRONIC KIDNEY DISEASE (HCC): ICD-10-CM

## 2025-05-08 DIAGNOSIS — K70.30 ALCOHOLIC CIRRHOSIS OF LIVER WITHOUT ASCITES (HCC): ICD-10-CM

## 2025-05-08 DIAGNOSIS — S32.010A CLOSED COMPRESSION FRACTURE OF L1 VERTEBRA, INITIAL ENCOUNTER (HCC): ICD-10-CM

## 2025-05-08 DIAGNOSIS — I50.9 ACUTE DECOMPENSATED HEART FAILURE (HCC): ICD-10-CM

## 2025-05-08 DIAGNOSIS — R91.8 LUNG MASS: ICD-10-CM

## 2025-05-08 DIAGNOSIS — J41.8 MIXED SIMPLE AND MUCOPURULENT CHRONIC BRONCHITIS (HCC): ICD-10-CM

## 2025-05-08 DIAGNOSIS — M81.0 OSTEOPOROSIS WITHOUT CURRENT PATHOLOGICAL FRACTURE, UNSPECIFIED OSTEOPOROSIS TYPE: ICD-10-CM

## 2025-05-08 DIAGNOSIS — Q25.3 SUPRAVALVAR AORTIC STENOSIS: ICD-10-CM

## 2025-05-08 PROCEDURE — 99285 EMERGENCY DEPT VISIT HI MDM: CPT

## 2025-05-08 PROCEDURE — 93005 ELECTROCARDIOGRAM TRACING: CPT | Performed by: STUDENT IN AN ORGANIZED HEALTH CARE EDUCATION/TRAINING PROGRAM

## 2025-05-08 PROCEDURE — 96367 TX/PROPH/DG ADDL SEQ IV INF: CPT

## 2025-05-08 RX ORDER — POTASSIUM CITRATE 1080 MG/1
30 TABLET, EXTENDED RELEASE ORAL 2 TIMES DAILY
Status: ON HOLD | COMMUNITY

## 2025-05-08 RX ORDER — PANTOPRAZOLE SODIUM 40 MG/1
40 TABLET, DELAYED RELEASE ORAL DAILY
Status: ON HOLD | COMMUNITY

## 2025-05-08 RX ORDER — SPIRONOLACTONE 25 MG/1
12.5 TABLET ORAL DAILY
Qty: 90 TABLET | Refills: 1 | Status: ON HOLD | OUTPATIENT
Start: 2025-05-08

## 2025-05-08 ASSESSMENT — PAIN - FUNCTIONAL ASSESSMENT: PAIN_FUNCTIONAL_ASSESSMENT: NONE - DENIES PAIN

## 2025-05-08 NOTE — PROGRESS NOTES
Out PATIENT New CARDIOLOGY CONSULT    Name: Jermaine Burks    Age: 74 y.o.    Date of Admission: No admission date for patient encounter.    Date of Service: 2025    Reason for Consultation:   Chief Complaint   Patient presents with    Follow-up     3 month ov    Dizziness    Other     nauseated          Referring Physician: No admitting provider for patient encounter.    History of Present Illness: 74-year-old male with history of TAVR in 2017 at which point he reports a Watchman device was attempted but she was told the left atrial appendage was too small to be able to sustain a Watchman device.  For follow-up visit today and report doing fine and denies chest pain but reports dizziness and state blood pressure has been very low.  Due to hypotension and dizziness Entresto is discontinued and spironolactone is decreased from 25 mg to 12.5 mg p.o. daily.  Patient is awaiting EP visit for evaluation of palpitations in the setting of atrial fibrillation with tenuous blood pressure not allowing up titration of beta-blocker  Recent stress test revealed no perfusion defect but EF was noted to be 40%.        Past Medical History:    Hypertension.  Long history of alcohol abuse.  Alcoholic cirrhosis with varices and upper GI bleed, 2011.   Auto anticoagulation. The patient's INR was 1.9 on 2012 despite the fact that he was not on anticoagulants. It has been documented as being high since at least 2011.   Elevated liver function studies due to alcoholic cirrhosis.  JAY. The patient had soft palate resection for this and does not use CPAP.  Obesity.  BMI  - 2019.  Banding of esophageal varices, 2011.   Family history negative for early CAD. His father  of \"natural causes\" and his mother  of leukemia. A younger brother who weighs over 450 pounds may have some heart problems but the patient is not sure.   Hospitalization, 2012 with newly documented AF.   Echo, 2012.

## 2025-05-09 ENCOUNTER — APPOINTMENT (OUTPATIENT)
Dept: CT IMAGING | Age: 75
DRG: 193 | End: 2025-05-09
Payer: MEDICARE

## 2025-05-09 ENCOUNTER — APPOINTMENT (OUTPATIENT)
Dept: ULTRASOUND IMAGING | Age: 75
DRG: 193 | End: 2025-05-09
Payer: MEDICARE

## 2025-05-09 ENCOUNTER — APPOINTMENT (OUTPATIENT)
Dept: GENERAL RADIOLOGY | Age: 75
DRG: 193 | End: 2025-05-09
Payer: MEDICARE

## 2025-05-09 PROBLEM — R09.02 HYPOXIA: Status: ACTIVE | Noted: 2025-01-01

## 2025-05-09 PROBLEM — J18.9 PNEUMONIA DUE TO INFECTIOUS ORGANISM: Status: ACTIVE | Noted: 2025-01-01

## 2025-05-09 PROBLEM — N17.9 AKI (ACUTE KIDNEY INJURY): Status: ACTIVE | Noted: 2025-01-01

## 2025-05-09 PROBLEM — I50.9 CHRONIC CONGESTIVE HEART FAILURE (HCC): Status: ACTIVE | Noted: 2025-01-01

## 2025-05-09 PROBLEM — J96.01 ACUTE HYPOXEMIC RESPIRATORY FAILURE (HCC): Status: ACTIVE | Noted: 2025-05-09

## 2025-05-09 PROBLEM — R91.8 LUNG MASS: Status: ACTIVE | Noted: 2025-05-09

## 2025-05-09 LAB
ALBUMIN SERPL-MCNC: 3.6 G/DL (ref 3.5–5.2)
ALP SERPL-CCNC: 53 U/L (ref 40–129)
ALP SERPL-CCNC: 56 U/L (ref 40–129)
ALP SERPL-CCNC: 58 U/L (ref 40–129)
ALT SERPL-CCNC: 47 U/L (ref 0–40)
ALT SERPL-CCNC: 55 U/L (ref 0–40)
ALT SERPL-CCNC: 57 U/L (ref 0–40)
AMMONIA PLAS-SCNC: 13 UMOL/L (ref 16–60)
ANION GAP SERPL CALCULATED.3IONS-SCNC: 10 MMOL/L (ref 7–16)
ANION GAP SERPL CALCULATED.3IONS-SCNC: 11 MMOL/L (ref 7–16)
ANION GAP SERPL CALCULATED.3IONS-SCNC: 22 MMOL/L (ref 7–16)
AST SERPL-CCNC: 102 U/L (ref 0–39)
AST SERPL-CCNC: 103 U/L (ref 0–39)
AST SERPL-CCNC: 91 U/L (ref 0–39)
B PARAP IS1001 DNA NPH QL NAA+NON-PROBE: NOT DETECTED
B PERT DNA SPEC QL NAA+PROBE: NOT DETECTED
B.E.: -5.6 MMOL/L (ref -3–3)
BASOPHILS # BLD: 0.01 K/UL (ref 0–0.2)
BASOPHILS NFR BLD: 0 % (ref 0–2)
BILIRUB DIRECT SERPL-MCNC: 0.5 MG/DL (ref 0–0.3)
BILIRUB INDIRECT SERPL-MCNC: 0.8 MG/DL (ref 0–1)
BILIRUB SERPL-MCNC: 1.3 MG/DL (ref 0–1.2)
BILIRUB SERPL-MCNC: 1.4 MG/DL (ref 0–1.2)
BILIRUB SERPL-MCNC: 2 MG/DL (ref 0–1.2)
BILIRUB UR QL STRIP: NEGATIVE
BNP SERPL-MCNC: 5418 PG/ML (ref 0–450)
BUN SERPL-MCNC: 31 MG/DL (ref 6–23)
BUN SERPL-MCNC: 31 MG/DL (ref 6–23)
BUN SERPL-MCNC: 32 MG/DL (ref 6–23)
C PNEUM DNA NPH QL NAA+NON-PROBE: NOT DETECTED
CALCIUM SERPL-MCNC: 9.6 MG/DL (ref 8.6–10.2)
CALCIUM SERPL-MCNC: 9.7 MG/DL (ref 8.6–10.2)
CALCIUM SERPL-MCNC: 9.7 MG/DL (ref 8.6–10.2)
CEA SERPL-MCNC: 2.3 NG/ML (ref 0–5.2)
CHLORIDE SERPL-SCNC: 103 MMOL/L (ref 98–107)
CHLORIDE SERPL-SCNC: 103 MMOL/L (ref 98–107)
CHLORIDE SERPL-SCNC: 98 MMOL/L (ref 98–107)
CLARITY UR: CLEAR
CO2 SERPL-SCNC: 21 MMOL/L (ref 22–29)
CO2 SERPL-SCNC: 23 MMOL/L (ref 22–29)
CO2 SERPL-SCNC: 24 MMOL/L (ref 22–29)
COHB: 0.6 % (ref 0–1.5)
COLOR UR: YELLOW
CREAT SERPL-MCNC: 1.9 MG/DL (ref 0.7–1.2)
CREAT SERPL-MCNC: 1.9 MG/DL (ref 0.7–1.2)
CREAT SERPL-MCNC: 2 MG/DL (ref 0.7–1.2)
CREAT UR-MCNC: 152.6 MG/DL (ref 40–278)
CRITICAL: ABNORMAL
DATE ANALYZED: ABNORMAL
DATE OF COLLECTION: ABNORMAL
EKG ATRIAL RATE: 144 BPM
EKG P-R INTERVAL: 128 MS
EKG Q-T INTERVAL: 312 MS
EKG QRS DURATION: 170 MS
EKG QTC CALCULATION (BAZETT): 453 MS
EKG R AXIS: -46 DEGREES
EKG T AXIS: 131 DEGREES
EKG VENTRICULAR RATE: 127 BPM
EOSINOPHIL # BLD: 0 K/UL (ref 0.05–0.5)
EOSINOPHILS RELATIVE PERCENT: 0 % (ref 0–6)
EPI CELLS #/AREA URNS HPF: ABNORMAL /HPF
ERYTHROCYTE [DISTWIDTH] IN BLOOD BY AUTOMATED COUNT: 14.6 % (ref 11.5–15)
FLUAV RNA NPH QL NAA+NON-PROBE: NOT DETECTED
FLUBV RNA NPH QL NAA+NON-PROBE: NOT DETECTED
GFR, ESTIMATED: 35 ML/MIN/1.73M2
GFR, ESTIMATED: 36 ML/MIN/1.73M2
GFR, ESTIMATED: 36 ML/MIN/1.73M2
GLUCOSE SERPL-MCNC: 102 MG/DL (ref 74–99)
GLUCOSE SERPL-MCNC: 123 MG/DL (ref 74–99)
GLUCOSE SERPL-MCNC: 99 MG/DL (ref 74–99)
GLUCOSE UR STRIP-MCNC: NEGATIVE MG/DL
HADV DNA NPH QL NAA+NON-PROBE: NOT DETECTED
HAV IGM SERPL QL IA: NONREACTIVE
HBV CORE IGM SERPL QL IA: NONREACTIVE
HBV SURFACE AG SERPL QL IA: NONREACTIVE
HCO3: 18.5 MMOL/L (ref 22–26)
HCOV 229E RNA NPH QL NAA+NON-PROBE: NOT DETECTED
HCOV HKU1 RNA NPH QL NAA+NON-PROBE: NOT DETECTED
HCOV NL63 RNA NPH QL NAA+NON-PROBE: NOT DETECTED
HCOV OC43 RNA NPH QL NAA+NON-PROBE: NOT DETECTED
HCT VFR BLD AUTO: 36.9 % (ref 37–54)
HCV AB SERPL QL IA: NONREACTIVE
HGB BLD-MCNC: 12.1 G/DL (ref 12.5–16.5)
HGB UR QL STRIP.AUTO: ABNORMAL
HHB: 2.7 % (ref 0–5)
HMPV RNA NPH QL NAA+NON-PROBE: NOT DETECTED
HPIV1 RNA NPH QL NAA+NON-PROBE: NOT DETECTED
HPIV2 RNA NPH QL NAA+NON-PROBE: NOT DETECTED
HPIV3 RNA NPH QL NAA+NON-PROBE: NOT DETECTED
HPIV4 RNA NPH QL NAA+NON-PROBE: NOT DETECTED
IMM GRANULOCYTES # BLD AUTO: 0.1 K/UL (ref 0–0.58)
IMM GRANULOCYTES NFR BLD: 1 % (ref 0–5)
INR PPP: 1.7
KETONES UR STRIP-MCNC: NEGATIVE MG/DL
LAB: ABNORMAL
LACTATE BLDV-SCNC: 1.6 MMOL/L (ref 0.5–2.2)
LACTATE BLDV-SCNC: 3.1 MMOL/L (ref 0.5–1.9)
LACTATE BLDV-SCNC: 4.7 MMOL/L (ref 0.5–1.9)
LEUKOCYTE ESTERASE UR QL STRIP: NEGATIVE
LYMPHOCYTES NFR BLD: 0.17 K/UL (ref 1.5–4)
LYMPHOCYTES RELATIVE PERCENT: 1 % (ref 20–42)
Lab: 123
M PNEUMO DNA NPH QL NAA+NON-PROBE: NOT DETECTED
MCH RBC QN AUTO: 32.7 PG (ref 26–35)
MCHC RBC AUTO-ENTMCNC: 32.8 G/DL (ref 32–34.5)
MCV RBC AUTO: 99.7 FL (ref 80–99.9)
METHB: 0.3 % (ref 0–1.5)
MODE: ABNORMAL
MONOCYTES NFR BLD: 1.16 K/UL (ref 0.1–0.95)
MONOCYTES NFR BLD: 8 % (ref 2–12)
NEUTROPHILS NFR BLD: 91 % (ref 43–80)
NEUTS SEG NFR BLD: 13.91 K/UL (ref 1.8–7.3)
NITRITE UR QL STRIP: NEGATIVE
O2 CONTENT: 17.1 ML/DL
O2 SATURATION: 97.3 % (ref 92–98.5)
O2HB: 96.4 % (ref 94–97)
OPERATOR ID: ABNORMAL
OSMOLALITY SERPL: 304 MOSM/KG (ref 285–310)
OSMOLALITY UR: 560 MOSM/KG (ref 300–900)
PATIENT TEMP: 37 C
PCO2: 31.9 MMHG (ref 35–45)
PH BLOOD GAS: 7.38 (ref 7.35–7.45)
PH UR STRIP: 5.5 [PH] (ref 5–8)
PLATELET CONFIRMATION: NORMAL
PLATELET, FLUORESCENCE: 52 K/UL (ref 130–450)
PMV BLD AUTO: 11.6 FL (ref 7–12)
PO2: 103.6 MMHG (ref 75–100)
POTASSIUM SERPL-SCNC: 4.8 MMOL/L (ref 3.5–5)
POTASSIUM SERPL-SCNC: 5 MMOL/L (ref 3.5–5)
POTASSIUM SERPL-SCNC: 5.5 MMOL/L (ref 3.5–5)
PROCALCITONIN SERPL-MCNC: 0.49 NG/ML (ref 0–0.08)
PROT SERPL-MCNC: 6.4 G/DL (ref 6.4–8.3)
PROT SERPL-MCNC: 6.4 G/DL (ref 6.4–8.3)
PROT SERPL-MCNC: 6.5 G/DL (ref 6.4–8.3)
PROT UR STRIP-MCNC: 100 MG/DL
PROTHROMBIN TIME: 18.4 SEC (ref 9.3–12.4)
RBC # BLD AUTO: 3.7 M/UL (ref 3.8–5.8)
RBC # BLD: ABNORMAL 10*6/UL
RBC #/AREA URNS HPF: ABNORMAL /HPF
RSV RNA NPH QL NAA+NON-PROBE: NOT DETECTED
RV+EV RNA NPH QL NAA+NON-PROBE: NOT DETECTED
SARS-COV-2 RNA NPH QL NAA+NON-PROBE: NOT DETECTED
SODIUM SERPL-SCNC: 136 MMOL/L (ref 132–146)
SODIUM SERPL-SCNC: 138 MMOL/L (ref 132–146)
SODIUM SERPL-SCNC: 141 MMOL/L (ref 132–146)
SODIUM UR-SCNC: <20 MMOL/L
SOURCE, BLOOD GAS: ABNORMAL
SP GR UR STRIP: >1.03 (ref 1–1.03)
SPECIMEN DESCRIPTION: NORMAL
THB: 12.5 G/DL (ref 11.5–16.5)
TIME ANALYZED: 132
TROPONIN I SERPL HS-MCNC: 173 NG/L (ref 0–22)
TROPONIN I SERPL HS-MCNC: 181 NG/L (ref 0–22)
TSH SERPL DL<=0.05 MIU/L-ACNC: 3.79 UIU/ML (ref 0.27–4.2)
UROBILINOGEN UR STRIP-ACNC: 0.2 EU/DL (ref 0–1)
UUN UR-MCNC: 477 MG/DL (ref 800–1666)
WBC # BLD: ABNORMAL 10*3/UL
WBC #/AREA URNS HPF: ABNORMAL /HPF
WBC OTHER # BLD: 15.4 K/UL (ref 4.5–11.5)

## 2025-05-09 PROCEDURE — 6360000002 HC RX W HCPCS

## 2025-05-09 PROCEDURE — 82570 ASSAY OF URINE CREATININE: CPT

## 2025-05-09 PROCEDURE — 2580000003 HC RX 258: Performed by: STUDENT IN AN ORGANIZED HEALTH CARE EDUCATION/TRAINING PROGRAM

## 2025-05-09 PROCEDURE — 83880 ASSAY OF NATRIURETIC PEPTIDE: CPT

## 2025-05-09 PROCEDURE — 76700 US EXAM ABDOM COMPLETE: CPT

## 2025-05-09 PROCEDURE — 6370000000 HC RX 637 (ALT 250 FOR IP)

## 2025-05-09 PROCEDURE — 2580000003 HC RX 258

## 2025-05-09 PROCEDURE — 84145 PROCALCITONIN (PCT): CPT

## 2025-05-09 PROCEDURE — 82248 BILIRUBIN DIRECT: CPT

## 2025-05-09 PROCEDURE — 82140 ASSAY OF AMMONIA: CPT

## 2025-05-09 PROCEDURE — 96365 THER/PROPH/DIAG IV INF INIT: CPT

## 2025-05-09 PROCEDURE — 2500000003 HC RX 250 WO HCPCS: Performed by: STUDENT IN AN ORGANIZED HEALTH CARE EDUCATION/TRAINING PROGRAM

## 2025-05-09 PROCEDURE — 0202U NFCT DS 22 TRGT SARS-COV-2: CPT

## 2025-05-09 PROCEDURE — APPSS180 APP SPLIT SHARED TIME > 60 MINUTES: Performed by: NURSE PRACTITIONER

## 2025-05-09 PROCEDURE — 99222 1ST HOSP IP/OBS MODERATE 55: CPT | Performed by: FAMILY MEDICINE

## 2025-05-09 PROCEDURE — 84484 ASSAY OF TROPONIN QUANT: CPT

## 2025-05-09 PROCEDURE — 80053 COMPREHEN METABOLIC PANEL: CPT

## 2025-05-09 PROCEDURE — 82805 BLOOD GASES W/O2 SATURATION: CPT

## 2025-05-09 PROCEDURE — 6360000004 HC RX CONTRAST MEDICATION: Performed by: RADIOLOGY

## 2025-05-09 PROCEDURE — 83605 ASSAY OF LACTIC ACID: CPT

## 2025-05-09 PROCEDURE — 85610 PROTHROMBIN TIME: CPT

## 2025-05-09 PROCEDURE — 94640 AIRWAY INHALATION TREATMENT: CPT

## 2025-05-09 PROCEDURE — 84443 ASSAY THYROID STIM HORMONE: CPT

## 2025-05-09 PROCEDURE — 6360000002 HC RX W HCPCS: Performed by: STUDENT IN AN ORGANIZED HEALTH CARE EDUCATION/TRAINING PROGRAM

## 2025-05-09 PROCEDURE — 71275 CT ANGIOGRAPHY CHEST: CPT

## 2025-05-09 PROCEDURE — 1200000000 HC SEMI PRIVATE

## 2025-05-09 PROCEDURE — 83930 ASSAY OF BLOOD OSMOLALITY: CPT

## 2025-05-09 PROCEDURE — 2500000003 HC RX 250 WO HCPCS

## 2025-05-09 PROCEDURE — 84300 ASSAY OF URINE SODIUM: CPT

## 2025-05-09 PROCEDURE — 80074 ACUTE HEPATITIS PANEL: CPT

## 2025-05-09 PROCEDURE — 87150 DNA/RNA AMPLIFIED PROBE: CPT

## 2025-05-09 PROCEDURE — 93010 ELECTROCARDIOGRAM REPORT: CPT | Performed by: INTERNAL MEDICINE

## 2025-05-09 PROCEDURE — 99223 1ST HOSP IP/OBS HIGH 75: CPT | Performed by: INTERNAL MEDICINE

## 2025-05-09 PROCEDURE — 87086 URINE CULTURE/COLONY COUNT: CPT

## 2025-05-09 PROCEDURE — 85025 COMPLETE CBC W/AUTO DIFF WBC: CPT

## 2025-05-09 PROCEDURE — 96361 HYDRATE IV INFUSION ADD-ON: CPT

## 2025-05-09 PROCEDURE — 0W993ZZ DRAINAGE OF RIGHT PLEURAL CAVITY, PERCUTANEOUS APPROACH: ICD-10-PCS | Performed by: FAMILY MEDICINE

## 2025-05-09 PROCEDURE — 81001 URINALYSIS AUTO W/SCOPE: CPT

## 2025-05-09 PROCEDURE — 96375 TX/PRO/DX INJ NEW DRUG ADDON: CPT

## 2025-05-09 PROCEDURE — 71045 X-RAY EXAM CHEST 1 VIEW: CPT

## 2025-05-09 PROCEDURE — 76604 US EXAM CHEST: CPT

## 2025-05-09 PROCEDURE — 82378 CARCINOEMBRYONIC ANTIGEN: CPT

## 2025-05-09 PROCEDURE — 83935 ASSAY OF URINE OSMOLALITY: CPT

## 2025-05-09 PROCEDURE — 87040 BLOOD CULTURE FOR BACTERIA: CPT

## 2025-05-09 PROCEDURE — 84540 ASSAY OF URINE/UREA-N: CPT

## 2025-05-09 RX ORDER — TRIAMCINOLONE ACETONIDE 1 MG/G
CREAM TOPICAL 2 TIMES DAILY
Status: DISCONTINUED | OUTPATIENT
Start: 2025-05-09 | End: 2025-05-11 | Stop reason: SDUPTHER

## 2025-05-09 RX ORDER — METOPROLOL SUCCINATE 25 MG/1
25 TABLET, EXTENDED RELEASE ORAL DAILY
Status: DISCONTINUED | OUTPATIENT
Start: 2025-05-09 | End: 2025-05-13 | Stop reason: HOSPADM

## 2025-05-09 RX ORDER — PANTOPRAZOLE SODIUM 40 MG/1
40 TABLET, DELAYED RELEASE ORAL DAILY
Status: DISCONTINUED | OUTPATIENT
Start: 2025-05-09 | End: 2025-05-13 | Stop reason: HOSPADM

## 2025-05-09 RX ORDER — FOLIC ACID 1 MG/1
1 TABLET ORAL NIGHTLY
Status: DISCONTINUED | OUTPATIENT
Start: 2025-05-09 | End: 2025-05-13 | Stop reason: HOSPADM

## 2025-05-09 RX ORDER — LANOLIN ALCOHOL/MO/W.PET/CERES
100 CREAM (GRAM) TOPICAL DAILY
Status: DISCONTINUED | OUTPATIENT
Start: 2025-05-09 | End: 2025-05-13 | Stop reason: HOSPADM

## 2025-05-09 RX ORDER — LACOSAMIDE 100 MG/1
100 TABLET ORAL 2 TIMES DAILY
Status: DISCONTINUED | OUTPATIENT
Start: 2025-05-09 | End: 2025-05-13 | Stop reason: HOSPADM

## 2025-05-09 RX ORDER — PSEUDOEPHEDRINE HCL 30 MG
250 TABLET ORAL DAILY
Status: DISCONTINUED | OUTPATIENT
Start: 2025-05-09 | End: 2025-05-13 | Stop reason: HOSPADM

## 2025-05-09 RX ORDER — LEVETIRACETAM 500 MG/1
1000 TABLET ORAL 2 TIMES DAILY
Status: DISCONTINUED | OUTPATIENT
Start: 2025-05-09 | End: 2025-05-13 | Stop reason: HOSPADM

## 2025-05-09 RX ORDER — SPIRONOLACTONE 25 MG/1
12.5 TABLET ORAL DAILY
Status: DISCONTINUED | OUTPATIENT
Start: 2025-05-09 | End: 2025-05-13 | Stop reason: HOSPADM

## 2025-05-09 RX ORDER — CALCIUM GLUCONATE 20 MG/ML
1000 INJECTION, SOLUTION INTRAVENOUS ONCE
Status: COMPLETED | OUTPATIENT
Start: 2025-05-09 | End: 2025-05-09

## 2025-05-09 RX ORDER — ACETAMINOPHEN 325 MG/1
650 TABLET ORAL EVERY 6 HOURS PRN
Status: DISCONTINUED | OUTPATIENT
Start: 2025-05-09 | End: 2025-05-13 | Stop reason: HOSPADM

## 2025-05-09 RX ORDER — ASPIRIN 81 MG/1
81 TABLET, CHEWABLE ORAL DAILY
Status: DISCONTINUED | OUTPATIENT
Start: 2025-05-09 | End: 2025-05-13 | Stop reason: HOSPADM

## 2025-05-09 RX ORDER — BUDESONIDE 0.5 MG/2ML
1 INHALANT ORAL 2 TIMES DAILY
Status: DISCONTINUED | OUTPATIENT
Start: 2025-05-09 | End: 2025-05-13 | Stop reason: HOSPADM

## 2025-05-09 RX ORDER — ENOXAPARIN SODIUM 100 MG/ML
40 INJECTION SUBCUTANEOUS DAILY
Status: DISCONTINUED | OUTPATIENT
Start: 2025-05-09 | End: 2025-05-09 | Stop reason: DRUGHIGH

## 2025-05-09 RX ORDER — IPRATROPIUM BROMIDE AND ALBUTEROL SULFATE 2.5; .5 MG/3ML; MG/3ML
1 SOLUTION RESPIRATORY (INHALATION)
Status: DISCONTINUED | OUTPATIENT
Start: 2025-05-09 | End: 2025-05-09

## 2025-05-09 RX ORDER — SODIUM CHLORIDE 9 MG/ML
INJECTION, SOLUTION INTRAVENOUS PRN
Status: DISCONTINUED | OUTPATIENT
Start: 2025-05-09 | End: 2025-05-13 | Stop reason: HOSPADM

## 2025-05-09 RX ORDER — ONDANSETRON 2 MG/ML
4 INJECTION INTRAMUSCULAR; INTRAVENOUS EVERY 6 HOURS PRN
Status: DISCONTINUED | OUTPATIENT
Start: 2025-05-09 | End: 2025-05-13 | Stop reason: HOSPADM

## 2025-05-09 RX ORDER — ARFORMOTEROL TARTRATE 15 UG/2ML
15 SOLUTION RESPIRATORY (INHALATION)
Status: DISCONTINUED | OUTPATIENT
Start: 2025-05-09 | End: 2025-05-13 | Stop reason: HOSPADM

## 2025-05-09 RX ORDER — ONDANSETRON 4 MG/1
4 TABLET, ORALLY DISINTEGRATING ORAL EVERY 8 HOURS PRN
Status: DISCONTINUED | OUTPATIENT
Start: 2025-05-09 | End: 2025-05-13 | Stop reason: HOSPADM

## 2025-05-09 RX ORDER — ACETAMINOPHEN 650 MG/1
650 SUPPOSITORY RECTAL EVERY 6 HOURS PRN
Status: DISCONTINUED | OUTPATIENT
Start: 2025-05-09 | End: 2025-05-13 | Stop reason: HOSPADM

## 2025-05-09 RX ORDER — DULOXETIN HYDROCHLORIDE 30 MG/1
30 CAPSULE, DELAYED RELEASE ORAL DAILY
Status: DISCONTINUED | OUTPATIENT
Start: 2025-05-09 | End: 2025-05-13 | Stop reason: HOSPADM

## 2025-05-09 RX ORDER — IOPAMIDOL 755 MG/ML
75 INJECTION, SOLUTION INTRAVASCULAR
Status: COMPLETED | OUTPATIENT
Start: 2025-05-09 | End: 2025-05-09

## 2025-05-09 RX ORDER — SODIUM CHLORIDE 0.9 % (FLUSH) 0.9 %
5-40 SYRINGE (ML) INJECTION PRN
Status: DISCONTINUED | OUTPATIENT
Start: 2025-05-09 | End: 2025-05-13 | Stop reason: HOSPADM

## 2025-05-09 RX ORDER — POLYETHYLENE GLYCOL 3350 17 G/17G
17 POWDER, FOR SOLUTION ORAL DAILY PRN
Status: DISCONTINUED | OUTPATIENT
Start: 2025-05-09 | End: 2025-05-13 | Stop reason: HOSPADM

## 2025-05-09 RX ORDER — SODIUM CHLORIDE 0.9 % (FLUSH) 0.9 %
5-40 SYRINGE (ML) INJECTION EVERY 12 HOURS SCHEDULED
Status: DISCONTINUED | OUTPATIENT
Start: 2025-05-09 | End: 2025-05-13 | Stop reason: HOSPADM

## 2025-05-09 RX ORDER — SILVER SULFADIAZINE 10 MG/G
CREAM TOPICAL DAILY
Status: DISCONTINUED | OUTPATIENT
Start: 2025-05-09 | End: 2025-05-13 | Stop reason: HOSPADM

## 2025-05-09 RX ORDER — HYDROXYCHLOROQUINE SULFATE 200 MG/1
200 TABLET, FILM COATED ORAL 2 TIMES DAILY
Status: DISCONTINUED | OUTPATIENT
Start: 2025-05-09 | End: 2025-05-13 | Stop reason: HOSPADM

## 2025-05-09 RX ORDER — BENZOCAINE/MENTHOL 6 MG-10 MG
LOZENGE MUCOUS MEMBRANE 2 TIMES DAILY
Status: DISCONTINUED | OUTPATIENT
Start: 2025-05-09 | End: 2025-05-13 | Stop reason: HOSPADM

## 2025-05-09 RX ORDER — PREDNISONE 5 MG/1
5 TABLET ORAL DAILY
Status: DISCONTINUED | OUTPATIENT
Start: 2025-05-09 | End: 2025-05-13 | Stop reason: HOSPADM

## 2025-05-09 RX ORDER — IBUPROFEN 200 MG
200 TABLET ORAL EVERY 6 HOURS PRN
Status: DISCONTINUED | OUTPATIENT
Start: 2025-05-09 | End: 2025-05-13 | Stop reason: HOSPADM

## 2025-05-09 RX ORDER — ENOXAPARIN SODIUM 100 MG/ML
30 INJECTION SUBCUTANEOUS 2 TIMES DAILY
Status: DISCONTINUED | OUTPATIENT
Start: 2025-05-09 | End: 2025-05-13 | Stop reason: HOSPADM

## 2025-05-09 RX ORDER — 0.9 % SODIUM CHLORIDE 0.9 %
1000 INTRAVENOUS SOLUTION INTRAVENOUS ONCE
Status: COMPLETED | OUTPATIENT
Start: 2025-05-09 | End: 2025-05-09

## 2025-05-09 RX ORDER — 0.9 % SODIUM CHLORIDE 0.9 %
500 INTRAVENOUS SOLUTION INTRAVENOUS ONCE
Status: DISCONTINUED | OUTPATIENT
Start: 2025-05-09 | End: 2025-05-09

## 2025-05-09 RX ORDER — IPRATROPIUM BROMIDE AND ALBUTEROL SULFATE 2.5; .5 MG/3ML; MG/3ML
1 SOLUTION RESPIRATORY (INHALATION)
Status: DISCONTINUED | OUTPATIENT
Start: 2025-05-09 | End: 2025-05-13 | Stop reason: HOSPADM

## 2025-05-09 RX ORDER — CHOLECALCIFEROL (VITAMIN D3) 50 MCG
4000 TABLET ORAL DAILY
Status: DISCONTINUED | OUTPATIENT
Start: 2025-05-09 | End: 2025-05-13 | Stop reason: HOSPADM

## 2025-05-09 RX ORDER — GABAPENTIN 300 MG/1
300 CAPSULE ORAL DAILY
Status: DISCONTINUED | OUTPATIENT
Start: 2025-05-09 | End: 2025-05-13 | Stop reason: HOSPADM

## 2025-05-09 RX ADMIN — LACOSAMIDE 100 MG: 100 TABLET, FILM COATED ORAL at 21:23

## 2025-05-09 RX ADMIN — ACETAMINOPHEN 650 MG: 325 TABLET ORAL at 16:42

## 2025-05-09 RX ADMIN — METOPROLOL SUCCINATE 25 MG: 25 TABLET, FILM COATED, EXTENDED RELEASE ORAL at 09:15

## 2025-05-09 RX ADMIN — BUDESONIDE 1000 MCG: 0.5 SUSPENSION RESPIRATORY (INHALATION) at 20:32

## 2025-05-09 RX ADMIN — ARFORMOTEROL TARTRATE 15 MCG: 15 SOLUTION RESPIRATORY (INHALATION) at 06:27

## 2025-05-09 RX ADMIN — DULOXETINE 30 MG: 30 CAPSULE, DELAYED RELEASE ORAL at 09:16

## 2025-05-09 RX ADMIN — Medication 4000 UNITS: at 09:15

## 2025-05-09 RX ADMIN — PANTOPRAZOLE SODIUM 40 MG: 40 TABLET, DELAYED RELEASE ORAL at 09:16

## 2025-05-09 RX ADMIN — ASPIRIN 81 MG 81 MG: 81 TABLET ORAL at 09:16

## 2025-05-09 RX ADMIN — CALCIUM GLUCONATE 1000 MG: 20 INJECTION, SOLUTION INTRAVENOUS at 00:50

## 2025-05-09 RX ADMIN — Medication 250 MG: at 10:44

## 2025-05-09 RX ADMIN — IPRATROPIUM BROMIDE AND ALBUTEROL SULFATE 1 DOSE: .5; 2.5 SOLUTION RESPIRATORY (INHALATION) at 20:32

## 2025-05-09 RX ADMIN — FOLIC ACID 1 MG: 1 TABLET ORAL at 21:23

## 2025-05-09 RX ADMIN — ARFORMOTEROL TARTRATE 15 MCG: 15 SOLUTION RESPIRATORY (INHALATION) at 20:32

## 2025-05-09 RX ADMIN — LEVETIRACETAM 1000 MG: 500 TABLET, FILM COATED ORAL at 09:15

## 2025-05-09 RX ADMIN — ENOXAPARIN SODIUM 30 MG: 100 INJECTION SUBCUTANEOUS at 09:19

## 2025-05-09 RX ADMIN — PREDNISONE 5 MG: 5 TABLET ORAL at 09:15

## 2025-05-09 RX ADMIN — DOXYCYCLINE 100 MG: 100 INJECTION, POWDER, LYOPHILIZED, FOR SOLUTION INTRAVENOUS at 04:44

## 2025-05-09 RX ADMIN — GABAPENTIN 300 MG: 300 CAPSULE ORAL at 09:16

## 2025-05-09 RX ADMIN — WATER 2000 MG: 1 INJECTION INTRAMUSCULAR; INTRAVENOUS; SUBCUTANEOUS at 04:42

## 2025-05-09 RX ADMIN — SODIUM CHLORIDE 1000 ML: 0.9 INJECTION, SOLUTION INTRAVENOUS at 10:48

## 2025-05-09 RX ADMIN — SODIUM ZIRCONIUM CYCLOSILICATE 10 G: 10 POWDER, FOR SUSPENSION ORAL at 01:53

## 2025-05-09 RX ADMIN — IPRATROPIUM BROMIDE AND ALBUTEROL SULFATE 1 DOSE: .5; 2.5 SOLUTION RESPIRATORY (INHALATION) at 06:32

## 2025-05-09 RX ADMIN — IBUPROFEN 200 MG: 200 TABLET, FILM COATED ORAL at 21:24

## 2025-05-09 RX ADMIN — BUDESONIDE 1000 MCG: 0.5 SUSPENSION RESPIRATORY (INHALATION) at 06:28

## 2025-05-09 RX ADMIN — SODIUM CHLORIDE 1000 ML: 9 INJECTION, SOLUTION INTRAVENOUS at 00:15

## 2025-05-09 RX ADMIN — SODIUM CHLORIDE, PRESERVATIVE FREE 10 ML: 5 INJECTION INTRAVENOUS at 21:26

## 2025-05-09 RX ADMIN — HYDROXYCHLOROQUINE SULFATE 200 MG: 200 TABLET ORAL at 10:45

## 2025-05-09 RX ADMIN — Medication 100 MG: at 09:16

## 2025-05-09 RX ADMIN — IPRATROPIUM BROMIDE AND ALBUTEROL SULFATE 1 DOSE: .5; 2.5 SOLUTION RESPIRATORY (INHALATION) at 13:51

## 2025-05-09 RX ADMIN — IOPAMIDOL 75 ML: 755 INJECTION, SOLUTION INTRAVENOUS at 01:31

## 2025-05-09 RX ADMIN — LEVETIRACETAM 1000 MG: 500 TABLET, FILM COATED ORAL at 21:23

## 2025-05-09 RX ADMIN — SACUBITRIL AND VALSARTAN 1 TABLET: 24; 26 TABLET, FILM COATED ORAL at 09:16

## 2025-05-09 RX ADMIN — HYDROCORTISONE: 1 CREAM TOPICAL at 21:23

## 2025-05-09 RX ADMIN — HYDROXYCHLOROQUINE SULFATE 200 MG: 200 TABLET ORAL at 21:22

## 2025-05-09 ASSESSMENT — PAIN DESCRIPTION - DESCRIPTORS: DESCRIPTORS: ACHING

## 2025-05-09 ASSESSMENT — PAIN SCALES - GENERAL
PAINLEVEL_OUTOF10: 6
PAINLEVEL_OUTOF10: 2
PAINLEVEL_OUTOF10: 2

## 2025-05-09 ASSESSMENT — PAIN - FUNCTIONAL ASSESSMENT: PAIN_FUNCTIONAL_ASSESSMENT: ACTIVITIES ARE NOT PREVENTED

## 2025-05-09 ASSESSMENT — PAIN DESCRIPTION - PAIN TYPE: TYPE: ACUTE PAIN

## 2025-05-09 ASSESSMENT — PAIN DESCRIPTION - LOCATION
LOCATION: HEAD
LOCATION: HEAD

## 2025-05-09 ASSESSMENT — PAIN DESCRIPTION - FREQUENCY: FREQUENCY: CONTINUOUS

## 2025-05-09 ASSESSMENT — PAIN DESCRIPTION - ONSET: ONSET: ON-GOING

## 2025-05-09 NOTE — PROGRESS NOTES
Name: Jermaine Burks  : 1950  MRN: [id number]    Date: 2025    Benefits of immediately proceeding with Radiology exam outweigh the risks and therefore the following is being waived:      [] Pregnancy test    [] Protocol for Iodine allergy    [] MRI questionnaire    [x] BUN/Creatinine        Je Barbosa DO

## 2025-05-09 NOTE — PROGRESS NOTES
Here in IR for thoracentesis. Ultrasound done and patient did not have enough fluid to be completed safely. Returned to emergency. Patient on room air.

## 2025-05-09 NOTE — ED NOTES
Pt was able to stand and pivot at this time to use the bedside commode. Pt was able to provide a urine sample at this time

## 2025-05-09 NOTE — CONSULTS
Som Juarez M.D.,[facility]  Bryant Tijerina D.O., JENNIFER., [facility]  Manpreet Rodrigues M.D.  Aileen Pritchett M.D.   Zaki Longoria D.O.  Domingo Tavares M.D.       Patient:  Jermaine Burks 74 y.o. male MRN: [id number]           PULMONARY CONSULTATION    Reason for Consultation: hypoxia  Referring Physician: Justina López MD    Communication with the referring physician will be sent via the electronic medical record.    Chief Complaint: Shortness of breath    CODE STATUS: Full code    SUBJECTIVE:  HPI:  Jermaine Burks is a 74 y.o. male with a past medical history of alcoholic cirrhosis, atrial fibrillation, esophageal varices, seizure disorder, squamous cell lung cancer that we are asked to evaluate for hypoxia.    Patient presented to the hospital with dyspnea with minimal exertion ongoing over several weeks.  He denies any cough or congestion.  He denies any fevers, chills or feeling ill lately.  He does report some mild swelling in his lower extremities that does occur intermittently.  He is on room air at baseline and was noted to be 89% in the emergency room.  He is a former smoker and quit approximately 9 years ago.  He does have untreated sleep apnea reporting intolerance to PAP therapy.  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  Currently the cancer was inoperable secondary to his liver cirrhosis.  Initially he was following at the [facility] but has not been seen for several years stating that he was told he was \"cured\".  It does not appear that he underwent any chemotherapy or other treatment with the exception of radiation therapy.    After further review of the EMR, it appears that the patient does have yearly telehealth visits with the [facility] that involves yearly CT imaging for surveillance regarding his squamous cell cancer.  It appears it has been stable since completing the radiation treatments in  mediastinal lymphadenopathy.  The heart and  pericardium demonstrate no acute abnormality.  There is no acute abnormality  of the thoracic aorta.     Lungs/pleura: There is a 4.2 cm with wedge-shaped right apical consolidative  opacity.  Moderate right pleural effusion.  No pneumothorax.     Upper Abdomen:  Limited images of the upper abdomen demonstrate no acute  abnormality.  Lobulated appearance of the liver.     Soft Tissues/Bones: No acute bone or soft tissue abnormality.     IMPRESSION:  1. No acute pulmonary artery embolism.  2. Right apical consolidative opacity with moderate right pleural effusion.  Recommend radiographic follow-up to resolution.  3. Cirrhotic morphology of the liver.          Echo 12/23/24:  Interpretation Summary  Show Result Comparison     Left Ventricle: Mildly reduced left ventricular systolic function with a visually estimated EF of 45 - 50%. Left ventricle size is normal. Normal wall thickness. Normal wall motion.    Right Ventricle: Right ventricle size is normal. Reduced systolic function. TAPSE is 1.4 cm.    Left Atrium: Left atrial volume index is severely increased (>48 mL/m2).    Right Atrium: Right atrium is moderately dilated.    Aortic Valve: Romero S3 bioprosthetic valve with a size of 29 mm. AV mean gradient is 5 mmHg. Trace regurgitation. AV mean gradient is 5 mmHg. AV peak velocity is 1.6 m/s. AV area by continuity VTI is 2.3 cm2.    Mitral Valve: Moderate annular calcification. Moderate regurgitation.    Tricuspid Valve: Moderate regurgitation. The estimated RVSP is 29 mmHg.    Pericardium: No pericardial effusion.     Echo Findings    Left Ventricle Mildly reduced left ventricular systolic function with a visually estimated EF of 45 - 50%. Left ventricle size is normal. Normal wall thickness. Normal wall motion. Indeterminate diastolic function.   Left Atrium Left atrial volume index is severely increased (>48 mL/m2).   Right Ventricle Right ventricle size is normal.

## 2025-05-09 NOTE — ED NOTES
ED to Inpatient Handoff Report    Notified Maryan that electronic handoff available and patient ready for transport to room 529.    Safety Risks: Risk of falls    Patient in Restraints: no    Constant Observer or Patient : no    Telemetry Monitoring Ordered :Yes           Order to transfer to unit without monitor: yes    Last MEWS: 2 Time completed: 1351    Deterioration Index Score:   Predictive Model Details          34 (Caution)  Factor Value    Calculated 5/9/2025 13:55 35% Age 74 years old    Deterioration Index Model 30% Respiratory rate 24     11% Potassium 4.8 mmol/L     10% WBC count abnormal (15.4 k/uL)     6% Pulse 100     3% Systolic 105     3% BUN abnormal (32 mg/dL)     0% Sodium 138 mmol/L     0% Pulse oximetry 98 %     0% Hematocrit abnormal (36.9 %)     0% Temperature 98.8 °F (37.1 °C)     0% Blood pH 7.381        Vitals:    05/09/25 1220 05/09/25 1258 05/09/25 1338 05/09/25 1351   BP:  99/71 107/65 105/65   Pulse: 96 94 90 100   Resp: 23 21 28 24   Temp:    98.8 °F (37.1 °C)   TempSrc:    Oral   SpO2: 93% 97% 100% 98%         Opportunity for questions and clarification was provided.

## 2025-05-09 NOTE — PROGRESS NOTES
Database initiated pharmacy and medications verified with the patient. He is A&O comes in from home with wife. He uses a walker and is RA at baseline.     He no longer takes Entresto the admitting team will need notified of this so they can discontinue the order.

## 2025-05-09 NOTE — CONSULTS
Nephrology Consult Note      Reason for Consult: Acute kidney injury  Date of Service: 5/9/2025   Chief Complaint: had concerns including Shortness of Breath (Hx of afib, 3L NC 89% on RA).  History Obtained From: patient electronic medical record     History of Presenting illness:  Jermaine Burks is a 74 y.o. male with a past medical history of JAY closed compression fracutre of lumbar vertebrae, alcoholic cirrhosis, CKD stage III, COPD, paroxysmal afib HTN .     They presented on 5/8/2025 complaining of SOB. They have noticed SOB for 2 days prior to presentation. On arrival their vitals were pertinent for hypertension. Initial labs were pertinent for creatinine of 1.9.  Question that he received iodinated contrast on 5 /9. nephrology was consulted for acute kidney injury.     Past Medical History:   Past Medical History:   Diagnosis Date    Alcoholic cirrhosis (HCC)     Alcoholism (HCC)     Has been sober / dry since 01/2016.    Anxiety     Arthritis     Chronic atrial fibrillation (HCC)     Esophageal varices (HCC)     UGI bleed ~2012 - has had variceal banding 2x around that time.    History of kidney stones     Hypertension     Lumbar compression fracture (HCC) 02/2017    Lung tumor 2017    Osteopenia     Osteopenia     S/P TAVR (transcatheter aortic valve replacement) 9/20/2018    Seizure disorder ([facility]) 09/2016    Complicated a stroke with occipital ICH.    Seizures ([facility])     Sepsis with MRSE bacteremia 09/2016    resolved    Severe aortic stenosis     Confirmed by echo 9/2016, BROWN 10/2016. Undergoing w/u at [facility] for TAVR, as of 4/2017.    Sleep apnea     Couldn't tolerate CPAP therapy ~2007. Had had UPPP.    Stroke ([facility]) 09/2016    With receptive aphasia, poor memory that are improving as of 04/2017. Pt was found to have an occipital ICH at that time, with possible amyloid angiopathy as cause per MRI subsequently. Had seizure and sepsis (d/t MRSE bacteremia) complicating stroke.       Past Surgical

## 2025-05-09 NOTE — PROGRESS NOTES
[facility]  Progress Note    Chief complaint :  Chief Complaint   Patient presents with    Shortness of Breath     Hx of afib, 3L NC 89% on RA       Subjective:    Patient was seen and examined at bedside this morning.  States that he started having shortness of breath for the last couple of days which progressively worsened.  He was denying fever, chills, abdominal pain, diarrhea, constipation.    Patient is yet to be seen by Nephrology and Cardiology. Pulmonology has planned an IR guided pleural tap for patient today.       Past medical, surgical, family and social history were reviewed, non-contributory, and unchanged unless otherwise stated.    Review of Systems  Pertinent positives and negatives as in HPI    Objective:  /67   Pulse 97   Temp 98.9 °F (37.2 °C)   Resp (!) 32   SpO2 93%     Physical Exam  Constitutional:       Appearance: He is obese. He is ill-appearing.   HENT:      Nose: Nose normal.      Mouth/Throat:      Mouth: Mucous membranes are moist.   Eyes:      Conjunctiva/sclera: Conjunctivae normal.   Cardiovascular:      Rate and Rhythm: Normal rate and regular rhythm.      Pulses: Normal pulses.      Heart sounds: Normal heart sounds.   Pulmonary:      Effort: Pulmonary effort is normal.      Breath sounds: No stridor. Rales present. No wheezing or rhonchi.      Comments: Crackles bilateral lower lung fields  Abdominal:      General: Bowel sounds are normal.      Palpations: Abdomen is soft.   Musculoskeletal:         General: Normal range of motion.      Cervical back: Normal range of motion.      Right lower leg: Edema present.      Left lower leg: Edema present.   Skin:     General: Skin is warm.   Neurological:      General: No focal deficit present.      Mental Status: He is alert.   Psychiatric:         Mood and Affect: Mood normal.         Labs:  Recent Results (from the past 24 hours)   EKG 12 Lead    Collection Time: 05/08/25 11:51 PM  fraction) (HCC) [I50.20] 02/06/2025    SOB (shortness of breath) [R06.02] 02/06/2025    Morbid obesity with BMI of 40.0-44.9, adult (HCC) [E66.01, Z68.41] 06/19/2019    S/P TAVR (transcatheter aortic valve replacement) [Z95.2] 09/20/2018    History of intracranial hemorrhage [Z86.79] 09/29/2016    Paroxysmal atrial fibrillation (HCC) [I48.0] 09/06/2012    Essential hypertension [I10] 09/06/2012    Obstructive sleep apnea, s/p UPPP [G47.33] 11/29/2011    COPD (chronic obstructive pulmonary disease) (HCC) [J44.9] 10/13/2011       Plan:  Acute hypoxia   May be secondary to CHF exacerbation,pnuemonia,COPD versus lung malignancy versus cirrhosis.  Recent stress test showed reduced EF to 40% and CT chest on admission showed evidence of lung opacity at the same site of malignancy  -strict I and os  - Breathing treatments for COPD  - Ultrasound abdomen for cirrhosis to assess ascites  -Pulmonology consulted appreciate recs [5/9/25]: Stop Rocephin and doxycycline as elevated white cell count could be secondary to chronic steroid use.  IR for right-sided thoracentesis, send pleural fluid for analysis, culture, cytology.       COPD  - CBC, CMP daily  - follow RFA neg, Procal 0.49, ESR, CRP pending  - start Brovana/Pulmicort twice daily  - start DuoNebs every 4h while awake  - wean oxygen as tolerated maintaining SpO2 88-92%  - walking pulse ox prior to discharge        Cirrhosis secondary to alcohol use with hyperbilirubinemia  MELD 3.0 19, MELD-Na: 20  Follows with Dr. Nazanin Oconnell aldactone 12.5 mg daily in view of JOSE  Folic acid 1 Mg daily  Thiamine 100 Mg daily  INR 1.7,ammonia 13, CEA   USG abdomen and pelvis 5/9: Mild diffuse hepatic steatosis with no gross discrete hepatic lesion, cholelithiasis with gallbladder wall thickening with no evidence of pericholecystic fluid or positive sonographic Rangel sign.  Consider GI consult     HFrEF with signs of volume overload  Follows with Dr. Guerra  PAF 45 to 50% on 12/24,40% in

## 2025-05-09 NOTE — CONSULTS
Inpatient Cardiology Consultation      Reason for Consult: CHF exacerbation with JOSE    Consulting Physician: Dr. Zhao    Requesting Physician:     Date of Consultation: 5/9/2025    HISTORY OF PRESENT ILLNESS:   Jermaine Burks  is a 74 y.o.  male known to [facility] cardiology. Follows with Dr. Guerra LOV 5/8/2025 with complaint of shortness of breath, dyspnea on exertion, fatigue and EF mildly reduced when compared to prior echo.  Patient is on aspirin Entresto and spironolactone.  During that visit Dr. Guerra ordered TSH nuclear regadenoson stress test as well as extended cardiac Holter monitor.     Nephrology on consultation for JOSE with CHF exacerbation with hypotension  Pulmonology on consult for hypoxia    PMH: see below    [facility] 5/8/2025 11:39 PM via EMS with shortness of breath and nausea.  Arrival vitals: T98.9, RR 18, P1 34, BP 85/76, SpO2 98% on 3 L nasal cannula.  Significant Labs: , K4.8, BUN 32, CR 1.9, GFR 36, lactic acid 3.1, glucose 102, troponin 173, pro-- BNP 5418, ALT 55, , total bilirubin 1.4, TSH 3.79, WBCs 15.4, Hgb 12.1, HCT 36.9, platelets 52.  Blood cultures and urine culture sent and pending  Urinalysis negative for leukocyte Estrace or nitrite   Respiratory panel ordered and pending.   Blood gas pCO2 31.9, KB0579.6, HCO3 18.5, base excess -5.6, O2 Hb 96.4, O2 sat 97.3.  RAD:   CTA PULMONARY W CONTRAST   Final Result   1. No acute pulmonary artery embolism.   2. Right apical consolidative opacity with moderate right pleural effusion.   Recommend radiographic follow-up to resolution.   3. Cirrhotic morphology of the liver.           XR CHEST PORTABLE   Final Result   1.  Suspicious medial right upper lung masslike opacity.  There appears to be   a pleural effusion on the right.       2.  Atherosclerotic disease and prominence of the cardiac silhouette.         ED EKG: No interpretation reported  ED Diagnosis: Acute hypoxemic respiratory failure, pneumonia,

## 2025-05-09 NOTE — PROCEDURES
PROCEDURE NOTE  Date: 5/9/2025   Name: Jermaine Burks  YOB: 1950    Limited US of the chest prior to thoracentesis reveals small pleural effusion with no safe pathway to perform thoracentesis at this time. Images reviewed with radiologist.

## 2025-05-09 NOTE — ED NOTES
Received report from RADHA Dean. All questions/concerns addressed. Patient resting in position of comfort on bed presenting in no acute/ apparent distress. Respirations are noted even and unlabored with good rise and fall of the chest observed. Patient updated with current plan of care (POC) and all questions/ concerns addressed. Patient voices no needs at this time. Bed noted in lowest position, locked, with side rails X 2 up for patient safety. Will continue to monitor patient for acute changes.       [x] Side rails up    [x] Cart in lowest position    [] Family at bedside    [x] Call light within reach

## 2025-05-09 NOTE — ED PROVIDER NOTES
ATTENDING PROVIDER ATTESTATION:     Jermaine Burks presented to the emergency department for evaluation of Shortness of Breath (Hx of afib, 3L NC 89% on RA)    I have reviewed and discussed the case, including pertinent history (medical, surgical, family and social) and exam findings with the Resident and the Nurse assigned to Jermaine Burks.  I have personally performed and/or participated in the history, exam, medical decision making, and procedures and agree with all pertinent clinical information.  I agree with any EKG interpretation by resident.      I have reviewed my findings and recommendations with Jermaine Burks and members of family present at the time of disposition.            I, Dr. Moore, am the primary provider of record        My findings/plan: The primary encounter diagnosis was Acute hypoxemic respiratory failure (HCC). Diagnoses of Pneumonia due to infectious organism, unspecified laterality, unspecified part of lung, Lung mass, JOSE (acute kidney injury), Hyperkalemia, Hepatic cirrhosis, unspecified hepatic cirrhosis type, unspecified whether ascites present (HCC), and Chronic congestive heart failure, unspecified heart failure type (HCC) were also pertinent to this visit.  Current Discharge Medication List        Roslyn Moore MD            [facility] MED SURG/TELE  EMERGENCY DEPARTMENT ENCOUNTER        Pt Name: Jermaine Burks  MRN: [id number]  Birthdate 1950  Date of evaluation: 5/8/2025  Provider: Je Barbosa DO  PCP: Ashley Rutherford MD  Note Started: 11:50 PM EDT 5/8/25    CHIEF COMPLAINT       Chief Complaint   Patient presents with    Shortness of Breath     Hx of afib, 3L NC 89% on RA       HISTORY OF PRESENT ILLNESS: 1 or more Elements   History From: Patient    Jermaine Burks is a 74 y.o. male with a PMHx of A-fib, not on anticoagulation, hypertension, COPD, CKD, heart failure, cirrhosis, CVA, seizures, who presents with shortness of breath and nausea.  MG tablet Take 1 tablet by mouth daily      potassium citrate (UROCIT-K) 10 MEQ (1080 MG) extended release tablet Take 3 tablets by mouth in the morning and 3 tablets in the evening.      spironolactone (ALDACTONE) 25 MG tablet Take 0.5 tablets by mouth daily  Qty: 90 tablet, Refills: 1      gabapentin (NEURONTIN) 100 MG capsule Take 1 capsule by mouth 2 times daily for 90 days.  Qty: 180 capsule, Refills: 0      DULoxetine (CYMBALTA) 30 MG extended release capsule Take 1 capsule by mouth daily  Qty: 30 capsule, Refills: 1      ibuprofen (ADVIL;MOTRIN) 200 MG tablet Take 1 tablet by mouth every 6 hours as needed for Pain      metoprolol succinate (TOPROL XL) 25 MG extended release tablet Take 1 tablet by mouth daily  Qty: 90 tablet, Refills: 3    Associated Diagnoses: HFrEF (heart failure with reduced ejection fraction) ([facility]); Shortness of breath      calcitonin (MIACALCIN) 200 UNIT/ACT nasal spray 1 spray by Nasal route daily  Qty: 3 each, Refills: 1    Associated Diagnoses: Compression fracture of L3 vertebra with routine healing, subsequent encounter      CALCIUM CITRATE PO Take 3 tablets by mouth nightly 600 MG      triamcinolone (KENALOG) 0.1 % cream Apply topically to affected areas 2 times daily.  Qty: 45 g, Refills: 2      levETIRAcetam (KEPPRA) 1000 MG tablet Take 1 tablet by mouth 2 times daily      predniSONE (DELTASONE) 5 MG tablet Take 1 tablet by mouth daily  Qty: 90 tablet, Refills: 1      lacosamide (VIMPAT) 100 MG TABS tablet Take 1 tablet by mouth 2 times daily.      vitamin B-1 (THIAMINE) 100 MG tablet Take 1 tablet by mouth daily      aspirin 81 MG tablet Take 1 tablet by mouth daily      hydroxychloroquine (PLAQUENIL) 200 MG tablet Take 1 tablet by mouth 2 times daily Indications: taking 400 mg      folic acid (FOLVITE) 1 MG tablet Take 1 tablet by mouth nightly      VITAMIN D PO Take 4,000 Units by mouth daily       Handicap Placard MISC by Does not apply route Duration: 5 years  Qty: 1 each,

## 2025-05-09 NOTE — ED NOTES
75 y/o male/female to the ed with a c/c of shortness of breath and nausea. Patient states symptoms have been ongoing for the last week but worsened over the last 2 days, patient does not wear oxygen at home and arrived 89% on room air, placed on 3 L saturating well. He does report an episode of dry heaving. Patient noted with PMH of closed compression fracutre of lumbar vertebrae, alcoholic cirrhosis, CKD stage III, COPD, paroxysmal afib and HTN. Patient denies chest pain, sob or difficulty breathing. Patient denies ABD pain, n/v/d or fever. Patient noted with + pms x 4, pupils PERRLA @ 3 and lung sounds that are clear and equil bilaterally. Patient placed on telemetry, BP and pulse ox. 12-lead EKG performed. Vitals noted as recorded. PIV placed with labs drawn and sent. Call light placed within patient's reach, and bed in lowest position with side rails up x 2 for safety. Provider at the bedside for assessment.

## 2025-05-09 NOTE — H&P
[facility]  Resident History and Physical      CHIEF COMPLAINT:    Chief Complaint   Patient presents with    Shortness of Breath     Hx of afib, 3L NC 89% on RA        History of Present Illness:   Jermaine Burks  is a 74 y.o. male patient of Ashley Rutherford MD  with a pertinent PMHx of JAY closed compression fracutre of lumbar vertebrae, alcoholic cirrhosis, CKD stage III, COPD, paroxysmal afib HTN  who presented to the ER from home with wife with chief complaint of shortness of breath and nausea. Patient states symptoms have been ongoing for the last week but worsened over the last 2 days, patient does not wear oxygen at home and arrived 89% on room air, placed on 3 L saturating well. He does report an episode of dry heaving. Denies any fevers, chills, chest pain, abdominal pain, diarrhea, constipation, dysuria, hematuria, hematochezia, melena. Patient is a former smoker, admits to previous alcohol use, denies illicit drug use.    Denies any fever, chills, n/v, headache, dizziness, vision changes, neck tenderness or stiffness, weakness, cp, palpitations, leg swelling/tenderness, sob, cough, abd pain, dysuria, hematuria, diarrhea, constipation, bloody stools.    Nursing Notes were all reviewed.    ED course: Vitals were remarkable for low blood pressure of 85/76, pulse of 134, 98% SpO2 on 3 L oxygen that then became room air as he was saturating well.. Labs were remarkable for hemoglobin 12.1, WBC 15.4, lactic acid 4.7 that trended to 3.1, sodium 141, potassium 5.5, creatinine 2, troponin 181 trended down to 173, BNP 5418.     CXR was remarkable for suspicious medial right upper lung masslike opacity with pleural effusion on the right with aortic valve prosthesis.  CTA pulm showed no evidence of pulmonary artery embolism but showed right apical consolidative opacity with moderate pleural right pleural effusion.  It also showed cirrhotic morphology of the liver     EKG was

## 2025-05-09 NOTE — PROGRESS NOTES
4 Eyes Skin Assessment     NAME:  Jermaine Burks  YOB: 1950  MEDICAL RECORD NUMBER:  [id number]    The patient is being assessed for  Admission    I agree that at least one RN has performed a thorough Head to Toe Skin Assessment on the patient. ALL assessment sites listed below have been assessed.      Areas assessed by both nurses:    Head, Face, Ears, Shoulders, Back, Chest, Arms, Elbows, Hands, Sacrum. Buttock, Coccyx, Ischium, Legs. Feet and Heels, and Under Medical Devices         Does the Patient have a Wound? Yes wound(s) were present on assessment. LDA wound assessment was Initiated and completed by RN       Jesus Prevention initiated by RN: Yes  Wound Care Orders initiated by RN: No    Pressure Injury (Stage 3,4, Unstageable, DTI, NWPT, and Complex wounds) if present, place Wound referral order by RN under : No    New Ostomies, if present place, Ostomy referral order under : No     Nurse 1 eSignature: Electronically signed by Mahogany Atkins RN on 5/9/25 at 3:31 PM EDT    **SHARE this note so that the co-signing nurse can place an eSignature**    Nurse 2 eSignature: Electronically signed by Charles Medina RN on 5/9/25 at 4:49 PM EDT

## 2025-05-10 ENCOUNTER — APPOINTMENT (OUTPATIENT)
Age: 75
DRG: 193 | End: 2025-05-10
Payer: MEDICARE

## 2025-05-10 LAB
ACB COMPLEX DNA BLD POS QL NAA+NON-PROBE: NOT DETECTED
ALBUMIN SERPL-MCNC: 3.4 G/DL (ref 3.5–5.2)
ALP SERPL-CCNC: 49 U/L (ref 40–129)
ALT SERPL-CCNC: 77 U/L (ref 0–40)
ANION GAP SERPL CALCULATED.3IONS-SCNC: 15 MMOL/L (ref 7–16)
AST SERPL-CCNC: 118 U/L (ref 0–39)
B FRAGILIS DNA BLD POS QL NAA+NON-PROBE: NOT DETECTED
BASOPHILS # BLD: 0 K/UL (ref 0–0.2)
BASOPHILS NFR BLD: 0 % (ref 0–2)
BILIRUB SERPL-MCNC: 1.2 MG/DL (ref 0–1.2)
BIOFIRE TEST COMMENT: ABNORMAL
BNP SERPL-MCNC: 3394 PG/ML (ref 0–450)
BUN SERPL-MCNC: 30 MG/DL (ref 6–23)
C ALBICANS DNA BLD POS QL NAA+NON-PROBE: NOT DETECTED
C AURIS DNA BLD POS QL NAA+NON-PROBE: NOT DETECTED
C GATTII+NEOFOR DNA BLD POS QL NAA+N-PRB: NOT DETECTED
C GLABRATA DNA BLD POS QL NAA+NON-PROBE: NOT DETECTED
C KRUSEI DNA BLD POS QL NAA+NON-PROBE: NOT DETECTED
C PARAP DNA BLD POS QL NAA+NON-PROBE: NOT DETECTED
C TROPICLS DNA BLD POS QL NAA+NON-PROBE: NOT DETECTED
CALCIUM SERPL-MCNC: 9.3 MG/DL (ref 8.6–10.2)
CHLORIDE SERPL-SCNC: 100 MMOL/L (ref 98–107)
CO2 SERPL-SCNC: 21 MMOL/L (ref 22–29)
CREAT SERPL-MCNC: 1.6 MG/DL (ref 0.7–1.2)
E CLOAC COMP DNA BLD POS NAA+NON-PROBE: NOT DETECTED
E COLI DNA BLD POS QL NAA+NON-PROBE: NOT DETECTED
E FAECALIS DNA BLD POS QL NAA+NON-PROBE: NOT DETECTED
E FAECIUM DNA BLD POS QL NAA+NON-PROBE: NOT DETECTED
ECHO AO ANNULUS INDEX: 1.42 CM/M2
ECHO AO ASC DIAM: 3.2 CM
ECHO AO ASCENDING AORTA INDEX: 1.51 CM/M2
ECHO AO SINUS VALSALVA DIAM: 3 CM
ECHO AO SINUS VALSALVA INDEX: 1.42 CM/M2
ECHO AV ANNULUS DIAM: 3 CM
ECHO AV AREA PEAK VELOCITY: 1.4 CM2
ECHO AV AREA VTI: 1.4 CM2
ECHO AV AREA/BSA PEAK VELOCITY: 0.7 CM2/M2
ECHO AV AREA/BSA VTI: 0.7 CM2/M2
ECHO AV CUSP MM: 1.5 CM
ECHO AV MEAN GRADIENT: 13 MMHG
ECHO AV MEAN VELOCITY: 1.7 M/S
ECHO AV PEAK GRADIENT: 22 MMHG
ECHO AV PEAK VELOCITY: 2.3 M/S
ECHO AV VELOCITY RATIO: 0.43
ECHO AV VTI: 40.8 CM
ECHO BSA: 2.18 M2
ECHO EST RA PRESSURE: 8 MMHG
ECHO LA DIAMETER INDEX: 2.69 CM/M2
ECHO LA DIAMETER: 5.7 CM
ECHO LA VOL A-L A2C: 143 ML (ref 18–58)
ECHO LA VOL A-L A4C: 122 ML (ref 18–58)
ECHO LA VOL MOD A2C: 137 ML (ref 18–58)
ECHO LA VOL MOD A4C: 116 ML (ref 18–58)
ECHO LA VOLUME AREA LENGTH: 138 ML
ECHO LA VOLUME INDEX A-L A2C: 67 ML/M2 (ref 16–34)
ECHO LA VOLUME INDEX A-L A4C: 58 ML/M2 (ref 16–34)
ECHO LA VOLUME INDEX AREA LENGTH: 65 ML/M2 (ref 16–34)
ECHO LA VOLUME INDEX MOD A2C: 65 ML/M2 (ref 16–34)
ECHO LA VOLUME INDEX MOD A4C: 55 ML/M2 (ref 16–34)
ECHO LV EDV A2C: 114 ML
ECHO LV EDV A4C: 96 ML
ECHO LV EDV BP: 105 ML (ref 67–155)
ECHO LV EDV INDEX A4C: 45 ML/M2
ECHO LV EDV INDEX BP: 50 ML/M2
ECHO LV EDV NDEX A2C: 54 ML/M2
ECHO LV EF PHYSICIAN: 48 %
ECHO LV EJECTION FRACTION A2C: 48 %
ECHO LV EJECTION FRACTION A4C: 50 %
ECHO LV EJECTION FRACTION BIPLANE: 47 % (ref 55–100)
ECHO LV ESV A2C: 60 ML
ECHO LV ESV A4C: 48 ML
ECHO LV ESV BP: 56 ML (ref 22–58)
ECHO LV ESV INDEX A2C: 28 ML/M2
ECHO LV ESV INDEX A4C: 23 ML/M2
ECHO LV ESV INDEX BP: 26 ML/M2
ECHO LV FRACTIONAL SHORTENING: 24 % (ref 28–44)
ECHO LV INTERNAL DIMENSION DIASTOLE INDEX: 2.41 CM/M2
ECHO LV INTERNAL DIMENSION DIASTOLIC: 5.1 CM (ref 4.2–5.9)
ECHO LV INTERNAL DIMENSION SYSTOLIC INDEX: 1.84 CM/M2
ECHO LV INTERNAL DIMENSION SYSTOLIC: 3.9 CM
ECHO LV ISOVOLUMETRIC RELAXATION TIME (IVRT): 87.7 MS
ECHO LV IVSD: 1 CM (ref 0.6–1)
ECHO LV IVSS: 1.4 CM
ECHO LV MASS 2D: 188 G (ref 88–224)
ECHO LV MASS INDEX 2D: 88.7 G/M2 (ref 49–115)
ECHO LV POSTERIOR WALL DIASTOLIC: 1 CM (ref 0.6–1)
ECHO LV POSTERIOR WALL SYSTOLIC: 1.6 CM
ECHO LV RELATIVE WALL THICKNESS RATIO: 0.39
ECHO LVOT AREA: 3.1 CM2
ECHO LVOT AV VTI INDEX: 0.43
ECHO LVOT DIAM: 2 CM
ECHO LVOT MEAN GRADIENT: 2 MMHG
ECHO LVOT PEAK GRADIENT: 4 MMHG
ECHO LVOT PEAK VELOCITY: 1 M/S
ECHO LVOT STROKE VOLUME INDEX: 25.9 ML/M2
ECHO LVOT SV: 55 ML
ECHO LVOT VTI: 17.5 CM
ECHO MV AREA VTI: 1.9 CM2
ECHO MV EROA PISA: 0.3 CM2
ECHO MV LVOT VTI INDEX: 1.63
ECHO MV MAX VELOCITY: 1.3 M/S
ECHO MV MEAN GRADIENT: 3 MMHG
ECHO MV MEAN VELOCITY: 0.8 M/S
ECHO MV PEAK GRADIENT: 6 MMHG
ECHO MV REGURGITANT ALIASING (NYQUIST) VELOCITY: 34 CM/S
ECHO MV REGURGITANT RADIUS PISA: 0.79 CM
ECHO MV REGURGITANT VELOCITY PISA: 4.6 M/S
ECHO MV REGURGITANT VOLUME PISA: 37.66 ML
ECHO MV REGURGITANT VTIA: 130 CM
ECHO MV VTI: 28.6 CM
ECHO PV MAX VELOCITY: 1.1 M/S
ECHO PV MEAN GRADIENT: 3 MMHG
ECHO PV MEAN VELOCITY: 0.8 M/S
ECHO PV PEAK GRADIENT: 5 MMHG
ECHO PV VTI: 18.9 CM
ECHO PVEIN PEAK D VELOCITY: 0.5 M/S
ECHO PVEIN PEAK S VELOCITY: 0.5 M/S
ECHO PVEIN S/D RATIO: 1
ECHO RIGHT VENTRICULAR SYSTOLIC PRESSURE (RVSP): 32 MMHG
ECHO RV BASAL DIMENSION: 5.1 CM
ECHO RV INTERNAL DIMENSION: 4 CM
ECHO RV LONGITUDINAL DIMENSION: 7.8 CM
ECHO RV MID DIMENSION: 3.1 CM
ECHO RV TAPSE: 1.7 CM (ref 1.7–?)
ECHO TV REGURGITANT MAX VELOCITY: 2.45 M/S
ECHO TV REGURGITANT PEAK GRADIENT: 24 MMHG
ENTEROBACTERALES DNA BLD POS NAA+N-PRB: NOT DETECTED
EOSINOPHIL # BLD: 0.05 K/UL (ref 0.05–0.5)
EOSINOPHILS RELATIVE PERCENT: 1 % (ref 0–6)
ERYTHROCYTE [DISTWIDTH] IN BLOOD BY AUTOMATED COUNT: 14.6 % (ref 11.5–15)
GFR, ESTIMATED: 46 ML/MIN/1.73M2
GLUCOSE SERPL-MCNC: 92 MG/DL (ref 74–99)
GP B STREP DNA BLD POS QL NAA+NON-PROBE: NOT DETECTED
HAEM INFLU DNA BLD POS QL NAA+NON-PROBE: NOT DETECTED
HCT VFR BLD AUTO: 34.7 % (ref 37–54)
HGB BLD-MCNC: 11.2 G/DL (ref 12.5–16.5)
K OXYTOCA DNA BLD POS QL NAA+NON-PROBE: NOT DETECTED
KLEBSIELLA SP DNA BLD POS QL NAA+NON-PRB: NOT DETECTED
KLEBSIELLA SP DNA BLD POS QL NAA+NON-PRB: NOT DETECTED
L MONOCYTOG DNA BLD POS QL NAA+NON-PROBE: NOT DETECTED
LYMPHOCYTES NFR BLD: 0.19 K/UL (ref 1.5–4)
LYMPHOCYTES RELATIVE PERCENT: 4 % (ref 20–42)
MCH RBC QN AUTO: 32.3 PG (ref 26–35)
MCHC RBC AUTO-ENTMCNC: 32.3 G/DL (ref 32–34.5)
MCV RBC AUTO: 100 FL (ref 80–99.9)
MECA+MECC ISLT/SPM QL: DETECTED
MICROORGANISM SPEC CULT: ABNORMAL
MICROORGANISM SPEC CULT: ABNORMAL
MICROORGANISM/AGENT SPEC: ABNORMAL
MONOCYTES NFR BLD: 0.33 K/UL (ref 0.1–0.95)
MONOCYTES NFR BLD: 6 % (ref 2–12)
N MEN DNA BLD POS QL NAA+NON-PROBE: NOT DETECTED
NEUTROPHILS NFR BLD: 90 % (ref 43–80)
NEUTS SEG NFR BLD: 4.84 K/UL (ref 1.8–7.3)
P AERUGINOSA DNA BLD POS NAA+NON-PROBE: NOT DETECTED
PLATELET CONFIRMATION: NORMAL
PLATELET, FLUORESCENCE: 41 K/UL (ref 130–450)
PMV BLD AUTO: 11.6 FL (ref 7–12)
POTASSIUM SERPL-SCNC: 3.7 MMOL/L (ref 3.5–5)
PROT SERPL-MCNC: 6.3 G/DL (ref 6.4–8.3)
PROTEUS SP DNA BLD POS QL NAA+NON-PROBE: NOT DETECTED
RBC # BLD AUTO: 3.47 M/UL (ref 3.8–5.8)
RBC # BLD: ABNORMAL 10*6/UL
S AUREUS DNA BLD POS QL NAA+NON-PROBE: NOT DETECTED
S AUREUS+CONS DNA BLD POS NAA+NON-PROBE: DETECTED
S EPIDERMIDIS DNA BLD POS QL NAA+NON-PRB: DETECTED
S LUGDUNENSIS DNA BLD POS QL NAA+NON-PRB: NOT DETECTED
S MALTOPHILIA DNA BLD POS QL NAA+NON-PRB: NOT DETECTED
S MARCESCENS DNA BLD POS NAA+NON-PROBE: NOT DETECTED
S PNEUM DNA BLD POS QL NAA+NON-PROBE: NOT DETECTED
S PYO DNA BLD POS QL NAA+NON-PROBE: NOT DETECTED
SALMONELLA DNA BLD POS QL NAA+NON-PROBE: NOT DETECTED
SERVICE CMNT-IMP: ABNORMAL
SERVICE CMNT-IMP: ABNORMAL
SODIUM SERPL-SCNC: 136 MMOL/L (ref 132–146)
SPECIMEN DESCRIPTION: ABNORMAL
SPECIMEN DESCRIPTION: ABNORMAL
STREPTOCOCCUS DNA BLD POS NAA+NON-PROBE: NOT DETECTED
WBC OTHER # BLD: 5.4 K/UL (ref 4.5–11.5)

## 2025-05-10 PROCEDURE — 6370000000 HC RX 637 (ALT 250 FOR IP)

## 2025-05-10 PROCEDURE — 93306 TTE W/DOPPLER COMPLETE: CPT | Performed by: INTERNAL MEDICINE

## 2025-05-10 PROCEDURE — 99233 SBSQ HOSP IP/OBS HIGH 50: CPT | Performed by: INTERNAL MEDICINE

## 2025-05-10 PROCEDURE — 2500000003 HC RX 250 WO HCPCS

## 2025-05-10 PROCEDURE — 99232 SBSQ HOSP IP/OBS MODERATE 35: CPT | Performed by: FAMILY MEDICINE

## 2025-05-10 PROCEDURE — 85025 COMPLETE CBC W/AUTO DIFF WBC: CPT

## 2025-05-10 PROCEDURE — 83880 ASSAY OF NATRIURETIC PEPTIDE: CPT

## 2025-05-10 PROCEDURE — 94640 AIRWAY INHALATION TREATMENT: CPT

## 2025-05-10 PROCEDURE — 2580000003 HC RX 258: Performed by: INTERNAL MEDICINE

## 2025-05-10 PROCEDURE — 87040 BLOOD CULTURE FOR BACTERIA: CPT

## 2025-05-10 PROCEDURE — 93306 TTE W/DOPPLER COMPLETE: CPT

## 2025-05-10 PROCEDURE — 6360000002 HC RX W HCPCS

## 2025-05-10 PROCEDURE — 80053 COMPREHEN METABOLIC PANEL: CPT

## 2025-05-10 PROCEDURE — 1200000000 HC SEMI PRIVATE

## 2025-05-10 RX ORDER — SODIUM CHLORIDE 9 MG/ML
INJECTION, SOLUTION INTRAVENOUS CONTINUOUS
Status: DISCONTINUED | OUTPATIENT
Start: 2025-05-10 | End: 2025-05-11

## 2025-05-10 RX ADMIN — DULOXETINE 30 MG: 30 CAPSULE, DELAYED RELEASE ORAL at 08:24

## 2025-05-10 RX ADMIN — PREDNISONE 5 MG: 5 TABLET ORAL at 08:24

## 2025-05-10 RX ADMIN — HYDROCORTISONE: 1 CREAM TOPICAL at 08:26

## 2025-05-10 RX ADMIN — Medication 4000 UNITS: at 08:25

## 2025-05-10 RX ADMIN — Medication 100 MG: at 08:24

## 2025-05-10 RX ADMIN — METOPROLOL SUCCINATE 25 MG: 25 TABLET, FILM COATED, EXTENDED RELEASE ORAL at 08:24

## 2025-05-10 RX ADMIN — HYDROXYCHLOROQUINE SULFATE 200 MG: 200 TABLET ORAL at 20:25

## 2025-05-10 RX ADMIN — HYDROCORTISONE: 1 CREAM TOPICAL at 20:27

## 2025-05-10 RX ADMIN — LACOSAMIDE 100 MG: 100 TABLET, FILM COATED ORAL at 20:26

## 2025-05-10 RX ADMIN — FOLIC ACID 1 MG: 1 TABLET ORAL at 20:25

## 2025-05-10 RX ADMIN — BUDESONIDE 1000 MCG: 0.5 SUSPENSION RESPIRATORY (INHALATION) at 09:22

## 2025-05-10 RX ADMIN — ACETAMINOPHEN 650 MG: 325 TABLET ORAL at 14:29

## 2025-05-10 RX ADMIN — SODIUM CHLORIDE: 0.9 INJECTION, SOLUTION INTRAVENOUS at 12:32

## 2025-05-10 RX ADMIN — LEVETIRACETAM 1000 MG: 500 TABLET, FILM COATED ORAL at 08:24

## 2025-05-10 RX ADMIN — PANTOPRAZOLE SODIUM 40 MG: 40 TABLET, DELAYED RELEASE ORAL at 06:43

## 2025-05-10 RX ADMIN — IPRATROPIUM BROMIDE AND ALBUTEROL SULFATE 1 DOSE: .5; 2.5 SOLUTION RESPIRATORY (INHALATION) at 15:40

## 2025-05-10 RX ADMIN — HYDROXYCHLOROQUINE SULFATE 200 MG: 200 TABLET ORAL at 08:24

## 2025-05-10 RX ADMIN — LACOSAMIDE 100 MG: 100 TABLET, FILM COATED ORAL at 08:25

## 2025-05-10 RX ADMIN — GABAPENTIN 300 MG: 300 CAPSULE ORAL at 08:26

## 2025-05-10 RX ADMIN — IPRATROPIUM BROMIDE AND ALBUTEROL SULFATE 1 DOSE: .5; 2.5 SOLUTION RESPIRATORY (INHALATION) at 12:46

## 2025-05-10 RX ADMIN — Medication 250 MG: at 08:25

## 2025-05-10 RX ADMIN — LEVETIRACETAM 1000 MG: 500 TABLET, FILM COATED ORAL at 20:27

## 2025-05-10 RX ADMIN — IPRATROPIUM BROMIDE AND ALBUTEROL SULFATE 1 DOSE: .5; 2.5 SOLUTION RESPIRATORY (INHALATION) at 09:22

## 2025-05-10 RX ADMIN — SODIUM CHLORIDE, PRESERVATIVE FREE 10 ML: 5 INJECTION INTRAVENOUS at 08:27

## 2025-05-10 RX ADMIN — SODIUM CHLORIDE, PRESERVATIVE FREE 10 ML: 5 INJECTION INTRAVENOUS at 20:26

## 2025-05-10 RX ADMIN — ARFORMOTEROL TARTRATE 15 MCG: 15 SOLUTION RESPIRATORY (INHALATION) at 09:22

## 2025-05-10 RX ADMIN — ENOXAPARIN SODIUM 30 MG: 100 INJECTION SUBCUTANEOUS at 20:25

## 2025-05-10 ASSESSMENT — PAIN SCALES - GENERAL: PAINLEVEL_OUTOF10: 1

## 2025-05-10 NOTE — PROGRESS NOTES
INPATIENT CARDIOLOGY FOLLOW-UP    Name: Jermaine Burks    Age: 74 y.o.    Date of Admission: 5/8/2025 11:43 PM    Date of Service: 5/10/2025    Chief Complaint: Follow-up for CHF with JOSE     Interim History:  No new overnight cardiac complaints.  Patient told me he is feeling better slept good denies any increased dyspnea.  Currently with no complaints of CP, SOB, palpitations, dizziness, or lightheadedness. SR on telemetry.    Review of Systems:   Cardiac: As per HPI  General: No fever, chills  Pulmonary: As per HPI  HEENT: No visual disturbances, difficult swallowing  GI: No nausea, vomiting  Endocrine: No thyroid disease or DM  Musculoskeletal: DOLAN x 4, no focal motor deficits  Skin: Intact, no rashes  Neuro/Psych: No headache or seizures    Problem List:  Patient Active Problem List   Diagnosis    COPD (chronic obstructive pulmonary disease) (HCC)    Thrombocytopenia    Obstructive sleep apnea, s/p UPPP    Paroxysmal atrial fibrillation (HCC)    Essential hypertension    Supravalvar aortic stenosis    Hepatic cirrhosis (HCC)    Alcoholic cirrhosis of liver without ascites (HCC)    Obesity    Persistent atrial fibrillation (HCC)    History of intracranial hemorrhage    Closed compression fracture of lumbar vertebra (HCC)    History of kidney stones    Ventral hernia    Nicotine dependence    History of TIA (transient ischemic attack)    S/P TAVR (transcatheter aortic valve replacement)    Rheumatoid arthritis involving multiple sites with positive rheumatoid factor (HCC)    Osteoporosis without current pathological fracture    Morbid obesity with BMI of 40.0-44.9, adult (HCC)    Chronic renal disease, stage III (HCC) [828271]    Anemia    Ambulatory dysfunction    Hyperkalemia    Diarrhea    HFrEF (heart failure with reduced ejection fraction) (HCC)    Suspected sleep apnea    Other fatigue    Palpitation    SOB (shortness of breath)    Hypoxia    Chronic congestive heart failure (HCC)    Pneumonia due  Daily Justina Zavaleta MD        [Held by provider] spironolactone (ALDACTONE) tablet 12.5 mg  12.5 mg Oral Daily Justina Zavaleta MD        triamcinolone (KENALOG) 0.1 % cream   Topical BID Justina Zavaleta MD        thiamine tablet 100 mg  100 mg Oral Daily Justina Zavaleta MD   100 mg at 05/10/25 0824    vitamin D (CHOLECALCIFEROL) tablet 4,000 Units  4,000 Units Oral Daily Justina Zavaleta MD   4,000 Units at 05/10/25 0825    sodium chloride flush 0.9 % injection 5-40 mL  5-40 mL IntraVENous 2 times per day Justina Zavaleta MD   10 mL at 05/10/25 0827    sodium chloride flush 0.9 % injection 5-40 mL  5-40 mL IntraVENous PRN Justina Zavaleta MD        0.9 % sodium chloride infusion   IntraVENous PRN Justina Zavaleta MD        ondansetron (ZOFRAN-ODT) disintegrating tablet 4 mg  4 mg Oral Q8H PRN Justina Zavaleta MD        Or    ondansetron (ZOFRAN) injection 4 mg  4 mg IntraVENous Q6H PRN Justina Zavaleta MD        polyethylene glycol (GLYCOLAX) packet 17 g  17 g Oral Daily PRN Justina Zavaleta MD        acetaminophen (TYLENOL) tablet 650 mg  650 mg Oral Q6H PRN Justina Zavaleta MD   650 mg at 05/09/25 1642    Or    acetaminophen (TYLENOL) suppository 650 mg  650 mg Rectal Q6H PRN Justina Zavaleta MD        arformoterol tartrate (BROVANA) nebulizer solution 15 mcg  15 mcg Nebulization BID RT Justina Zavaleta MD   15 mcg at 05/10/25 0922    budesonide (PULMICORT) nebulizer suspension 1,000 mcg  1 mg Nebulization BID Justina Zavaleta MD   1,000 mcg at 05/10/25 0922    [Held by provider] enoxaparin Sodium (LOVENOX) injection 30 mg  30 mg SubCUTAneous BID Justina Zavaleta MD   30 mg at 05/09/25 0919    ipratropium 0.5 mg-albuterol 2.5 mg (DUONEB) nebulizer solution 1 Dose  1 Dose Inhalation Q4H May Lopez, APRN - CNP   1 Dose at 05/10/25 0922      sodium chloride         Physical Exam:  /65   Pulse (!) 101   Temp 97.9 °F

## 2025-05-10 NOTE — PROGRESS NOTES
[facility]  Progress Note    Chief complaint :  Chief Complaint   Patient presents with    Shortness of Breath     Hx of afib, 3L NC 89% on RA       Subjective:    Patient was seen and examined at bedside this morning.  Today he denies any shortness of breath and is not wearing any oxygen today he was denying fever, chills, abdominal pain, diarrhea, constipation.  He did undergo thoracentesis yesterday however IR was not able to drain any fluid because there was not enough fluid to drain.    Past medical, surgical, family and social history were reviewed, non-contributory, and unchanged unless otherwise stated.    Review of Systems  Pertinent positives and negatives as in HPI    Objective:  /65   Pulse (!) 101   Temp 97.9 °F (36.6 °C)   Resp 18   Wt 104.5 kg (230 lb 6.1 oz)   SpO2 98%   BMI 37.18 kg/m²     Physical Exam  Constitutional:       Appearance: He is obese. He is ill-appearing.   HENT:      Nose: Nose normal.      Mouth/Throat:      Mouth: Mucous membranes are moist.   Eyes:      Conjunctiva/sclera: Conjunctivae normal.   Cardiovascular:      Rate and Rhythm: Normal rate and regular rhythm.      Pulses: Normal pulses.      Heart sounds: Normal heart sounds.   Pulmonary:      Effort: Pulmonary effort is normal.      Breath sounds: No stridor. Rales present. No wheezing or rhonchi.      Comments: Crackles bilateral lower lung fields  Abdominal:      General: Bowel sounds are normal.      Palpations: Abdomen is soft.   Musculoskeletal:         General: Normal range of motion.      Cervical back: Normal range of motion.      Right lower leg: Edema present.      Left lower leg: Edema present.   Skin:     General: Skin is warm.   Neurological:      General: No focal deficit present.      Mental Status: He is alert.   Psychiatric:         Mood and Affect: Mood normal.         Labs:  Recent Results (from the past 24 hours)   Ammonia    Collection Time: 05/09/25

## 2025-05-10 NOTE — PROGRESS NOTES
Per Micro 1/4 blood culture bottles positive for gram positive cocci and clusters. Notified Dr. Radford via perfect serve.

## 2025-05-10 NOTE — PLAN OF CARE
Problem: ABCDS Injury Assessment  Goal: Absence of physical injury  Outcome: Progressing     Problem: Discharge Planning  Goal: Discharge to home or other facility with appropriate resources  Outcome: Progressing     Problem: Safety - Adult  Goal: Free from fall injury  Outcome: Progressing     Problem: Chronic Conditions and Co-morbidities  Goal: Patient's chronic conditions and co-morbidity symptoms are monitored and maintained or improved  Outcome: Progressing     Problem: Pain  Goal: Verbalizes/displays adequate comfort level or baseline comfort level  Outcome: Progressing     Problem: Skin/Tissue Integrity  Goal: Skin integrity remains intact  Description: 1.  Monitor for areas of redness and/or skin breakdown2.  Assess vascular access sites hourly3.  Every 4-6 hours minimum:  Change oxygen saturation probe site4.  Every 4-6 hours:  If on nasal continuous positive airway pressure, respiratory therapy assess nares and determine need for appliance change or resting period  Outcome: Progressing

## 2025-05-10 NOTE — PROGRESS NOTES
[facility]  Nephrology Progress Note    Patient's Name: Jermaine Burks    History of Present Illness from 5/9 Consult Note:    \"Jermaine Burks is a 74 y.o. male with a past medical history of JAY closed compression fracutre of lumbar vertebrae, alcoholic cirrhosis, CKD stage III, COPD, paroxysmal afib HTN .      They presented on 5/8/2025 complaining of SOB. They have noticed SOB for 2 days prior to presentation. On arrival their vitals were pertinent for hypertension. Initial labs were pertinent for creatinine of 1.9.  Question that he received iodinated contrast on 5 /9. nephrology was consulted for acute kidney injury.\"    Subjective:    5/10/2025: Patient was seen and examined.  He notes that his breathing is okay.  He reports that his appetite is slightly improved.  Visitor at bedside.    PMH:    Past Medical History:   Diagnosis Date    Alcoholic cirrhosis (HCC)     Alcoholism (HCC)     Has been sober / dry since 01/2016.    Anxiety     Arthritis     Chronic atrial fibrillation (HCC)     Esophageal varices ([facility])     UGI bleed ~2012 - has had variceal banding 2x around that time.    History of kidney stones     Hypertension     Lumbar compression fracture (HCC) 02/2017    Lung tumor 2017    Osteopenia     Osteopenia     S/P TAVR (transcatheter aortic valve replacement) 9/20/2018    Seizure disorder ([facility]) 09/2016    Complicated a stroke with occipital ICH.    Seizures ([facility])     Sepsis with MRSE bacteremia 09/2016    resolved    Severe aortic stenosis     Confirmed by echo 9/2016, BROWN 10/2016. Undergoing w/u at [facility] for TAVR, as of 4/2017.    Sleep apnea     Couldn't tolerate CPAP therapy ~2007. Had had UPPP.    Stroke ([facility]) 09/2016    With receptive aphasia, poor memory that are improving as of 04/2017. Pt was found to have an occipital ICH at that time, with possible amyloid angiopathy as cause per MRI subsequently. Had seizure and sepsis (d/t MRSE bacteremia) complicating stroke.       Patient  100 mg Oral Daily    vitamin D  4,000 Units Oral Daily    sodium chloride flush  5-40 mL IntraVENous 2 times per day    arformoterol tartrate  15 mcg Nebulization BID RT    budesonide  1 mg Nebulization BID    [Held by provider] enoxaparin  30 mg SubCUTAneous BID    ipratropium 0.5 mg-albuterol 2.5 mg  1 Dose Inhalation Q4H WA RT        sodium chloride         Meds prn:     ibuprofen, sodium chloride flush, sodium chloride, ondansetron **OR** ondansetron, polyethylene glycol, acetaminophen **OR** acetaminophen    Meds prior to admission:     No current facility-administered medications on file prior to encounter.     Current Outpatient Medications on File Prior to Encounter   Medication Sig Dispense Refill    pantoprazole (PROTONIX) 40 MG tablet Take 1 tablet by mouth daily      potassium citrate (UROCIT-K) 10 MEQ (1080 MG) extended release tablet Take 3 tablets by mouth in the morning and 3 tablets in the evening.      spironolactone (ALDACTONE) 25 MG tablet Take 0.5 tablets by mouth daily 90 tablet 1    gabapentin (NEURONTIN) 100 MG capsule Take 1 capsule by mouth 2 times daily for 90 days. (Patient taking differently: Take 3 capsules by mouth daily.) 180 capsule 0    DULoxetine (CYMBALTA) 30 MG extended release capsule Take 1 capsule by mouth daily 30 capsule 1    ibuprofen (ADVIL;MOTRIN) 200 MG tablet Take 1 tablet by mouth every 6 hours as needed for Pain      metoprolol succinate (TOPROL XL) 25 MG extended release tablet Take 1 tablet by mouth daily 90 tablet 3    calcitonin (MIACALCIN) 200 UNIT/ACT nasal spray 1 spray by Nasal route daily 3 each 1    CALCIUM CITRATE PO Take 3 tablets by mouth nightly 600 MG      triamcinolone (KENALOG) 0.1 % cream Apply topically to affected areas 2 times daily. 45 g 2    levETIRAcetam (KEPPRA) 1000 MG tablet Take 1 tablet by mouth 2 times daily      predniSONE (DELTASONE) 5 MG tablet Take 1 tablet by mouth daily 90 tablet 1    lacosamide (VIMPAT) 100 MG TABS tablet Take 1

## 2025-05-10 NOTE — PROGRESS NOTES
[facility]  Progress Note    Chief complaint :  Chief Complaint   Patient presents with    Shortness of Breath     Hx of afib, 3L NC 89% on RA       Subjective:    Patient was seen and examined at bedside this morning, patient mentioned he had around 6-7 bowel movements overnight decreasing consistency, no blood appreciated, patient mentioned he has usually very soft bowel movements but overnight he noticed it was more watery than usual.  Patient refers feeling better, he still feeling fatigued but this is his baseline.  Denying fever, chills, abdominal pain, diarrhea, constipation.  Patient is tolerating diet.  Past medical, surgical, family and social history were reviewed, non-contributory, and unchanged unless otherwise stated.    Review of Systems  Pertinent positives and negatives as in HPI    Objective:  /89   Pulse (!) 103   Temp 97.3 °F (36.3 °C) (Oral)   Resp 20   Wt 106 kg (233 lb 11 oz)   SpO2 96%   BMI 37.72 kg/m²     Physical Exam  Constitutional:       Appearance: He is obese. He is ill-appearing.   HENT:      Nose: Nose normal.      Mouth/Throat:      Mouth: Mucous membranes are moist.   Eyes:      Conjunctiva/sclera: Conjunctivae normal.   Cardiovascular:      Rate and Rhythm: Normal rate and regular rhythm.      Pulses: Normal pulses.      Heart sounds: Normal heart sounds.   Pulmonary:      Effort: Pulmonary effort is normal.      Breath sounds: No stridor. Rales present. No wheezing or rhonchi.      Comments: Crackles bilateral lower lung fields  Abdominal:      General: Bowel sounds are normal.      Palpations: Abdomen is soft.   Musculoskeletal:         General: Normal range of motion.      Cervical back: Normal range of motion.      Right lower leg: Edema present.      Left lower leg: Edema present.   Skin:     General: Skin is warm.   Neurological:      General: No focal deficit present.      Mental Status: He is alert.   Psychiatric:

## 2025-05-10 NOTE — PLAN OF CARE
Problem: ABCDS Injury Assessment  Goal: Absence of physical injury  5/10/2025 1659 by Josiane Good RN  Outcome: Progressing  5/10/2025 0441 by Vicenta Burton RN  Outcome: Progressing     Problem: Discharge Planning  Goal: Discharge to home or other facility with appropriate resources  5/10/2025 1659 by Josiane Good RN  Outcome: Progressing  5/10/2025 0441 by Vicenta Burton RN  Outcome: Progressing     Problem: Safety - Adult  Goal: Free from fall injury  5/10/2025 1659 by Josiane Good RN  Outcome: Progressing  5/10/2025 0441 by Vicenta Burton RN  Outcome: Progressing     Problem: Chronic Conditions and Co-morbidities  Goal: Patient's chronic conditions and co-morbidity symptoms are monitored and maintained or improved  5/10/2025 1659 by Josiane Good RN  Outcome: Progressing  5/10/2025 0441 by Vicenta Burton RN  Outcome: Progressing     Problem: Pain  Goal: Verbalizes/displays adequate comfort level or baseline comfort level  5/10/2025 1659 by Josiane Good RN  Outcome: Progressing  5/10/2025 0441 by Vicenta Burton RN  Outcome: Progressing     Problem: Skin/Tissue Integrity  Goal: Skin integrity remains intact  Description: 1.  Monitor for areas of redness and/or skin breakdown2.  Assess vascular access sites hourly3.  Every 4-6 hours minimum:  Change oxygen saturation probe site4.  Every 4-6 hours:  If on nasal continuous positive airway pressure, respiratory therapy assess nares and determine need for appliance change or resting period  5/10/2025 1659 by Josiane Good RN  Outcome: Progressing  5/10/2025 0441 by Vicenta Burton RN  Outcome: Progressing

## 2025-05-11 LAB
ALBUMIN SERPL-MCNC: 3.7 G/DL (ref 3.5–5.2)
ALP SERPL-CCNC: 66 U/L (ref 40–129)
ALT SERPL-CCNC: 123 U/L (ref 0–40)
ANION GAP SERPL CALCULATED.3IONS-SCNC: 14 MMOL/L (ref 7–16)
AST SERPL-CCNC: 162 U/L (ref 0–39)
BASOPHILS # BLD: 0.02 K/UL (ref 0–0.2)
BASOPHILS NFR BLD: 0 % (ref 0–2)
BILIRUB SERPL-MCNC: 1.6 MG/DL (ref 0–1.2)
BNP SERPL-MCNC: 2185 PG/ML (ref 0–450)
BUN SERPL-MCNC: 27 MG/DL (ref 6–23)
CALCIUM SERPL-MCNC: 9.7 MG/DL (ref 8.6–10.2)
CHLORIDE SERPL-SCNC: 101 MMOL/L (ref 98–107)
CO2 SERPL-SCNC: 23 MMOL/L (ref 22–29)
CREAT SERPL-MCNC: 1.3 MG/DL (ref 0.7–1.2)
EOSINOPHIL # BLD: 0 K/UL (ref 0.05–0.5)
EOSINOPHILS RELATIVE PERCENT: 0 % (ref 0–6)
ERYTHROCYTE [DISTWIDTH] IN BLOOD BY AUTOMATED COUNT: 14.7 % (ref 11.5–15)
GFR, ESTIMATED: 58 ML/MIN/1.73M2
GLUCOSE SERPL-MCNC: 87 MG/DL (ref 74–99)
HCT VFR BLD AUTO: 40 % (ref 37–54)
HGB BLD-MCNC: 12.7 G/DL (ref 12.5–16.5)
IMM GRANULOCYTES # BLD AUTO: 0.03 K/UL (ref 0–0.58)
IMM GRANULOCYTES NFR BLD: 0 % (ref 0–5)
LYMPHOCYTES NFR BLD: 0.44 K/UL (ref 1.5–4)
LYMPHOCYTES RELATIVE PERCENT: 6 % (ref 20–42)
MCH RBC QN AUTO: 32.7 PG (ref 26–35)
MCHC RBC AUTO-ENTMCNC: 31.8 G/DL (ref 32–34.5)
MCV RBC AUTO: 103.1 FL (ref 80–99.9)
MONOCYTES NFR BLD: 0.52 K/UL (ref 0.1–0.95)
MONOCYTES NFR BLD: 7 % (ref 2–12)
NEUTROPHILS NFR BLD: 87 % (ref 43–80)
NEUTS SEG NFR BLD: 6.68 K/UL (ref 1.8–7.3)
PLATELET CONFIRMATION: NORMAL
PLATELET, FLUORESCENCE: 73 K/UL (ref 130–450)
PMV BLD AUTO: 11.5 FL (ref 7–12)
POTASSIUM SERPL-SCNC: 3.6 MMOL/L (ref 3.5–5)
PROT SERPL-MCNC: 7.2 G/DL (ref 6.4–8.3)
RBC # BLD AUTO: 3.88 M/UL (ref 3.8–5.8)
RBC # BLD: ABNORMAL 10*6/UL
SODIUM SERPL-SCNC: 138 MMOL/L (ref 132–146)
WBC # BLD: ABNORMAL 10*3/UL
WBC # BLD: ABNORMAL 10*3/UL
WBC OTHER # BLD: 7.7 K/UL (ref 4.5–11.5)

## 2025-05-11 PROCEDURE — 6360000002 HC RX W HCPCS

## 2025-05-11 PROCEDURE — 6360000002 HC RX W HCPCS: Performed by: INTERNAL MEDICINE

## 2025-05-11 PROCEDURE — 2500000003 HC RX 250 WO HCPCS: Performed by: INTERNAL MEDICINE

## 2025-05-11 PROCEDURE — 99233 SBSQ HOSP IP/OBS HIGH 50: CPT | Performed by: INTERNAL MEDICINE

## 2025-05-11 PROCEDURE — 6370000000 HC RX 637 (ALT 250 FOR IP)

## 2025-05-11 PROCEDURE — 87324 CLOSTRIDIUM AG IA: CPT

## 2025-05-11 PROCEDURE — 2500000003 HC RX 250 WO HCPCS

## 2025-05-11 PROCEDURE — 85025 COMPLETE CBC W/AUTO DIFF WBC: CPT

## 2025-05-11 PROCEDURE — 99232 SBSQ HOSP IP/OBS MODERATE 35: CPT | Performed by: FAMILY MEDICINE

## 2025-05-11 PROCEDURE — 94640 AIRWAY INHALATION TREATMENT: CPT

## 2025-05-11 PROCEDURE — 83880 ASSAY OF NATRIURETIC PEPTIDE: CPT

## 2025-05-11 PROCEDURE — 80053 COMPREHEN METABOLIC PANEL: CPT

## 2025-05-11 PROCEDURE — 87449 NOS EACH ORGANISM AG IA: CPT

## 2025-05-11 PROCEDURE — 2580000003 HC RX 258: Performed by: INTERNAL MEDICINE

## 2025-05-11 PROCEDURE — 1200000000 HC SEMI PRIVATE

## 2025-05-11 RX ORDER — FUROSEMIDE 20 MG/1
20 TABLET ORAL DAILY
Status: DISCONTINUED | OUTPATIENT
Start: 2025-05-12 | End: 2025-05-13 | Stop reason: HOSPADM

## 2025-05-11 RX ORDER — DIGOXIN 0.25 MG/ML
125 INJECTION INTRAMUSCULAR; INTRAVENOUS ONCE
Status: COMPLETED | OUTPATIENT
Start: 2025-05-11 | End: 2025-05-11

## 2025-05-11 RX ORDER — 0.9 % SODIUM CHLORIDE 0.9 %
250 INTRAVENOUS SOLUTION INTRAVENOUS ONCE
Status: COMPLETED | OUTPATIENT
Start: 2025-05-11 | End: 2025-05-12

## 2025-05-11 RX ORDER — METOPROLOL TARTRATE 1 MG/ML
5 INJECTION, SOLUTION INTRAVENOUS ONCE
Status: COMPLETED | OUTPATIENT
Start: 2025-05-11 | End: 2025-05-11

## 2025-05-11 RX ORDER — POTASSIUM CHLORIDE 1500 MG/1
40 TABLET, EXTENDED RELEASE ORAL ONCE
Status: COMPLETED | OUTPATIENT
Start: 2025-05-11 | End: 2025-05-11

## 2025-05-11 RX ADMIN — PANTOPRAZOLE SODIUM 40 MG: 40 TABLET, DELAYED RELEASE ORAL at 05:51

## 2025-05-11 RX ADMIN — LACOSAMIDE 100 MG: 100 TABLET, FILM COATED ORAL at 23:51

## 2025-05-11 RX ADMIN — LEVETIRACETAM 1000 MG: 500 TABLET, FILM COATED ORAL at 23:51

## 2025-05-11 RX ADMIN — DIGOXIN 125 MCG: 0.25 INJECTION INTRAMUSCULAR; INTRAVENOUS at 23:52

## 2025-05-11 RX ADMIN — LEVETIRACETAM 1000 MG: 500 TABLET, FILM COATED ORAL at 08:43

## 2025-05-11 RX ADMIN — IPRATROPIUM BROMIDE AND ALBUTEROL SULFATE 1 DOSE: .5; 2.5 SOLUTION RESPIRATORY (INHALATION) at 15:33

## 2025-05-11 RX ADMIN — SILVER SULFADIAZINE: 10 CREAM TOPICAL at 08:45

## 2025-05-11 RX ADMIN — FOLIC ACID 1 MG: 1 TABLET ORAL at 23:52

## 2025-05-11 RX ADMIN — ARFORMOTEROL TARTRATE 15 MCG: 15 SOLUTION RESPIRATORY (INHALATION) at 19:30

## 2025-05-11 RX ADMIN — IPRATROPIUM BROMIDE AND ALBUTEROL SULFATE 1 DOSE: .5; 2.5 SOLUTION RESPIRATORY (INHALATION) at 13:08

## 2025-05-11 RX ADMIN — BUDESONIDE 1000 MCG: 0.5 SUSPENSION RESPIRATORY (INHALATION) at 09:34

## 2025-05-11 RX ADMIN — Medication 100 MG: at 08:43

## 2025-05-11 RX ADMIN — ENOXAPARIN SODIUM 30 MG: 100 INJECTION SUBCUTANEOUS at 08:44

## 2025-05-11 RX ADMIN — LACOSAMIDE 100 MG: 100 TABLET, FILM COATED ORAL at 08:43

## 2025-05-11 RX ADMIN — ASPIRIN 81 MG 81 MG: 81 TABLET ORAL at 08:43

## 2025-05-11 RX ADMIN — DULOXETINE 30 MG: 30 CAPSULE, DELAYED RELEASE ORAL at 08:43

## 2025-05-11 RX ADMIN — IPRATROPIUM BROMIDE AND ALBUTEROL SULFATE 1 DOSE: .5; 2.5 SOLUTION RESPIRATORY (INHALATION) at 09:34

## 2025-05-11 RX ADMIN — ENOXAPARIN SODIUM 30 MG: 100 INJECTION SUBCUTANEOUS at 23:51

## 2025-05-11 RX ADMIN — BUDESONIDE 1000 MCG: 0.5 SUSPENSION RESPIRATORY (INHALATION) at 19:30

## 2025-05-11 RX ADMIN — HYDROCORTISONE: 1 CREAM TOPICAL at 21:01

## 2025-05-11 RX ADMIN — SODIUM CHLORIDE 250 ML: 0.9 INJECTION, SOLUTION INTRAVENOUS at 23:50

## 2025-05-11 RX ADMIN — METOPROLOL TARTRATE 5 MG: 5 INJECTION INTRAVENOUS at 23:52

## 2025-05-11 RX ADMIN — PREDNISONE 5 MG: 5 TABLET ORAL at 08:43

## 2025-05-11 RX ADMIN — POTASSIUM CHLORIDE 40 MEQ: 1500 TABLET, EXTENDED RELEASE ORAL at 05:51

## 2025-05-11 RX ADMIN — SODIUM CHLORIDE, PRESERVATIVE FREE 10 ML: 5 INJECTION INTRAVENOUS at 08:44

## 2025-05-11 RX ADMIN — SODIUM CHLORIDE, PRESERVATIVE FREE 10 ML: 5 INJECTION INTRAVENOUS at 21:00

## 2025-05-11 RX ADMIN — Medication 4000 UNITS: at 08:43

## 2025-05-11 RX ADMIN — IPRATROPIUM BROMIDE AND ALBUTEROL SULFATE 1 DOSE: .5; 2.5 SOLUTION RESPIRATORY (INHALATION) at 19:30

## 2025-05-11 RX ADMIN — ARFORMOTEROL TARTRATE 15 MCG: 15 SOLUTION RESPIRATORY (INHALATION) at 09:33

## 2025-05-11 RX ADMIN — Medication 250 MG: at 08:43

## 2025-05-11 RX ADMIN — HYDROXYCHLOROQUINE SULFATE 200 MG: 200 TABLET ORAL at 23:52

## 2025-05-11 RX ADMIN — HYDROXYCHLOROQUINE SULFATE 200 MG: 200 TABLET ORAL at 08:43

## 2025-05-11 RX ADMIN — METOPROLOL SUCCINATE 25 MG: 25 TABLET, FILM COATED, EXTENDED RELEASE ORAL at 08:51

## 2025-05-11 RX ADMIN — HYDROCORTISONE: 1 CREAM TOPICAL at 08:44

## 2025-05-11 RX ADMIN — GABAPENTIN 300 MG: 300 CAPSULE ORAL at 08:43

## 2025-05-11 ASSESSMENT — PAIN SCALES - GENERAL: PAINLEVEL_OUTOF10: 0

## 2025-05-11 NOTE — PLAN OF CARE
Problem: ABCDS Injury Assessment  Goal: Absence of physical injury  5/11/2025 1116 by Cam Vang RN  Outcome: Progressing  5/10/2025 2342 by Deborah Ogden RN  Outcome: Progressing     Problem: Discharge Planning  Goal: Discharge to home or other facility with appropriate resources  5/11/2025 1116 by Cam Vang RN  Outcome: Progressing  5/10/2025 2342 by Deborah Ogden RN  Outcome: Progressing  Flowsheets (Taken 5/10/2025 2335)  Discharge to home or other facility with appropriate resources: Identify barriers to discharge with patient and caregiver     Problem: Safety - Adult  Goal: Free from fall injury  5/11/2025 1116 by Cam Vang RN  Outcome: Progressing  5/10/2025 2342 by Deborah Ogden RN  Outcome: Progressing     Problem: Chronic Conditions and Co-morbidities  Goal: Patient's chronic conditions and co-morbidity symptoms are monitored and maintained or improved  5/11/2025 1116 by Cam Vang RN  Outcome: Progressing  5/10/2025 2342 by Deborah Ogden RN  Outcome: Progressing  Flowsheets (Taken 5/10/2025 2335)  Care Plan - Patient's Chronic Conditions and Co-Morbidity Symptoms are Monitored and Maintained or Improved: Monitor and assess patient's chronic conditions and comorbid symptoms for stability, deterioration, or improvement     Problem: Pain  Goal: Verbalizes/displays adequate comfort level or baseline comfort level  5/11/2025 1116 by Cam Vang RN  Outcome: Progressing  5/10/2025 2342 by Deborah Ogden RN  Outcome: Progressing     Problem: Skin/Tissue Integrity  Goal: Skin integrity remains intact  Description: 1.  Monitor for areas of redness and/or skin breakdown2.  Assess vascular access sites hourly3.  Every 4-6 hours minimum:  Change oxygen saturation probe site4.  Every 4-6 hours:  If on nasal continuous positive airway pressure, respiratory therapy assess nares and determine need for appliance change or resting period  5/11/2025  1116 by Cam Vang, RN  Outcome: Progressing  5/10/2025 2342 by Deborah Ogden, RN  Outcome: Progressing  Flowsheets  Taken 5/10/2025 2340  Skin Integrity Remains Intact: Monitor for areas of redness and/or skin breakdown  Taken 5/10/2025 2335  Skin Integrity Remains Intact: Monitor for areas of redness and/or skin breakdown

## 2025-05-11 NOTE — PLAN OF CARE
Problem: ABCDS Injury Assessment  Goal: Absence of physical injury  5/10/2025 2342 by Deborah Ogden RN  Outcome: Progressing  5/10/2025 1659 by Josiane Good RN  Outcome: Progressing     Problem: Discharge Planning  Goal: Discharge to home or other facility with appropriate resources  5/10/2025 2342 by Deborah Ogden RN  Outcome: Progressing  Flowsheets (Taken 5/10/2025 2335)  Discharge to home or other facility with appropriate resources: Identify barriers to discharge with patient and caregiver  5/10/2025 1659 by Josiane Good RN  Outcome: Progressing     Problem: Safety - Adult  Goal: Free from fall injury  5/10/2025 2342 by Deborah Ogden RN  Outcome: Progressing  5/10/2025 1659 by Josiane Good RN  Outcome: Progressing     Problem: Chronic Conditions and Co-morbidities  Goal: Patient's chronic conditions and co-morbidity symptoms are monitored and maintained or improved  5/10/2025 2342 by Deborah Ogden RN  Outcome: Progressing  Flowsheets (Taken 5/10/2025 2335)  Care Plan - Patient's Chronic Conditions and Co-Morbidity Symptoms are Monitored and Maintained or Improved: Monitor and assess patient's chronic conditions and comorbid symptoms for stability, deterioration, or improvement  5/10/2025 1659 by Josiane Good RN  Outcome: Progressing     Problem: Pain  Goal: Verbalizes/displays adequate comfort level or baseline comfort level  5/10/2025 2342 by Deborah Ogden RN  Outcome: Progressing  5/10/2025 1659 by Josiane Good RN  Outcome: Progressing     Problem: Skin/Tissue Integrity  Goal: Skin integrity remains intact  Description: 1.  Monitor for areas of redness and/or skin breakdown2.  Assess vascular access sites hourly3.  Every 4-6 hours minimum:  Change oxygen saturation probe site4.  Every 4-6 hours:  If on nasal continuous positive airway pressure, respiratory therapy assess nares and determine need for appliance change or resting

## 2025-05-11 NOTE — PROGRESS NOTES
INPATIENT CARDIOLOGY FOLLOW-UP    Name: Jermaine Burks    Age: 74 y.o.    Date of Admission: 5/8/2025 11:43 PM    Date of Service: 5/11/2025    Chief Complaint: Follow-up for CHF with JOSE     Interim History:  No new overnight cardiac complaints patient complaining of increased dyspnea.  Patient was initiated on IV fluids.  Currently with no complaints of CP,  palpitations, dizziness, or lightheadedness. SR on telemetry.    Review of Systems:   Cardiac: As per HPI  General: No fever, chills  Pulmonary: As per HPI  HEENT: No visual disturbances, difficult swallowing  GI: No nausea, vomiting  Endocrine: No thyroid disease or DM  Musculoskeletal: DOLAN x 4, no focal motor deficits  Skin: Intact, no rashes  Neuro/Psych: No headache or seizures    Problem List:  Patient Active Problem List   Diagnosis    COPD (chronic obstructive pulmonary disease) (HCC)    Thrombocytopenia    Obstructive sleep apnea, s/p UPPP    Paroxysmal atrial fibrillation (HCC)    Essential hypertension    Supravalvar aortic stenosis    Hepatic cirrhosis (HCC)    Alcoholic cirrhosis of liver without ascites (HCC)    Obesity    Persistent atrial fibrillation (HCC)    History of intracranial hemorrhage    Closed compression fracture of lumbar vertebra (HCC)    History of kidney stones    Ventral hernia    Nicotine dependence    History of TIA (transient ischemic attack)    S/P TAVR (transcatheter aortic valve replacement)    Rheumatoid arthritis involving multiple sites with positive rheumatoid factor (HCC)    Osteoporosis without current pathological fracture    Morbid obesity with BMI of 40.0-44.9, adult (HCC)    Chronic renal disease, stage III (HCC) [805245]    Anemia    Ambulatory dysfunction    Hyperkalemia    Diarrhea    HFrEF (heart failure with reduced ejection fraction) (HCC)    Suspected sleep apnea    Other fatigue    Palpitation    SOB (shortness of breath)    Hypoxia    Chronic congestive heart failure (HCC)    Pneumonia due to    Component Value Date    INR 1.7 05/09/2025    INR 1.3 11/14/2022    INR 1.3 02/21/2022    PROTIME 18.4 (H) 05/09/2025    PROTIME 14.2 (H) 11/14/2022    PROTIME 14.6 (H) 02/21/2022     Lab Results   Component Value Date    TSH 3.79 05/09/2025     Lab Results   Component Value Date    LABA1C 5.5 02/04/2012     No results found for: \"EAG\"  Lab Results   Component Value Date    CHOL 116 10/16/2023    CHOL 133 02/20/2023    CHOL 119 10/12/2020     Lab Results   Component Value Date    TRIG 89 10/16/2023    TRIG 85 02/20/2023    TRIG 80 10/12/2020     Lab Results   Component Value Date    HDL 65 10/16/2023    HDL 70 02/20/2023    HDL 63 10/12/2020     No components found for: \"LDLCALC\", \"LDLCHOLESTEROL\"  Lab Results   Component Value Date    VLDL 18 10/16/2023    VLDL 17 02/20/2023    VLDL 16 10/12/2020     No results found for: \"CHOLHDLRATIO\"    Cardiac Tests:  Telemetry findings reviewed: AF  at rate 90s, RBBB, no new tachy/bradyarrhythmias overnight     EKG: Atrial fibrillation with RVR, right bundle branch block, left axis deviation, inferior infarct age undetermined, lateral infarct age undetermined, abnormal EKG.        Vitals and labs were reviewed: As above blood pressure 94/63 heart rate 96 sats 93% on 3 L     Labs-BUNs/creatinine 32/1.9 lactic acid 3.1, troponin 173, proBNP 5000 418, AST//13 WBC 15.4 hemoglobin 12.1 platelets 50     Echocardiogram reviewed: 12/2024      Left Ventricle: Mildly reduced left ventricular systolic function with a visually estimated EF of 45 - 50%. Left ventricle size is normal. Normal wall thickness. Normal wall motion.    Right Ventricle: Right ventricle size is normal. Reduced systolic function. TAPSE is 1.4 cm.    Left Atrium: Left atrial volume index is severely increased (>48 mL/m2).    Right Atrium: Right atrium is moderately dilated.    Aortic Valve: Romero S3 bioprosthetic valve with a size of 29 mm. AV mean gradient is 5 mmHg. Trace regurgitation. AV mean gradient

## 2025-05-11 NOTE — PROGRESS NOTES
[facility]  Nephrology Progress Note    Patient's Name: Jermaine Burks    History of Present Illness from 5/9 Consult Note:    \"Jermaine Burks is a 74 y.o. male with a past medical history of JAY closed compression fracutre of lumbar vertebrae, alcoholic cirrhosis, CKD stage III, COPD, paroxysmal afib HTN .      They presented on 5/8/2025 complaining of SOB. They have noticed SOB for 2 days prior to presentation. On arrival their vitals were pertinent for hypertension. Initial labs were pertinent for creatinine of 1.9.  Question that he received iodinated contrast on 5 /9. nephrology was consulted for acute kidney injury.\"    Subjective:    5/11/2025: Patient was seen and examined.  He is awake, no acute distress, on room air.  He notes diarrhea.  Visitor bedside.    PMH:    Past Medical History:   Diagnosis Date    Alcoholic cirrhosis (HCC)     Alcoholism (HCC)     Has been sober / dry since 01/2016.    Anxiety     Arthritis     Chronic atrial fibrillation (HCC)     Esophageal varices (HCC)     UGI bleed ~2012 - has had variceal banding 2x around that time.    History of kidney stones     Hypertension     Lumbar compression fracture (HCC) 02/2017    Lung tumor 2017    Osteopenia     Osteopenia     S/P TAVR (transcatheter aortic valve replacement) 9/20/2018    Seizure disorder ([facility]) 09/2016    Complicated a stroke with occipital ICH.    Seizures ([facility])     Sepsis with MRSE bacteremia 09/2016    resolved    Severe aortic stenosis     Confirmed by echo 9/2016, BROWN 10/2016. Undergoing w/u at [facility] for TAVR, as of 4/2017.    Sleep apnea     Couldn't tolerate CPAP therapy ~2007. Had had UPPP.    Stroke ([facility]) 09/2016    With receptive aphasia, poor memory that are improving as of 04/2017. Pt was found to have an occipital ICH at that time, with possible amyloid angiopathy as cause per MRI subsequently. Had seizure and sepsis (d/t MRSE bacteremia) complicating stroke.       Patient Active Problem List  vitamin B-1 (THIAMINE) 100 MG tablet Take 1 tablet by mouth daily      aspirin 81 MG tablet Take 1 tablet by mouth daily      hydroxychloroquine (PLAQUENIL) 200 MG tablet Take 1 tablet by mouth 2 times daily Indications: taking 400 mg      folic acid (FOLVITE) 1 MG tablet Take 1 tablet by mouth nightly      VITAMIN D PO Take 4,000 Units by mouth daily       Handicap Placard MISC by Does not apply route Duration: 5 years 1 each 0       Allergies:    Patient has no known allergies.    Social History:     reports that he quit smoking about 9 years ago. His smoking use included cigarettes and pipe. He started smoking about 58 years ago. He has a 55 pack-year smoking history. He has been exposed to tobacco smoke. He has never used smokeless tobacco. He reports that he does not drink alcohol and does not use drugs.    Family History:         Problem Relation Age of Onset    Cancer Mother         leukemia    Heart Attack Father 70 - 79    Heart Disease Father        Physical Exam:      Patient Vitals for the past 24 hrs:   BP Temp Temp src Pulse Resp SpO2 Weight   05/11/25 0738 129/89 97.3 °F (36.3 °C) Oral (!) 103 20 96 % --   05/11/25 0348 -- -- -- -- -- -- 106 kg (233 lb 11 oz)   05/10/25 1920 -- -- -- (!) 110 -- -- --   05/10/25 1908 (!) 89/59 98.1 °F (36.7 °C) Oral (!) 111 18 98 % --   05/10/25 1540 -- -- -- -- -- 96 % --   05/10/25 1246 -- -- -- -- -- 96 % --   05/10/25 0922 -- -- -- -- -- 99 % --         Intake/Output Summary (Last 24 hours) at 5/11/2025 0841  Last data filed at 5/10/2025 1200  Gross per 24 hour   Intake 180 ml   Output --   Net 180 ml       General: Awake, alert, no acute distress  Neck: No JVD noted  Lungs: Clear bilaterally upper, diminished to the bases bilaterally.  Unlabored  CV: Irregular.  No rub  Abd: Soft, nontender, nondistended.  Active bowel sounds  Skin: Warm and dry.  No rash on exposed extremities  Ext: 1+ BLE edema   Neuro: Awake, answers questions appropriately    Data:    Recent

## 2025-05-12 PROBLEM — K76.9: Status: ACTIVE | Noted: 2025-05-12

## 2025-05-12 LAB
ALBUMIN SERPL-MCNC: 3.5 G/DL (ref 3.5–5.2)
ALP SERPL-CCNC: 53 U/L (ref 40–129)
ALT SERPL-CCNC: 196 U/L (ref 0–40)
ANION GAP SERPL CALCULATED.3IONS-SCNC: 10 MMOL/L (ref 7–16)
AST SERPL-CCNC: 219 U/L (ref 0–39)
BACTERIA URNS QL MICRO: ABNORMAL
BASOPHILS # BLD: 0.01 K/UL (ref 0–0.2)
BASOPHILS NFR BLD: 0 % (ref 0–2)
BILIRUB SERPL-MCNC: 1.4 MG/DL (ref 0–1.2)
BILIRUB UR QL STRIP: NEGATIVE
BUN SERPL-MCNC: 19 MG/DL (ref 6–23)
C DIFF GDH + TOXINS A+B STL QL IA.RAPID: NEGATIVE
CALCIUM SERPL-MCNC: 9.6 MG/DL (ref 8.6–10.2)
CHLORIDE SERPL-SCNC: 105 MMOL/L (ref 98–107)
CLARITY UR: CLEAR
CO2 SERPL-SCNC: 22 MMOL/L (ref 22–29)
COLOR UR: ABNORMAL
CREAT SERPL-MCNC: 1.1 MG/DL (ref 0.7–1.2)
CRYSTALS URNS MICRO: ABNORMAL /HPF
EKG ATRIAL RATE: 97 BPM
EKG Q-T INTERVAL: 404 MS
EKG QRS DURATION: 164 MS
EKG QTC CALCULATION (BAZETT): 505 MS
EKG R AXIS: -43 DEGREES
EKG T AXIS: 139 DEGREES
EKG VENTRICULAR RATE: 94 BPM
EOSINOPHIL # BLD: 0 K/UL (ref 0.05–0.5)
EOSINOPHILS RELATIVE PERCENT: 0 % (ref 0–6)
ERYTHROCYTE [DISTWIDTH] IN BLOOD BY AUTOMATED COUNT: 14.6 % (ref 11.5–15)
GFR, ESTIMATED: 74 ML/MIN/1.73M2
GLUCOSE SERPL-MCNC: 85 MG/DL (ref 74–99)
GLUCOSE UR STRIP-MCNC: NEGATIVE MG/DL
HCT VFR BLD AUTO: 35 % (ref 37–54)
HGB BLD-MCNC: 11.2 G/DL (ref 12.5–16.5)
HGB UR QL STRIP.AUTO: ABNORMAL
IMM GRANULOCYTES # BLD AUTO: <0.03 K/UL (ref 0–0.58)
IMM GRANULOCYTES NFR BLD: 1 % (ref 0–5)
KETONES UR STRIP-MCNC: NEGATIVE MG/DL
LEUKOCYTE ESTERASE UR QL STRIP: NEGATIVE
LYMPHOCYTES NFR BLD: 0.23 K/UL (ref 1.5–4)
LYMPHOCYTES RELATIVE PERCENT: 8 % (ref 20–42)
MAGNESIUM SERPL-MCNC: 1.4 MG/DL (ref 1.6–2.6)
MCH RBC QN AUTO: 32.7 PG (ref 26–35)
MCHC RBC AUTO-ENTMCNC: 32 G/DL (ref 32–34.5)
MCV RBC AUTO: 102.3 FL (ref 80–99.9)
MONOCYTES NFR BLD: 0.29 K/UL (ref 0.1–0.95)
MONOCYTES NFR BLD: 11 % (ref 2–12)
NEUTROPHILS NFR BLD: 80 % (ref 43–80)
NEUTS SEG NFR BLD: 2.18 K/UL (ref 1.8–7.3)
NITRITE UR QL STRIP: NEGATIVE
PH UR STRIP: 6 [PH] (ref 5–8)
PHOSPHATE SERPL-MCNC: 2.1 MG/DL (ref 2.5–4.5)
PLATELET CONFIRMATION: NORMAL
PLATELET, FLUORESCENCE: 49 K/UL (ref 130–450)
PMV BLD AUTO: 11.3 FL (ref 7–12)
POTASSIUM SERPL-SCNC: 3.7 MMOL/L (ref 3.5–5)
PROT SERPL-MCNC: 6.2 G/DL (ref 6.4–8.3)
PROT UR STRIP-MCNC: 100 MG/DL
RBC # BLD AUTO: 3.42 M/UL (ref 3.8–5.8)
RBC # BLD: ABNORMAL 10*6/UL
RBC # BLD: ABNORMAL 10*6/UL
RBC #/AREA URNS HPF: ABNORMAL /HPF
SODIUM SERPL-SCNC: 137 MMOL/L (ref 132–146)
SP GR UR STRIP: >1.03 (ref 1–1.03)
SPECIMEN DESCRIPTION: NORMAL
UROBILINOGEN UR STRIP-ACNC: 0.2 EU/DL (ref 0–1)
WBC #/AREA URNS HPF: ABNORMAL /HPF
WBC OTHER # BLD: 2.7 K/UL (ref 4.5–11.5)

## 2025-05-12 PROCEDURE — 84100 ASSAY OF PHOSPHORUS: CPT

## 2025-05-12 PROCEDURE — 83735 ASSAY OF MAGNESIUM: CPT

## 2025-05-12 PROCEDURE — 6360000002 HC RX W HCPCS

## 2025-05-12 PROCEDURE — 94640 AIRWAY INHALATION TREATMENT: CPT

## 2025-05-12 PROCEDURE — 36415 COLL VENOUS BLD VENIPUNCTURE: CPT

## 2025-05-12 PROCEDURE — 6370000000 HC RX 637 (ALT 250 FOR IP)

## 2025-05-12 PROCEDURE — 82607 VITAMIN B-12: CPT

## 2025-05-12 PROCEDURE — 85025 COMPLETE CBC W/AUTO DIFF WBC: CPT

## 2025-05-12 PROCEDURE — 99233 SBSQ HOSP IP/OBS HIGH 50: CPT | Performed by: INTERNAL MEDICINE

## 2025-05-12 PROCEDURE — 82746 ASSAY OF FOLIC ACID SERUM: CPT

## 2025-05-12 PROCEDURE — 2500000003 HC RX 250 WO HCPCS

## 2025-05-12 PROCEDURE — 93005 ELECTROCARDIOGRAM TRACING: CPT

## 2025-05-12 PROCEDURE — 1200000000 HC SEMI PRIVATE

## 2025-05-12 PROCEDURE — 6360000002 HC RX W HCPCS: Performed by: INTERNAL MEDICINE

## 2025-05-12 PROCEDURE — 93010 ELECTROCARDIOGRAM REPORT: CPT | Performed by: INTERNAL MEDICINE

## 2025-05-12 PROCEDURE — 81001 URINALYSIS AUTO W/SCOPE: CPT

## 2025-05-12 PROCEDURE — 87086 URINE CULTURE/COLONY COUNT: CPT

## 2025-05-12 PROCEDURE — 6370000000 HC RX 637 (ALT 250 FOR IP): Performed by: INTERNAL MEDICINE

## 2025-05-12 PROCEDURE — 99232 SBSQ HOSP IP/OBS MODERATE 35: CPT | Performed by: FAMILY MEDICINE

## 2025-05-12 PROCEDURE — 80053 COMPREHEN METABOLIC PANEL: CPT

## 2025-05-12 RX ORDER — FUROSEMIDE 10 MG/ML
20 INJECTION INTRAMUSCULAR; INTRAVENOUS ONCE
Status: COMPLETED | OUTPATIENT
Start: 2025-05-12 | End: 2025-05-12

## 2025-05-12 RX ORDER — MAGNESIUM SULFATE IN WATER 40 MG/ML
2000 INJECTION, SOLUTION INTRAVENOUS ONCE
Status: COMPLETED | OUTPATIENT
Start: 2025-05-12 | End: 2025-05-12

## 2025-05-12 RX ADMIN — LEVETIRACETAM 1000 MG: 500 TABLET, FILM COATED ORAL at 09:19

## 2025-05-12 RX ADMIN — FUROSEMIDE 20 MG: 10 INJECTION, SOLUTION INTRAMUSCULAR; INTRAVENOUS at 17:51

## 2025-05-12 RX ADMIN — PREDNISONE 5 MG: 5 TABLET ORAL at 09:19

## 2025-05-12 RX ADMIN — IPRATROPIUM BROMIDE AND ALBUTEROL SULFATE 1 DOSE: .5; 2.5 SOLUTION RESPIRATORY (INHALATION) at 14:42

## 2025-05-12 RX ADMIN — FOLIC ACID 1 MG: 1 TABLET ORAL at 20:50

## 2025-05-12 RX ADMIN — PANTOPRAZOLE SODIUM 40 MG: 40 TABLET, DELAYED RELEASE ORAL at 08:06

## 2025-05-12 RX ADMIN — HYDROXYCHLOROQUINE SULFATE 200 MG: 200 TABLET ORAL at 09:18

## 2025-05-12 RX ADMIN — POTASSIUM PHOSPHATE, MONOBASIC 250 MG: 500 TABLET, SOLUBLE ORAL at 20:50

## 2025-05-12 RX ADMIN — Medication 4000 UNITS: at 09:19

## 2025-05-12 RX ADMIN — IPRATROPIUM BROMIDE AND ALBUTEROL SULFATE 1 DOSE: .5; 2.5 SOLUTION RESPIRATORY (INHALATION) at 07:42

## 2025-05-12 RX ADMIN — Medication 100 MG: at 09:19

## 2025-05-12 RX ADMIN — LEVETIRACETAM 1000 MG: 500 TABLET, FILM COATED ORAL at 20:50

## 2025-05-12 RX ADMIN — SACUBITRIL AND VALSARTAN 1 TABLET: 24; 26 TABLET, FILM COATED ORAL at 09:18

## 2025-05-12 RX ADMIN — LACOSAMIDE 100 MG: 100 TABLET, FILM COATED ORAL at 20:50

## 2025-05-12 RX ADMIN — DULOXETINE 30 MG: 30 CAPSULE, DELAYED RELEASE ORAL at 09:19

## 2025-05-12 RX ADMIN — HYDROCORTISONE: 1 CREAM TOPICAL at 09:20

## 2025-05-12 RX ADMIN — SILVER SULFADIAZINE: 10 CREAM TOPICAL at 09:20

## 2025-05-12 RX ADMIN — ACETAMINOPHEN 650 MG: 325 TABLET ORAL at 20:57

## 2025-05-12 RX ADMIN — ENOXAPARIN SODIUM 30 MG: 100 INJECTION SUBCUTANEOUS at 09:21

## 2025-05-12 RX ADMIN — LACOSAMIDE 100 MG: 100 TABLET, FILM COATED ORAL at 09:18

## 2025-05-12 RX ADMIN — IPRATROPIUM BROMIDE AND ALBUTEROL SULFATE 1 DOSE: .5; 2.5 SOLUTION RESPIRATORY (INHALATION) at 21:28

## 2025-05-12 RX ADMIN — MAGNESIUM SULFATE HEPTAHYDRATE 2000 MG: 40 INJECTION, SOLUTION INTRAVENOUS at 09:24

## 2025-05-12 RX ADMIN — SODIUM CHLORIDE, PRESERVATIVE FREE 10 ML: 5 INJECTION INTRAVENOUS at 20:51

## 2025-05-12 RX ADMIN — ARFORMOTEROL TARTRATE 15 MCG: 15 SOLUTION RESPIRATORY (INHALATION) at 21:28

## 2025-05-12 RX ADMIN — Medication 250 MG: at 09:19

## 2025-05-12 RX ADMIN — ARFORMOTEROL TARTRATE 15 MCG: 15 SOLUTION RESPIRATORY (INHALATION) at 07:42

## 2025-05-12 RX ADMIN — SODIUM CHLORIDE, PRESERVATIVE FREE 10 ML: 5 INJECTION INTRAVENOUS at 09:20

## 2025-05-12 RX ADMIN — FUROSEMIDE 20 MG: 20 TABLET ORAL at 09:19

## 2025-05-12 RX ADMIN — BUDESONIDE 1000 MCG: 0.5 SUSPENSION RESPIRATORY (INHALATION) at 21:28

## 2025-05-12 RX ADMIN — HYDROCORTISONE: 1 CREAM TOPICAL at 21:01

## 2025-05-12 RX ADMIN — HYDROXYCHLOROQUINE SULFATE 200 MG: 200 TABLET ORAL at 20:50

## 2025-05-12 RX ADMIN — GABAPENTIN 300 MG: 300 CAPSULE ORAL at 09:19

## 2025-05-12 RX ADMIN — SACUBITRIL AND VALSARTAN 1 TABLET: 24; 26 TABLET, FILM COATED ORAL at 20:50

## 2025-05-12 RX ADMIN — ASPIRIN 81 MG 81 MG: 81 TABLET ORAL at 09:21

## 2025-05-12 RX ADMIN — IPRATROPIUM BROMIDE AND ALBUTEROL SULFATE 1 DOSE: .5; 2.5 SOLUTION RESPIRATORY (INHALATION) at 11:13

## 2025-05-12 RX ADMIN — BUDESONIDE 1000 MCG: 0.5 SUSPENSION RESPIRATORY (INHALATION) at 07:42

## 2025-05-12 RX ADMIN — ENOXAPARIN SODIUM 30 MG: 100 INJECTION SUBCUTANEOUS at 20:50

## 2025-05-12 RX ADMIN — POTASSIUM PHOSPHATE, MONOBASIC 250 MG: 500 TABLET, SOLUBLE ORAL at 09:19

## 2025-05-12 RX ADMIN — METOPROLOL SUCCINATE 25 MG: 25 TABLET, FILM COATED, EXTENDED RELEASE ORAL at 09:19

## 2025-05-12 ASSESSMENT — PAIN DESCRIPTION - LOCATION: LOCATION: HEAD

## 2025-05-12 ASSESSMENT — PAIN SCALES - WONG BAKER: WONGBAKER_NUMERICALRESPONSE: NO HURT

## 2025-05-12 ASSESSMENT — PAIN SCALES - GENERAL: PAINLEVEL_OUTOF10: 0

## 2025-05-12 NOTE — CARE COORDINATION
The following medication needs a prior authorization:     Medication Name: diazepam    Dosage: 5 mg    Frequency: every night     Directions for use: 1-2  Tablets po every night prn for anxiety or sleep    Diagnosis: anxiety and sleeplessness    Is the request for a reauthorization? No     Is the patient currently stable on therapy? Yes     Please list all therapeutic alternatives previously used with start/end dates and outcome:  cymbalta 60 mg 12/1/2017-3-5-2018 did not control anxiety                Transition of Care-Met with patient at the bedside, introduced myself and CM role in care coordination. Patient is independent from home, resides with his wife in a one story home-2/3 steps to enter. DME-walker, past history with [facility], unable to recall name and stay at [facility]. PCP is Dr. Rutherford, saw recently, preferred pharmacy is [facility] [place]. Admitted for shortness of breath, RA. Discharge plan is home, no anticipated needs. Please use below template if pulse testing needed.     Pulse ox was ______% on room air at rest.  Ambulated patient on room air.  Oxygen saturation was ______% on room air while ambulating.  Oxygen applied.  Recovery pulse ox was ______% on _______ liters of oxygen while ambulating.    Francy SOLANON, RN  [facility]

## 2025-05-12 NOTE — PROGRESS NOTES
INPATIENT CARDIOLOGY FOLLOW-UP    Name: Jermaine Burks    Age: 74 y.o.    Date of Admission: 5/8/2025 11:43 PM    Date of Service: 5/12/2025    Chief Complaint: Follow-up for CHF with JOSE     Interim History:  No new overnight cardiac complaints patient complaining of increased dyspnea.  Patient was initiated on IV fluids.  Currently with no complaints of CP,  palpitations, dizziness, or lightheadedness. SR on telemetry.      Review of Systems:   Cardiac: As per HPI  General: No fever, chills  Pulmonary: As per HPI  HEENT: No visual disturbances, difficult swallowing  GI: No nausea, vomiting  Endocrine: No thyroid disease or DM  Musculoskeletal: DOLAN x 4, no focal motor deficits  Skin: Intact, no rashes  Neuro/Psych: No headache or seizures    Problem List:  Patient Active Problem List   Diagnosis    COPD (chronic obstructive pulmonary disease) (HCC)    Thrombocytopenia    Obstructive sleep apnea, s/p UPPP    Paroxysmal atrial fibrillation (HCC)    Essential hypertension    Supravalvar aortic stenosis    Hepatic cirrhosis (HCC)    Alcoholic cirrhosis of liver without ascites (HCC)    Obesity    Persistent atrial fibrillation (HCC)    History of intracranial hemorrhage    Closed compression fracture of lumbar vertebra (HCC)    History of kidney stones    Ventral hernia    Nicotine dependence    History of TIA (transient ischemic attack)    S/P TAVR (transcatheter aortic valve replacement)    Rheumatoid arthritis involving multiple sites with positive rheumatoid factor (HCC)    Osteoporosis without current pathological fracture    Morbid obesity with BMI of 40.0-44.9, adult (HCC)    Chronic renal disease, stage III (HCC) [737030]    Anemia    Ambulatory dysfunction    Hyperkalemia    Diarrhea    HFrEF (heart failure with reduced ejection fraction) (HCC)    Suspected sleep apnea    Other fatigue    Palpitation    SOB (shortness of breath)    Hypoxia    Chronic congestive heart failure (HCC)    Pneumonia due to  kg (242 lb 15.2 oz)   SpO2 96%   BMI 39.21 kg/m²   Wt Readings from Last 3 Encounters:   05/12/25 110.2 kg (242 lb 15.2 oz)   05/08/25 102.1 kg (225 lb)   03/20/25 102.1 kg (225 lb)     Appearance: Awake, alert, no acute respiratory distress  Skin: Intact, no rash  Head: Normocephalic, atraumatic  Eyes: EOMI, no conjunctival erythema  ENMT: No pharyngeal erythema, MMM, no rhinorrhea  Neck: Supple, JVP elevated at 13 to 14 cm, no carotid bruits  Lungs: Clear to auscultation bilaterally. No wheezes, rales, or rhonchi.  Cardiac: Irregularly irregular rate and rhythm, tachycardic +S1S2, no murmurs apparent  Abdomen: Soft, nontender, +bowel sounds  Extremities: Moves all extremities x 4, trace lower extremity edema  Neurologic: No focal motor deficits apparent, normal mood and affect  Peripheral Pulses: Intact posterior tibial pulses bilaterally    Intake/Output:    Intake/Output Summary (Last 24 hours) at 5/12/2025 0822  Last data filed at 5/12/2025 0600  Gross per 24 hour   Intake 485 ml   Output 150 ml   Net 335 ml     No intake/output data recorded.    Laboratory Tests:  Recent Labs     05/10/25  0314 05/11/25  0253    138   K 3.7 3.6    101   CO2 21* 23   BUN 30* 27*   CREATININE 1.6* 1.3*   GLUCOSE 92 87   CALCIUM 9.3 9.7     Lab Results   Component Value Date/Time    MG 1.7 10/12/2024 04:04 AM     Recent Labs     05/09/25  0954 05/10/25  0314 05/11/25  0253   ALKPHOS 53 49 66   ALT 57* 77* 123*   * 118* 162*   BILITOT 1.3* 1.2 1.6*   BILIDIR 0.5*  --   --      Recent Labs     05/10/25  0314 05/11/25  0253 05/12/25  0645   WBC 5.4 7.7 2.7*   RBC 3.47* 3.88 3.42*   HGB 11.2* 12.7 11.2*   HCT 34.7* 40.0 35.0*   .0* 103.1* 102.3*   MCH 32.3 32.7 32.7   MCHC 32.3 31.8* 32.0   RDW 14.6 14.7 14.6   MPV 11.6 11.5 11.3     Lab Results   Component Value Date    CKTOTAL 146 12/14/2017    CKMB 2.4 09/29/2016    TROPONINI <0.01 02/20/2018    TROPONINI <0.01 12/11/2017    TROPONINI <0.01 04/19/2017

## 2025-05-12 NOTE — PROGRESS NOTES
Perfect serve sent to Dr. Zhao d/t elevated HR and soft BP -- due to resume entresto this evening.  Per Dr. Zhao; hold Entresto dose - give one time bolus of 250 NS, one time IV dose of digoxin 250 mcg, and a one time dose of 5 mg IV metoprolol -- notify Dr. Zhao if HR remains elevated >120's a few hours after administration of new orders.

## 2025-05-12 NOTE — ACP (ADVANCE CARE PLANNING)
Advance Care Planning   Healthcare Decision Maker:    Primary Decision Maker: LindaDione espinoza - [facility] - [phone]    Click here to complete Healthcare Decision Makers including selection of the Healthcare Decision Maker Relationship (ie \"Primary\").

## 2025-05-12 NOTE — PLAN OF CARE
Patient's chart updated to reflect:      .    - HF care plan, HF education points and HF discharge instructions.  -Orders: 2 gram sodium diet, daily weights, I/O.  -PCP or cardiology follow up appointments to be scheduled within 7 days of hospital discharge.  -CHF education session will be provided to the patient prior to hospital discharge.    Lucy Hernandez RN   Heart Failure Navigator

## 2025-05-12 NOTE — PROGRESS NOTES
[facility] Resident Progress Note    Shortness of Breath (Hx of afib, 3L NC 89% on RA)      Subjective:    Overnight, the patient had elevated heart rate readings and soft blood pressure readings, cardiology aware contacted, and Entresto, will give 150 cc bolus of IV fluid with, dose of IV metoprolol.    Today, patient seen and evaluated at bedside and appears in  acute distress. Patient describes feeling better.  The patient mentioned that he had bowel movements, he describes them as soft with some water with the.     ROS: Review of system unremarkable except stated otherwise in HPI    Past medical, surgical, family and social history were reviewed, non-contributory, and unchanged unless otherwise stated.    Objective:  /75   Pulse (!) 103   Temp 98.1 °F (36.7 °C) (Oral)   Resp 18   Wt 110.2 kg (242 lb 15.2 oz)   SpO2 96%   BMI 39.21 kg/m²     Physical Exam  Constitutional:       Appearance: He is obese. He is ill-appearing.   HENT:      Nose: Nose normal.      Mouth/Throat:      Mouth: Mucous membranes are moist.   Eyes:      Conjunctiva/sclera: Conjunctivae normal.   Cardiovascular:      Rate and Rhythm: Normal rate and regular rhythm.      Pulses: Normal pulses.      Heart sounds: Normal heart sounds.   Pulmonary:      Effort: Pulmonary effort is normal.      Breath sounds: No stridor. Rales present. No wheezing or rhonchi.      Comments: Crackles bilateral lower lung fields  Abdominal:      General: Bowel sounds are normal.      Palpations: Abdomen is soft.   Musculoskeletal:         General: Normal range of motion.      Cervical back: Normal range of motion.      Right lower leg: Edema present.      Left lower leg: Edema present.   Skin:     General: Skin is warm.   Neurological:      General: No focal deficit present.      Mental Status: He is alert.   Psychiatric:         Mood and Affect: Mood normal    Labs:    Complete Blood Count:   Recent Labs

## 2025-05-12 NOTE — PROGRESS NOTES
Som Juarez M.D.,[facility]  Bryant Tijerina D.O., JENNIFER., [facility]  Samuel Rodrigues M.D.  Aileen Pritchett M.D.   Zaki Longoria D.O.  Domingo Tavares M.D.         Daily Pulmonary Progress Note    Patient:  Jermaine Burks 74 y.o. male MRN: [id number]            Synopsis     We are following patient for hypoxia    \"CC\" shortness of breath    Code status: FULL CODE      Subjective   5/9/25: Bill was seen in the emergency room in no distress.  He is stable on room air with SpO2 96%.  He has bilateral crackles heard on exam.     5/12/25: Patient was seen and examined lying in bed on room air. Nephrology managing diuretics. Lungs clear on exam. Lower extremity swelling noted. Thoracentesis cancelled due to insufficient amount of fluid.      Review of Systems:  Constitutional: Denies fever, weight loss, night sweats, and fatigue  Skin: Denies pigmentation, dark lesions, and rashes   HEENT: Denies hearing loss, tinnitus, ear drainage, epistaxis, sore throat, and hoarseness.  Cardiovascular: Intermittent mild leg edema  Respiratory: Dyspnea with minimal exertion  Gastrointestinal: Denies nausea, vomiting, poor appetite, diarrhea, heartburn or reflux  Genitourinary: Denies dysuria, frequency, urgency or hematuria  Musculoskeletal: Denies myalgias, muscle weakness, and bone pain  Neurological: Denies dizziness, vertigo, headache, and focal weakness  Psychological: Denies anxiety and depression  Endocrine: Denies heat intolerance and cold intolerance  Hematopoietic/Lymphatic: Denies bleeding problems and blood transfusions    24-hour events:  No new events    Objective   OBJECTIVE:   /75   Pulse (!) 106   Temp 98.1 °F (36.7 °C) (Oral)   Resp 18   Wt 110.2 kg (242 lb 15.2 oz)   SpO2 98%   BMI 39.21 kg/m²   SpO2 Readings from Last 1 Encounters:   05/12/25 98%        I/O:    Intake/Output Summary (Last 24 hours) at 5/12/2025 1126  Last data filed at 5/12/2025 0600  Gross per 24 hour   Intake 180 ml

## 2025-05-12 NOTE — PROGRESS NOTES
[facility]  Nephrology Progress Note    Patient's Name: Jermaine Burks    History of Present Illness from 5/9 Consult Note:    \"Jermaine Burks is a 74 y.o. male with a past medical history of JAY closed compression fracutre of lumbar vertebrae, alcoholic cirrhosis, CKD stage III, COPD, paroxysmal afib HTN .      They presented on 5/8/2025 complaining of SOB. They have noticed SOB for 2 days prior to presentation. On arrival their vitals were pertinent for hypertension. Initial labs were pertinent for creatinine of 1.9.  Question that he received iodinated contrast on 5 /9. nephrology was consulted for acute kidney injury.\"    Interval History:  5/12/25: Seen and examined.  Feeling better. Multiple BM overnight     PMH:    Past Medical History:   Diagnosis Date    Alcoholic cirrhosis (HCC)     Alcoholism (HCC)     Has been sober / dry since 01/2016.    Anxiety     Arthritis     Chronic atrial fibrillation (HCC)     Esophageal varices (HCC)     UGI bleed ~2012 - has had variceal banding 2x around that time.    History of kidney stones     Hypertension     Lumbar compression fracture (HCC) 02/2017    Lung tumor 2017    Osteopenia     Osteopenia     S/P TAVR (transcatheter aortic valve replacement) 9/20/2018    Seizure disorder ([facility]) 09/2016    Complicated a stroke with occipital ICH.    Seizures ([facility])     Sepsis with MRSE bacteremia 09/2016    resolved    Severe aortic stenosis     Confirmed by echo 9/2016, BROWN 10/2016. Undergoing w/u at [facility] for TAVR, as of 4/2017.    Sleep apnea     Couldn't tolerate CPAP therapy ~2007. Had had UPPP.    Stroke ([facility]) 09/2016    With receptive aphasia, poor memory that are improving as of 04/2017. Pt was found to have an occipital ICH at that time, with possible amyloid angiopathy as cause per MRI subsequently. Had seizure and sepsis (d/t MRSE bacteremia) complicating stroke.       Patient Active Problem List   Diagnosis    COPD (chronic obstructive pulmonary disease)  Final       Lab Results   Component Value Date/Time    COLORU Dark Yellow 05/12/2025 06:00 AM    NITRU NEGATIVE 05/12/2025 06:00 AM    GLUCOSEU NEGATIVE 05/12/2025 06:00 AM    GLUCOSEU NEGATIVE 10/13/2011 06:00 AM    KETUA NEGATIVE 05/12/2025 06:00 AM    UROBILINOGEN 0.2 05/12/2025 06:00 AM    BILIRUBINUR NEGATIVE 05/12/2025 06:00 AM       Lab Results   Component Value Date/Time    PROTEIN 6.2 (L) 05/12/2025 06:45 AM    OSMOU 560 05/09/2025 04:38 AM       No components found for: \"URIC\"    No results found for: \"LIPIDPAN\"    Assessment and Plans:    JOSE   Presumed secondary to decreased renal perfusion in the setting of shock; likely exacerbated by Aldactone  S/p CT with contrast 5/9  Baseline serum creatinine 1-1.2 mg/dL  Creatinine peaked at 2 on 5/9--> 1.3-> 1.1 mg/dL   Now at baseline  FEUrea 18.63% and FENa 0.14% suggestive of prerenal etiology  Avoid nephrotoxins/NSAIDs  Strict I&O, daily weights  Entresto and Aldactone resumed  Monitor labs    2. Chronic kidney disease stage 2  Risk factor of HTN  Baseline creatinine 1.1-1.2 with an EGFR of 78   Avoid nephrotoxins/NSAIDs  Strict I&O, daily weights  Monitor labs    3.  Hyperkalemia-resolved  With JOSE and Aldactone  K+ 5.5 on 5/9 S/p Lokelma 5/9-->   Last Potassium 3.7 mmol/L.  Monitor labs    4.  High anion gap metabolic acidosis  With JOSE and lactic acidosis  Anion gap 22/CO2 20/lactic acid 4.7 on 5/9--> CO2 23 today  Start sodium HCO3 if CO2<20  Monitor labs    5. CHF  ECHO 12/2024-EF 45-50%  proBNP 5418--> 3394--> 2185  Strict I&O, daily weights  Entresto and Aldactone remain on hold for now  Monitor labs  Cardiology following    6. Bacteremia   BC (+) Staphylococcus  Respiratory panel (-)  Defer to primary service    7.  Pleural effusion  History of lung tumor  Pulmonary CTA 5/9-R atypical consolidative opacity with moderate R pleural effusion  US chest 5/9-small R pleural effusion  Defer to pulmonology    8. History of cirrhosis    9. Hypomagnesemia

## 2025-05-13 ENCOUNTER — TELEPHONE (OUTPATIENT)
Dept: FAMILY MEDICINE CLINIC | Age: 75
End: 2025-05-13

## 2025-05-13 VITALS
DIASTOLIC BLOOD PRESSURE: 72 MMHG | HEART RATE: 100 BPM | OXYGEN SATURATION: 96 % | SYSTOLIC BLOOD PRESSURE: 104 MMHG | HEIGHT: 66 IN | TEMPERATURE: 98.2 F | WEIGHT: 242.95 LBS | RESPIRATION RATE: 18 BRPM | BODY MASS INDEX: 39.04 KG/M2

## 2025-05-13 PROBLEM — I50.9 CHRONIC CONGESTIVE HEART FAILURE (HCC): Status: ACTIVE | Noted: 2025-05-13

## 2025-05-13 PROBLEM — J18.9 PNEUMONIA DUE TO INFECTIOUS ORGANISM: Status: RESOLVED | Noted: 2025-01-01 | Resolved: 2025-01-01

## 2025-05-13 PROBLEM — J96.01 ACUTE HYPOXEMIC RESPIRATORY FAILURE (HCC): Status: RESOLVED | Noted: 2025-01-01 | Resolved: 2025-01-01

## 2025-05-13 PROBLEM — E87.5 HYPERKALEMIA: Status: RESOLVED | Noted: 2024-01-01 | Resolved: 2025-01-01

## 2025-05-13 PROBLEM — N17.9 AKI (ACUTE KIDNEY INJURY): Status: RESOLVED | Noted: 2025-01-01 | Resolved: 2025-01-01

## 2025-05-13 PROBLEM — I50.9 ACUTE DECOMPENSATED HEART FAILURE (HCC): Status: RESOLVED | Noted: 2025-05-09 | Resolved: 2025-05-13

## 2025-05-13 PROBLEM — R09.02 HYPOXIA: Status: RESOLVED | Noted: 2025-01-01 | Resolved: 2025-01-01

## 2025-05-13 LAB
ALBUMIN SERPL-MCNC: 3.4 G/DL (ref 3.5–5.2)
ALP SERPL-CCNC: 56 U/L (ref 40–129)
ALT SERPL-CCNC: 223 U/L (ref 0–40)
ANION GAP SERPL CALCULATED.3IONS-SCNC: 12 MMOL/L (ref 7–16)
AST SERPL-CCNC: 219 U/L (ref 0–39)
BASOPHILS # BLD: 0.02 K/UL (ref 0–0.2)
BASOPHILS NFR BLD: 1 % (ref 0–2)
BILIRUB SERPL-MCNC: 1.3 MG/DL (ref 0–1.2)
BUN SERPL-MCNC: 15 MG/DL (ref 6–23)
CALCIUM SERPL-MCNC: 9.3 MG/DL (ref 8.6–10.2)
CHLORIDE SERPL-SCNC: 102 MMOL/L (ref 98–107)
CO2 SERPL-SCNC: 24 MMOL/L (ref 22–29)
CREAT SERPL-MCNC: 1 MG/DL (ref 0.7–1.2)
EOSINOPHIL # BLD: 0.02 K/UL (ref 0.05–0.5)
EOSINOPHILS RELATIVE PERCENT: 1 % (ref 0–6)
ERYTHROCYTE [DISTWIDTH] IN BLOOD BY AUTOMATED COUNT: 14.5 % (ref 11.5–15)
FOLATE SERPL-MCNC: 35.4 NG/ML (ref 4.6–34.8)
GFR, ESTIMATED: 77 ML/MIN/1.73M2
GLUCOSE SERPL-MCNC: 91 MG/DL (ref 74–99)
HCT VFR BLD AUTO: 36.4 % (ref 37–54)
HGB BLD-MCNC: 11.7 G/DL (ref 12.5–16.5)
LYMPHOCYTES NFR BLD: 0.25 K/UL (ref 1.5–4)
LYMPHOCYTES RELATIVE PERCENT: 10 % (ref 20–42)
MAGNESIUM SERPL-MCNC: 1.4 MG/DL (ref 1.6–2.6)
MCH RBC QN AUTO: 32.5 PG (ref 26–35)
MCHC RBC AUTO-ENTMCNC: 32.1 G/DL (ref 32–34.5)
MCV RBC AUTO: 101.1 FL (ref 80–99.9)
MICROORGANISM SPEC CULT: ABNORMAL
MONOCYTES NFR BLD: 0.19 K/UL (ref 0.1–0.95)
MONOCYTES NFR BLD: 8 % (ref 2–12)
NEUTROPHILS NFR BLD: 80 % (ref 43–80)
NEUTS SEG NFR BLD: 1.92 K/UL (ref 1.8–7.3)
PHOSPHATE SERPL-MCNC: 2.5 MG/DL (ref 2.5–4.5)
PLATELET CONFIRMATION: NORMAL
PLATELET, FLUORESCENCE: 44 K/UL (ref 130–450)
PMV BLD AUTO: 11.3 FL (ref 7–12)
POTASSIUM SERPL-SCNC: 3.4 MMOL/L (ref 3.5–5)
PROT SERPL-MCNC: 6.1 G/DL (ref 6.4–8.3)
RBC # BLD AUTO: 3.6 M/UL (ref 3.8–5.8)
RBC # BLD: ABNORMAL 10*6/UL
SERVICE CMNT-IMP: ABNORMAL
SODIUM SERPL-SCNC: 138 MMOL/L (ref 132–146)
SPECIMEN DESCRIPTION: ABNORMAL
VIT B12 SERPL-MCNC: >2000 PG/ML (ref 232–1245)
WBC OTHER # BLD: 2.4 K/UL (ref 4.5–11.5)

## 2025-05-13 PROCEDURE — 99238 HOSP IP/OBS DSCHRG MGMT 30/<: CPT | Performed by: FAMILY MEDICINE

## 2025-05-13 PROCEDURE — 99233 SBSQ HOSP IP/OBS HIGH 50: CPT | Performed by: INTERNAL MEDICINE

## 2025-05-13 PROCEDURE — 36415 COLL VENOUS BLD VENIPUNCTURE: CPT

## 2025-05-13 PROCEDURE — 80053 COMPREHEN METABOLIC PANEL: CPT

## 2025-05-13 PROCEDURE — 84100 ASSAY OF PHOSPHORUS: CPT

## 2025-05-13 PROCEDURE — 2500000003 HC RX 250 WO HCPCS

## 2025-05-13 PROCEDURE — 6370000000 HC RX 637 (ALT 250 FOR IP): Performed by: INTERNAL MEDICINE

## 2025-05-13 PROCEDURE — 6360000002 HC RX W HCPCS

## 2025-05-13 PROCEDURE — 6370000000 HC RX 637 (ALT 250 FOR IP)

## 2025-05-13 PROCEDURE — 85025 COMPLETE CBC W/AUTO DIFF WBC: CPT

## 2025-05-13 PROCEDURE — 83735 ASSAY OF MAGNESIUM: CPT

## 2025-05-13 PROCEDURE — 94640 AIRWAY INHALATION TREATMENT: CPT

## 2025-05-13 RX ORDER — POTASSIUM CHLORIDE 1500 MG/1
40 TABLET, EXTENDED RELEASE ORAL ONCE
Status: COMPLETED | OUTPATIENT
Start: 2025-05-13 | End: 2025-05-13

## 2025-05-13 RX ORDER — MAGNESIUM SULFATE IN WATER 40 MG/ML
2000 INJECTION, SOLUTION INTRAVENOUS ONCE
Status: DISCONTINUED | OUTPATIENT
Start: 2025-05-13 | End: 2025-05-13

## 2025-05-13 RX ORDER — FUROSEMIDE 20 MG/1
20 TABLET ORAL DAILY
Qty: 30 TABLET | Refills: 0 | Status: ON HOLD | OUTPATIENT
Start: 2025-05-13

## 2025-05-13 RX ORDER — SILVER SULFADIAZINE 10 MG/G
CREAM TOPICAL
Qty: 50 G | Refills: 0 | Status: ON HOLD | OUTPATIENT
Start: 2025-05-13

## 2025-05-13 RX ORDER — MAGNESIUM SULFATE IN WATER 40 MG/ML
4000 INJECTION, SOLUTION INTRAVENOUS ONCE
Status: COMPLETED | OUTPATIENT
Start: 2025-05-13 | End: 2025-05-13

## 2025-05-13 RX ORDER — BENZOCAINE/MENTHOL 6 MG-10 MG
LOZENGE MUCOUS MEMBRANE 2 TIMES DAILY
Qty: 30 G | Refills: 0 | Status: ON HOLD | OUTPATIENT
Start: 2025-05-13

## 2025-05-13 RX ADMIN — DULOXETINE 30 MG: 30 CAPSULE, DELAYED RELEASE ORAL at 09:07

## 2025-05-13 RX ADMIN — POTASSIUM CHLORIDE 40 MEQ: 1500 TABLET, EXTENDED RELEASE ORAL at 09:55

## 2025-05-13 RX ADMIN — Medication 250 MG: at 09:08

## 2025-05-13 RX ADMIN — LACOSAMIDE 100 MG: 100 TABLET, FILM COATED ORAL at 09:08

## 2025-05-13 RX ADMIN — SODIUM CHLORIDE, PRESERVATIVE FREE 10 ML: 5 INJECTION INTRAVENOUS at 09:07

## 2025-05-13 RX ADMIN — Medication 100 MG: at 09:08

## 2025-05-13 RX ADMIN — Medication 4000 UNITS: at 09:07

## 2025-05-13 RX ADMIN — GABAPENTIN 300 MG: 300 CAPSULE ORAL at 09:08

## 2025-05-13 RX ADMIN — HYDROXYCHLOROQUINE SULFATE 200 MG: 200 TABLET ORAL at 09:08

## 2025-05-13 RX ADMIN — SPIRONOLACTONE 12.5 MG: 25 TABLET ORAL at 09:08

## 2025-05-13 RX ADMIN — PREDNISONE 5 MG: 5 TABLET ORAL at 09:07

## 2025-05-13 RX ADMIN — LEVETIRACETAM 1000 MG: 500 TABLET, FILM COATED ORAL at 09:07

## 2025-05-13 RX ADMIN — IBUPROFEN 200 MG: 200 TABLET, FILM COATED ORAL at 09:16

## 2025-05-13 RX ADMIN — SACUBITRIL AND VALSARTAN 1 TABLET: 24; 26 TABLET, FILM COATED ORAL at 09:08

## 2025-05-13 RX ADMIN — SILVER SULFADIAZINE: 10 CREAM TOPICAL at 09:11

## 2025-05-13 RX ADMIN — METOPROLOL SUCCINATE 25 MG: 25 TABLET, FILM COATED, EXTENDED RELEASE ORAL at 09:08

## 2025-05-13 RX ADMIN — IPRATROPIUM BROMIDE AND ALBUTEROL SULFATE 1 DOSE: .5; 2.5 SOLUTION RESPIRATORY (INHALATION) at 12:43

## 2025-05-13 RX ADMIN — MAGNESIUM SULFATE HEPTAHYDRATE 4000 MG: 40 INJECTION, SOLUTION INTRAVENOUS at 10:10

## 2025-05-13 RX ADMIN — PANTOPRAZOLE SODIUM 40 MG: 40 TABLET, DELAYED RELEASE ORAL at 06:12

## 2025-05-13 RX ADMIN — ENOXAPARIN SODIUM 30 MG: 100 INJECTION SUBCUTANEOUS at 09:07

## 2025-05-13 RX ADMIN — IPRATROPIUM BROMIDE AND ALBUTEROL SULFATE 1 DOSE: .5; 2.5 SOLUTION RESPIRATORY (INHALATION) at 16:19

## 2025-05-13 RX ADMIN — ASPIRIN 81 MG 81 MG: 81 TABLET ORAL at 09:08

## 2025-05-13 ASSESSMENT — PAIN DESCRIPTION - ORIENTATION: ORIENTATION: LOWER

## 2025-05-13 ASSESSMENT — PAIN DESCRIPTION - LOCATION: LOCATION: BACK

## 2025-05-13 ASSESSMENT — PAIN SCALES - GENERAL
PAINLEVEL_OUTOF10: 3
PAINLEVEL_OUTOF10: 5

## 2025-05-13 ASSESSMENT — PAIN DESCRIPTION - DESCRIPTORS: DESCRIPTORS: ACHING;DISCOMFORT;SORE

## 2025-05-13 NOTE — PROGRESS NOTES
[facility] Resident Progress Note    Shortness of Breath (Hx of afib, 3L NC 89% on RA)      Subjective:    Patient seen and evaluated at bedside and appears in no acute distress. Patient describes feeling better. He denies any new symptoms. No overnight problems     ROS: Review of system unremarkable except stated otherwise in HPI    Past medical, surgical, family and social history were reviewed, non-contributory, and unchanged unless otherwise stated.    Objective:  /72   Pulse 80   Temp 98.2 °F (36.8 °C) (Oral)   Resp 18   Wt 110.2 kg (242 lb 15.2 oz)   SpO2 95%   BMI 39.21 kg/m²     Physical Exam  Constitutional:       Appearance: He is obese.   HENT:      Nose: Nose normal.      Mouth/Throat:      Mouth: Mucous membranes are moist.   Eyes:      Conjunctiva/sclera: Conjunctivae normal.   Cardiovascular:      Rate and Rhythm: Normal rate and regular rhythm.      Pulses: Normal pulses.      Heart sounds: Normal heart sounds.   Pulmonary:      Effort: Pulmonary effort is normal.      Breath sounds: No stridor. Rales present. No wheezing or rhonchi.      Comments: Crackles bilateral lower lung fields  Abdominal:      General: Bowel sounds are normal.      Palpations: Abdomen is soft.   Musculoskeletal:         General: Normal range of motion.      Cervical back: Normal range of motion.      Right lower leg: Edema present.      Left lower leg: Edema present.   Skin:     General: Skin is warm.   Neurological:      General: No focal deficit present.      Mental Status: He is alert.   Psychiatric:         Mood and Affect: Mood normal    Labs:    Complete Blood Count:   Recent Labs     05/11/25  0253 05/12/25  0645   WBC 7.7 2.7*   HGB 12.7 11.2*   HCT 40.0 35.0*        Chemistries   Last 3 CMP:    Recent Labs     05/11/25  0253 05/12/25  0645    137   K 3.6 3.7    105   CO2 23 22   BUN 27* 19   CREATININE 1.3* 1.1   GLUCOSE 87 85   CALCIUM 9.7 9.6   BILITOT  appreciate input and Nephrology consulted, appreciate input        Pain control:acetaminophen  DVT prophylaxis: Lovenox 40mg once daily  GI prophylaxis:proton pump inhibitor per orders  CODE STATUS: Full Code  Diet: ADULT DIET; Regular      Disposition: Discharge to pending clinical course    NOTE: This note was transcribed using voice recognition software. Every effort was made to ensure accuracy; however, inadvertent computerized transcription errors may be present.    Barrett Funez MD   Family Medicine Resident PGY-1  05/13/25

## 2025-05-13 NOTE — PROGRESS NOTES
INPATIENT CARDIOLOGY FOLLOW-UP    Name: Jermaine Burks    Age: 74 y.o.    Date of Admission: 5/8/2025 11:43 PM    Date of Service: 5/13/2025    Chief Complaint: Follow-up for CHF with JOSE     Interim History:  No new overnight cardiac complaints patient complaining of increased dyspnea.  Patient was initiated on IV fluids.  Currently with no complaints of CP,  palpitations, dizziness, or lightheadedness. SR on telemetry.    Having some diarrhea today.  4 episode so far.  Did have some urine output on IV Lasix.  Not documented accurately      Review of Systems:   Cardiac: As per HPI  General: No fever, chills  Pulmonary: As per HPI  HEENT: No visual disturbances, difficult swallowing  GI: No nausea, vomiting  Endocrine: No thyroid disease or DM  Musculoskeletal: DOLAN x 4, no focal motor deficits  Skin: Intact, no rashes  Neuro/Psych: No headache or seizures    Problem List:  Patient Active Problem List   Diagnosis    COPD (chronic obstructive pulmonary disease) (HCC)    Thrombocytopenia    Obstructive sleep apnea, s/p UPPP    Paroxysmal atrial fibrillation (HCC)    Essential hypertension    Supravalvar aortic stenosis    Hepatic cirrhosis (HCC)    Alcoholic cirrhosis of liver without ascites (HCC)    Obesity    Persistent atrial fibrillation (HCC)    History of intracranial hemorrhage    Closed compression fracture of lumbar vertebra (HCC)    History of kidney stones    Ventral hernia    Nicotine dependence    History of TIA (transient ischemic attack)    S/P TAVR (transcatheter aortic valve replacement)    Rheumatoid arthritis involving multiple sites with positive rheumatoid factor (HCC)    Osteoporosis without current pathological fracture    Morbid obesity with BMI of 40.0-44.9, adult (HCC)    Chronic renal disease, stage III (HCC) [055250]    Anemia    Ambulatory dysfunction    Hyperkalemia    Diarrhea    HFrEF (heart failure with reduced ejection fraction) (HCC)    Suspected sleep apnea    Other  May DAS APRN - CNP   1 Dose at 05/12/25 2123      sodium chloride         Physical Exam:  /72   Pulse 80   Temp 98.2 °F (36.8 °C) (Oral)   Resp 18   Ht 1.676 m (5' 6\")   Wt 110.2 kg (242 lb 15.2 oz)   SpO2 95%   BMI 39.21 kg/m²   Wt Readings from Last 3 Encounters:   05/12/25 110.2 kg (242 lb 15.2 oz)   05/08/25 102.1 kg (225 lb)   03/20/25 102.1 kg (225 lb)     Appearance: Awake, alert, no acute respiratory distress  Skin: Intact, no rash  Head: Normocephalic, atraumatic  Eyes: EOMI, no conjunctival erythema  ENMT: No pharyngeal erythema, MMM, no rhinorrhea  Neck: Supple, JVP elevated at 13 to 14 cm, no carotid bruits  Lungs: Clear to auscultation bilaterally. No wheezes, rales, or rhonchi.  Cardiac: Irregularly irregular rate and rhythm, tachycardic +S1S2, no murmurs apparent  Abdomen: Soft, nontender, +bowel sounds  Extremities: Moves all extremities x 4, trace lower extremity edema  Neurologic: No focal motor deficits apparent, normal mood and affect  Peripheral Pulses: Intact posterior tibial pulses bilaterally    Intake/Output:    Intake/Output Summary (Last 24 hours) at 5/13/2025 0857  Last data filed at 5/13/2025 0649  Gross per 24 hour   Intake 504.42 ml   Output 150 ml   Net 354.42 ml     No intake/output data recorded.    Laboratory Tests:  Recent Labs     05/11/25 0253 05/12/25  0645    137   K 3.6 3.7    105   CO2 23 22   BUN 27* 19   CREATININE 1.3* 1.1   GLUCOSE 87 85   CALCIUM 9.7 9.6     Lab Results   Component Value Date/Time    MG 1.4 05/12/2025 06:45 AM     Recent Labs     05/11/25 0253 05/12/25  0645   ALKPHOS 66 53   * 196*   * 219*   BILITOT 1.6* 1.4*     Recent Labs     05/11/25 0253 05/12/25  0645 05/13/25  0600   WBC 7.7 2.7* 2.4*   RBC 3.88 3.42* 3.60*   HGB 12.7 11.2* 11.7*   HCT 40.0 35.0* 36.4*   .1* 102.3* 101.1*   MCH 32.7 32.7 32.5   MCHC 31.8* 32.0 32.1   RDW 14.7 14.6 14.5   MPV 11.5 11.3 11.3     Lab Results   Component Value Date

## 2025-05-13 NOTE — PLAN OF CARE
Problem: ABCDS Injury Assessment  Goal: Absence of physical injury  5/12/2025 2308 by Josh Lopez RN  Outcome: Progressing     Problem: Safety - Adult  Goal: Free from fall injury  5/13/2025 1109 by Aileen Andres RN  Outcome: Progressing  5/12/2025 2308 by Josh Lopez RN  Outcome: Progressing     Problem: Chronic Conditions and Co-morbidities  Goal: Patient's chronic conditions and co-morbidity symptoms are monitored and maintained or improved  5/13/2025 1109 by Aileen Andres RN  Outcome: Progressing  Flowsheets (Taken 5/13/2025 0900)  Care Plan - Patient's Chronic Conditions and Co-Morbidity Symptoms are Monitored and Maintained or Improved: Monitor and assess patient's chronic conditions and comorbid symptoms for stability, deterioration, or improvement  5/12/2025 2308 by Josh Lopez RN  Outcome: Progressing

## 2025-05-13 NOTE — DISCHARGE SUMMARY
[facility] Resident Discharge Summary  Patient ID:  Jermaine Burks  [id number]  74 y.o.  1950    Admit date: 5/8/2025    Discharge date: 05/13/25     Location of discharge: [facility]     Discharge Physician: Mable Cancino MD    Consults: Pulmonology, cardiology, nephrology    Admission Diagnoses: Hyperkalemia [E87.5]  Lung mass [R91.8]  Hypoxia [R09.02]  JOSE (acute kidney injury) [N17.9]  Acute hypoxemic respiratory failure (HCC) [J96.01]  Pneumonia due to infectious organism, unspecified laterality, unspecified part of lung [J18.9]  Chronic congestive heart failure, unspecified heart failure type (HCC) [I50.9]  Hepatic cirrhosis, unspecified hepatic cirrhosis type, unspecified whether ascites present (HCC) [K74.60]    Discharge Diagnoses: Principal Problem (Resolved):    Hypoxia  Active Problems:    Chronic renal disease, stage III (HCC) [207746]    COPD (chronic obstructive pulmonary disease) (HCC)    Obstructive sleep apnea, s/p UPPP    Paroxysmal atrial fibrillation (HCC)    Essential hypertension    Hepatic cirrhosis (HCC)    History of intracranial hemorrhage    S/P TAVR (transcatheter aortic valve replacement)    Morbid obesity with BMI of 40.0-44.9, adult (HCC)    HFrEF (heart failure with reduced ejection fraction) (HCC)    SOB (shortness of breath)    Lung mass    Child-Castelan class B liver disease score    Chronic congestive heart failure (HCC)  Resolved Problems:    Hyperkalemia    Acute decompensated heart failure (HCC)    Pneumonia due to infectious organism    JOSE (acute kidney injury)    Acute hypoxemic respiratory failure (HCC)      Procedures: None    Hospital Course:  Jermaine Burks  is a 74 y.o. male patient of Ashley Rutherford MD  with a pertinent PMHx of JAY closed compression fracutre of lumbar vertebrae, alcoholic cirrhosis, CKD stage III, COPD, paroxysmal afib HTN  who presented to the ER from home with wife with

## 2025-05-13 NOTE — TELEPHONE ENCOUNTER
I just took a call from [facility] asking for an appointment for this patient being discharge home today.    He was admitted there on the 8th for CHF & Hipercalemia.       Where can I put him on your schedule?

## 2025-05-13 NOTE — PROGRESS NOTES
Spiritual Health History and Assessment/Progress Note  [facility]    Spiritual/Emotional Needs,  ,  ,      Name: Jermaine Burks MRN: [id number]    Age: 74 y.o.     Sex: male   Language: English   Yazidi: None   Hypoxia     Date: 5/13/2025                           [address]        Referral/Consult From: Rounding   Encounter Overview/Reason: Spiritual/Emotional Needs  Service Provided For: Patient    Liane, Belief, Meaning:   Patient unable to assess at this time  Family/Friends No family/friends present      Importance and Influence:  Patient unable to assess at this time  Family/Friends No family/friends present    Community:  Patient Patient declined visit  Family/Friends No family/friends present    Assessment and Plan of Care:     Patient Interventions include: Patient declined visit  Family/Friends Interventions include: No family/friends present    Patient Plan of Care: No spiritual needs identified for follow-up  Family/Friends Plan of Care: No family/friends present    Electronically signed by Chaplain Aleah on 5/13/2025 at 4:51 PM

## 2025-05-13 NOTE — CONSULTS
[facility]   Inpatient CHF Nurse Navigator Consult        Cardiologist: Dr Guerra   Primary Care Physician: Ashley Rutherford MD     Jermaine Burks is a 74 y.o. (1950) male with a history of HFmrEF, most recent EF:  Lab Results   Component Value Date    LVEF 65 05/29/2019    LVEFMODE Echo 03/19/2018       Patient was awake and alert, laying in bed during the consultation and is agreeable to heart failure education. He was engaged and asked appropriate questions throughout the education session.     Patient is eligible for [facility] Heart Failure Management System (HFMS) and is agreeable to the Heart Failure Management System.     Discussed with patient and patient was given patient HFMS pamphlet and QR code for additional resource. Demo HFMS device shown and demonstrated. Patient agreeable and order placed in [facility] Patient [facility] Network. I spoke to his wife and gave her the information as well.      HFMS will be mailed out to patient in 24-48 hours and instructed to call [facility] [facility] if assistance is needed for application.     Barriers identified during consult contributing to HF Hospitalization: lack of education.     [] Limited medication adherence   [] Poor health literacy, education regarding HF medications provided   [] Pill box provided to patient  [] Difficulty affording medications  [] Difficulty obtaining/ managing medications  [] Prescription assistance information given     [x] Not weighing themselves daily  [x] Weight log provided for easy monitoring  [] Scale provided     [x] Not following low sodium diet  [] Food insecurity   [x] 2 gram sodium diet education provided   [] Low sodium recipes provided  [] Sodium free seasoning provided   [] Low sodium meal delivery options given to patient  [] Dietician consulted     [] Lack of transportation to appointments     [] Depression, given chronic illness  [] Primary team notified     [] Goals of care need addressed  []

## 2025-05-13 NOTE — PROGRESS NOTES
Call placed to the office of Dr Rutherford for post hospital follow up appointment. Per the office, they will call the patient with scheduled appointment.     Lucy DEL ROSARIO, RN  Heart Failure Navigator

## 2025-05-13 NOTE — PROGRESS NOTES
[facility]  Nephrology Progress Note    Patient's Name: Jermaine Burks    History of Present Illness from 5/9 Consult Note:    \"Jermaine Burks is a 74 y.o. male with a past medical history of JAY closed compression fracutre of lumbar vertebrae, alcoholic cirrhosis, CKD stage III, COPD, paroxysmal afib HTN .      They presented on 5/8/2025 complaining of SOB. They have noticed SOB for 2 days prior to presentation. On arrival their vitals were pertinent for hypertension. Initial labs were pertinent for creatinine of 1.9.  Question that he received iodinated contrast on 5 /9. nephrology was consulted for acute kidney injury.\"    Interval History:  5/13/25: Seen and examined. They do not have any new issues overnight. Lose BM this am Possible DC today    PMH:    Past Medical History:   Diagnosis Date    Alcoholic cirrhosis (HCC)     Alcoholism (HCC)     Has been sober / dry since 01/2016.    Anxiety     Arthritis     Chronic atrial fibrillation (HCC)     Esophageal varices (HCC)     UGI bleed ~2012 - has had variceal banding 2x around that time.    History of kidney stones     Hypertension     Lumbar compression fracture (HCC) 02/2017    Lung tumor 2017    Osteopenia     Osteopenia     S/P TAVR (transcatheter aortic valve replacement) 9/20/2018    Seizure disorder (HCC) 09/2016    Complicated a stroke with occipital ICH.    Seizures (HCC)     Sepsis with MRSE bacteremia 09/2016    resolved    Severe aortic stenosis     Confirmed by echo 9/2016, BROWN 10/2016. Undergoing w/u at [facility] for TAVR, as of 4/2017.    Sleep apnea     Couldn't tolerate CPAP therapy ~2007. Had had UPPP.    Stroke (HCC) 09/2016    With receptive aphasia, poor memory that are improving as of 04/2017. Pt was found to have an occipital ICH at that time, with possible amyloid angiopathy as cause per MRI subsequently. Had seizure and sepsis (d/t MRSE bacteremia) complicating stroke.       Patient Active Problem List   Diagnosis    COPD  (chronic obstructive pulmonary disease) (HCC)    Thrombocytopenia    Obstructive sleep apnea, s/p UPPP    Paroxysmal atrial fibrillation (HCC)    Essential hypertension    Supravalvar aortic stenosis    Hepatic cirrhosis (HCC)    Alcoholic cirrhosis of liver without ascites (HCC)    Obesity    Persistent atrial fibrillation (HCC)    History of intracranial hemorrhage    Closed compression fracture of lumbar vertebra (HCC)    History of kidney stones    Ventral hernia    Nicotine dependence    History of TIA (transient ischemic attack)    S/P TAVR (transcatheter aortic valve replacement)    Rheumatoid arthritis involving multiple sites with positive rheumatoid factor (HCC)    Osteoporosis without current pathological fracture    Morbid obesity with BMI of 40.0-44.9, adult (HCC)    Chronic renal disease, stage III (HCC) [563437]    Anemia    Ambulatory dysfunction    Diarrhea    HFrEF (heart failure with reduced ejection fraction) (HCC)    Suspected sleep apnea    Other fatigue    Palpitation    SOB (shortness of breath)    Lung mass    Child-Castelan class B liver disease score    Chronic congestive heart failure (HCC)       Diet:    ADULT DIET; Regular    Meds:     [Held by provider] furosemide  20 mg Oral Daily    aspirin  81 mg Oral Daily    calcium citrate  250 mg Oral Daily    DULoxetine  30 mg Oral Daily    folic acid  1 mg Oral Nightly    gabapentin  300 mg Oral Daily    hydrocortisone   Topical BID    hydroxychloroquine  200 mg Oral BID    lacosamide  100 mg Oral BID    levETIRAcetam  1,000 mg Oral BID    metoprolol succinate  25 mg Oral Daily    pantoprazole  40 mg Oral Daily    predniSONE  5 mg Oral Daily    sacubitril-valsartan  1 tablet Oral BID    silver sulfADIAZINE   Topical Daily    spironolactone  12.5 mg Oral Daily    thiamine  100 mg Oral Daily    vitamin D  4,000 Units Oral Daily    sodium chloride flush  5-40 mL IntraVENous 2 times per day    arformoterol tartrate  15 mcg Nebulization BID RT

## 2025-05-13 NOTE — CARE COORDINATION
Transition of Care-anticipate discharge today. Pulmonary ok for discharge, room air and on oral steroids. Patient will return home with wife at discharge, no anticipated needs.    Francy SOLANON, RN  [facility]

## 2025-05-14 ENCOUNTER — TELEPHONE (OUTPATIENT)
Dept: FAMILY MEDICINE CLINIC | Age: 75
End: 2025-05-14

## 2025-05-14 ENCOUNTER — CARE COORDINATION (OUTPATIENT)
Dept: CARE COORDINATION | Age: 75
End: 2025-05-14

## 2025-05-14 LAB
MICROORGANISM SPEC CULT: NORMAL
SERVICE CMNT-IMP: NORMAL
SPECIMEN DESCRIPTION: NORMAL

## 2025-05-14 NOTE — TELEPHONE ENCOUNTER
Care Transitions Initial Follow Up Call    Outreach made within 2 business days of discharge: Yes    Patient: eJrmaine Burks Patient : 1950   MRN: [id number]  Reason for Admission: hypoxia  Discharge Date: 25       Spoke with: patient's wife ( okay per HIPAA)     Discharge department/facility: [facility]    TCM Interactive Patient Contact:  Was patient able to fill all prescriptions: Yes  Was patient instructed to bring all medications to the follow-up visit: Yes  Is patient taking all medications as directed in the discharge summary? Yes  Does patient understand their discharge instructions: Yes  Does patient have questions or concerns that need addressed prior to 7-14 day follow up office visit: no    Additional needs identified to be addressed with provider  No needs identified             Scheduled appointment with PCP within 7-14 days    Follow Up  Future Appointments   Date Time Provider Department Center   2025  1:30 PM [facility] ROOM 1 [facility]   2025 10:30 AM Paty Neal PA BDM CHF [facility]   2025  1:00 PM Alvaro Emmanuel, DPM [facility] Podiatry [facility]   2025  1:30 PM Ashley Rutherford MD [facility] DEP   2025 10:30 AM Logan Sigala DO ELECTRO PHYS [facility]   2025  2:45 PM Dario Guerra MD [place] [facility]   9/15/2025  2:30 PM Oniel Gamboa MD POLAND SLEEP [facility]       Yasemin Mullins

## 2025-05-14 NOTE — CARE COORDINATION
Care Transitions Note    Initial Call - Call within 2 business days of discharge: Yes    Attempted to reach patient for transitions of care follow up. Unable to reach patient.    Outreach Attempts:   1st attempt. HIPAA compliant voicemail left for patient.     Patient: Jermaine Burks    Patient : 1950   MRN: [id number]    Reason for Admission: Acute hypoxemic respiratory failure   Discharge Date: 25  RURS: Readmission Risk Score: 18.1    Last Discharge Facility       Date Complaint Diagnosis Description Type Department Provider    25 Shortness of Breath Acute hypoxemic respiratory failure (HCC) ... ED to Hosp-Admission (Discharged) (ADMITTED) Mable Monroe MD; Eileen Moore..            Was this an external facility discharge? No    Follow Up Appointment:   Patient has hospital follow up appointment scheduled greater than 14 days after discharge; Scheduled per office.    Future Appointments         Provider Specialty Dept Phone    2025 1:00 PM Ashley Rutherford MD Family Medicine [phone]    2025 1:30 PM JIMMY CHF ROOM 1 Cardiology [phone]    2025 10:30 AM Paty Neal PA Cardiology [phone]    2025 1:00 PM Alvaro Emmanuel DPM Podiatry [phone]    2025 1:30 PM Ashley Rutherford MD Family Medicine [phone]    2025 10:30 AM Logan Sigala DO Cardiac Electrophysiology [phone]    2025 2:45 PM Dario Guerra MD Cardiology [phone]    9/15/2025 2:30 PM Oniel Gamboa MD Sleep Medicine [phone]            Plan for follow-up on next business day.      Jolene Herrera RN

## 2025-05-14 NOTE — TELEPHONE ENCOUNTER
Patient is scheduled.   Last Appointment:  3/20/2025  Future Appointments   Date Time Provider Department Center   5/29/2025  1:00 PM Ashley Rutherford MD [facility] DEP   6/5/2025  1:30 PM J[facility] ROOM 1 [facility]   6/12/2025 10:30 AM Paty Neal PA BD CHF [facility]   6/16/2025  1:00 PM Alvaro Emmanuel, DPM Col Podiatry [facility]   7/24/2025  1:30 PM Ashley Rutherford MD [facility]   7/31/2025 10:30 AM Logan Sigala, DO ELECTRO PHYS [facility]   9/11/2025  2:45 PM Dario Guerra MD [place] [facility]   9/15/2025  2:30 PM Oniel Gamboa MD [place] SLEEP [facility]

## 2025-05-15 ENCOUNTER — CARE COORDINATION (OUTPATIENT)
Dept: CARE COORDINATION | Age: 75
End: 2025-05-15

## 2025-05-15 LAB
MICROORGANISM SPEC CULT: NORMAL
MICROORGANISM SPEC CULT: NORMAL
SERVICE CMNT-IMP: NORMAL
SERVICE CMNT-IMP: NORMAL
SPECIMEN DESCRIPTION: NORMAL
SPECIMEN DESCRIPTION: NORMAL

## 2025-05-15 NOTE — CARE COORDINATION
Care Transitions Note    Initial Call - Call within 2 business days of discharge: Yes    Attempted to reach patient for transitions of care follow up. Unable to reach patient.    Outreach Attempts:   Pt's wife answered the phone, but states that the pt is currently asleep and will request that he call this CTN back later. CTN ensured that the pt's wife had this CTN's contact information.    No further outreaches will be attempted.    If no return call by the end of today, CTN will  sign off and resolve  the CT program.      Patient: Jermaine Burks    Patient : 1950   MRN: [id number]    Reason for Admission: Acute hypoxemic respiratory failure   Discharge Date: 25  RURS: Readmission Risk Score: 18.1    Last Discharge Facility       Date Complaint Diagnosis Description Type Department Provider    25 Shortness of Breath Acute hypoxemic respiratory failure (HCC) ... ED to Hosp-Admission (Discharged) (ADMITTED) JIMMY HUBBARDB Mable Cancino MD; Eileen Moore..            Was this an external facility discharge? No    Follow Up Appointment:   Patient has hospital follow up appointment scheduled greater than 14 days after discharge; Office scheduled.    Future Appointments         Provider Specialty Dept Phone    2025 1:00 PM Ashley Rutherford MD Family Medicine [phone]    2025 1:30 PM JIMMY CHF ROOM 1 Cardiology [phone]    2025 10:30 AM Paty Neal PA Cardiology [phone]    2025 1:00 PM Alvaro Emmanuel, DPM Podiatry [phone]    2025 1:30 PM Ashley Rutherford MD Family Medicine [phone]    2025 10:30 AM Logan Sigala DO Cardiac Electrophysiology [phone]    2025 2:45 PM Dario Guerra MD Cardiology [phone]    9/15/2025 2:30 PM Oniel Gamboa MD Sleep Medicine [phone]            No further follow-up call indicated     Jolene Herrera RN

## 2025-05-15 NOTE — PROGRESS NOTES
Physician Progress Note      PATIENT:               HIMANSHU COLIN  CSN #:                  [id number]  :                       1950  ADMIT DATE:       2025 11:43 PM  DISCH DATE:        2025 6:10 PM  RESPONDING  PROVIDER #:        Mable Cancino MD          QUERY TEXT:    Pneumonia is documented in the medical record 25. Please provide   additional clinical indicators supportive of your documentation. Or please   document if the diagnosis of pneumonia has been ruled out after study.    The clinical indicators include:  --74 year old male presents with SOB and nausea. Acute hypoxia may be   secondary to CHF exacerbation, pneumonia, COPD vs lung malignancy vs cirrhosis   (25 Dr. Zavaleta H&P)  --Stop Rocephin and doxycycline.  White blood cell count slightly elevated,   could potentially be from chronic steroid use.  Patient has been afebrile.    Procalcitonin 0.49.  Chest imaging unremarkable for any infiltrates (25   Dr. Longoria pulmonology consult note)  --off antibiotics initially given to cover for CAP (25 Dr. Cancino/Dr. Funez family medicine progress note)  --Assessment/plan: Pneumonia (25 Dr. Cancino/Dr. Funez family [facility]   progress note)  --XR Chest: Suspicious medial right upper lung masslike opacity. There appears   to be a pleural effusion on the right. (25)  --IV Doxy x 1 (25), IV Rocephin x 1 (25) (per MAR)  Options provided:  -- Pneumonia present as evidenced by, Please document evidence.  -- Pneumonia was ruled out  -- Other - I will add my own diagnosis  -- Disagree - Not applicable / Not valid  -- Disagree - Clinically unable to determine / Unknown  -- Refer to Clinical Documentation Reviewer    PROVIDER RESPONSE TEXT:    Pneumonia was ruled out after study.    Query created by: Delfina Nixon on 2025 10:14 AM      QUERY TEXT:    Acute respiratory failure is documented in the medical record 25 Dr. Funez/Dr. Cancino [facility]  Savage Lee Yeimi Alfred, Pharm.D., [facility]  Medication Therapy Management Pharmacist  [facility]     Current Outpatient Medications   Medication Sig Dispense Refill     albuterol (PROAIR HFA;PROVENTIL HFA;VENTOLIN HFA) 90 mcg/actuation inhaler Inhale 2 puffs every 4 (four) hours as needed for wheezing. 1 Inhaler 1     amLODIPine (NORVASC) 5 MG tablet Take 1 tablet (5 mg total) by mouth at bedtime. 30 tablet 5     atorvastatin (LIPITOR) 10 MG tablet TAKE 1 TABLET(10 MG) BY MOUTH AT BEDTIME 90 tablet 3     beclomethasone (QVAR) 80 mcg/actuation inhaler Inhale 2 puffs 2 (two) times a day. 1 Inhaler 11     calcium, as carbonate, (TUMS) 200 mg calcium (500 mg) chewable tablet Chew 1-2 tablets 3 (three) times a day as needed for heartburn.       clopidogrel (PLAVIX) 75 mg tablet TAKE 1 TABLET BY MOUTH DAILY 90 tablet 1     fluticasone (FLONASE) 50 mcg/actuation  evidenced by, Please document evidence.  -- Acute Respiratory Failure ruled out after study  -- Other - I will add my own diagnosis  -- Disagree - Not applicable / Not valid  -- Disagree - Clinically unable to determine / Unknown  -- Refer to Clinical Documentation Reviewer    PROVIDER RESPONSE TEXT:    Acute Respiratory Failure has been ruled out after study.    Query created by: Delfina Nixon on 5/14/2025 10:52 AM      Electronically signed by:  Mable Cancino MD 5/15/2025 4:38 PM           Andrea Giraldo RN

## 2025-05-16 NOTE — TELEPHONE ENCOUNTER
Offered appointment in [place] on Tuesday, wife declined. States that EP does not have wheelchairs and he is not able to walk a long distance. Appointment made for Thursday in [place].

## 2025-05-19 NOTE — PROGRESS NOTES
PATIENT:               HIMANSHU COLIN  [facility] #:                  [id number]  :                       1950  ADMIT DATE:       2025 11:43 PM  DISCH DATE:        2025 6:10 PM  RESPONDING  PROVIDER #:        Lorena Toscano          QUERY TEXT:    Shock is documented in the medical record 25 Dr. Guillory nephrology consult   note. Please provide additional clinical indicators supportive of your   documentation. Or please document if the diagnosis of shock has been ruled out   after study.    The clinical indicators include:  --74 year old male pmh alcohol cirrhosis, CKD stage III presents with SOB.   Assessment/plan: JOSE 2/2 decreased renal perfusion in the setting of shock;   Metabolic acidosis 2/2 acute renal failure and lactic acidosis (25 Dr. Guillory nephrology consult note)  --nephrology on consult for JOSE with CHF exacerbation with hypotension. Agree   with holding Entresto, spironolactone due to hypotension and JOSE (25 Dr. Zhao cardiology consult)  --IVF support for the hypotension, but needs diuresis (25 Dr. Mosley   family medicine progress note)    --lactic acid 4.7>3.1 (25)  --per vs: 115/67, 97/60, 101/70, 94/63, 98/61; HR ; RR 18-32 (25)  --per vs: 102/65, 89/59; -111; RR 18 (5/10/25)  --IV fluid bolus (5/9 x 2, 5/11 x 1)  Options provided:  -- Shock present as evidenced by, Please document evidence.  -- Shock was ruled out  -- Other - I will add my own diagnosis  -- Disagree - Not applicable / Not valid  -- Disagree - Clinically unable to determine / Unknown  -- Refer to Clinical Documentation Reviewer    PROVIDER RESPONSE TEXT:    Provider disagreed with this query.    Query created by: Delfina Nixon on 2025 10:27 AM      Electronically signed by:  Lorena Toscano 2025 8:18 AM

## 2025-05-19 NOTE — ED PROVIDER NOTES
[facility] EMERGENCY DEPARTMENT  EMERGENCY DEPARTMENT ENCOUNTER      Pt Name: Jermaine Burks  MRN: [id number]  Birthdate 1950  Date of evaluation: 5/19/2025  Provider: Cheng Pollock MD     CHIEF COMPLAINT       Chief Complaint   Patient presents with    Diarrhea     For the past 3-4 days    Fatigue         HISTORY OF PRESENT ILLNESS   (Location/Symptom, Timing/Onset, Context/Setting, Quality, Duration, Modifying Factors, Severity) Note limiting factors.   I wore a kn95 mask for the entirety of this encounter.      HPI    Jermaine Burks is a 74 y.o. male who presents to the emergency department diarrhea nausea fatigue has been ongoing for the better part of a week he also has shortness of breath but this is chronic in nature.  No worsening chest pain or worsening abdominal pain no fevers.  No other acute complaints.  Lives at home with    Nursing Notes were reviewed.    REVIEW OF SYSTEMS    (2+ for level 4; 10+ for level 5)   Review of Systems    PAST MEDICAL HISTORY     Past Medical History:   Diagnosis Date    Alcoholic cirrhosis (HCC)     Alcoholism (HCC)     Has been sober / dry since 01/2016.    Anxiety     Arthritis     Chronic atrial fibrillation (HCC)     Esophageal varices (HCC)     UGI bleed ~2012 - has had variceal banding 2x around that time.    History of kidney stones     Hypertension     Lumbar compression fracture (HCC) 02/2017    Lung tumor 2017    Osteopenia     Osteopenia     S/P TAVR (transcatheter aortic valve replacement) 9/20/2018    Seizure disorder ([facility]) 09/2016    Complicated a stroke with occipital ICH.    Seizures ([facility])     Sepsis with MRSE bacteremia 09/2016    resolved    Severe aortic stenosis     Confirmed by echo 9/2016, BROWN 10/2016. Undergoing w/u at [facility] for TAVR, as of 4/2017.    Sleep apnea     Couldn't tolerate CPAP therapy ~2007. Had had UPPP.    Stroke ([facility]) 09/2016    With receptive aphasia, poor memory that are improving as of  (Per Emergency Physician):       RADIOLOGY (Per Emergency Physician):       Interpretation per the Radiologist below, if available at the time of this note:  No results found.    ED BEDSIDE ULTRASOUND:   Performed by ED Physician - none    LABS:  Labs Reviewed   CBC WITH AUTO DIFFERENTIAL   COMPREHENSIVE METABOLIC PANEL   TROPONIN        All other labs were within normal range or not returned as of this dictation.    EMERGENCY DEPARTMENT COURSE and DIFFERENTIAL DIAGNOSIS/MDM:   Vitals:    Vitals:    05/19/25 1535   BP: 102/65   Pulse: 96   Resp: 17   Temp: 98.2 °F (36.8 °C)   TempSrc: Oral   SpO2: 99%   Weight: 102.1 kg (225 lb)   Height: 1.778 m (5' 10\")       Medications   sodium chloride 0.9 % bolus 1,000 mL (has no administration in time range)   ondansetron (ZOFRAN) injection 4 mg (has no administration in time range)       MDM  .  Patient presented nausea diarrhea fatigue is been ongoing for a few days.  He is afebrile vitals are stable he is nontoxic awake alert and well-appearing.  Considered head CT no falls or trauma consider abdominal CT but he has a benign abdominal exam.  Plan to give fluids IV Zofran and will consider admission if he has severe electrolyte derangement or unable to tolerate p.o.  His workup is benign he is able to ambulate safely and wife comfortable taking him home we will consider discharge    REVAL:     Workup is benign plan to discharge with Zofran Imodium he is follow-up in 2 days.  Stable for discharge.  Wife at bedside agreeable with plan    CRITICAL CARE TIME   Total Critical Care time was  minutes, excluding separately reportable procedures.  There was a high probability of clinically significant/life threatening deterioration in the patient's condition which required my urgent intervention.     CONSULTS:  None    PROCEDURES:  Unless otherwise noted below, none     Procedures    Patients symptoms are consistent with sepsis, severe sepsis, or septic shock (If yes use

## 2025-05-21 PROBLEM — N17.9 ACUTE KIDNEY INJURY: Status: ACTIVE | Noted: 2025-01-01

## 2025-05-21 NOTE — ED NOTES
Radiology Procedure Waiver   Name: Jermaine Burks  : 1950  MRN: [id number]    Date:  25    Time: 4:42 PM EDT    Benefits of immediately proceeding with Radiology exam(s) without pre-testing outweigh the risks or are not indicated as specified below and therefore the following is/are being waived:    [] Pregnancy test   [] Patients LMP on-time and regular.   [] Patient had Tubal Ligation or has other Contraception Device.   [] Patient  is Menopausal or Premenarcheal.    [] Patient had Full or Partial Hysterectomy.    [] Protocol for Iodine allergy    [] MRI Questionnaire     [x] BUN/Creatinine   [] Patient age w/no hx of renal dysfunction.   [] Patient on Dialysis.   [] Recent Normal Labs.  Electronically signed by Berny Tariq DO on 25 at 4:42 PM EDT               Berny Tariq DO  [phone]

## 2025-05-22 ENCOUNTER — CARE COORDINATION (OUTPATIENT)
Dept: CARE COORDINATION | Age: 75
End: 2025-05-22

## 2025-05-22 ENCOUNTER — TELEPHONE (OUTPATIENT)
Dept: FAMILY MEDICINE CLINIC | Age: 75
End: 2025-05-22

## 2025-05-22 PROBLEM — I50.22 HEART FAILURE WITH MID-RANGE EJECTION FRACTION (HFMEF) (HCC): Status: ACTIVE | Noted: 2025-01-01

## 2025-05-22 PROBLEM — R60.1 ANASARCA: Status: ACTIVE | Noted: 2025-01-01

## 2025-05-22 PROBLEM — A04.72 C. DIFFICILE COLITIS: Status: ACTIVE | Noted: 2025-05-22

## 2025-05-22 PROBLEM — N17.9 ACUTE KIDNEY INJURY: Status: ACTIVE | Noted: 2025-05-22

## 2025-05-22 PROBLEM — N18.9 ACUTE KIDNEY INJURY SUPERIMPOSED ON CHRONIC KIDNEY DISEASE: Status: ACTIVE | Noted: 2025-05-21

## 2025-05-22 NOTE — TELEPHONE ENCOUNTER
Dione - Wife          She is calling to let you know that Jermaine has been admitted to [facility], following his ER visit last evening.     iDone took him to the ER because he was suffering from nausea, and had uncontrolled diarrhea.  He was taking Imodium & Zofran.  Neither of these was helping.

## 2025-05-22 NOTE — ACP (ADVANCE CARE PLANNING)
Advance Care Planning   Healthcare Decision Maker:    Primary Decision Maker: LindaDione espinoza - [facility] - [phone]    Click here to complete Healthcare Decision Makers including selection of the Healthcare Decision Maker Relationship (ie \"Primary\").

## 2025-05-22 NOTE — PLAN OF CARE
Problem: Safety - Adult  Goal: Free from fall injury  Outcome: Progressing  Flowsheets (Taken 5/21/2025 2321)  Free From Fall Injury: Instruct family/caregiver on patient safety     Problem: Chronic Conditions and Co-morbidities  Goal: Patient's chronic conditions and co-morbidity symptoms are monitored and maintained or improved  Outcome: Progressing  Flowsheets (Taken 5/21/2025 2313)  Care Plan - Patient's Chronic Conditions and Co-Morbidity Symptoms are Monitored and Maintained or Improved: Monitor and assess patient's chronic conditions and comorbid symptoms for stability, deterioration, or improvement     Problem: Discharge Planning  Goal: Discharge to home or other facility with appropriate resources  Outcome: Progressing  Flowsheets  Taken 5/21/2025 2321  Discharge to home or other facility with appropriate resources: Identify barriers to discharge with patient and caregiver  Taken 5/21/2025 2313  Discharge to home or other facility with appropriate resources:   Refer to discharge planning if patient needs post-hospital services based on physician order or complex needs related to functional status, cognitive ability or social support system   Identify barriers to discharge with patient and caregiver     Problem: Pain  Goal: Verbalizes/displays adequate comfort level or baseline comfort level  Outcome: Progressing  Flowsheets (Taken 5/21/2025 2321)  Verbalizes/displays adequate comfort level or baseline comfort level:   Encourage patient to monitor pain and request assistance   Assess pain using appropriate pain scale     Problem: Skin/Tissue Integrity  Goal: Skin integrity remains intact  Description: 1.  Monitor for areas of redness and/or skin breakdown2.  Assess vascular access sites hourly3.  Every 4-6 hours minimum:  Change oxygen saturation probe site4.  Every 4-6 hours:  If on nasal continuous positive airway pressure, respiratory therapy assess nares and determine need for appliance change or

## 2025-05-22 NOTE — PLAN OF CARE
Problem: Safety - Adult  Goal: Free from fall injury  5/22/2025 0941 by Charo Hirsch RN  Outcome: Progressing  5/22/2025 0348 by Beba Dawn RN  Outcome: Progressing  Flowsheets (Taken 5/21/2025 2321)  Free From Fall Injury: Instruct family/caregiver on patient safety     Problem: Chronic Conditions and Co-morbidities  Goal: Patient's chronic conditions and co-morbidity symptoms are monitored and maintained or improved  5/22/2025 0941 by Charo Hirsch RN  Outcome: Progressing  5/22/2025 0348 by Beba Dawn RN  Outcome: Progressing  Flowsheets (Taken 5/21/2025 2313)  Care Plan - Patient's Chronic Conditions and Co-Morbidity Symptoms are Monitored and Maintained or Improved: Monitor and assess patient's chronic conditions and comorbid symptoms for stability, deterioration, or improvement

## 2025-05-22 NOTE — PROGRESS NOTES
Pharmacy Note    This patient was ordered calcitonin (MIACALCIN) nasal spray. Per the Pharmacy & Therapeutics Committee, this medication is non-formulary and not stocked by pharmacy for the reason indicated below. The medication can be reordered at discharge.     Medications in which risks outweigh benefits during hospitalization:           -  oral bisphosphonates         -  raloxifene (Evista)        -  SGLT2 inhibitors (ordered in the hospital for an indication other than heart failure or chronic kidney disease)    Medications that lack necessity during an acute hospital stay:        -  nasal antihistamines        -  nasal ipratropium 0.03% and 0.06%        -  nasal miacalcin        -  acyclovir topical cream/ointment orders for herpes labialis (cold sores)  Jabari Woods [facility]  5/21/2025  11:26 PM

## 2025-05-22 NOTE — CONSULTS
[facility]  Nephrology Progress Note    Patient's Name: Jermaine Burks    History of Present Illness from 5/9 Consult Note:    \"Jermaine Burks is a 74 y.o. male with a past medical history of JAY closed compression fracutre of lumbar vertebrae, alcoholic cirrhosis, CKD stage III, COPD, paroxysmal afib HTN .   He presented on 5/8/2025 complaining of SOB. They have noticed SOB for 2 days prior to presentation. On arrival their vitals were pertinent for hypertension. Initial labs were pertinent for creatinine of 1.9.  Question that he received iodinated contrast on 5 /9. nephrology was consulted for acute kidney injury.\"     Addendum to the HX: He was last seen at [facility] on 5/13/25  prior to D/C at which time his cr was 1.0mg/dl  He was tested while at [facility] for C. Dif but was (-). After going home his appetite was poor, he was weak and not ambulating much per his wife. He had diarrhea and was seen in the ED 5/19/25 and D/Toribio with Imodium. He presented back to the ED 5/21/25 with the c/odiarrhea 4-5 up to 7-=8 loose watery BM with assoc weakness and decreased appetite per his wife. He denied abd pain. He has SOB. In the ED BP 99/66 with O2 sat 89% on RA. Labs initially 6.1 and dropped to 5.7 and after Lokelma dropped to 4.7. Initial cr 2.0mg/dl dropped to 1.8mg/dl  Overnight HR sofia into the 140-150's and the monitor showed AFib    Interval History:  5/22/25: Seen and examined while in radiology this afternoon. He states he feels poorly overall and he has ongoing SOB. He denied abd pain at the time of my visit    PMH:    Past Medical History:   Diagnosis Date    Alcoholic cirrhosis (HCC)     Alcoholism (HCC)     Has been sober / dry since 01/2016.    Anxiety     Arthritis     Chronic atrial fibrillation (HCC)     Esophageal varices (HCC)     UGI bleed ~2012 - has had variceal banding 2x around that time.    History of kidney stones     Hypertension     Lumbar compression fracture (HCC) 02/2017    Lung

## 2025-05-22 NOTE — PROGRESS NOTES
4 Eyes Skin Assessment     NAME:  Jermaine Burks  YOB: 1950  MEDICAL RECORD NUMBER:  [id number]    The patient is being assessed for  Admission    I agree that at least one RN has performed a thorough Head to Toe Skin Assessment on the patient. ALL assessment sites listed below have been assessed.      Areas assessed by both nurses:    Head, Face, Ears, Shoulders, Back, Chest, Arms, Elbows, Hands, Sacrum. Buttock, Coccyx, Ischium, Legs. Feet and Heels, and Under Medical Devices         Does the Patient have a Wound? No noted wound(s)       Jesus Prevention initiated by RN: Yes  Wound Care Orders initiated by RN: Yes    Pressure Injury (Stage 3,4, Unstageable, DTI, NWPT, and Complex wounds) if present, place Wound referral order by RN under : No    New Ostomies, if present place, Ostomy referral order under : No     Nurse 1 eSignature: Electronically signed by Breann Molina RN on 5/22/25 at 5:55 PM EDT    **SHARE this note so that the co-signing nurse can place an eSignature**    Nurse 2 eSignature: Electronically signed by Tisha Lincoln RN on 5/22/25 at 6:18 PM EDT

## 2025-05-22 NOTE — PROGRESS NOTES
CT results messaged to family [facility], with softer BP and HR in 120's, transfer order placed to intermediate

## 2025-05-22 NOTE — CONSULTS
Inpatient Cardiology Consultation      Reason for Consult:  HFmrEF with  bpm     Consulting Physician: Dr. Martínez    Requesting Physician:  Dr. Funez    Date of Consultation: 5/22/2025    HISTORY OF PRESENT ILLNESS:   Jermaine Burks  is a 74 y.o.  male known to [facility]. Follows with Servando BOURGEOIS 5/8/2025 note reviewed he reported significantly dizzy and lightheaded with hypotension Entresto was discontinued.  Spironolactone was reduced to 12.5 mg daily.  He was considered to not be on optimal guideline directed therapy due to hypotension.  Unable to proceed with cardioversion/BROWN as patient is not able to be on anticoagulation.  Patient was referred to EP for further evaluation and management.  Currently on contact precautions due to C. difficile rule out this admission    Admission to [facility] on 5/8/2025 - 5/13/2025 evaluated by pulmonology, cardiology and nephrology.  He was diagnosed with hyperkalemia, lung mass, hypoxia, JOSE, acute hypoxemic respiratory failure, pneumonia (received 1 dose Rocephin and doxycycline, pulmonology stopped the antibiotics), CHF, hepatic cirrhosis.  He was discharged on Lasix 20 mg, Entresto 24-26 mg twice daily, spironolactone 12.5 mg daily, aspirin 81 mg and Toprol 25 mg daily.    Cardiology consultation completed on 5/9/2025 per Dr. Zhao for CHF exacerbation with JOSE.  An echocardiogram was completed patient was also diagnosed with pneumonia.  Diuretic therapy with Entresto spironolactone was held due to hypotension and JOSE.  He was started on IV fluids.  It was reported as he was having diarrhea prior to discharge, was started on Lasix p.o. as well as Entresto half tablet twice daily with plans to resume spironolactone once renal function normalized.  Echocardiogram reviewed and remained unchanged with a EF at 45 to 50% and indeterminate diastolic function.  Normal functioning transcatheter catheter aortic valve.  Patient was expected to  upper GI bleed, 2011. Last use of ETOH was around 8-10 yrs ago.   Auto anticoagulation. The patient's INR was 1.9 on 2012 despite the fact that he was not on anticoagulants. It has been documented as being high since at least 2011.   Elevated liver function studies due to alcoholic cirrhosis.  JAY. The patient had soft palate resection for this and does not use CPAP.  Obesity.  BMI 36.32 kg/M2  Banding of esophageal varices, 2011.   Family history negative for early CAD. His father  of \"natural causes\" and his mother  of leukemia. A younger brother who weighs over 450 pounds may have some heart problems but the patient is not sure.   Hospitalization, 2012 with newly documented AF.   Echo, 2012. Supravalvular aortic stenosis with peak and mean gradients of 33 and 22 mm/Hg respectively, effective orifice area was 1.6 cm ². He did have normal LV internal dimensions with mild concentric LVH, normal LV regional wall motion and systolic function with normal diastolic function for age. Moderate LAE with mild MR, AV is sclerotic, but the leaflets appear to open well. He has mild TR with RVSP between 35 and 40 mmHg.   Rheumatoid arthritis on prednisone therapy.   [facility] admission, 2013 with AF/RVR. Triggered by a flu-like illness and beta blocker withdrawal. Patient unable to take his medications for two to three days prior to admission because of nausea and vomiting. Patient converted to sinus rhythm in the hospital. Further GI work-up pending OP follow-up with his gastroenterologist, Dr. Back.   Echo, 2013. Normal LV size, mild concentric LVH. Mild global hypokinesis, EF 45%. Stage II diastolic dysfunction. Severe LAE. AV difficult to visualize, but appeared to be sclerotic with mildly decreased leaflet excursion. Echo bright structure just above sinuses of Valsalva consistent with supravalvular aortic stenosis. Peak/mean gradients 46/24. Estimated effective orifice area 1.1  Never true   Transportation Needs: No Transportation Needs (5/21/2025)    PRAPARE - Transportation     Lack of Transportation (Medical): No     Lack of Transportation (Non-Medical): No   Physical Activity: Inactive (3/20/2025)    Exercise Vital Sign     Days of Exercise per Week: 0 days     Minutes of Exercise per Session: 10 min   Stress: Not on file   Social Connections: Not on file   Intimate Partner Violence: Not At Risk (2/19/2023)    Humiliation, Afraid, Rape, and Kick questionnaire     Fear of Current or Ex-Partner: No     Emotionally Abused: No     Physically Abused: No     Sexually Abused: No   Housing Stability: Low Risk  (5/21/2025)    Housing Stability Vital Sign     Unable to Pay for Housing in the Last Year: No     Number of Times Moved in the Last Year: 0     Homeless in the Last Year: No       Family History:   Family History   Problem Relation Age of Onset    Cancer Mother         leukemia    Heart Attack Father 70 - 79    Heart Disease Father          REVIEW OF SYSTEMS:     See HPI     PHYSICAL EXAM:   BP 92/62   Pulse (!) 141   Temp 98.2 °F (36.8 °C) (Oral)   Resp 20   Ht 1.676 m (5' 5.98\")   Wt 108.8 kg (239 lb 14.4 oz)   SpO2 96%   BMI 38.74 kg/m²   CONST:  Well developed, pale, ill-appearing obese  male, who appears stated age. Awake, sleepy, cooperative, no apparent distress.  On 2 L nasal cannula oxygen therapy.  HEENT:   Head- Normocephalic, atraumatic   Eyes- Conjunctivae pink  Throat- Oral mucosa pink and moist  Neck-  No stridor, trachea midline, positive for jugular venous distention. No hepatojugular reflex  CHEST: Chest symmetrical and non-tender to palpation. No accessory muscle use or intercostal retractions  RESPIRATORY: Lung sounds -crackles noted left posterior lobe.  Clear to auscultation anteriorly.  No rhonchi or wheezing.  CARDIOVASCULAR:     No carotid bruit  Heart Inspection- shows no noted pulsations  Heart Palpation- no heaves or thrills  Heart

## 2025-05-22 NOTE — PROGRESS NOTES
Comprehensive Nutrition Assessment    Type and Reason for Visit:  Initial, Positive nutrition screen    Nutrition Recommendations/Plan:   Start ONS High Protein High Calorie BID and Frozen Daily  Continue Current Nutrition Tx  Continue Inpatient Monitoring     Malnutrition Assessment:  Malnutrition Status:  At risk for malnutrition (05/22/25 1232)    Context:  Chronic Illness     Findings of the 6 clinical characteristics of malnutrition:  Energy Intake:  Mild decrease in energy intake  Weight Loss:  Unable to assess (fluid may mask losses)     Body Fat Loss:  Unable to assess (fluid/edema)     Muscle Mass Loss:  Unable to assess (fluid/edema)    Fluid Accumulation:  Unable to assess (multifactorial)     Strength:  Not Performed    Nutrition Assessment:    Pt adm JOSE, Chr Diarrhea + C-diff PMH Liver Cirrhosis 2/2 ETOH Abuse sober 8-9 yrs, Ascites, Esophageal Varices, HFmrEF. Recent adm 5/8-5/13 JOSE, Hyperkalemia. PTA pt states he has had poor appetite for several weeks, nauseated, subjective wt loss unsure amoung stated UBW 102kg. Poor intake of B today by observation. At Risk for Malnutrition, start on ONS reviewed w/pt likes Ensure will add High Jalen/High Pro BID+ Frozen ONS daily to optimize nutr    Nutrition Related Findings:    I+O +2.8L, A&O x4 (flat affect), +BS, +Diarrhea +C-diff, +nausea, decreased appetite, K+ 6.1 5/21/4.7 5/22,, Mg 1.4 Mg Sulfate replacement, BUN 24, Cr 1.8, GFR 40. Prednisone, thiamine. Wound Type:  (R Dorsal Foot)       Current Nutrition Intake & Therapies:    Average Meal Intake: 0%     ADULT DIET; Regular    Anthropometric Measures:  Height: 167.6 cm (5' 5.98\")  Ideal Body Weight (IBW): 142 lbs (65 kg)    Admission Body Weight: 108.8 kg (239 lb 13.8 oz)  Current Body Weight: 108.8 kg (239 lb 13.8 oz), 168.9 % IBW. Weight Source: Bed scale (5/22)  Current BMI (kg/m2): 38.7  Usual Body Weight: 103.8 kg (228 lb 13.4 oz) (10/15/24 EMR/Bed Scale)     % Weight Change (Calculated):

## 2025-05-22 NOTE — PROGRESS NOTES
Patient still in afib RVR sustaining in the 130s after one time dose of metoprolol, notified cardiology.

## 2025-05-22 NOTE — PROGRESS NOTES
Pharmacy Note    Order for Zofran has been interchanged to Compazine for this patient due to the risk of QT prolongation.    QTc as of 5- = 568 ms    Jabari Woods RPH  5/21/2025  11:29 PM

## 2025-05-22 NOTE — PROGRESS NOTES
4 Eyes Skin Assessment     NAME:  Jermaine Burks  YOB: 1950  MEDICAL RECORD NUMBER:  [id number]    The patient is being assessed for  Admission    I agree that at least one RN has performed a thorough Head to Toe Skin Assessment on the patient. ALL assessment sites listed below have been assessed.      Areas assessed by both nurses:    Head, Face, Ears, Shoulders, Back, Chest, Arms, Elbows, Hands, Sacrum. Buttock, Coccyx, Ischium, Legs. Feet and Heels, and Under Medical Devices         Does the Patient have a Wound? Yes wound(s) were present on assessment. LDA wound assessment was Initiated and completed by RN       Jesus Prevention initiated by RN: Yes  Wound Care Orders initiated by RN: Yes    Pressure Injury (Stage 3,4, Unstageable, DTI, NWPT, and Complex wounds) if present, place Wound referral order by RN under : No    New Ostomies, if present place, Ostomy referral order under : No     Nurse 1 eSignature: Electronically signed by Beba Dawn RN on 5/22/25 at 2:46 AM EDT    **SHARE this note so that the co-signing nurse can place an eSignature**    Nurse 2 eSignature: Electronically signed by Donato Gonzalez on 5/22/25 at 5:14 AM EDT

## 2025-05-22 NOTE — ED NOTES
ED to Inpatient Handoff Report    Notified Savage that electronic handoff available and patient ready for transport to room 0527.    Safety Risks: Risk of falls    Patient in Restraints: no    Constant Observer or Patient : no    Telemetry Monitoring Ordered :Yes      Cardiac Rhythm: Atrial fib    Order to transfer to unit without monitor:NO    Last MEWS: 4 Time completed: 2149    Deterioration Index Score:   Predictive Model Details          69 (Warning)  Factor Value    Calculated 5/21/2025 22:12 17% Age 74 years old    Deterioration Index Model 16% Supplemental oxygen Nasal cannula     13% Respiratory rate 23     13% Neurological exam X     10% Potassium abnormal (5.7 mmol/L)     8% Systolic 94     8% Pulse 114     6% Cardiac rhythm Atrial fib     4% WBC count abnormal (13.3 k/uL)     2% Pulse oximetry 100 %     1% BUN abnormal (28 mg/dL)     1% Sodium 134 mmol/L     0% Temperature 98.2 °F (36.8 °C)     0% Hematocrit 39.8 %        Vitals:    05/21/25 1900 05/21/25 2000 05/21/25 2100 05/21/25 2149   BP: 102/76 107/67 90/62 94/65   Pulse: (!) 112 (!) 109 (!) 101 (!) 114   Resp: 25 20 26 23   Temp:       TempSrc:       SpO2: 100% 99% 100% 100%   Weight:       Height:             Opportunity for questions and clarification was provided.

## 2025-05-22 NOTE — CARE COORDINATION
Attempted to reach patient by telephone for possible care coordination enrollment. Unable to leave HIPAA compliant message requesting a return call. Will attempt to reach patient again.       AC noted patient is in the hospital  Bed: [id number]  will attempt to contact at a later date.

## 2025-05-22 NOTE — PROGRESS NOTES
Message sent to Dr. Guerra regarding patient still being in afib rvr and recent vitals. Orders received to give 2100 dose of toprol xl right now.

## 2025-05-22 NOTE — PROGRESS NOTES
[facility] Resident Progress Note    Fatigue, Shortness of Breath, and Diarrhea (On going diarrhea and general fatigue, denies fevers / chills, pt pale appearing, denies bloody stools  )      Subjective:    Patient seen and evaluated at bedside and appears uncomfortable. Patient describes feeling worse.  The patient reported increased shortness of breath.  His vitals overnight showed tachycardia of 140-155.  He denies having any chest pain.    ROS: Review of system unremarkable except stated otherwise in HPI    Past medical, surgical, family and social history were reviewed, non-contributory, and unchanged unless otherwise stated.    Objective:  BP 92/62   Pulse (!) 136   Temp 98.2 °F (36.8 °C) (Oral)   Resp 20   Ht 1.676 m (5' 5.98\")   Wt 108.8 kg (239 lb 14.4 oz)   SpO2 96%   BMI 38.74 kg/m²     Physical Exam  Constitutional:       General: He is not in acute distress.     Appearance: Normal appearance. He is not ill-appearing, toxic-appearing or diaphoretic.   HENT:      Head: Normocephalic and atraumatic.   Eyes:      General: No scleral icterus.  Cardiovascular:      Rate and Rhythm: Rhythm irregular.      Heart sounds: Murmur heard.   Pulmonary:      Effort: No respiratory distress.      Comments: Decreased air movement  Abdominal:      General: There is no distension.      Palpations: Abdomen is soft.      Tenderness: There is no abdominal tenderness. There is no guarding.   Musculoskeletal:         General: No tenderness.      Right lower leg: Edema present.      Left lower leg: Edema present.      Comments: Symmetrical bilateral lower extremity pitting edema, 3+.  No calf tenderness   Skin:     General: Skin is warm and dry.      Coloration: Skin is pale. Skin is not jaundiced.   Neurological:      Mental Status: He is alert. Mental status is at baseline.      Comments: Poor memory   Psychiatric:         Mood and Affect: Mood normal.     Labs:    Complete Blood

## 2025-05-22 NOTE — PROGRESS NOTES
Message sent to Dr. Marin regarding consistent heart rate 140-160's and updated on pt. Status. Awaiting response.

## 2025-05-22 NOTE — CARE COORDINATION
Transition of Care-Readmit, last discharged 5/13. Patient is independent from home, resides with his wife in a one story home-2/3 steps to enter. DME-walker, past history with [facility], unable to recall name and stay at [facility]. PCP is Dr. Rutherford, saw recently, preferred pharmacy is [facility]. Admitted this stay for JOSE, Cardiology consulted. PT/OT ordered to assist in discharge planning. Patient wishes to return home.      Francy SOLANON, RN  [facility]

## 2025-05-22 NOTE — H&P
[facility]  Resident History and Physical      CHIEF COMPLAINT:    Chief Complaint   Patient presents with    Fatigue    Shortness of Breath    Diarrhea     On going diarrhea and general fatigue, denies fevers / chills, pt pale appearing, denies bloody stools          History of Present Illness:   Jermaine Burks  is a 74 y.o. male patient of Ashley Rutherford MD  with a pertinent PMHx of alcoholic cirrhosis, chronic A-fib, esophageal varices, HTN, status post TAVR, seizures, sleep apnea, brain bleed 2016, who presented to the ER from home with chief complaint of diarrhea nausea and fatigue.    Patient was recently admitted 5/8 - 5/13 with suspected pneumonia and received 1 dose Rocephin and doxycycline however pulmonology stopped the antibiotics.  Echocardiogram was checked which demonstrated an EF 45-50%.  Patient also had a mild JOSE.    Patient presented to the ER on 5/19 for diarrhea nausea and fatigue and was discharged with prescription of Imodium.    Patient reports he has been taking the Imodium once or twice daily and it has helped slow down the diarrhea.  He reports 4-5 bowel movements daily where his wife reports 8.  Bowel movements are loose.  He also reports dry heaving but no vomiting.  Denies fevers or chest pain.  Does have chronic shortness of breath.  Denies abdominal pain or dysuria.  Does report increased leg swelling worse on the left.    In the emergency department patient was afebrile, respirations 18-31, heart rate 51-1 18, BP 99/66, saturating 89% on room air placed on 4 L nasal cannula saturating 93%.  CBC notable for platelet count of 72.  CMP notable for potassium of 6.1 which was reduced to 5.7 after fluid administration.  Creatinine was 2.0, previously 1.4 in 5/19 and 1.0 prior to that.  Lipase 17, lactic acid 3.7 down to 2.5 after fluids.  Alk phos 62, ALT 72, .  Total bilirubin 2.6.  Urinalysis notable for hyaline cast.  C. difficile stool  home Vimpat 100 mg twice daily  - Continue home Keppra 1000 mg twice daily    COPD  Former smoker.  On room air at baseline.  Requiring 4 L nasal cannula in ER.  Denies cough.  CTAP demonstrating pleural effusions.  Favor fluid overload versus COPD exacerbation  -Continue home prednisone 5 mg daily    Hypocalcemia, stable  - Continue home calcitonin nasal spray daily  - Exchange home calcium citrate 600 mg nightly for 500 mg nightly  - Continue home vitamin D 4000 units daily    Arthritis  - Continue home Plaquenil 200 mg twice daily  - Holding home gabapentin 300 mg daily given renal function    Mood  - Continue home Cymbalta 30 mg daily    Code Status: full  Diet: adult  DVT ppx: Lovenox  GI ppx: protonix    Consults:   Case discussed with attending on call Dr. Mosley.    Domingo Marin DO   Family Medicine Resident Physician   5/21/2025

## 2025-05-22 NOTE — PROGRESS NOTES
Occupational Therapy  OT eval and treat order received.Attempted OT eval and pt was being taken for a test. Will attempt at a later time.Maddison Dai OTR/L [id number]

## 2025-05-23 PROBLEM — B35.1 ONYCHOMYCOSIS: Status: ACTIVE | Noted: 2025-05-23

## 2025-05-23 NOTE — PROGRESS NOTES
[facility] Resident Progress Note    Fatigue, Shortness of Breath, and Diarrhea (On going diarrhea and general fatigue, denies fevers / chills, pt pale appearing, denies bloody stools  )      Subjective:  Overnight, patient was seen for for having increased anxiety and increased medically swelling left arm.  The patient was assessed at bedside.  He was alert and oriented X3.  Physical exam findings were concerning for DVT.  The patient was ordered stat chest x-ray that showed cardiomegaly with mild increased interstitial markings and small bilateral  pleural effusions. Atelectatic change seen within the right lower lung field., and stat vascular duplex upper extremity venous left that is still pending.    Today, patient was seen and evaluated at bedside. Patient describes feeling unchanged.  She reports having the shortness of breath, which she describes as worse today.  Denies any chest pain or any new symptoms.  He did not have any bowel movement in the last 48 hours.    ROS: Review of system unremarkable except stated otherwise in HPI    Past medical, surgical, family and social history were reviewed, non-contributory, and unchanged unless otherwise stated.    Objective:  /82   Pulse 73   Temp 97.7 °F (36.5 °C) (Oral)   Resp 20   Ht 1.676 m (5' 5.98\")   Wt 119.6 kg (263 lb 11.2 oz)   SpO2 94%   BMI 42.58 kg/m²     Physical Exam  Constitutional:       General: He is not in acute distress.     Appearance: Normal appearance. He is not ill-appearing, toxic-appearing or diaphoretic.   HENT:      Head: Normocephalic and atraumatic.   Eyes:      General: No scleral icterus.  Cardiovascular:      Rate and Rhythm: Rhythm irregular.      Heart sounds: Murmur heard.   Pulmonary:      Effort: No respiratory distress.      Comments: Decreased air movement. The patient is at 5L O2  Abdominal:      General: There is no distension.      Palpations: Abdomen is soft.         1. No pneumothorax.   2. Other findings as noted above.         CTA CHEST W CONTRAST   Final Result   1. No evidence of pulmonary embolism.   2. Large right and small left pleural effusions.   3. Right lower lobe collapse.   4. Right apical noncalcified partially pleural based 3.5 x 3.2 cm mass   extending inferiorly right suprahilar region again identified.  Cannot   exclude malignancy.   5. Dependent subsegmental consolidation left lower lobe.   6. Cardiomegaly with evidence of right heart failure.   7. Cirrhosis with ascites.         Vascular duplex lower extremity venous left   Final Result   1.  No sonographic evidence of DVT in the left lower extremity.         XR CHEST PORTABLE   Final Result   Low lung volumes with mild increased markings left lung base stable and   unchanged. Trace bilateral pleural effusions. No new focal parenchymal   opacification present.         CT ABDOMEN PELVIS W IV CONTRAST Additional Contrast? None   Final Result   1. No mechanical obstructive process of bowel or evidence of a diarrheal   state with colonic segments largely decompressed.  No evidence for   perforation or abscess.   2. Cirrhotic hepatic morphology with splenomegaly and gastroesophageal   varices consistent with sequela portal hypertension with perihepatic and   perisplenic ascites.  Correlate with LFTs and AFP values.   3. Cholelithiasis.   4. Moderate right and small left pleural effusions with adjacent atelectasis.         Vascular ankle brachial index (DYLAN)    (Results Pending)     Assessment:    Active Hospital Problems    Diagnosis Date Noted    Alcoholic cirrhosis of liver without ascites (HCC) [K70.30] 09/10/2016     Priority: Medium    Permanent atrial fibrillation (HCC) [I48.21]      Priority: Medium    Onychomycosis [B35.1] 05/23/2025    C. difficile colitis [A04.72] 05/22/2025    Heart failure with mid-range ejection fraction (HFmEF) (HCC) [I50.22] 05/22/2025    Anasarca [R60.1] 05/22/2025    Acute  to ethanol abuse  Sober for approximately 8-9 years.  CT abdomen pelvis demonstrating cirrhotic liver, splenomegaly, ascites, esophageal varices and pleural effusions  MELD 3.0: 21 at 5/24/2025  4:50 AM  MELD-Na: 22 at 5/24/2025  4:50 AM  Calculated from:  Serum Creatinine: 1.6 mg/dL at 5/24/2025  4:50 AM  Serum Sodium: 133 mmol/L at 5/24/2025  4:50 AM  Total Bilirubin: 2.3 mg/dL at 5/24/2025  4:50 AM  Serum Albumin: 3.5 g/dL at 5/24/2025  4:50 AM  INR(ratio): 1.5 at 5/24/2025  4:50 AM  Age at listing (hypothetical): 74 years  Sex: Male at 5/24/2025  4:50 AM  - Daily hepatic panel and INR  - Lactic acid 3.7 on presentation, down to 1,9  - Total bilirubin 2.6 on presentation  - Protonix 40 mg p.o. daily for history of esophageal varices  - Continue folic acid 1 mg nightly  - Continue thiamine 100 mg daily        Seizure disorder  - Continue home Vimpat 100 mg twice daily  - Continue home Keppra 1000 mg twice daily     COPD  Former smoker.  On room air at baseline.  Requiring 4 L nasal cannula in ER.  Denies cough.  CTAP demonstrating pleural effusions.  Favor fluid overload versus COPD exacerbation  - Brovana, Pulmicort, Xopenex     Hypocalcemia, stable  - Continue home calcitonin nasal spray daily  - Exchange home calcium citrate 600 mg nightly for 500 mg nightly  - Continue home vitamin D 4000 units daily     Arthritis  - Continue home Plaquenil 200 mg twice daily  - Holding home gabapentin 300 mg daily given renal function  -Continue home prednisone 5 mg daily     Mood  - Continue home Cymbalta 30 mg daily     Code Status: full  Diet: adult  DVT ppx: Lovenox  GI ppx: protonix     Disposition: Discharge to pending clinical course    NOTE: This note was transcribed using voice recognition software. Every effort was made to ensure accuracy; however, inadvertent computerized transcription errors may be present.    Barrett Funez MD   Family Medicine Resident PGY-1  05/24/25

## 2025-05-23 NOTE — CONSULTS
Som Juarez M.D.,[facility]  Bryant Tijerina D.O., JENNIFER., [facility]  Manpreet Rodrigues M.D.  Aileen Pritchett M.D.   Zaki Longoria D.O.  Domingo Tavares M.D.       Patient:  Jermaine Burks 74 y.o. male MRN: [id number]           PULMONARY CONSULTATION    Reason for Consultation: large right pleural effusion, hx of lung CA  Referring Physician: Salazar Novoa MD    Communication with the referring physician will be sent via the electronic medical record.    Chief Complaint: fatigue, shortness of breath, diarrhea    CODE STATUS: FULL CODE    SUBJECTIVE:  HPI:  Jermaine Burks is a 74 y.o. male with a PMH alcoholic cirrhosis, atrial fibrillation, esophageal varices, seizure disorder, squamous cell lung cancer that we are asked to evaluate for a large right pleural effusion.    He is known to our service as we have recently seen him during a hospitalization for hypoxia. When we saw him initially, he was already weaned off the oxygen and it appears his issues at that time were heart failure related. He did show a right pleural effusion and we did order a thoracentesis to be done in IR however the procedure was cancelled due to insufficient amount of fluid showing on ultrasound. He also was being followed by Nephrology for JOSE.    At baseline he on room air.  He is a former smoker and quit approximately 9 years ago. He does have untreated sleep apnea reporting intolerance to PAP therapy. 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 The cancer was inoperable secondary to his liver cirrhosis. He follows with the [facility] and has yearly CT imaging for surveillance regarding his squamous cell cancer. It appears it has been stable since completing the radiation treatments in 2018.     Noe came back to the hospital on 5/9 reporting fatigue, diarrhea and nausea x 1 week and was discharged home with zofran and immodium. He came back to the ED on 5/22 with fatigue,

## 2025-05-23 NOTE — PROGRESS NOTES
Dr Beltran notified of consult via the office of [facility]. Per request face sheet faxed to the office  Mitch campos

## 2025-05-23 NOTE — PROGRESS NOTES
Occupational Therapy    OCCUPATIONAL THERAPY INITIAL EVALUATION    [facility]   [address], [place]         Date:2025                                                  Patient Name: Jermaine Burks    MRN: [id number]    : 1950    Room: [address] [place] [address]      Evaluating OT: Sosa Mittal OTR/L   [id number]      Referring Provider:    Domingo Marin DO       Specific Provider Orders/Date:OT eval and treat 2025      Diagnosis:  Acute kidney injury [N17.9]  Bilateral lower extremity edema [R60.0]     Pertinent Medical History: alcoholic cirrhosis, A fib, stroke, hip surgery,      Precautions:  Fall Risk, O2     Assessment of current deficits    [x] Functional mobility  [x]ADLs  [x] Strength               [x]Cognition    [x] Functional transfers   [x] IADLs         [x] Safety Awareness   [x]Endurance    [x] Fine Coordination              [x] Balance      [] Vision/perception   [x]Sensation     []Gross Motor Coordination  [] ROM  [] Delirium                   [] Motor Control     OT PLAN OF CARE   OT POC based on physician orders, patient diagnosis and results of clinical assessment    Frequency/Duration  2-3 days/wk for 2 - 4weeks PRN   Specific OT Treatment Interventions to include:   ADL retraining/adapted techniques and AE recommendations to increase functional independence within precautions                    Energy conservation techniques to improve tolerance for selfcare routine   Functional transfer/mobility training/DME recommendations for increased independence, safety and fall prevention         Patient/family education to increase safety and functional independence             Environmental modifications for safe mobility and completion of ADLs                             Therapeutic activity to improve functional performance during ADLs.                                         Therapeutic exercise to improve tolerance and functional  independence and safety during completion of ADL/functional transfer/mobility tasks.  Pt would benefit from continued skilled OT to increase safety and independence with completion of ADL/IADL tasks for functional independence and quality of life.        Rehab Potential: fair + for established goals     Patient / Family Goal: none stated       Patient and/or family were instructed on functional diagnosis, prognosis/goals and OT plan of care. Demonstrated fair  understanding.     Eval Complexity: Low    Time In: 1007  Time Out: 1025      Min Units   OT Eval Low 97165 x  1   OT Eval Medium 07712      OT Eval High 46202      OT Re-Eval 53418       Therapeutic Ex 77662      Therapeutic Activities 93704       ADL/Self Care 14933      Orthotic Management 56958       Manual 85737     Neuro Re-Ed 77171       Non-Billable Time       OT Re eval 50382        Evaluation Time additionally includes thorough review of current medical information, gathering information on past medical history/social history and prior level of function, interpretation of standardized testing/informal observation of tasks, assessment of data and development of plan of care and goals.            Sosa Mittal  OTR/L  OT [id number]

## 2025-05-23 NOTE — CONSULTS
Department of Podiatry   Consult Note        Reason for Consult:  Tinea    CHIEF COMPLAINT:  Fatigue, SOB, Diarrhea     HISTORY OF PRESENT ILLNESS:                The patient is a 74 y.o. male with significant past medical history of  alcoholic cirrhosis, chronic A-fib, esophageal varices, HTN, status post TAVR, seizures, sleep apnea, brain bleed 2016. Podiatry was consulted for evaluation of b/l feet for possible tinea. Patient's wife by bedside. Reports he follows up with Dr. Emmanuel outpatient every 3 months. Denies itchiness, denies any open wounds or lesions to feet. Wife states his feet always appear cold and discolored. No pain or discomfort. Patient denies any N/V/D/F/C/SOB/CP and has no other pedal complaints at this time.       Past Medical History:        Diagnosis Date    Alcoholic cirrhosis (HCC)     Alcoholism (HCC)     Has been sober / dry since 01/2016.    Anxiety     Arthritis     Chronic atrial fibrillation (HCC)     Esophageal varices (HCC)     UGI bleed ~2012 - has had variceal banding 2x around that time.    History of kidney stones     Hypertension     Lumbar compression fracture (HCC) 02/2017    Lung tumor 2017    Osteopenia     Osteopenia     S/P TAVR (transcatheter aortic valve replacement) 9/20/2018    Seizure disorder (HCC) 09/2016    Complicated a stroke with occipital ICH.    Seizures (HCC)     Sepsis with MRSE bacteremia 09/2016    resolved    Severe aortic stenosis     Confirmed by echo 9/2016, BROWN 10/2016. Undergoing w/u at [facility] for TAVR, as of 4/2017.    Sleep apnea     Couldn't tolerate CPAP therapy ~2007. Had had UPPP.    Stroke (HCC) 09/2016    With receptive aphasia, poor memory that are improving as of 04/2017. Pt was found to have an occipital ICH at that time, with possible amyloid angiopathy as cause per MRI subsequently. Had seizure and sepsis (d/t MRSE bacteremia) complicating stroke.       Past Surgical History:        Procedure Laterality Date    ECHO COMPL W DOP COLOR FLOW   2/4/2012         ECHOCARDIOGRAM COMPLETE 2D W DOPPLER W COLOR  8/27/2013         ESOPHAGEAL VARICE LIGATION      HIP SURGERY      PALATE SURGERY      UMBILICAL HERNIA REPAIR  04/14/2017    UPPER GASTROINTESTINAL ENDOSCOPY      UPPER GASTROINTESTINAL ENDOSCOPY  05/08/2017    UPPER GASTROINTESTINAL ENDOSCOPY  04/2017    XR MIDLINE EQUAL OR GREATER THAN 5 YEARS  10/3/2016    XR MIDLINE EQUAL OR GREATER THAN 5 YEARS       Medications Prior to Admission:    Medications Prior to Admission: loperamide (IMODIUM) 2 MG capsule, Take 1 capsule by mouth 4 times daily as needed for Diarrhea  ondansetron (ZOFRAN) 4 MG tablet, Take 1 tablet by mouth daily as needed for Nausea or Vomiting  furosemide (LASIX) 20 MG tablet, Take 1 tablet by mouth daily  pantoprazole (PROTONIX) 40 MG tablet, Take 1 tablet by mouth daily  potassium citrate (UROCIT-K) 10 MEQ (1080 MG) extended release tablet, Take 3 tablets by mouth in the morning and 3 tablets in the evening.  spironolactone (ALDACTONE) 25 MG tablet, Take 0.5 tablets by mouth daily  gabapentin (NEURONTIN) 100 MG capsule, Take 1 capsule by mouth 2 times daily for 90 days. (Patient taking differently: Take 3 capsules by mouth daily.)  DULoxetine (CYMBALTA) 30 MG extended release capsule, Take 1 capsule by mouth daily  metoprolol succinate (TOPROL XL) 25 MG extended release tablet, Take 1 tablet by mouth daily  calcitonin (MIACALCIN) 200 UNIT/ACT nasal spray, 1 spray by Nasal route daily  CALCIUM CITRATE PO, Take 3 tablets by mouth nightly 600 MG  levETIRAcetam (KEPPRA) 1000 MG tablet, Take 1 tablet by mouth 2 times daily  predniSONE (DELTASONE) 5 MG tablet, Take 1 tablet by mouth daily  lacosamide (VIMPAT) 100 MG TABS tablet, Take 1 tablet by mouth 2 times daily.  vitamin B-1 (THIAMINE) 100 MG tablet, Take 1 tablet by mouth daily  aspirin 81 MG tablet, Take 1 tablet by mouth daily  hydroxychloroquine (PLAQUENIL) 200 MG tablet, Take 1 tablet by mouth 2 times daily Indications: taking 400

## 2025-05-23 NOTE — PROGRESS NOTES
Physical Therapy  Facility/Department: [address] INTERMEDIATE 1  Physical Therapy Initial Assessment    Name: Jermaine Burks  : 1950  MRN: [id number]  Date of Service: 2025        Patient Diagnosis(es): The primary encounter diagnosis was Bilateral lower extremity edema. A diagnosis of Vascular disease was also pertinent to this visit.  Past Medical History:  has a past medical history of Alcoholic cirrhosis (HCC), Alcoholism (HCC), Anxiety, Arthritis, Chronic atrial fibrillation (HCC), Esophageal varices (HCC), History of kidney stones, Hypertension, Lumbar compression fracture (HCC), Lung tumor, Osteopenia, Osteopenia, S/P TAVR (transcatheter aortic valve replacement), Seizure disorder (HCC), Seizures (HCC), Sepsis with MRSE bacteremia, Severe aortic stenosis, Sleep apnea, and Stroke (HCC).  Past Surgical History:  has a past surgical history that includes Upper gastrointestinal endoscopy; Esophageal varice ligation; ECHO Compl W Dop Color Flow (2012); Palate surgery; ECHO Complete 2D W Doppler W Color (2013); Umbilical hernia repair (2017); Upper gastrointestinal endoscopy (2017); hip surgery; Upper gastrointestinal endoscopy (2017); and XR MIDLINE EQUAL OR GREATER THAN 5 YEARS (10/3/2016).    Evaluating Therapist: Elisa Linares PT      Room #:  0445/0445-A  Diagnosis:  Acute kidney injury [N17.9]  Bilateral lower extremity edema [R60.0]  PMHx/PSHx:  Alcoholic cirrhosis, Anxiety, Afib, HTN, seizure disorder, CVA  Precautions:  falls, contact isolation, O2, alarm      Social:  Pt lives with wife in a 1 floor plan 2-3 steps  to enter.  Prior to admission ambulates with rollator. . States has been needing increased assistance recently     Initial Evaluation  Date: 25 Treatment      Short Term/ Long Term   Goals   Was pt agreeable to Eval/treatment? yes     Does pt have pain? None reported     Bed Mobility  Rolling: max assist  Supine to sit: max assist of 2  Sit to supine:

## 2025-05-23 NOTE — PLAN OF CARE
Problem: Safety - Adult  Goal: Free from fall injury  5/23/2025 1159 by Yanci Tenorio, RN  Outcome: Progressing     Problem: Chronic Conditions and Co-morbidities  Goal: Patient's chronic conditions and co-morbidity symptoms are monitored and maintained or improved  5/23/2025 1159 by Yanci Tenorio, RN  Outcome: Progressing     Problem: Discharge Planning  Goal: Discharge to home or other facility with appropriate resources  5/23/2025 1159 by Yanci Tenorio, RN  Outcome: Progressing     Problem: Pain  Goal: Verbalizes/displays adequate comfort level or baseline comfort level  5/23/2025 1159 by Yanci Tenorio, RN  Outcome: Progressing

## 2025-05-23 NOTE — OR NURSING
Patient arrived from the floor to IR for ultrasound guided (right) thoracentesis. Vitals obtained and consent signed per patient. Dr Page in to speak with the patient about the procedure, all questions answered. Patient sat up at the side of the bed, procedural site scanned and prepped. 1% Lidocaine administered to procedural site. 5 Portuguese centesis catheter inserted to (right side) with ultrasound guidance, catheter connected to suction. Patient tolerated procedure well. (600) ml drained of (serosanguinous) colored pleural fluid. Centesis catheter removed post procedure. Specimens collected and sent to lab per order. Puncture site cleansed and dry dressing applied. No bleeding, swelling or complications noted. Post procedure vitals obtained. Portable CXR ordered and completed post procedure. Images reviewed per Dr Page. Nurse to nurse report called. Patient transported out of the dept and back to the floor with all personal belongings and no complications.

## 2025-05-23 NOTE — PROGRESS NOTES
INPATIENT CARDIOLOGY FOLLOW-UP    Name: Jermaine Burks    Age: 74 y.o.    Date of Admission: 5/21/2025  3:00 PM    Date of Service: 5/23/2025    Primary Cardiologist: Dr Guerra    Chief Complaint: Follow-up for CHF, tachycardia    Interim History:  Feels okay denies chest pain or shortness of breath.  Heart rate well-controlled remains on diltiazem infusion, which was started overnight by on-call cardiologist.    Review of Systems:   Negative except as described above    Problem List:  Patient Active Problem List   Diagnosis    COPD (chronic obstructive pulmonary disease) (HCC)    Thrombocytopenia    Obstructive sleep apnea, s/p UPPP    Paroxysmal atrial fibrillation (HCC)    Essential hypertension    Supravalvar aortic stenosis    Permanent atrial fibrillation (HCC)    Hepatic cirrhosis (HCC)    Alcoholic cirrhosis of liver without ascites (HCC)    Obesity    Persistent atrial fibrillation (HCC)    History of intracranial hemorrhage    Closed compression fracture of lumbar vertebra (HCC)    History of kidney stones    Ventral hernia    Nicotine dependence    History of TIA (transient ischemic attack)    History of transcatheter aortic valve replacement (TAVR)    Rheumatoid arthritis involving multiple sites with positive rheumatoid factor (HCC)    Osteoporosis without current pathological fracture    Morbid obesity with BMI of 40.0-44.9, adult (HCC)    Chronic renal disease, stage III (HCC) [470693]    Anemia    Ambulatory dysfunction    Diarrhea    HFrEF (heart failure with reduced ejection fraction) (HCC)    Suspected sleep apnea    Other fatigue    Palpitation    SOB (shortness of breath)    Lung mass    Child-Castelan class B liver disease score    Chronic congestive heart failure (HCC)    Acute kidney injury superimposed on chronic kidney disease    C. difficile colitis    Heart failure with mid-range ejection fraction (HFmEF) (HCC)    Anasarca    Acute kidney injury    Onychomycosis       Current

## 2025-05-23 NOTE — PROGRESS NOTES
[facility]  Nephrology Progress Note    Patient's Name: Jermaine Burks    History of Present Illness from 5/9 Consult Note:    \"Jermaine Burks is a 74 y.o. male with a past medical history of JAY closed compression fracutre of lumbar vertebrae, alcoholic cirrhosis, CKD stage III, COPD, paroxysmal afib HTN .   He presented on 5/8/2025 complaining of SOB. They have noticed SOB for 2 days prior to presentation. On arrival their vitals were pertinent for hypertension. Initial labs were pertinent for creatinine of 1.9.  Question that he received iodinated contrast on 5 /9. nephrology was consulted for acute kidney injury.\"     Addendum to the HX: [place] [facility] [place]  prior to D/C at which time his cr was 1.0mg/dl  [place] [facility] [place]. [place] but was (-). After going home his appetite was poor, he was weak and not ambulating much per his wife. He had diarrhea and was seen in the ED 5/19/25 and D/Toribio with Imodium. He presented back to the ED 5/21/25 with the c/odiarrhea 4-5 up to 7-=8 loose watery BM with assoc weakness and decreased appetite per his wife. He denied abd pain. He has SOB. In the ED BP 99/66 with O2 sat 89% on RA. Labs initially 6.1 and dropped to 5.7 and after Lokelma dropped to 4.7. Initial cr 2.0mg/dl dropped to 1.8mg/dl  Overnight HR sofia into the 140-150's and the monitor showed AFib    Interval History:  5/23/25: Pt seen and examined in bed. Wife with pt. He returned from having Right thoracentesis for 600 ml. He c/o nausea and poor appetite. Feels his breathing is better overall. Remains on oxygen. Labs reviewed with them. Creatinine improved 1.6 mg/dl today.     PMH:    Past Medical History:   Diagnosis Date    Alcoholic cirrhosis (HCC)     Alcoholism (HCC)     Has been sober / dry since 01/2016.    Anxiety     Arthritis     Chronic atrial fibrillation (HCC)     Esophageal varices (HCC)     UGI bleed ~2012 - has had variceal banding 2x around that time.        Lung mass    Child-Castelan class B liver disease score    Chronic congestive heart failure (HCC)    Acute kidney injury superimposed on chronic kidney disease    C. difficile colitis    Heart failure with mid-range ejection fraction (HFmEF) (HCC)    Anasarca    Acute kidney injury    Onychomycosis       Diet:    ADULT DIET; Regular  ADULT ORAL NUTRITION SUPPLEMENT; Breakfast, Lunch; Standard High Calorie/High Protein Oral Supplement  ADULT ORAL NUTRITION SUPPLEMENT; Dinner; Frozen Oral Supplement    Meds:     arformoterol tartrate  15 mcg Nebulization BID RT    budesonide  0.5 mg Nebulization BID RT    levalbuterol  0.63 mg Nebulization Q6H    metoprolol succinate  25 mg Oral BID    lidocaine  1 patch TransDERmal Daily    vancomycin  125 mg Oral 4 times per day    aspirin  81 mg Oral Daily    calcium citrate  500 mg Oral Nightly    DULoxetine  30 mg Oral Daily    folic acid  1 mg Oral Nightly    [Held by provider] furosemide  20 mg Oral Daily    gabapentin  300 mg Oral Daily    hydrocortisone   Topical BID    hydroxychloroquine  200 mg Oral BID    lacosamide  100 mg Oral BID    levETIRAcetam  1,000 mg Oral BID    [Held by provider] loperamide  2 mg Oral Daily    pantoprazole  40 mg Oral Daily    [Held by provider] potassium citrate  30 mEq Oral BID    predniSONE  5 mg Oral Daily    silver sulfADIAZINE   Topical Daily    [Held by provider] spironolactone  12.5 mg Oral Daily    thiamine  100 mg Oral Daily    vitamin D  4,000 Units Oral Daily    sodium chloride flush  5-40 mL IntraVENous 2 times per day    [Held by provider] enoxaparin  30 mg SubCUTAneous BID        dilTIAZem 7.5 mg/hr (05/23/25 0230)    sodium chloride      sodium chloride 125 mL/hr at 05/23/25 0115       Meds prn:     iopamidol, sodium chloride flush, sodium chloride, acetaminophen **OR** acetaminophen, prochlorperazine **OR** prochlorperazine    Meds prior to admission:     No current facility-administered medications on file prior to encounter.    Component Value Date/Time    COLORU Yellow 05/21/2025 03:24 PM    NITRU NEGATIVE 05/21/2025 03:24 PM    GLUCOSEU NEGATIVE 05/21/2025 03:24 PM    GLUCOSEU NEGATIVE 10/13/2011 06:00 AM    KETUA NEGATIVE 05/21/2025 03:24 PM    UROBILINOGEN 0.2 05/21/2025 03:24 PM    BILIRUBINUR NEGATIVE 05/21/2025 03:24 PM       Lab Results   Component Value Date/Time    PROTEIN 6.5 05/23/2025 04:00 AM    OSMOU 560 05/09/2025 04:38 AM       No components found for: \"URIC\"    No results found for: \"LIPIDPAN\"    Assessment and Plans:    Stage II JOSE -recurrent  JOSE during the 5/9 to 5/13/25 admission Presumed secondary to decreased renal perfusion in the setting of shock; likely exacerbated by Aldactone-FEUrea 18.63% and FENa 0.14% suggestive of prerenal etiology  S/p CT with contrast 5/9  Baseline serum creatinine 1-1.2 mg/dL  Creatinine peaked at 2 on 5/9--> 1.3-> 1.1-->1.0 mg/dL at D/C 5/13/25  Cr on admission 5/21/25  2.0mg/dl and on 5/22/25 is 1.8mg/dl  sec to decreased effective renal perfusion with the GI losses as well as the out pt  loop+ MRA as evidenced by the Deb+<20 with a FeNa <1%  UA (+) protein 100, HgB (-)  Creatinine improved 1.6 mg/dl today, he is on IVF, nonoliguric  PLAN:  Reduce rate of IVF to 75 ml/hr  Follow I/O's  Avoid nephrotoxins/NSAIDs  Hold Entresto and Aldactone   Once the acute issues resolved will need to reassess the proteinuria   Follow for development of CRISTOBAL from the CTA of the Chest on 5/22/25    2. Chronic kidney disease stage 2  Risk factor of HTN  Baseline creatinine 1.1-1.2 with an EGFR of 78   Plan:  Avoid nephrotoxins/NSAIDs  Strict I&O, daily weights  Monitor labs    3.  Hyperkalemia  With JOSE and Aldactone and the Urocit K  K+ 6.1 on 5/21 S/p Lokelma   Last Potassium 4.7 mmol/L.  PLAN:  Monitor labs    4. AFib with RVR in the setting of HFmr EF  ECHO 12/2024-EF 45-50%  proBNP 5418--> 3394--> 2185 on 5/11/25 with 5/22/25 pro-BNP 8233  PLAN:  Strict I&O, daily weights  Entresto and Aldactone

## 2025-05-23 NOTE — PLAN OF CARE
Problem: Safety - Adult  Goal: Free from fall injury  Outcome: Progressing     Problem: Chronic Conditions and Co-morbidities  Goal: Patient's chronic conditions and co-morbidity symptoms are monitored and maintained or improved  Outcome: Progressing     Problem: Discharge Planning  Goal: Discharge to home or other facility with appropriate resources  Outcome: Progressing     Problem: Pain  Goal: Verbalizes/displays adequate comfort level or baseline comfort level  Outcome: Progressing     Problem: Skin/Tissue Integrity  Goal: Skin integrity remains intact  Description: 1.  Monitor for areas of redness and/or skin breakdown2.  Assess vascular access sites hourly3.  Every 4-6 hours minimum:  Change oxygen saturation probe site4.  Every 4-6 hours:  If on nasal continuous positive airway pressure, respiratory therapy assess nares and determine need for appliance change or resting period  Outcome: Progressing     Problem: ABCDS Injury Assessment  Goal: Absence of physical injury  Outcome: Progressing     Problem: Nutrition Deficit:  Goal: Optimize nutritional status  Outcome: Progressing

## 2025-05-23 NOTE — PROGRESS NOTES
[facility] Resident Progress Note    Fatigue, Shortness of Breath, and Diarrhea (On going diarrhea and general fatigue, denies fevers / chills, pt pale appearing, denies bloody stools  )      Subjective:    Patient seen and evaluated at bedside and appears in no acute distress. Patient describes feeling better.  However, he still reports that his breathing is labored.  He mentions that he is not having any back pain today.  No overnight problems       ROS: Review of system unremarkable except stated otherwise in HPI    Past medical, surgical, family and social history were reviewed, non-contributory, and unchanged unless otherwise stated.    Objective:  /76   Pulse 59   Temp 98.4 °F (36.9 °C) (Axillary)   Resp 20   Ht 1.676 m (5' 5.98\")   Wt 117 kg (257 lb 14.4 oz)   SpO2 99%   BMI 41.65 kg/m²     Physical Exam  Constitutional:       General: He is not in acute distress.     Appearance: Normal appearance. He is not ill-appearing, toxic-appearing or diaphoretic.   HENT:      Head: Normocephalic and atraumatic.   Eyes:      General: No scleral icterus.  Cardiovascular:      Rate and Rhythm: Rhythm irregular.      Heart sounds: Murmur heard.   Pulmonary:      Effort: No respiratory distress.      Comments: Decreased air movement  Abdominal:      General: There is no distension.      Palpations: Abdomen is soft.      Tenderness: There is no abdominal tenderness. There is no guarding.   Musculoskeletal:         General: No tenderness.      Right lower leg: Edema present.      Left lower leg: Edema present.      Comments: Symmetrical bilateral lower extremity pitting edema, 3+.  No calf tenderness   Skin:     General: Skin is warm and dry.      Coloration: Skin is pale. Skin is not jaundiced.   Neurological:      Mental Status: He is alert. Mental status is at baseline.      Comments: Poor memory   Psychiatric:         Mood and Affect: Mood normal.

## 2025-05-24 PROBLEM — M79.89 SWELLING OF LEFT UPPER EXTREMITY: Status: ACTIVE | Noted: 2025-05-24

## 2025-05-24 PROBLEM — R60.0 BILATERAL LOWER EXTREMITY EDEMA: Status: ACTIVE | Noted: 2025-01-01

## 2025-05-24 NOTE — PROGRESS NOTES
Heart rate in the 70s at this time. Dr. Burton at bedside- discussed discontinuation of cardizem drip at this time. Drip stopped per titration orders. Cardiology aware

## 2025-05-24 NOTE — PROGRESS NOTES
Notified Dr. Johnson of patient complaining of shortness of breath and now yelling out with new irritability/restlessness.

## 2025-05-24 NOTE — PROGRESS NOTES
Pulmonary Subsequent Hospital F/U note    Patient is being followed for: pleural effusion     Interval HPI:  Patient underwent R thoracentesis with removal of 600cc fluid yesterday, thus far transudate  Overnight worsening dyspnea and today he appears more labored with his breathing  He has poor air movement and appears more lethargic  ABG requested:  7.32/42/79/21    ROS:  Unable to obtain       Exam:  BP (!) 112/91   Pulse 82   Temp 98.1 °F (36.7 °C) (Oral)   Resp 22   Ht 1.676 m (5' 5.98\")   Wt 119.6 kg (263 lb 11.2 oz)   SpO2 93%   BMI 42.58 kg/m²    General: Patient is lying in bed, his breathing appears more labored, prolonged expiratory phase   HEENT: PERRL, EOMI, MM dry   Neck: supple no adenopathy  CV: irregularly irregular   Lungs: poor air movement bilaterally, paradoxical breathing pattern   Abd: soft, ND, NT, bowel sounds normal  Ext: warm, 1+ edema of the legs     Data:    Oximetry:  SpO2 Readings from Last 1 Encounters:   05/24/25 93%       Imaging personally reviewed by myself:  CXR  IMPRESSION:  Cardiomegaly with mild increased interstitial markings and small bilateral  pleural effusions. Atelectatic change seen within the right lower lung field.     Cta chest  IMPRESSION:  1. No evidence of pulmonary embolism.  2. Large right and small left pleural effusions.  3. Right lower lobe collapse.  4. Right apical noncalcified partially pleural based 3.5 x 3.2 cm mass  extending inferiorly right suprahilar region again identified.  Cannot  exclude malignancy.  5. Dependent subsegmental consolidation left lower lobe.  6. Cardiomegaly with evidence of right heart failure.  7. Cirrhosis with ascites.      Left Ventricle: Normal left ventricular systolic function with a visually estimated EF of 45 - 50%. Left ventricle size is normal. Normal wall thickness. Ventricular mass is normal. Septal motion is consistent with post-operative status. Indeterminate diastolic function due to mitral valve surgery.

## 2025-05-24 NOTE — PROGRESS NOTES
[facility]  Nephrology Progress Note    Patient's Name: Jermaine Burks    History of Present Illness from 5/9 Consult Note:    \"Jermaine Burks is a 74 y.o. male with a past medical history of JAY closed compression fracutre of lumbar vertebrae, alcoholic cirrhosis, CKD stage III, COPD, paroxysmal afib HTN .   He presented on 5/8/2025 complaining of SOB. They have noticed SOB for 2 days prior to presentation. On arrival their vitals were pertinent for hypertension. Initial labs were pertinent for creatinine of 1.9.  Question that he received iodinated contrast on 5 /9. nephrology was consulted for acute kidney injury.\"     Addendum to the HX: He was last seen at [facility] [place] on 5[place]13/[place]  prior to D/C at which time his cr was 1.0mg/dl  He was tested while at [facility] [place] for C. Dif but was (-). After going home his appetite was poor, he was weak and not ambulating much per his wife. He had diarrhea and was seen in the ED 5/19/25 and D/Toribio with Imodium. He presented back to the ED 5/21/25 with the c/odiarrhea 4-5 up to 7-=8 loose watery BM with assoc weakness and decreased appetite per his wife. He denied abd pain. He has SOB. In the ED BP 99/66 with O2 sat 89% on RA. Labs initially 6.1 and dropped to 5.7 and after Lokelma dropped to 4.7. Initial cr 2.0mg/dl dropped to 1.8mg/dl  Overnight HR sofia into the 140-150's and the monitor showed AFib    Interval History:    5/24/25: Pt seen and examined in bed. He was resting, c/o not able to stay awake. Denies current sob but reported sob overnight. He appears dyspneic on exam. D/w nursing. On 6 L NC. IVF DC with sob.       PMH:    Past Medical History:   Diagnosis Date    Alcoholic cirrhosis (HCC)     Alcoholism (HCC)     Has been sober / dry since 01/2016.    Anxiety     Arthritis     Chronic atrial fibrillation (HCC)     Esophageal varices (HCC)     UGI bleed ~2012 - has had variceal banding 2x around that time.    History of kidney stones     Hypertension     Lumbar  hydrocortisone 1 % cream Apply topically 2 times daily Dilt 3% / Lido 2% / Hydrocort 1% compound cream  Apply to rectum 2 times daily. 30 g 0    silver sulfADIAZINE (SILVADENE) 1 % cream Apply topically daily. 50 g 0    ibuprofen (ADVIL;MOTRIN) 200 MG tablet Take 1 tablet by mouth every 6 hours as needed for Pain      triamcinolone (KENALOG) 0.1 % cream Apply topically to affected areas 2 times daily. 45 g 2    Handicap Placard MISC by Does not apply route Duration: 5 years 1 each 0       Allergies:    Patient has no known allergies.    Social History:     reports that he quit smoking about 9 years ago. His smoking use included cigarettes and pipe. He started smoking about 58 years ago. He has a 55 pack-year smoking history. He has been exposed to tobacco smoke. He has never used smokeless tobacco. He reports that he does not drink alcohol and does not use drugs.    Family History:         Problem Relation Age of Onset    Cancer Mother         leukemia    Heart Attack Father 70 - 79    Heart Disease Father      ROS: As per the HPI    Physical Exam:      Patient Vitals for the past 24 hrs:   BP Temp Temp src Pulse Resp SpO2 Weight   05/24/25 1215 -- -- -- 82 -- 93 % --   05/24/25 1158 (!) 112/91 -- -- 66 -- -- --   05/24/25 1122 (!) 90/51 98.1 °F (36.7 °C) Oral 70 22 93 % --   05/24/25 0808 116/82 -- -- 73 -- -- --   05/24/25 0702 116/82 97.7 °F (36.5 °C) Oral 73 20 94 % --   05/24/25 0300 105/68 97.2 °F (36.2 °C) Oral 88 20 98 % --   05/24/25 0018 -- -- -- -- -- -- 119.6 kg (263 lb 11.2 oz)   05/23/25 2315 101/68 97.5 °F (36.4 °C) Oral 85 18 98 % --   05/23/25 2045 105/62 -- -- -- -- -- --   05/23/25 1834 (!) 93/58 97.3 °F (36.3 °C) Axillary 72 20 100 % --   05/23/25 1500 107/72 98.2 °F (36.8 °C) Oral 81 20 99 % --   05/23/25 1459 (!) 143/87 -- -- 77 20 96 % --   05/23/25 1440 111/75 -- -- 83 20 96 % --         Intake/Output Summary (Last 24 hours) at 5/24/2025 1257  Last data filed at 5/24/2025 0504  Gross per 24

## 2025-05-24 NOTE — PROGRESS NOTES
Patients breathing is shallow and labored. Breath sounds wheezing throughout and bilaterally. Nebulizer administered and ineffective. MD Notified. No new orders at this time.

## 2025-05-24 NOTE — PROGRESS NOTES
[facility] Resident Progress Note    Fatigue, Shortness of Breath, and Diarrhea (On going diarrhea and general fatigue, denies fevers / chills, pt pale appearing, denies bloody stools  )      Subjective:  Overnight, patient was having bradycardia heart rate ranging into 30s-40s with sinus pauses.  Patient was somnolent on hypotension was observed as well.  During the day Cardizem drip was stopped as patient was bradycardic as well as his fluid was also stopped secondary to consideration of volume overload.  Cardiology was consulted during the event and advised to put pacer pads.  ICU was consulted at that time and patient was transferred to ICU for further observation.  Patient received 500 cc of fluid, his metoprolol was held as well.  Overnight his heart rate seems to be improving, still soft blood pressure.  ICU and cardiology following    Today, patient was seen and evaluated at bedside. Patient describes feeling unchanged. Although he grover snot remember what happened last night.    He did not have any bowel movement in the last 48 hours.    ROS: Review of system unremarkable except stated otherwise in HPI    Past medical, surgical, family and social history were reviewed, non-contributory, and unchanged unless otherwise stated.    Objective:  BP (!) 83/62   Pulse 70   Temp 97.9 °F (36.6 °C) (Axillary)   Resp 14   Ht 1.676 m (5' 5.98\")   Wt 112.4 kg (247 lb 12.8 oz)   SpO2 100%   BMI 40.01 kg/m²     Physical Exam  Constitutional:       General: He is not in acute distress.     Appearance: Normal appearance. He is not ill-appearing, toxic-appearing or diaphoretic.   HENT:      Head: Normocephalic and atraumatic.   Eyes:      General: No scleral icterus.  Cardiovascular:      Rate and Rhythm: Rhythm irregular.      Heart sounds: Murmur heard.   Pulmonary:      Effort: No respiratory distress.      Comments: Decreased air movement. The patient is at 5L O2  Abdominal:

## 2025-05-24 NOTE — PROGRESS NOTES
Notified Dr. Johnson of patient's continuing restlessness and anxiety. Also notified her of left arm being more swollen now than it was at the beginning of my shift.

## 2025-05-24 NOTE — PLAN OF CARE
Problem: Safety - Adult  Goal: Free from fall injury  5/24/2025 1604 by Grace Kumar RN  Outcome: Not Progressing  5/24/2025 0520 by Mandie Olivier RN  Outcome: Progressing     Problem: Chronic Conditions and Co-morbidities  Goal: Patient's chronic conditions and co-morbidity symptoms are monitored and maintained or improved  5/24/2025 1604 by Grace Kumar RN  Outcome: Not Progressing  5/24/2025 0520 by Mandie Olivier RN  Outcome: Progressing     Problem: Discharge Planning  Goal: Discharge to home or other facility with appropriate resources  5/24/2025 1604 by Grace Kumar RN  Outcome: Not Progressing  5/24/2025 0520 by Mandie Olivier RN  Outcome: Progressing     Problem: Pain  Goal: Verbalizes/displays adequate comfort level or baseline comfort level  5/24/2025 1604 by Grace Kumar RN  Outcome: Not Progressing  5/24/2025 0520 by Mandie Olivier RN  Outcome: Progressing     Problem: Skin/Tissue Integrity  Goal: Skin integrity remains intact  Description: 1.  Monitor for areas of redness and/or skin breakdown2.  Assess vascular access sites hourly3.  Every 4-6 hours minimum:  Change oxygen saturation probe site4.  Every 4-6 hours:  If on nasal continuous positive airway pressure, respiratory therapy assess nares and determine need for appliance change or resting period  Outcome: Not Progressing     Problem: ABCDS Injury Assessment  Goal: Absence of physical injury  Outcome: Not Progressing     Problem: Nutrition Deficit:  Goal: Optimize nutritional status  Outcome: Not Progressing     Problem: Safety - Adult  Goal: Free from fall injury  5/24/2025 1604 by Grace Kumar RN  Outcome: Not Progressing  5/24/2025 0520 by Mandie Olivier RN  Outcome: Progressing     Problem: Chronic Conditions and Co-morbidities  Goal: Patient's chronic conditions and co-morbidity symptoms are monitored and maintained or improved  5/24/2025 1604 by Grace Kumar  RADHA Chavarria  Outcome: Not Progressing  5/24/2025 0520 by Mandie Olivier RN  Outcome: Progressing     Problem: Discharge Planning  Goal: Discharge to home or other facility with appropriate resources  5/24/2025 1604 by Grace Kumar RN  Outcome: Not Progressing  5/24/2025 0520 by Mandie Olivier RN  Outcome: Progressing     Problem: Pain  Goal: Verbalizes/displays adequate comfort level or baseline comfort level  5/24/2025 1604 by Grace Kumar RN  Outcome: Not Progressing  5/24/2025 0520 by Mandie Olivier RN  Outcome: Progressing     Problem: Skin/Tissue Integrity  Goal: Skin integrity remains intact  Description: 1.  Monitor for areas of redness and/or skin breakdown2.  Assess vascular access sites hourly3.  Every 4-6 hours minimum:  Change oxygen saturation probe site4.  Every 4-6 hours:  If on nasal continuous positive airway pressure, respiratory therapy assess nares and determine need for appliance change or resting period  Outcome: Not Progressing     Problem: ABCDS Injury Assessment  Goal: Absence of physical injury  Outcome: Not Progressing     Problem: Nutrition Deficit:  Goal: Optimize nutritional status  Outcome: Not Progressing

## 2025-05-24 NOTE — PROGRESS NOTES
[facility] Cardiology Inpatient Progress Note    Patient is a 74 y.o. male of Ashley Rutherford MD seen in hospital follow up.     Chief complaint: CHF/Afib    HPI: Some SOB. No CP.     Patient Active Problem List   Diagnosis    COPD (chronic obstructive pulmonary disease) (HCC)    Thrombocytopenia    Obstructive sleep apnea, s/p UPPP    Paroxysmal atrial fibrillation (HCC)    Essential hypertension    Supravalvar aortic stenosis    Permanent atrial fibrillation (HCC)    Hepatic cirrhosis (HCC)    Alcoholic cirrhosis of liver without ascites (HCC)    Obesity    Persistent atrial fibrillation (HCC)    History of intracranial hemorrhage    Closed compression fracture of lumbar vertebra (HCC)    History of kidney stones    Ventral hernia    Nicotine dependence    History of TIA (transient ischemic attack)    History of transcatheter aortic valve replacement (TAVR)    Rheumatoid arthritis involving multiple sites with positive rheumatoid factor (HCC)    Osteoporosis without current pathological fracture    Morbid obesity with BMI of 40.0-44.9, adult (HCC)    Chronic renal disease, stage III (HCC) [953105]    Anemia    Ambulatory dysfunction    Diarrhea    HFrEF (heart failure with reduced ejection fraction) (HCC)    Suspected sleep apnea    Other fatigue    Palpitation    SOB (shortness of breath)    Lung mass    Child-Castelan class B liver disease score    Chronic congestive heart failure (HCC)    Acute kidney injury superimposed on chronic kidney disease    C. difficile colitis    Heart failure with mid-range ejection fraction (HFmEF) (HCC)    Anasarca    Acute kidney injury    Onychomycosis    Swelling of left upper extremity       No Known Allergies    Current Facility-Administered Medications   Medication Dose Route Frequency Provider Last Rate Last Admin    thiamine tablet 100 mg  100 mg Oral Daily Krystian Gomez MD        sodium chloride flush 0.9 % injection 5-40 mL  5-40 mL IntraVENous 2 times per day Krystian Gomez,  Physicians

## 2025-05-24 NOTE — PROGRESS NOTES
Patient in bipap at this time. When asked if the patient would like to eat or take medications and come out of the bipap. The patient shakes head \"No\"

## 2025-05-24 NOTE — PROGRESS NOTES
Paged by nursing around 2:30 AM that patient was complaining of shortness of breath and yelling out.  Patient assessed at bedside, patient was sleeping wearing O2 via nasal cannula.  Upon entering his room, patient woke up startled and cried out, then went back to sleep.  Heart rhythm irregular, LCTAB.  Discussed with nursing concern for possible alcohol withdrawal given timeline of admission so far.  Upon discussion with patient, he reports he has been sober \"for 9 years.\"  Patient's vitals have remained stable without hypotension or tachycardia.  Discussed case with nursing.  Will continue to monitor.    Patient to nursing again around 4:45 AM that the patient was anxious and had an increasingly swollen left arm.  Assessed patient at bedside.  Patient asleep, patient woke easily upon entering his room.  Patient is alert and oriented to person, month, place.  Is able to answer questions, but is conversationally dyspneic. He denies chest pain, dizziness, lightheadedness.  Heart rhythm regular, LCTAB, edematous left upper extremity with diffuse bruising.  Concern for DVT given physical exam findings.  Patient with subcutaneous Lovenox ordered but held.  Per chart review, patient deemed high bleeding risk and is on aspirin only.  Ordered stat chest x-ray, stat vascular duplex upper extremity venous left.  Will continue to monitor.  Discussed with nursing.    Glenda Johnson DO  Family Medicine Resident, PGY-3  [facility]  5/24/2025 5:35 AM

## 2025-05-25 PROBLEM — Z71.89 GOALS OF CARE, COUNSELING/DISCUSSION: Status: ACTIVE | Noted: 2025-05-25

## 2025-05-25 NOTE — PROGRESS NOTES
[facility] Cardiology Inpatient Progress Note    Patient is a 74 y.o. male of Ashley Rutherford MD seen in hospital follow up.     Chief complaint: CHF/Afib/Bradycardia    HPI: Some SOB. No CP.     Patient Active Problem List   Diagnosis    COPD (chronic obstructive pulmonary disease) (HCC)    Thrombocytopenia    Obstructive sleep apnea, s/p UPPP    Paroxysmal atrial fibrillation (HCC)    Essential hypertension    Supravalvar aortic stenosis    Permanent atrial fibrillation (HCC)    Hepatic cirrhosis (HCC)    Alcoholic cirrhosis of liver without ascites (HCC)    Obesity    Persistent atrial fibrillation (HCC)    History of intracranial hemorrhage    Closed compression fracture of lumbar vertebra (HCC)    History of kidney stones    Ventral hernia    Nicotine dependence    History of TIA (transient ischemic attack)    History of transcatheter aortic valve replacement (TAVR)    Rheumatoid arthritis involving multiple sites with positive rheumatoid factor (HCC)    Osteoporosis without current pathological fracture    Morbid obesity with BMI of 40.0-44.9, adult (HCC)    Chronic renal disease, stage III (HCC) [291254]    Anemia    Ambulatory dysfunction    Diarrhea    HFrEF (heart failure with reduced ejection fraction) (HCC)    Suspected sleep apnea    Other fatigue    Palpitation    SOB (shortness of breath)    Lung mass    Child-Castelan class B liver disease score    Chronic congestive heart failure (HCC)    Acute kidney injury superimposed on chronic kidney disease    C. difficile colitis    Heart failure with mid-range ejection fraction (HFmEF) (HCC)    Anasarca    Acute kidney injury    Onychomycosis    Swelling of left upper extremity    Bilateral lower extremity edema       No Known Allergies    Current Facility-Administered Medications   Medication Dose Route Frequency Provider Last Rate Last Admin    albumin human 25% IV solution 25 g  25 g IntraVENous Q6H Tana Durant  mL/hr at 05/25/25 1045 25 g at  Indeterminate diastolic function due to mitral valve surgery.    Right Ventricle: Right ventricle is moderately dilated. Normal systolic function. TAPSE is 1.7 cm.    Aortic Valve: Romero S3 transcatheter bioprosthetic valve that is well-seated with a size of #29 mm. AV mean gradient is 13 mmHg. Mild sclerosis of the aortic valve cusps. No regurgitation. LVOT Diameter is 2.0 cm. AV Mean Gradient is 13 mmHg. AV Peak Velocity is 2.3 m/s. AV Area by Peak Velocity is 1.4 cm2. AV Velocity Ratio is 0.43.    Mitral Valve: There is mild annular calcification noted. Mild to moderate regurgitation with a centrally directed jet.    Tricuspid Valve: Mild regurgitation. Normal RVSP. RVSP is 32 mmHg.    Left Atrium: Left atrium is severely dilated.    Right Atrium: Right atrium is severely dilated.    Image quality is adequate.    CTA CHEST IMPRESSION 5/22/2025:  1. No evidence of pulmonary embolism.  2. Large right and small left pleural effusions.  3. Right lower lobe collapse.  4. Right apical noncalcified partially pleural based 3.5 x 3.2 cm mass extending inferiorly right suprahilar region again identified.  Cannot exclude malignancy.  5. Dependent subsegmental consolidation left lower lobe.  6. Cardiomegaly with evidence of right heart failure.  7. Cirrhosis with ascites.    ASSESSMENT & PLAN:    Patient Active Problem List   Diagnosis    COPD (chronic obstructive pulmonary disease) (HCC)    Thrombocytopenia    Obstructive sleep apnea, s/p UPPP    Paroxysmal atrial fibrillation (HCC)    Essential hypertension    Supravalvar aortic stenosis    Permanent atrial fibrillation (HCC)    Hepatic cirrhosis (HCC)    Alcoholic cirrhosis of liver without ascites (HCC)    Obesity    Persistent atrial fibrillation (HCC)    History of intracranial hemorrhage    Closed compression fracture of lumbar vertebra (HCC)    History of kidney stones    Ventral hernia    Nicotine dependence    History of TIA (transient ischemic attack)    History

## 2025-05-25 NOTE — PROGRESS NOTES
4 Eyes Skin Assessment     NAME:  Jermaine Burks  YOB: 1950  MEDICAL RECORD NUMBER:  [id number]    The patient is being assessed for  Admission    I agree that at least one RN has performed a thorough Head to Toe Skin Assessment on the patient. ALL assessment sites listed below have been assessed.      Areas assessed by both nurses:    Head, Face, Ears, Shoulders, Back, Chest, Arms, Elbows, Hands, Sacrum. Buttock, Coccyx, Ischium, Legs. Feet and Heels, and Under Medical Devices         Does the Patient have a Wound? No noted wound(s)--heels purple color, but intact, left arm ecchymotic       Jesus Prevention initiated by RN: Yes  Wound Care Orders initiated by RN: No    Pressure Injury (Stage 3,4, Unstageable, DTI, NWPT, and Complex wounds) if present, place Wound referral order by RN under : No    New Ostomies, if present place, Ostomy referral order under : No     Nurse 1 eSignature: Electronically signed by Cal Taveras RN on 5/25/25 at 2:09 AM EDT    **SHARE this note so that the co-signing nurse can place an eSignature**    Nurse 2 eSignature: Electronically signed by Kathryn Valerio RN on 5/25/25 at 2:10 AM EDT

## 2025-05-25 NOTE — PROGRESS NOTES
[facility]  Nephrology Progress Note    Patient's Name: Jermaine Burks    History of Present Illness from 5/9 Consult Note:    \"Jermaine Burks is a 74 y.o. male with a past medical history of JAY closed compression fracutre of lumbar vertebrae, alcoholic cirrhosis, CKD stage III, COPD, paroxysmal afib HTN .   He presented on 5/8/2025 complaining of SOB. They have noticed SOB for 2 days prior to presentation. On arrival their vitals were pertinent for hypertension. Initial labs were pertinent for creatinine of 1.9.  Question that he received iodinated contrast on 5 /9. nephrology was consulted for acute kidney injury.\"     Addendum to the HX: He was last seen [facility] 5[facility]  prior to D/C at which time his cr was 1.0mg/dl  He was tested while at [facility] for C. Dif but was (-). After going home his appetite was poor, he was weak and not ambulating much per his wife. He had diarrhea and was seen in the ED 5/19/25 and D/Toribio with Imodium. He presented back to the ED 5/21/25 with the c/odiarrhea 4-5 up to 7-=8 loose watery BM with assoc weakness and decreased appetite per his wife. He denied abd pain. He has SOB. In the ED BP 99/66 with O2 sat 89% on RA. Labs initially 6.1 and dropped to 5.7 and after Lokelma dropped to 4.7. Initial cr 2.0mg/dl dropped to 1.8mg/dl  Overnight HR sofia into the 140-150's and the monitor showed AFib     Interval History:  5/25/25: Seen and examined. Bradycardia and hypotension overnight. Transferred to ICU.       PMH:    Past Medical History:   Diagnosis Date    Alcoholic cirrhosis (HCC)     Alcoholism (HCC)     Has been sober / dry since 01/2016.    Anxiety     Arthritis     Chronic atrial fibrillation (HCC)     Esophageal varices (HCC)     UGI bleed ~2012 - has had variceal banding 2x around that time.    History of kidney stones     Hypertension     Lumbar compression fracture (HCC) 02/2017    Lung tumor 2017    Osteopenia     Osteopenia     S/P TAVR (transcatheter aortic  questions appropriately    Data:    Recent Labs     05/24/25 0450 05/24/25 2133 05/25/25  0402   WBC 16.2* 9.3 7.7   HGB 12.2* 10.7* 11.1*   HCT 38.1 33.2* 34.9*   .1* 101.2* 102.6*       Recent Labs     05/24/25 0450 05/24/25 2133 05/25/25 0402 05/25/25  1230    135 132 131*   K 4.8 4.1 5.1* 5.0   CL 98 106 100 99   CO2 23 18* 21* 20*   CREATININE 1.6* 1.4* 1.7* 1.7*   BUN 36* 35* 42* 44*   LABGLOM 46* 53* 43* 41*   GLUCOSE 102* 98 109* 133*   CALCIUM 9.4 7.7* 9.1 9.4   PHOS 3.2 2.7 3.5  --    MG 2.2 1.8 2.1 2.3       Vit D, 25-Hydroxy   Date Value Ref Range Status   02/20/2023 55 30 - 100 ng/mL Final     Comment:     <20 ng/mL.............Deficient  20-30 ng/mL...........Insufficient   ng/mL..........Sufficient  >100 ng/mL............Toxic         No results found for: \"PTH\"    Recent Labs     05/24/25 0450 05/24/25 2133 05/25/25 0402   ALT 51* 36 40   AST 70* 46* 54*   ALKPHOS 69 42 48   BILITOT 2.3* 1.5* 1.6*   BILIDIR 1.0* 0.8* 0.8*       No results for input(s): \"LABALBU\" in the last 72 hours.    Ferritin   Date Value Ref Range Status   03/21/2024 62 ng/mL Final     Comment:       FERRITIN Reference Ranges:  Adult Males   20 - 60 years:    30 - 400 ng/mL  Adult females 17 - 60 years:    13 - 150 ng/mL  Adults greater than 60 years:   no established reference range  Pediatrics:  no established reference range       Iron   Date Value Ref Range Status   03/21/2024 112 59 - 158 ug/dL Final     TIBC   Date Value Ref Range Status   03/21/2024 335 250 - 450 ug/dL Final       Vitamin B-12   Date Value Ref Range Status   05/12/2025 >2000 (H) 232 - 1245 pg/mL Final       Folate   Date Value Ref Range Status   05/12/2025 35.4 (H) 4.6 - 34.8 ng/mL Final       Lab Results   Component Value Date/Time    COLORU Yellow 05/24/2025 10:00 PM    NITRU NEGATIVE 05/24/2025 10:00 PM    GLUCOSEU NEGATIVE 05/24/2025 10:00 PM    GLUCOSEU NEGATIVE 10/13/2011 06:00 AM    KETUA NEGATIVE 05/24/2025 10:00 PM

## 2025-05-25 NOTE — FLOWSHEET NOTE
Patient admitted from 445 to 212, with the following belongings; cell phone, and glasses in glasses case, placed on monitor, patient oriented to room and unit visiting hours.  Patient guide at bedside, reviewed patient rights and responsibilities. MRSA nasal swab obtained.  Bed alarm on.  Call light within reach.

## 2025-05-25 NOTE — PROCEDURES
PROCEDURE NOTE  Date: 5/25/2025   Name: Jermaine Burks  YOB: 1950    Insert Arterial Line    Date/Time: 5/25/2025 12:43 PM    Performed by: Aileen Pritchett MD  Authorized by: Aileen Pritchett MD  Consent: Verbal consent obtained.  Consent given by: patient and spouse  Site marked: the operative site was marked  Patient identity confirmed: verbally with patient and arm band  Time out: Immediately prior to procedure a \"time out\" was called to verify the correct patient, procedure, equipment, support staff and site/side marked as required.  Preparation: Patient was prepped and draped in the usual sterile fashion.  Indications: multiple ABGs and hemodynamic monitoring  Location: right radial  Anesthesia: local infiltration    Anesthesia:  Local Anesthetic: lidocaine 1% without epinephrine  Anesthetic total: 2 mL    Sedation:  Patient sedated: no    Darion's test normal: yes  Needle gauge: 20  Seldinger technique: Seldinger technique used  Number of attempts: 1  Post-procedure: line sutured and dressing applied  Patient tolerance: patient tolerated the procedure well with no immediate complications        Electronically signed by Aileen Pritchett MD on 5/25/2025 at 12:44 PM

## 2025-05-25 NOTE — ED PROVIDER NOTES
EMERGENCY DEPARTMENT ENCOUNTER      Pt Name: Jermaine Burks  MRN: [id number]  Birthdate 1950  Date of evaluation: 5/21/2025  Provider: Berny Tariq DO  PCP: Ashley Rutherford MD    CHIEF COMPLAINT       Chief Complaint   Patient presents with    Fatigue    Shortness of Breath    Diarrhea     On going diarrhea and general fatigue, denies fevers / chills, pt pale appearing, denies bloody stools         HISTORY OF PRESENT ILLNESS: 1 or more Elements   History From: Patient  Limitations to history : None    Jermaine Burks is a 74 y.o. malePast medical history of A-fib, alcoholic cirrhosis, hypertension, as well as COPD and TIAs.  Patient presents with chief complaint of generalized fatigue as well as some shortness of breath and diarrhea.  Patient notes that the last few days he has had multiple episodes of generalized fatigue and weakness as well as some nonbloody diarrhea.  Symptoms have been moderate severity constant onset no exacerbating relieving factors noted.  Patient states that he was seen evaluated few days ago Emergency Department for similar complaints and was discharged home.  Patient notes continued diarrhea with inability to tolerate oral intake thus presented back to emergency department for further evaluation.  Patient denies any fevers, chills, chest pain, cough, numbness tingling.    Nursing Notes were all reviewed and agreed with or any disagreements were addressed in the HPI.    REVIEW OF SYSTEMS :    Positives and Pertinent negatives as per HPI.     PAST MEDICAL HISTORY/Chronic Conditions Affecting Care    has a past medical history of Alcoholic cirrhosis (HCC), Alcoholism (HCC), Anxiety, Arthritis, Chronic atrial fibrillation (HCC), Esophageal varices (HCC), History of kidney stones, Hypertension, Lumbar compression fracture (HCC) (02/2017), Lung tumor (2017), Osteopenia, Osteopenia, S/P TAVR (transcatheter aortic valve replacement) (9/20/2018), Seizure disorder (HCC) (09/2016),  GABINO Tariq DO (electronically signed)       Berny Tariq,   05/25/25 0054

## 2025-05-25 NOTE — PLAN OF CARE
Problem: Safety - Adult  Goal: Free from fall injury  5/25/2025 0244 by Cal Taveras RN  Outcome: Progressing  Flowsheets (Taken 5/24/2025 2130)  Free From Fall Injury:   Instruct family/caregiver on patient safety   Based on caregiver fall risk screen, instruct family/caregiver to ask for assistance with transferring infant if caregiver noted to have fall risk factors  5/24/2025 1604 by Grace Kumar RN  Outcome: Not Progressing     Problem: Chronic Conditions and Co-morbidities  Goal: Patient's chronic conditions and co-morbidity symptoms are monitored and maintained or improved  5/25/2025 0244 by Cal Taveras RN  Outcome: Progressing  5/24/2025 1604 by Grace Kumar RN  Outcome: Not Progressing     Problem: Discharge Planning  Goal: Discharge to home or other facility with appropriate resources  5/25/2025 0244 by Cal Taveras RN  Outcome: Progressing  5/24/2025 1604 by Grace Kumar RN  Outcome: Not Progressing     Problem: Pain  Goal: Verbalizes/displays adequate comfort level or baseline comfort level  5/25/2025 0244 by Cal Taveras RN  Outcome: Progressing  5/24/2025 1604 by Grace Kumar RN  Outcome: Not Progressing     Problem: Skin/Tissue Integrity  Goal: Skin integrity remains intact  Description: 1.  Monitor for areas of redness and/or skin breakdown2.  Assess vascular access sites hourly3.  Every 4-6 hours minimum:  Change oxygen saturation probe site4.  Every 4-6 hours:  If on nasal continuous positive airway pressure, respiratory therapy assess nares and determine need for appliance change or resting period  5/25/2025 0244 by Cal Taveras RN  Outcome: Progressing  Flowsheets (Taken 5/24/2025 2130)  Skin Integrity Remains Intact:   Monitor for areas of redness and/or skin breakdown   Assess vascular access sites hourly   Every 4-6 hours minimum:  Change oxygen saturation probe site   Turn and reposition as indicated    Grace Chavarria RN  Outcome: Not Progressing     Problem: Skin/Tissue Integrity  Goal: Skin integrity remains intact  Description: 1.  Monitor for areas of redness and/or skin breakdown2.  Assess vascular access sites hourly3.  Every 4-6 hours minimum:  Change oxygen saturation probe site4.  Every 4-6 hours:  If on nasal continuous positive airway pressure, respiratory therapy assess nares and determine need for appliance change or resting period  5/25/2025 0244 by Cal Taveras RN  Outcome: Progressing  Flowsheets (Taken 5/24/2025 2130)  Skin Integrity Remains Intact:   Monitor for areas of redness and/or skin breakdown   Assess vascular access sites hourly   Every 4-6 hours minimum:  Change oxygen saturation probe site   Turn and reposition as indicated   Assess need for specialty bed   Positioning devices   Pressure redistribution bed/mattress (bed type)   Check visual cues for pain   Monitor skin under medical devices  5/24/2025 1604 by Grace Kumar RN  Outcome: Not Progressing     Problem: ABCDS Injury Assessment  Goal: Absence of physical injury  5/25/2025 0244 by Cal Taveras RN  Outcome: Progressing  Flowsheets (Taken 5/24/2025 2130)  Absence of Physical Injury: Implement safety measures based on patient assessment  5/24/2025 1604 by Grace Kumar RN  Outcome: Not Progressing     Problem: Nutrition Deficit:  Goal: Optimize nutritional status  5/25/2025 0244 by Cal Taveras RN  Outcome: Progressing  5/24/2025 1604 by Grace Kumar RN  Outcome: Not Progressing

## 2025-05-25 NOTE — SIGNIFICANT EVENT
Received page from nursing at around 8 PM due to concern for somnolence and persistent bradycardia with sinus pauses lasting roughly 2-3+ seconds.  Cardiology had been contacted and recommended placing of pacer pads as well as as needed atropine ordered.    I went up to assess the patient who was on BiPAP.  Nursing and let me know there was concern with the BiPAP earlier in the day where for an unknown amount of time for CO2 was not being blown off through the mask valve, this was corrected but despite this patient continued to be hypersomnolent.  Vital signs upon arrival showed patient's heart rate in the 60s recorded via the pacer pads, with a twelve-lead EKG showing A-fib with slow ventricular response with a ventricular rate around 60.  On exam patient appeared somnolent with BiPAP, extremities cool to touch with lower extremities appearing mottled.  Patient with significant bruising in the left antecubital area, result of the superficial thrombosis seen on ultrasound earlier today.  Patient also noted to have bruising noted on right arm as well.  Patient able to squeeze hands when prompted however did not open his eyes on examination.  Heart sounds are regular and bradycardic.  Mechanical lung sounds bilaterally given BiPAP.  Delayed capillary refill in the upper and lower extremities.    At around 3 PM today his diltiazem drip was stopped, which he was on for A-fib RVR.  At around 11 AM today his IV fluids were stopped due to concern for fluid overload as patient was complaining of increased shortness of breath and appeared edematous on exam.  Since this time patient has not had any oral intake as he has been on the BiPAP.  A liter fluid bolus was ordered, ABG, lactic, CBC, CMP, UA, urine culture all ordered.  Patient did not receive any atropine during this time.  Last digoxin dose on 5/23/2025 at roughly 6 PM.    Vital signs showed temperature of 97 °F, heart rate variable largely in the 60s though dipping

## 2025-05-25 NOTE — PLAN OF CARE
Problem: Pain  Goal: Verbalizes/displays adequate comfort level or baseline comfort level  5/25/2025 1359 by Kimi Lacey, RN  Outcome: Progressing  Flowsheets  Taken 5/25/2025 1200  Verbalizes/displays adequate comfort level or baseline comfort level: Assess pain using appropriate pain scale  Taken 5/25/2025 0800  Verbalizes/displays adequate comfort level or baseline comfort level: Assess pain using appropriate pain scale

## 2025-05-25 NOTE — PROGRESS NOTES
SPEECH/LANGUAGE PATHOLOGY  CLINICAL ASSESSMENT OF SWALLOWING FUNCTION   and PLAN OF CARE      PATIENT NAME:  Jermaine Burks  (male)     MRN:  [id number]    :  1950  (74 y.o.)  STATUS:  Inpatient: Room 0212/2-A    TODAY'S DATE:  2025  ORDER DATE, DESCRIPTION AND REFERRING PROVIDER:    SLP eval and treat  Start:  25,   End:  25,   ONE TIME,   Standing Count:  1 Occurrences,   Minnie Thomas MD  REASON FOR REFERRAL: Coughing while swallowing thin liquids   EVALUATING THERAPIST: SAQIB Desai                 RESULTS:    DYSPHAGIA DIAGNOSIS:   Clinical indicators of mild-moderate oropharyngeal phase dysphagia     Patient reports a decrease in appetite and shortness of breath while eating/drinking. No overt s/s of aspiration were displayed with all consistencies at bedside; however, silent aspiration cannot be ruled out. Patient is recommended to complete a modified barium swallow study to fully assess his oral and pharyngeal swallow function.       DIET RECOMMENDATIONS:  Soft and bite size consistency solids (IDDSI level 6) with thin liquids (IDDSI level 0) -- monitor respiratory status and discontinue diet until MBSS if concern arise.      FEEDING RECOMMENDATIONS:     Assistance level:  Set-up is required for all oral intake  Supervision is needed during all oral intake  Verbal cueing for implementation of safe swallow strategies       Compensatory strategies recommended: Fully alert for all PO, Thorough oral care to prevent colonization of oral bacteria, Upright in bed/ chair as tolerated, Effortful swallow, Encourage oral clearing of bolus before next bite/sip is taken, Slow rate of intake, SINGLE cup sips, SINGLE straw sips, SMALL bites, Liquid wash to help clear oral cavity of thicker consistency items      Discussed recommendations with:  Patient  and patient nurse in person    SPEECH THERAPY  PLAN OF CARE   The dysphagia POC is established based on  physician order, dysphagia diagnosis and results of clinical assessment     Skilled SLP intervention for dysphagia management on acute care up to 5 x per week until goals met, pt plateaus in function and/or discharged from hospital    Conditions Requiring Skilled Therapeutic Intervention for dysphagia:    Patient is performing below functional baseline d/t  current acute condition, respiratory compromise, multiple medications, and/or increased dependency upon caregivers.  Incoordination of swallow/breathing pattern due to compromised respiratory system    Specific dysphagia interventions to include:     MBSS to fully assess oropharyngeal swallow function and to assist in determining the least restrictive PO diet to maintain adequate nutrition/hydration   compensatory swallowing strategies to improve airway protection and swallow function.  Training in positioning for improved integrity of swallow  ongoing mealtime assessment to provide diet modification and compensatory strategy implementation to minimize risk of aspiration associated with PO intake  Effortful Swallow therapeutically to target increased oral and base of tongue pressure, increased pharyngeal constrictor contractions, and increased UES relaxation duration to reduce pharyngeal residue    Specific instructions for next treatment:  development and training of compensatory swallow strategies to improve airway protection and swallow function, ongoing PO analysis to upgrade diet and evaluate tolerance of current PO recommendation, initiate instruction of compensatory strategies, and MBSS to be completed  Patient Treatment Goals:    Short Term Goals:  Pt will participate in MBSS to fully assess oropharyngeal swallow function and to assist in determining the least restrictive PO diet to maintain adequate nutrition/hydration   Pt will implement identified compensatory swallowing strategies on 90% of opportunities or greater to improve airway protection and

## 2025-05-25 NOTE — CONSULTS
Palliative Care Department  [phone]  Palliative Care Initial Consult  Provider Shruthi Hernandez, APRN - CNP     Jermaine Burks  [id number]  Hospital Day: 5  Date of Initial Consult: 5/25/25  Referring Provider: Tana Durant MD   Palliative Medicine was consulted for assistance with: End of Life Care Goals    HPI:   Jermaine Burks is a 74 y.o. with a medical history of alcoholic cirrhosis, squamous cell lung cancer s/p radiation chronic A-fib, esophageal varices, HTN, TAVR, seizures, sleep apnea, intracranial hemorrhage 2016 with seizures, who was admitted on 5/21/2025 from home with a CHIEF COMPLAINT of nausea, diarrhea, fatigue.  Patient was recently discharged on 5/13 with suspected pneumonia, CDiff and JOSE.  He returns with continued diarrhea, nausea and fatigue ER workup reveals potassium 6.1, creatinine 2.0, lactic 3.7, CT A/P with cirrhotic liver, splenomegaly, ascites, esophageal varices and pleural effusions.  He underwent thoracentesis on 5/23 with 600 mL removed and cytology sent, had A-fib RVR on Cardizem infusion and digoxin.  He had episodes of bradycardia with sinus pauses and developed respiratory distress requiring BiPAP, moved to ICU after RRT for continued bradycardia, hypotension and increased oxygen requirements.  Palliative medicine consulted for further assistance with goals of care.    ASSESSMENT/PLAN:     Pertinent Hospital Diagnoses     Acute hypoxic respiratory failure  Pleural effusions  JOSE  A-fib RVR  Bradycardia  C. difficile colitis  Hypokalemia  Liver cirrhosis secondary to alcohol abuse  Thrombocytopenia  Hx chronic A-fib, COPD, CHF      Palliative Care Encounter / Counseling Regarding Goals of Care  Please see detailed goals of care discussion as below  At this time, Jermaine Burks, Does Not have capacity for medical decision-making.  Capacity is time limited and situation/question specific  During encounter Dione was surrogate medical

## 2025-05-25 NOTE — PATIENT CARE CONFERENCE
Intensive Care Daily Quality Rounding Checklist      ICU Team Members: Dr. Pritchett, resident, charge nurse, bedside nurse, respiratory therapist    ICU Day #: NUMBER: 2    SOFA Score: 9    Intubation Date: N/A    Ventilator Day #: N/A    Central Line Insertion Date: N/A        Day #: N/A        Indication: N/A     Arterial Line Insertion Date: N/A      Day #: N/A    Temporary Hemodialysis Catheter Insertion Date: N/A      Day # N/A    DVT Prophylaxis: PCDs    GI Prophylaxis: Protonix    Lua Catheter Insertion Date: N/A       Day #: N/A      Indications: N/A      Continued need (if yes, reason documented and discussed with physician): N/A    Skin Issues/ Wounds and ordered treatment discussed on rounds: no issues, SOS precautions    Goals/ Plans for the Day: Monitor labs and vitals, treat/replace as needed.  Bipap QHS and PRN.  Advance diet and activity as able. Monitor HR and BP. Palliative care consult. Add Albumin, Octreotide, Midodrine.    Reviewed plan and goals for day with patient and/or representative: Yes

## 2025-05-25 NOTE — PROGRESS NOTES
Critical Care Resident note         Patient - Jermaine Burks   MRN -  [id number]   [id number]   - 1950      Date of Admission -  2025  3:00 PM  Date of evaluation -  2025/021-A   Hospital Day - 4        Consulting Service/Physician   Consulting - Minnie Ribeiro*  Primary Care Physician - Ashley Rutherford MD         Past Medical History         Diagnosis Date    Alcoholic cirrhosis (HCC)     Alcoholism (HCC)     Has been sober / dry since 2016.    Anxiety     Arthritis     Chronic atrial fibrillation (HCC)     Esophageal varices (HCC)     UGI bleed ~ - has had variceal banding 2x around that time.    History of kidney stones     Hypertension     Lumbar compression fracture (HCC) 2017    Lung tumor 2017    Osteopenia     Osteopenia     S/P TAVR (transcatheter aortic valve replacement) 2018    Seizure disorder (HCC) 2016    Complicated a stroke with occipital ICH.    Seizures (HCC)     Sepsis with MRSE bacteremia 2016    resolved    Severe aortic stenosis     Confirmed by echo 2016, BROWN 10/2016. Undergoing w/u at [facility] for TAVR, as of 2017.    Sleep apnea     Couldn't tolerate CPAP therapy ~. Had had UPPP.    Stroke ([facility]) 2016    With receptive aphasia, poor memory that are improving as of 2017. Pt was found to have an occipital ICH at that time, with possible amyloid angiopathy as cause per MRI subsequently. Had seizure and sepsis (d/t MRSE bacteremia) complicating stroke.        Past Surgical History           Procedure Laterality Date    ECHO COMPL W DOP COLOR FLOW  2012         ECHOCARDIOGRAM COMPLETE 2D W DOPPLER W COLOR  2013         ESOPHAGEAL VARICE LIGATION      HIP SURGERY      PALATE SURGERY      UMBILICAL HERNIA REPAIR  2017    UPPER GASTROINTESTINAL ENDOSCOPY      UPPER GASTROINTESTINAL ENDOSCOPY  2017    UPPER GASTROINTESTINAL ENDOSCOPY  2017    XR MIDLINE EQUAL OR GREATER THAN 5 YEARS  10/3/2016    XR

## 2025-05-25 NOTE — PROGRESS NOTES
Called patients wife Dione, updated her on patients change in condition and transfer to ICU bed 212

## 2025-05-25 NOTE — CONSULTS
Critical Care Consult Note    Patient - Jermaine Burks   MRN -  [id number]   [facility] [id number]   - 1950      Date of Admission -  2025  3:00 PM  Date of evaluation -  2025/0212-A   Hospital Day - 3          ADMIT/CONSULT DETAILS     Reason for Admit/Consult   Critical care consulted due to patient bradycardic on diltiazem drip on the [facility] telemetry floor.  Patient was also having irregular heart rate with atrial pauses    Consulting Service/Physician   Consulting - Minnie Ribeiro*  Primary Care Physician - Ashley Rutherford MD         HPI   The patient is a 74 y.o. male with significant past medical history of alcoholic cirrhosis, chronic atrial fibrillation, esophageal varices, hypertension patient is status post TAVR  replacement he initially presented to the ER on  for diarrhea, nausea and fatigue but was discharged home with a prescription for Imodium patient did later test positive for C. difficile and reports 4-5 bowel movements daily his wife reported more like 8 bowel movements daily patient reports nausea retching but no vomiting.  Patient has been on BiPAP now for approximately 24 hours.  He is breathing comfortably breath sounds are clear bilaterally does not appear to be in any respiratory distress on BiPAP.  Patient's most recent laboratory studies are notable for a platelet count of 76.  16.2 white count.  BUN of 36/1.6 creatinine history of chronic kidney disease patient is not on anticoagulant due to a history of a brain bleed in 2016 most recent TTE shows an EF of 45 to 50% bioprosthetic valve in good position over the aortic valve.  Patient is being followed by pulmonology for bilateral pleural effusions right greater than left and the patient did undergo right thoracentesis yesterday on 2025 with 600 cc of transudative appearing fluid removed fluid was sent for laboratory testing and awaiting cytology reports.  Patient is also noted to have a right

## 2025-05-25 NOTE — FLOWSHEET NOTE
05/25/25 1000   Urine Assessment   Bladder Scan Volume (mL) (S)  100 mL   $ Bladder scan (S)  $ Yes   Intermittent/Straight Cath (mL) (S)  125 mL   $ Cath urethra straight (S)  $ Yes

## 2025-05-26 PROBLEM — N17.9 ACUTE KIDNEY INJURY SUPERIMPOSED ON CHRONIC KIDNEY DISEASE: Status: RESOLVED | Noted: 2025-01-01 | Resolved: 2025-01-01

## 2025-05-26 PROBLEM — R60.0 BILATERAL LOWER EXTREMITY EDEMA: Status: RESOLVED | Noted: 2025-01-01 | Resolved: 2025-01-01

## 2025-05-26 PROBLEM — M79.89 SWELLING OF LEFT UPPER EXTREMITY: Status: RESOLVED | Noted: 2025-01-01 | Resolved: 2025-01-01

## 2025-05-26 PROBLEM — M05.79 RHEUMATOID ARTHRITIS INVOLVING MULTIPLE SITES WITH POSITIVE RHEUMATOID FACTOR (HCC): Status: RESOLVED | Noted: 2019-06-17 | Resolved: 2025-01-01

## 2025-05-26 PROBLEM — Z95.2 HISTORY OF TRANSCATHETER AORTIC VALVE REPLACEMENT (TAVR): Status: RESOLVED | Noted: 2018-09-20 | Resolved: 2025-01-01

## 2025-05-26 PROBLEM — I50.22 HEART FAILURE WITH MID-RANGE EJECTION FRACTION (HFMEF) (HCC): Status: RESOLVED | Noted: 2025-01-01 | Resolved: 2025-01-01

## 2025-05-26 PROBLEM — A04.72 C. DIFFICILE COLITIS: Status: RESOLVED | Noted: 2025-01-01 | Resolved: 2025-01-01

## 2025-05-26 PROBLEM — B35.1 ONYCHOMYCOSIS: Status: RESOLVED | Noted: 2025-01-01 | Resolved: 2025-01-01

## 2025-05-26 PROBLEM — Z71.89 GOALS OF CARE, COUNSELING/DISCUSSION: Status: RESOLVED | Noted: 2025-01-01 | Resolved: 2025-01-01

## 2025-05-26 PROBLEM — N17.9 ACUTE KIDNEY INJURY: Status: RESOLVED | Noted: 2025-01-01 | Resolved: 2025-01-01

## 2025-05-26 PROBLEM — N18.9 ACUTE KIDNEY INJURY SUPERIMPOSED ON CHRONIC KIDNEY DISEASE: Status: RESOLVED | Noted: 2025-01-01 | Resolved: 2025-01-01

## 2025-05-26 PROBLEM — R60.1 ANASARCA: Status: RESOLVED | Noted: 2025-01-01 | Resolved: 2025-01-01

## 2025-05-26 NOTE — PROCEDURES
PROCEDURE NOTE  Date: 5/26/2025   Name: Jermaine Burks  YOB: 1950    Intubation    Date/Time: 5/26/2025 4:24 AM    Performed by: Domingo Leavitt APRN - CNP  Authorized by: Domingo Leavitt APRN - CNP  Consent: The procedure was performed in an emergent situation.  Imaging studies: imaging studies available  Required items: required blood products, implants, devices, and special equipment available  Patient identity confirmed: arm band  Time out: Immediately prior to procedure a \"time out\" was called to verify the correct patient, procedure, equipment, support staff and site/side marked as required.  Indications: airway protection and respiratory failure (CODE BLUE)  Intubation method: video-assisted  Patient status: sedated  Preoxygenation: BVM  Pretreatment meds: Patient was given 2 mg of Versed prior to intubation.  Sedatives: propofol  Paralytic: none  Laryngoscope size: Mac 4  Tube size: 8.0 mm  Tube type: cuffed  Number of attempts: 1  Cricoid pressure: no  Cords visualized: yes  Post-procedure assessment: chest rise and ETCO2 monitor  Breath sounds: equal  Cuff inflated: yes  ETT to lip: 24 cm  Tube secured with: ETT avila  Chest x-ray interpreted by me and radiologist.  Chest x-ray findings: endotracheal tube in appropriate position  Patient tolerance: patient tolerated the procedure well with no immediate complications       intubation during CODE BLUE activation with CPR ongoing patient was intubated.     Using a glide scope and a #8 ET tube patient who was spontaneously moving throughout with CPR interventions and appeared to be awake and perfusing during CPR was given 2 mg of Versed he was then intubated using a glide scope and a #8 ET tube was able to visualize the cords on the first look past the ET tube through the cords on first attempt.   Tube was placed 24 cm at the lip and secured with a commercial tube avila.   Post intubation CO2 positive.  Bilateral auscultation

## 2025-05-26 NOTE — FLOWSHEET NOTE
Patient turned on right side while doing bath. While on patient side, patient went into asystole. Laid back on back and unresponsive. CPR initiated, code blue called. See code documentation.

## 2025-05-26 NOTE — PROCEDURES
PROCEDURE NOTE  Date: 5/26/2025   Name: Jermaine Burks  YOB: 1950    CENTRAL LINE    Date/Time: 5/26/2025 4:30 AM    Performed by: Domingo Leavitt APRN - CNP  Authorized by: Domingo Leavitt APRN - CNP  Consent: The procedure was performed in an emergent situation.  Required items: required blood products, implants, devices, and special equipment available  Patient identity confirmed: arm band  Time out: Immediately prior to procedure a \"time out\" was called to verify the correct patient, procedure, equipment, support staff and site/side marked as required.  Indications: vascular access and central pressure monitoring  Anesthesia method: Patient was given 2 mg of Versed this was a CODE BLUE situation patient was not conscious.    Sedation:  Patient sedated: yes  Sedatives: propofol  Analgesia: fentanyl  Sedation start date/time: 5/26/2025 2:17 AM  Vitals: Vital signs were monitored during sedation.    Preparation: skin prepped with 2% chlorhexidine  Skin prep agent dried: skin prep agent completely dried prior to procedure  Sterile barriers: all five maximum sterile barriers used - cap, mask, sterile gown, sterile gloves, and large sterile sheet  Hand hygiene: hand hygiene performed prior to central venous catheter insertion  Location details: left internal jugular  Patient position: flat  Catheter type: triple lumen  Catheter size: 7 Fr  Ultrasound guidance: yes  Sterile ultrasound techniques: sterile gel and sterile probe covers were used  Number of attempts: 3 (Patient's left IJ was accessed several times however the wire would not thread and the procedure had to be repeated)  Successful placement: yes  Post-procedure: line sutured and dressing applied  Assessment: blood return through all ports, placement verified by x-ray and no pneumothorax on x-ray  Complications: Patient has a INR greater than 10 and we are having some oozing around the triple-lumen catheter postprocedure, direct

## 2025-05-26 NOTE — FLOWSHEET NOTE
Patient's wife Dione here at bedside. Discussed with KANDIS Leavitt NP and Dr. Marin regarding code and that patient is currently maximized on 3 vasopressors and is requiring 100% pacing via [employer] just to maintain pulse. She verbalizes that she tried to call their children but knows they both keep their phones off at night. After discussion, would like to proceed with current treatment and not add anything else. Will change to DNRCCA with understanding that patient will inevitably pass and despite everything he is on patient's blood pressure continues to drop. At this time she will continue to try to reach her children while she stays at the bedside. May yet decide on full withdrawal of care after discussing with her children.

## 2025-05-26 NOTE — PROGRESS NOTES
During patient care patient was moved and patient developed significant bradycardia and then asystole.  CODE BLUE was called.  Chest compressions were performed, patient was administered epinephrine and dopamine. ROSC was intermittently achieved over the course of approx 10 to 15 minutes. Then ROSC was achieved for approx 5 minuts. Patient was intubated. Code blue was called a second time for bradycardia which ultimately resulted in the placement of transcutaneous pacer with capture.     Discussed with ICU attending who recommended contacting cardiology and transferring [facility] as patient will need venous pacing.    Spoke with cardiology here at [facility] who agreed with this plan and stated to speak to the interventional cardiologist and electrophysiologist at the [facility].    Started the transfer process.    Spoke with patient's wife and power of , Dione Burks, to update her on patient's status.  She will travel here to make decision on how to proceed regarding goals of care.     Received phone call from interventional cardiologist, Dr. Payan.  He does not think patient is stable enough for transfer and recommended calling him back after wife had made decision regarding goals of care.     Awaiting arrival of patient's wife

## 2025-05-26 NOTE — PROGRESS NOTES
I received a pelvic show from the transfer center to determine this lesion and possible need for temporary transvenous pacemaker, called the transfer center who put through ICU, I spoke to the family practice resident and I spoke to the ICU nurse practitioner, patient had 2 cardiac arrest with improvise of CPR, now he has transcutaneous pacemaker on, systolic blood pressure is in the 70s on max dose of Dopamine, and Levo, and they are going to start vasopressin, waiting for the patient wife to come and make a decision regarding the patient's CODE STATUS, who indicated she might withdraw care.

## 2025-05-26 NOTE — DISCHARGE SUMMARY
Discharge Summary    Date:2025  Patient Name: Jermaine Burks     YOB: 1950     Age: 74 y.o. MRN: [id number]    Admit Date:2025    Discharge Date: 25  Discharged Condition:     Discharge Diagnoses   Principal Problem:    Acute kidney injury superimposed on chronic kidney disease  Active Problems:    Permanent atrial fibrillation (HCC)    Alcoholic cirrhosis of liver without ascites (HCC)    Paroxysmal atrial fibrillation (HCC)    Essential hypertension    Palliative care encounter    History of transcatheter aortic valve replacement (TAVR)    Rheumatoid arthritis involving multiple sites with positive rheumatoid factor (HCC)    C. difficile colitis    Heart failure with mid-range ejection fraction (HFmEF) (HCC)    Anasarca    Acute kidney injury    Onychomycosis    Swelling of left upper extremity    Bilateral lower extremity edema    Goals of care, counseling/discussion  Resolved Problems:    * No resolved hospital problems. *      Hospital Stay   Narrative of Hospital Course:  Jermaine Burks was admitted on 2025 with a pertinent PMHx of alcoholic cirrhosis, chronic A-fib, esophageal varices, HTN, status post TAVR, seizures, sleep apnea, brain bleed , who presented to the ER from home with chief complaint of diarrhea nausea and fatigue.  He was admitted for JOSE. CT abdomen pelvis with IV contrast demonstrating cirrhotic liver, splenomegaly, ascites, esophageal varices, pleural effusions.  Cardiology was consulted for HFmrEF with A-fib RVR.  Nephrology was also consulted for his prerenal JOSE; nephrotoxic agents were held and IV fluids were given.  Pulmonology was consulted for recurrent pleural effusion and underwent thoracentesis.  Podiatry was consulted for footcare.  He was treated with vancomycin for C. difficile.  Patient underwent Doppler ultrasound of left upper extremity no DVT was found.  On 2025 he was transitioned to the ICU after he became

## 2025-05-26 NOTE — PROGRESS NOTES
Code blue note    Code michelle was called at 0205    On arrival, pt was asystole CPR in progress.    ACLS protocol was initiated and the following was given:  Adrenaline: 6 amps to epi throughout the course of the code    Atropine:  no  Calcium gluconate: no   Sodium bicarbonate: yes two amps      Patient intubated and placed on ventilator.yes at 0217 hrs  Central line inserted per team members.yes   ABG obtained revealed: pH of 7.103, pCO2 of 41.3, PO2 of 49.1, bicarb of 12.6,    We continued CPR for 35 mins total , and pt had a return of ROSC twice he was  started on Dopamine at 10mcg subsequently he was started on Levophed.  Patient was intubated and placed on ventilator.   patient achieved ROSC and remained stable for about 5 minutes and then went into asystole again and a second code was called.  Patient had CPR epinephrine again with profound bradycardia and was started on transcutaneous pacemaker we were able to achieve capture and were pacing him at a rate of 60, patient has a palpable femoral pulse with paced rhythm.  Patient hypotensive despite dopamine and Levophed we will start vasopressin.      Family medicine resident at bedside during code Dr Marin .  We contacted transfer line and were initiating a transfer to the [facility] for possible trans venous pacemaker placement.   Family, PCP and Intensives were updated.  Spoke with the patient's wife who has reservations about continuing aggressive care.  And is not certain that her  would want all of these interventions to continue.  She is on her way to the hospital     Postcode laboratory studies obtained patient's sodium 131, potassium 5.2, chloride 98, BUN 52 creatinine 1.7 lactic acid is elevated to 8.8.  We are giving a liter bolus of normal saline.       Domingo Leavitt, APRN - CNP  Critical care  Medicine  5/26/2025  3:43 AM       45 minutes critical care time not including procedures.   [id number]

## 2025-05-26 NOTE — PLAN OF CARE
Problem: Safety - Adult  Goal: Free from fall injury  Outcome: Progressing  Flowsheets (Taken 5/25/2025 1945)  Free From Fall Injury:   Instruct family/caregiver on patient safety   Based on caregiver fall risk screen, instruct family/caregiver to ask for assistance with transferring infant if caregiver noted to have fall risk factors     Problem: Chronic Conditions and Co-morbidities  Goal: Patient's chronic conditions and co-morbidity symptoms are monitored and maintained or improved  Outcome: Progressing     Problem: Discharge Planning  Goal: Discharge to home or other facility with appropriate resources  Outcome: Progressing     Problem: Pain  Goal: Verbalizes/displays adequate comfort level or baseline comfort level  5/26/2025 0045 by Cal Taveras RN  Outcome: Progressing  5/25/2025 1359 by Kimi Lacey RN  Outcome: Progressing  Flowsheets  Taken 5/25/2025 1200  Verbalizes/displays adequate comfort level or baseline comfort level: Assess pain using appropriate pain scale  Taken 5/25/2025 0800  Verbalizes/displays adequate comfort level or baseline comfort level: Assess pain using appropriate pain scale     Problem: Skin/Tissue Integrity  Goal: Skin integrity remains intact  Description: 1.  Monitor for areas of redness and/or skin breakdown2.  Assess vascular access sites hourly3.  Every 4-6 hours minimum:  Change oxygen saturation probe site4.  Every 4-6 hours:  If on nasal continuous positive airway pressure, respiratory therapy assess nares and determine need for appliance change or resting period  Outcome: Progressing  Flowsheets  Taken 5/25/2025 1945 by Cal Taveras, RN  Skin Integrity Remains Intact:   Monitor for areas of redness and/or skin breakdown   Assess vascular access sites hourly   Every 4-6 hours minimum:  Change oxygen saturation probe site   Turn and reposition as indicated   Assess need for specialty bed   Positioning devices   Pressure redistribution bed/mattress

## 2025-05-26 NOTE — FLOWSHEET NOTE
Dr. Radford was called and notified of the following findings:  patient is unresponsive with non-reactive pupils; no pulses palpated; no spontaneous breaths; no heart sounds auscultated.  KANDIS Leavitt NP  pronounced patient dead at 0648 on May 26, 2025.  Dr. Ribeiro will sign the death certificate.

## 2025-05-27 ENCOUNTER — TELEPHONE (OUTPATIENT)
Dept: FAMILY MEDICINE CLINIC | Age: 75
End: 2025-05-27

## 2025-05-27 ENCOUNTER — CARE COORDINATION (OUTPATIENT)
Dept: CARE COORDINATION | Age: 75
End: 2025-05-27

## 2025-05-27 LAB
EKG ATRIAL RATE: 394 BPM
EKG ATRIAL RATE: 57 BPM
EKG Q-T INTERVAL: 438 MS
EKG Q-T INTERVAL: 466 MS
EKG QRS DURATION: 162 MS
EKG QRS DURATION: 182 MS
EKG QTC CALCULATION (BAZETT): 465 MS
EKG QTC CALCULATION (BAZETT): 469 MS
EKG R AXIS: -44 DEGREES
EKG R AXIS: -46 DEGREES
EKG T AXIS: 128 DEGREES
EKG T AXIS: 132 DEGREES
EKG VENTRICULAR RATE: 61 BPM
EKG VENTRICULAR RATE: 68 BPM
FECAL PANCREATIC ELASTASE-1: >800 UG/G

## 2025-05-28 LAB — NON-GYN CYTOLOGY REPORT: NORMAL

## 2025-05-29 NOTE — TELEPHONE ENCOUNTER
Emailed death certificate and left a message for the   letting them know the certificate was sent.

## 2025-05-31 LAB
MICROORGANISM SPEC CULT: NORMAL
MICROORGANISM/AGENT SPEC: NORMAL
SERVICE CMNT-IMP: NORMAL
SPECIMEN DESCRIPTION: NORMAL
